# Patient Record
Sex: MALE | Race: WHITE | Employment: OTHER | URBAN - METROPOLITAN AREA
[De-identification: names, ages, dates, MRNs, and addresses within clinical notes are randomized per-mention and may not be internally consistent; named-entity substitution may affect disease eponyms.]

---

## 2017-05-19 PROBLEM — F03.90 SENILE DEMENTIA (HCC): Status: ACTIVE | Noted: 2017-05-19

## 2017-06-26 PROBLEM — M19.90 OA (OSTEOARTHRITIS): Status: ACTIVE | Noted: 2017-06-26

## 2017-06-26 PROBLEM — E78.2 MIXED HYPERLIPIDEMIA: Status: ACTIVE | Noted: 2017-06-26

## 2017-07-18 PROBLEM — E53.8 VITAMIN B12 DEFICIENCY: Status: ACTIVE | Noted: 2017-07-18
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
## 2017-07-18 PROBLEM — R89.9 ABNORMAL LABORATORY TEST: Status: ACTIVE | Noted: 2017-07-18

## 2017-07-31 PROBLEM — M25.572 BILATERAL ANKLE PAIN: Status: ACTIVE | Noted: 2017-07-31

## 2017-07-31 PROBLEM — M25.571 BILATERAL ANKLE PAIN: Status: ACTIVE | Noted: 2017-07-31

## 2017-07-31 PROBLEM — Z91.09 POLLEN ALLERGIES: Status: ACTIVE | Noted: 2017-07-31

## 2017-08-02 ENCOUNTER — APPOINTMENT (OUTPATIENT)
Dept: CT IMAGING | Age: 82
DRG: 841 | End: 2017-08-02
Attending: INTERNAL MEDICINE
Payer: MEDICARE

## 2017-08-02 ENCOUNTER — PATIENT OUTREACH (OUTPATIENT)
Dept: CASE MANAGEMENT | Age: 82
End: 2017-08-02

## 2017-08-02 ENCOUNTER — APPOINTMENT (OUTPATIENT)
Dept: GENERAL RADIOLOGY | Age: 82
DRG: 841 | End: 2017-08-02
Attending: NURSE PRACTITIONER
Payer: MEDICARE

## 2017-08-02 ENCOUNTER — HOSPITAL ENCOUNTER (INPATIENT)
Age: 82
LOS: 7 days | Discharge: NURSING, REHAB OR LONG-TERM CARE | DRG: 841 | End: 2017-08-10
Attending: INTERNAL MEDICINE | Admitting: INTERNAL MEDICINE
Payer: MEDICARE

## 2017-08-02 ENCOUNTER — HOSPITAL ENCOUNTER (OUTPATIENT)
Dept: LAB | Age: 82
Discharge: HOME | End: 2017-08-02
Payer: MEDICARE

## 2017-08-02 ENCOUNTER — DOCUMENTATION ONLY (OUTPATIENT)
Dept: ONCOLOGY | Age: 82
End: 2017-08-02

## 2017-08-02 DIAGNOSIS — C90.00 MULTIPLE MYELOMA NOT HAVING ACHIEVED REMISSION (HCC): ICD-10-CM

## 2017-08-02 DIAGNOSIS — F31.60 BIPOLAR AFFECTIVE DISORDER, CURRENT EPISODE MIXED, CURRENT EPISODE SEVERITY UNSPECIFIED (HCC): ICD-10-CM

## 2017-08-02 PROBLEM — K21.9 GASTROESOPHAGEAL REFLUX DISEASE: Status: ACTIVE | Noted: 2017-08-02

## 2017-08-02 PROBLEM — D72.819 LEUKOPENIA: Status: ACTIVE | Noted: 2017-08-02

## 2017-08-02 LAB
ALBUMIN SERPL BCP-MCNC: 3 G/DL (ref 3.2–4.6)
ALBUMIN SERPL BCP-MCNC: 3.1 G/DL (ref 3.2–4.6)
ALBUMIN/GLOB SERPL: 0.5 {RATIO} (ref 1.2–3.5)
ALBUMIN/GLOB SERPL: 0.5 {RATIO} (ref 1.2–3.5)
ALP SERPL-CCNC: 70 U/L (ref 50–136)
ALP SERPL-CCNC: 76 U/L (ref 50–136)
ALT SERPL-CCNC: 30 U/L (ref 12–65)
ALT SERPL-CCNC: 33 U/L (ref 12–65)
ANION GAP BLD CALC-SCNC: 3 MMOL/L (ref 7–16)
ANION GAP BLD CALC-SCNC: 7 MMOL/L (ref 7–16)
AST SERPL W P-5'-P-CCNC: 32 U/L (ref 15–37)
AST SERPL W P-5'-P-CCNC: 33 U/L (ref 15–37)
B2 MICROGLOB SERPL-MCNC: 4.3 MG/L (ref 0.8–2.34)
BASOPHILS # BLD AUTO: 0 K/UL (ref 0–0.2)
BASOPHILS # BLD AUTO: 0 K/UL (ref 0–0.2)
BASOPHILS # BLD: 0 % (ref 0–2)
BASOPHILS # BLD: 0 % (ref 0–2)
BILIRUB SERPL-MCNC: 0.4 MG/DL (ref 0.2–1.1)
BILIRUB SERPL-MCNC: 0.5 MG/DL (ref 0.2–1.1)
BONE MARROW PREP & W,BMA: NORMAL
BUN SERPL-MCNC: 21 MG/DL (ref 8–23)
BUN SERPL-MCNC: 23 MG/DL (ref 8–23)
CALCIUM SERPL-MCNC: 8 MG/DL (ref 8.3–10.4)
CALCIUM SERPL-MCNC: 8.4 MG/DL (ref 8.3–10.4)
CHLORIDE SERPL-SCNC: 109 MMOL/L (ref 98–107)
CHLORIDE SERPL-SCNC: 110 MMOL/L (ref 98–107)
CO2 SERPL-SCNC: 25 MMOL/L (ref 21–32)
CO2 SERPL-SCNC: 26 MMOL/L (ref 21–32)
CREAT SERPL-MCNC: 0.89 MG/DL (ref 0.8–1.5)
CREAT SERPL-MCNC: 0.98 MG/DL (ref 0.8–1.5)
DIFFERENTIAL METHOD BLD: ABNORMAL
DIFFERENTIAL METHOD BLD: ABNORMAL
EOSINOPHIL # BLD: 0.1 K/UL (ref 0–0.8)
EOSINOPHIL # BLD: 0.1 K/UL (ref 0–0.8)
EOSINOPHIL NFR BLD: 2 % (ref 0.5–7.8)
EOSINOPHIL NFR BLD: 2 % (ref 0.5–7.8)
ERYTHROCYTE [DISTWIDTH] IN BLOOD BY AUTOMATED COUNT: 14.6 % (ref 11.9–14.6)
ERYTHROCYTE [DISTWIDTH] IN BLOOD BY AUTOMATED COUNT: 14.9 % (ref 11.9–14.6)
GLOBULIN SER CALC-MCNC: 6.1 G/DL (ref 2.3–3.5)
GLOBULIN SER CALC-MCNC: 6.2 G/DL (ref 2.3–3.5)
GLUCOSE SERPL-MCNC: 146 MG/DL (ref 65–100)
GLUCOSE SERPL-MCNC: 72 MG/DL (ref 65–100)
HCT VFR BLD AUTO: 32.8 % (ref 41.1–50.3)
HCT VFR BLD AUTO: 33.8 % (ref 41.1–50.3)
HGB BLD-MCNC: 11.5 G/DL (ref 13.6–17.2)
HGB BLD-MCNC: 11.8 G/DL (ref 13.6–17.2)
IMM GRANULOCYTES # BLD: 0 K/UL (ref 0–0.5)
IMM GRANULOCYTES NFR BLD AUTO: 0.3 % (ref 0–5)
KAPPA LC FREE SER-MCNC: 57.16 MG/L (ref 3.3–19.4)
KAPPA LC FREE/LAMBDA FREE SER: 4.61 {RATIO} (ref 0.26–1.65)
LAMBDA LC FREE SERPL-MCNC: 12.39 MG/L (ref 5.71–26.3)
LYMPHOCYTES # BLD AUTO: 44 % (ref 13–44)
LYMPHOCYTES # BLD AUTO: 49 % (ref 13–44)
LYMPHOCYTES # BLD: 1.5 K/UL (ref 0.5–4.6)
LYMPHOCYTES # BLD: 1.7 K/UL (ref 0.5–4.6)
MAGNESIUM SERPL-MCNC: 2.1 MG/DL (ref 1.8–2.4)
MCH RBC QN AUTO: 34.3 PG (ref 26.1–32.9)
MCH RBC QN AUTO: 34.8 PG (ref 26.1–32.9)
MCHC RBC AUTO-ENTMCNC: 34.9 G/DL (ref 31.4–35)
MCHC RBC AUTO-ENTMCNC: 35.1 G/DL (ref 31.4–35)
MCV RBC AUTO: 98.3 FL (ref 79.6–97.8)
MCV RBC AUTO: 99.4 FL (ref 79.6–97.8)
MONOCYTES # BLD: 0.4 K/UL (ref 0.1–1.3)
MONOCYTES # BLD: 0.4 K/UL (ref 0.1–1.3)
MONOCYTES NFR BLD AUTO: 10 % (ref 4–12)
MONOCYTES NFR BLD AUTO: 12 % (ref 4–12)
NEUTS SEG # BLD: 1.3 K/UL (ref 1.7–8.2)
NEUTS SEG # BLD: 1.5 K/UL (ref 1.7–8.2)
NEUTS SEG NFR BLD AUTO: 37 % (ref 43–78)
NEUTS SEG NFR BLD AUTO: 44 % (ref 43–78)
NRBC # BLD: 0 K/UL (ref 0–0.2)
PLATELET # BLD AUTO: 112 K/UL (ref 150–450)
PLATELET # BLD AUTO: 120 K/UL (ref 150–450)
PMV BLD AUTO: 10.7 FL (ref 10.8–14.1)
PMV BLD AUTO: 11 FL (ref 10.8–14.1)
POTASSIUM SERPL-SCNC: 3.7 MMOL/L (ref 3.5–5.1)
POTASSIUM SERPL-SCNC: 3.9 MMOL/L (ref 3.5–5.1)
PROT SERPL-MCNC: 9.1 G/DL (ref 6.3–8.2)
PROT SERPL-MCNC: 9.3 G/DL (ref 6.3–8.2)
RBC # BLD AUTO: 3.3 M/UL (ref 4.23–5.67)
RBC # BLD AUTO: 3.44 M/UL (ref 4.23–5.67)
SODIUM SERPL-SCNC: 137 MMOL/L (ref 136–145)
SODIUM SERPL-SCNC: 143 MMOL/L (ref 136–145)
WBC # BLD AUTO: 3.4 K/UL (ref 4.3–11.1)
WBC # BLD AUTO: 3.4 K/UL (ref 4.3–11.1)

## 2017-08-02 PROCEDURE — 88305 TISSUE EXAM BY PATHOLOGIST: CPT | Performed by: INTERNAL MEDICINE

## 2017-08-02 PROCEDURE — 88280 CHROMOSOME KARYOTYPE STUDY: CPT

## 2017-08-02 PROCEDURE — 82232 ASSAY OF BETA-2 PROTEIN: CPT | Performed by: INTERNAL MEDICINE

## 2017-08-02 PROCEDURE — 88185 FLOWCYTOMETRY/TC ADD-ON: CPT

## 2017-08-02 PROCEDURE — 36415 COLL VENOUS BLD VENIPUNCTURE: CPT | Performed by: NURSE PRACTITIONER

## 2017-08-02 PROCEDURE — 88341 IMHCHEM/IMCYTCHM EA ADD ANTB: CPT | Performed by: INTERNAL MEDICINE

## 2017-08-02 PROCEDURE — 83735 ASSAY OF MAGNESIUM: CPT | Performed by: NURSE PRACTITIONER

## 2017-08-02 PROCEDURE — 88312 SPECIAL STAINS GROUP 1: CPT | Performed by: INTERNAL MEDICINE

## 2017-08-02 PROCEDURE — 88237 TISSUE CULTURE BONE MARROW: CPT

## 2017-08-02 PROCEDURE — 88184 FLOWCYTOMETRY/ TC 1 MARKER: CPT

## 2017-08-02 PROCEDURE — 88112 CYTOPATH CELL ENHANCE TECH: CPT

## 2017-08-02 PROCEDURE — 88374 M/PHMTRC ALYS ISHQUANT/SEMIQ: CPT

## 2017-08-02 PROCEDURE — 36415 COLL VENOUS BLD VENIPUNCTURE: CPT | Performed by: INTERNAL MEDICINE

## 2017-08-02 PROCEDURE — 88185 FLOWCYTOMETRY/TC ADD-ON: CPT | Performed by: INTERNAL MEDICINE

## 2017-08-02 PROCEDURE — 74011250636 HC RX REV CODE- 250/636: Performed by: NURSE PRACTITIONER

## 2017-08-02 PROCEDURE — 86334 IMMUNOFIX E-PHORESIS SERUM: CPT | Performed by: INTERNAL MEDICINE

## 2017-08-02 PROCEDURE — 88264 CHROMOSOME ANALYSIS 20-25: CPT

## 2017-08-02 PROCEDURE — 88342 IMHCHEM/IMCYTCHM 1ST ANTB: CPT | Performed by: INTERNAL MEDICINE

## 2017-08-02 PROCEDURE — 74011000250 HC RX REV CODE- 250: Performed by: PHYSICIAN ASSISTANT

## 2017-08-02 PROCEDURE — 77075 RADEX OSSEOUS SURVEY COMPL: CPT

## 2017-08-02 PROCEDURE — 85025 COMPLETE CBC W/AUTO DIFF WBC: CPT | Performed by: INTERNAL MEDICINE

## 2017-08-02 PROCEDURE — 82652 VIT D 1 25-DIHYDROXY: CPT | Performed by: INTERNAL MEDICINE

## 2017-08-02 PROCEDURE — 84165 PROTEIN E-PHORESIS SERUM: CPT | Performed by: INTERNAL MEDICINE

## 2017-08-02 PROCEDURE — 88364 INSITU HYBRIDIZATION (FISH): CPT

## 2017-08-02 PROCEDURE — 88367 INSITU HYBRIDIZATION AUTO: CPT

## 2017-08-02 PROCEDURE — 88313 SPECIAL STAINS GROUP 2: CPT | Performed by: INTERNAL MEDICINE

## 2017-08-02 PROCEDURE — 88311 DECALCIFY TISSUE: CPT | Performed by: INTERNAL MEDICINE

## 2017-08-02 PROCEDURE — 77012 CT SCAN FOR NEEDLE BIOPSY: CPT

## 2017-08-02 PROCEDURE — 80053 COMPREHEN METABOLIC PANEL: CPT | Performed by: INTERNAL MEDICINE

## 2017-08-02 PROCEDURE — 80053 COMPREHEN METABOLIC PANEL: CPT | Performed by: NURSE PRACTITIONER

## 2017-08-02 PROCEDURE — 88184 FLOWCYTOMETRY/ TC 1 MARKER: CPT | Performed by: INTERNAL MEDICINE

## 2017-08-02 PROCEDURE — 85025 COMPLETE CBC W/AUTO DIFF WBC: CPT | Performed by: NURSE PRACTITIONER

## 2017-08-02 PROCEDURE — 83883 ASSAY NEPHELOMETRY NOT SPEC: CPT | Performed by: INTERNAL MEDICINE

## 2017-08-02 PROCEDURE — 99218 HC RM OBSERVATION: CPT

## 2017-08-02 PROCEDURE — 88365 INSITU HYBRIDIZATION (FISH): CPT

## 2017-08-02 PROCEDURE — 74011250637 HC RX REV CODE- 250/637: Performed by: NURSE PRACTITIONER

## 2017-08-02 RX ORDER — NAPROXEN 250 MG/1
500 TABLET ORAL
Status: DISCONTINUED | OUTPATIENT
Start: 2017-08-02 | End: 2017-08-10 | Stop reason: HOSPADM

## 2017-08-02 RX ORDER — ONDANSETRON 2 MG/ML
4 INJECTION INTRAMUSCULAR; INTRAVENOUS
Status: DISCONTINUED | OUTPATIENT
Start: 2017-08-02 | End: 2017-08-10 | Stop reason: HOSPADM

## 2017-08-02 RX ORDER — DONEPEZIL HYDROCHLORIDE 5 MG/1
10 TABLET, FILM COATED ORAL DAILY
Status: DISCONTINUED | OUTPATIENT
Start: 2017-08-03 | End: 2017-08-10 | Stop reason: HOSPADM

## 2017-08-02 RX ORDER — BISMUTH SUBSALICYLATE 262 MG
1 TABLET,CHEWABLE ORAL DAILY
COMMUNITY
End: 2019-05-23

## 2017-08-02 RX ORDER — ASPIRIN 81 MG/1
81 TABLET ORAL DAILY
Status: DISCONTINUED | OUTPATIENT
Start: 2017-08-03 | End: 2017-08-10 | Stop reason: HOSPADM

## 2017-08-02 RX ORDER — SODIUM CHLORIDE 9 MG/ML
75 INJECTION, SOLUTION INTRAVENOUS CONTINUOUS
Status: DISCONTINUED | OUTPATIENT
Start: 2017-08-02 | End: 2017-08-10 | Stop reason: HOSPADM

## 2017-08-02 RX ORDER — LIDOCAINE HYDROCHLORIDE 20 MG/ML
2-20 INJECTION, SOLUTION INFILTRATION; PERINEURAL
Status: DISCONTINUED | OUTPATIENT
Start: 2017-08-02 | End: 2017-08-09 | Stop reason: HOSPADM

## 2017-08-02 RX ORDER — SIMVASTATIN 20 MG/1
20 TABLET, FILM COATED ORAL
Status: DISCONTINUED | OUTPATIENT
Start: 2017-08-02 | End: 2017-08-10 | Stop reason: HOSPADM

## 2017-08-02 RX ORDER — ENOXAPARIN SODIUM 100 MG/ML
40 INJECTION SUBCUTANEOUS EVERY 24 HOURS
Status: DISCONTINUED | OUTPATIENT
Start: 2017-08-02 | End: 2017-08-10 | Stop reason: HOSPADM

## 2017-08-02 RX ORDER — ALPRAZOLAM 0.5 MG/1
1 TABLET ORAL
Status: DISCONTINUED | OUTPATIENT
Start: 2017-08-02 | End: 2017-08-10 | Stop reason: HOSPADM

## 2017-08-02 RX ORDER — ESCITALOPRAM OXALATE 10 MG/1
20 TABLET ORAL DAILY
Status: DISCONTINUED | OUTPATIENT
Start: 2017-08-03 | End: 2017-08-03

## 2017-08-02 RX ORDER — MECLIZINE HYDROCHLORIDE 25 MG/1
25 TABLET ORAL
Status: DISCONTINUED | OUTPATIENT
Start: 2017-08-02 | End: 2017-08-10 | Stop reason: HOSPADM

## 2017-08-02 RX ORDER — HYDROCODONE BITARTRATE AND ACETAMINOPHEN 7.5; 325 MG/1; MG/1
1 TABLET ORAL
Status: DISCONTINUED | OUTPATIENT
Start: 2017-08-02 | End: 2017-08-10 | Stop reason: HOSPADM

## 2017-08-02 RX ADMIN — LIDOCAINE HYDROCHLORIDE 160 MG: 20 INJECTION, SOLUTION INFILTRATION; PERINEURAL at 14:48

## 2017-08-02 RX ADMIN — ALPRAZOLAM 1 MG: 0.5 TABLET ORAL at 22:15

## 2017-08-02 RX ADMIN — SODIUM CHLORIDE 75 ML/HR: 900 INJECTION, SOLUTION INTRAVENOUS at 22:40

## 2017-08-02 RX ADMIN — ENOXAPARIN SODIUM 40 MG: 40 INJECTION SUBCUTANEOUS at 22:12

## 2017-08-02 RX ADMIN — SODIUM BICARBONATE 2 ML: 0.2 INJECTION, SOLUTION INTRAVENOUS at 14:48

## 2017-08-02 RX ADMIN — NAPROXEN 250 MG: 250 TABLET ORAL at 23:26

## 2017-08-02 NOTE — PROGRESS NOTES
TRANSFER - IN REPORT:    Verbal report received from  [facility]  on Radha Ba  being received from   [facility]  for ordered procedure- work-up Bone Marrow  Biopsy and Bone Survery      Report consisted of patients Situation, Background, Assessment and   Recommendations(SBAR). Information from the following report(s) SBAR and Kardex was reviewed with the receiving nurse. Opportunity for questions and clarification was provided. Assessment completed upon patients arrival to unit and care assumed.

## 2017-08-02 NOTE — PROGRESS NOTES
8/2/17:  Patient in for new patient consult with Dr. Hernesto Asif. Patient referred by Dr. Jimi Mahmood to Dr. Kyle Clark for elevated protein in urine. Patient alert and oriented x 3 in the office today but admits to being forgetful at times. Patient is hard of hearing and speaks loudly to compensate. Dr. Kyle Clark reviewed labs and discussed plan. Patient lives alone and unable to travel back and forth to [facility].  Plan is to admit for further diagnostic work-up. During ov, patient mentioned to this navigator of \"highs and lows\" that he experiences with his bi-polar. He mentioned suicidal thoughts during \"lows\" but doesn't feel that way currently. , Trey Whatley, made aware of patient's comments. Patient reports he has seen Dr. Paul Bailey in the past for psychiatry. SW met with patient briefly. SW arranged for transportation to [facility] for admission to room 537. This navigator called patient's daughter, Claude Ammons, per his request to update family on his condition. Claude Ammons contact is [phone]. John Bland NP made aware of patient's admission. Report called to RN, Andrea Kinney on 5th Floor. Of note, patient's car is still at the cancer center.

## 2017-08-02 NOTE — PROGRESS NOTES
Dual head to toe skin assessment completed with this nurse and Guadalupe Kevin RN no breaks in skin good skin integrity .

## 2017-08-02 NOTE — PROGRESS NOTES
END OF SHIFT NOTE:    Intake/Output      Voiding: yes incont. In IR   Catheter: no   Drain:              Stool:  No  occurrences. Emesis:  No  occurrences. VITAL SIGNS  Patient Vitals for the past 12 hrs:   Temp Pulse Resp BP SpO2   08/02/17 1522 98.5 °F (36.9 °C) (!) 55 20 167/69 100 %   08/02/17 1340 97.8 °F (36.6 °C) (!) 54 18 131/64 100 %       Pain Assessment  Pain 1  Pain Scale 1: Numeric (0 - 10) (08/02/17 1637)  Pain Intensity 1: 0 (08/02/17 1637)  Patient Stated Pain Goal: 0 (08/02/17 1637)    Ambulating   bed     Additional Information: Patient is  very talkative. Call place to Pastoral care per patient request to have confession by . [facility] Psychiatry  consult  Perform by Dr. Eloy Lam report fax copy place om patient 's hard chart with his dx and recommendation. Patient is very hear of hearing. Close caption place on his television. Bone marrow biopsy done and Bone survey to be done this pm.     Shift report given to oncoming nurse Farshad Edmondson RN  at the bedside.     Rylan Latwon RN

## 2017-08-02 NOTE — PROGRESS NOTES
TRANSFER - OUT REPORT:    Verbal report given to Lam Pizarro RN(name) on [facility]  being transferred to 5th floor(unit) for routine progression of care       Report consisted of patients Situation, Background, Assessment and   Recommendations(SBAR). Information from the following report(s) Procedure Summary was reviewed with the receiving nurse. Lines:   Peripheral IV 03/23/15 Left Hand (Active)        Opportunity for questions and clarification was provided. Patient transported with:   Registered Nurse     Nurse encouraged to call IR for questions.

## 2017-08-02 NOTE — PROCEDURES
Department of Interventional Radiology  [phone]        Interventional Radiology Brief Procedure Note    Patient: Viki Wang MRN: [id number]  SSN: [id number]    YOB: 1935  Age: [de-identified] y.o.   Sex: male      Date of Procedure: 8/2/2017    Pre-Procedure Diagnosis: multiple myeloma    Post-Procedure Diagnosis: SAME    Procedure(s): Image Guided Biopsy    Brief Description of Procedure: CT guided right iliac bone marrow aspiration and core biopsy    Performed By: Paris Dias PA-C     Assistants: None    Anesthesia:Lidocaine    Estimated Blood Loss: Less than 10ml    Specimens: core and aspirate    Implants: None    Findings: no post biopsy bleeding    Complications: None    Recommendations: bedrest     Follow Up: referring MD    Signed By: Paris Dias PA-C     August 2, 2017

## 2017-08-02 NOTE — PROGRESS NOTES
SW received referral from RN Navigator Ness Powell to assist with pt needs. JUAN reviewed pt's chart, significant for dx of bipolar and Alzheimer's. MD to admit pt for testing overnight. JUAN met with pt to introduce self and services. Pt stated he was agreeable to admission but was concerned about transportation and alerting his family of his admission. RN Navigator called pt's daughter and made her aware of pt's admission. JUAN arranged for [place] transportation to [facility] and relayed this to pt. Pt verbalized understanding. JUAN updated inpt CMs of pt admission. JUAN provided pt with SW contact information and encouraged pt to call should any questions arise. Pt verbalized understanding. SW intends to follow up.

## 2017-08-02 NOTE — H&P
Inpatient Hematology / Oncology History and Physical    Reason for Asmission:  multimyeloma;Leukopenia    History of Present Illness:  Mr. Reny Wetzel is a [de-identified] y.o. male admitted on 8/2/2017 with a primary diagnosis of Multiple Myeloma, IgG Kappa, ISS Stage II, he expressed suicidal ideation after being told the diagnosis and treatment plan, he will be evaluated by Psychiatry during this admission. Review of Systems:  Constitutional c/o  appetite changes and fatigue. HEENT Denies trauma, blurry vision, hearing loss, ear pain, nosebleeds, sore throat, neck pain and ear discharge. Skin Denies lesions or rashes. Lungs Denies dyspnea, cough, sputum production or hemoptysis. Cardiovascular Denies chest pain, palpitations, or lower extremity edema. Gastrointestinal Denies nausea, vomiting, changes in bowel habits, bloody or black stools, abdominal pain.  Denies dysuria, frequency or hesitancy of urination. Neuro Denies headaches, visual changes or ataxia. Denies dizziness, tingling, tremors, sensory change, speech change, focal weakness or headaches. Hematology Denies easy bruising or bleeding, denies gingival bleeding or epistaxis. Endo Denies heat/cold intolerance, denies diabetes or thyroid abnormalities. MSK Denies back pain, arthralgias, myalgias or frequent falls. Psychiatric/Behavioral He expressed suicidal ideation.          No Known Allergies  Past Medical History:   Diagnosis Date    Acute encephalopathy 3/22/2015    Altered mental status 9/16/2014    Alzheimer disease     Anemia 9/16/2014    MILD      Anxiety     Asymmetrical sensorineural hearing loss     Bipolar disorder ([facility]     NOT TREATED, DIAGNOSED MANY YEARS AGO    Cataract 9/8/2016    Cortical hemorrhage (HCC) 9/17/2014    Elevated AST (SGOT) 9/16/2014    GERD (gastroesophageal reflux disease)     not Tx    H/O seasonal allergies     History of TIA (transient ischemic attack) 9/16/2014    Hypomagnesemia 9/16/2014    MILD      Panic attack     Senile dementia 5/19/2017    Stroke [facility])     ? TIA, PUT ON PLAVIX, TOOK SELF OFF    Syncope and collapse 3/22/2015    Tinea corporis 9/16/2014    Tinnitus      Past Surgical History:   Procedure Laterality Date    HX APPENDECTOMY      HX COLONOSCOPY  MULTIPLE OVER THE YEARS    NO CANCER    HX OTHER SURGICAL  AGE 26    COLONIC POLYP REMOVAL     Family History   Problem Relation Age of Onset    Diabetes Mother      COMPLICATIONS OF DM    Cancer Mother     Heart Disease Mother     Lung Disease Father      BLACK LUNG    Dementia Sister     Heart Disease Brother      \"OLD AGE\"    Other Son      ESTRANGED, IN [place]    Other Daughter      1 ESTRANGED, [place], 1 IN [place]     Social History     Social History    Marital status:      Spouse name: N/A    Number of children: N/A    Years of education: N/A     Occupational History    Not on file. Social History Main Topics    Smoking status: Former Smoker     Packs/day: 2.00     Years: 16.00     Types: Cigarettes    Smokeless tobacco: Never Used      Comment: QUIT AGE 37    Alcohol use No    Drug use: No    Sexual activity: Not Currently     Partners: Female     Birth control/ protection: None     Other Topics Concern    Not on file     Social History Narrative    9/16/14:  PATIENT IS , LIVES WITH CATARINA MAYA. LIVED MOST OF LIVE IN [place], OWNED A BUSINESS [facility]. HE MOVED TO [facility] FOR 5 YEARS, HAS BEEN HERE (?) FOR ABOUT 10 YEARS. HAS A DAUGHTER IN [place] THAT IS NOT SPEAKING TO HIM, A SON IN [place] WHO IS NOT SPEAKING TO HIM, AND A DAUGHTER IN [facility]. BROTHER AND SISTER ARE DEAD. ONLY FRIEND IS SERA TORRES, ([facility]: [phone]), A  WHO LOOKS AFTER THE PATIENT. CALL HIM ON DISCHARGE AS HE HAS PATIENT'S DOG AND HOUSE KEYS.        Current Facility-Administered Medications   Medication Dose Route Frequency Provider Last Rate Last Dose    ALPRAZolam (XANAX) tablet 1 mg  1 mg Oral TID PRN Linda Zepeda, NP        [START ON 8/3/2017] aspirin delayed-release tablet 81 mg  81 mg Oral DAILY Linda Zepeda, NP        [START ON 8/3/2017] donepezil (ARICEPT) tablet 10 mg  10 mg Oral DAILY Linda Zepeda, NP        [START ON 8/3/2017] escitalopram oxalate (LEXAPRO) tablet 20 mg  20 mg Oral DAILY Linda Zepeda, NP        simvastatin (ZOCOR) tablet 20 mg  20 mg Oral QHS Linda Zepeda, NP        meclizine (ANTIVERT) tablet 25 mg  25 mg Oral Q6H PRN Linda Zepeda, NP        ondansetron [facility]) injection 4 mg  4 mg IntraVENous Q4H PRN Linda Zepeda, NP        HYDROcodone-acetaminophen (NORCO) 7.5-325 mg per tablet 1 Tab  1 Tab Oral Q4H PRN Linda Zepeda, NP        0.9% sodium chloride infusion  75 mL/hr IntraVENous CONTINUOUS Linda Zepeda, NP        enoxaparin (LOVENOX) injection 40 mg  40 mg SubCUTAneous Q24H Linda Zepeda, NP        lidocaine (XYLOCAINE) 20 mg/mL (2 %) injection  mg  2-20 mL IntraDERMal Multiple Raull SOPHIA Altman   160 mg at 17 1448       OBJECTIVE:  Patient Vitals for the past 8 hrs:   BP Temp Pulse Resp SpO2 Weight   17 1522 167/69 98.5 °F (36.9 °C) (!) 55 20 100 % -   17 1420 - - - - - 261 lb (118.4 kg)   17 1340 131/64 97.8 °F (36.6 °C) (!) 54 18 100 % -     Temp (24hrs), Av.1 °F (36.7 °C), Min:97.8 °F (36.6 °C), Max:98.5 °F (36.9 °C)         Physical Exam:  Constitutional: Well developed, well nourished male in no acute distress, sitting comfortably in the hospital bed. HEENT: Normocephalic and atraumatic. Oropharynx is clear, mucous membranes are moist.  Pupils are equal, round, and reactive to light. Extraocular muscles are intact. Sclerae anicteric. Neck supple without JVD. No thyromegaly present. Lymph node   No palpable submandibular, cervical, supraclavicular, axillary or inguinal lymph nodes. Skin Warm and dry. No bruising and no rash noted. No erythema. No pallor.     Respiratory Lungs are clear to auscultation bilaterally without wheezes, rales or rhonchi, normal air exchange without accessory muscle use. CVS Normal rate, regular rhythm and normal S1 and S2 with a 2/6 systolic murmur. Abdomen Soft, nontender and nondistended, normoactive bowel sounds. No palpable mass. No hepatosplenomegaly. Neuro Grossly nonfocal with no obvious sensory or motor deficits. MSK Normal range of motion in general.  No edema and no tenderness. Psych Appears depressed. Labs:    Recent Results (from the past 24 hour(s))   CBC WITH AUTOMATED DIFF    Collection Time: 08/02/17 11:47 AM   Result Value Ref Range    WBC 3.4 (L) 4.3 - 11.1 K/uL    RBC 3.30 (L) 4.23 - 5.67 M/uL    HGB 11.5 (L) 13.6 - 17.2 g/dL    HCT 32.8 (L) 41.1 - 50.3 %    MCV 99.4 (H) 79.6 - 97.8 FL    MCH 34.8 (H) 26.1 - 32.9 PG    MCHC 35.1 (H) 31.4 - 35.0 g/dL    RDW 14.6 11.9 - 14.6 %    PLATELET 450 (L) 895 - 450 K/uL    MPV 10.7 (L) 10.8 - 14.1 FL    ABSOLUTE NRBC 0.00 0.0 - 0.2 K/uL    DF AUTOMATED      NEUTROPHILS 44 43 - 78 %    LYMPHOCYTES 44 13 - 44 %    MONOCYTES 10 4.0 - 12.0 %    EOSINOPHILS 2 0.5 - 7.8 %    BASOPHILS 0 0.0 - 2.0 %    ABS. NEUTROPHILS 1.5 (L) 1.7 - 8.2 K/UL    ABS. LYMPHOCYTES 1.5 0.5 - 4.6 K/UL    ABS. MONOCYTES 0.4 0.1 - 1.3 K/UL    ABS. EOSINOPHILS 0.1 0.0 - 0.8 K/UL    ABS. BASOPHILS 0.0 0.0 - 0.2 K/UL   METABOLIC PANEL, COMPREHENSIVE    Collection Time: 08/02/17 11:47 AM   Result Value Ref Range    Sodium 137 136 - 145 mmol/L    Potassium 3.9 3.5 - 5.1 mmol/L    Chloride 109 (H) 98 - 107 mmol/L    CO2 25 21 - 32 mmol/L    Anion gap 3 (L) 7 - 16 mmol/L    Glucose 146 (H) 65 - 100 mg/dL    BUN 23 8 - 23 MG/DL    Creatinine 0.98 0.8 - 1.5 MG/DL    GFR est AA >60 >60 ml/min/1.73m2    GFR est non-AA >60 >60 ml/min/1.73m2    Calcium 8.4 8.3 - 10.4 MG/DL    Bilirubin, total 0.5 0.2 - 1.1 MG/DL    ALT (SGPT) 33 12 - 65 U/L    AST (SGOT) 33 15 - 37 U/L    Alk.  phosphatase 76 50 - 136 U/L    Protein, total 9.3 (H) 6.3 - 8.2 g/dL    Albumin 3.1 (L) 3.2 - 4.6 g/dL Globulin 6.2 (H) 2.3 - 3.5 g/dL    A-G Ratio 0.5 (L) 1.2 - 3.5     PROTEIN ELEC WITH RENE, SERUM    Collection Time: 08/02/17 11:47 AM   Result Value Ref Range    Protein, total PENDING g/dL    A-G Ratio PENDING      ALBUMIN PENDING g/dL    ALPHA 1 PENDING g/dL    ALPHA 2 PENDING g/dL    BETA PENDING g/dL    GAMMA PENDING 10 - 12 g/dL    M-Perry PENDING 0 g/dL    Immunoglobulin G 3865 (H) 610 - 1616 mg/dL    Immunoglobulin A 32 (L) 85 - 499 mg/dL    Immunoglobulin M 29 (L) 35 - 242 mg/dL    PEP Interpretation PENDING     RENE Interpretation PENDING    BETA-2 MICROGLOBULIN    Collection Time: 08/02/17 11:47 AM   Result Value Ref Range    Beta-2 Microglobulin 4.3 (H) 0.80 - 2.34 mg/L   FREE LIGHT CHAINS, KAPPA/LAMBDA, QT    Collection Time: 08/02/17 11:47 AM   Result Value Ref Range    Kappa Free Light Chain 57.16 (H) 3.30 - 19.40 mg/L    Lambda Free Light Chain 12.39 5.71 - 26.30 mg/L    Kappa/Lambda Ratio 4.61 (H) 0.26 - 1.65     BONE MARROW PREP & PANCHAL STAIN    Collection Time: 08/02/17  2:50 PM   Result Value Ref Range    Bone Marrow Prep & Lonia Nailer Stain FOR HEMATOLOGY SERVICES RENDERED     METABOLIC PANEL, COMPREHENSIVE    Collection Time: 08/02/17  3:19 PM   Result Value Ref Range    Sodium 143 136 - 145 mmol/L    Potassium 3.7 3.5 - 5.1 mmol/L    Chloride 110 (H) 98 - 107 mmol/L    CO2 26 21 - 32 mmol/L    Anion gap 7 7 - 16 mmol/L    Glucose 72 65 - 100 mg/dL    BUN 21 8 - 23 MG/DL    Creatinine 0.89 0.8 - 1.5 MG/DL    GFR est AA >60 >60 ml/min/1.73m2    GFR est non-AA >60 >60 ml/min/1.73m2    Calcium 8.0 (L) 8.3 - 10.4 MG/DL    Bilirubin, total 0.4 0.2 - 1.1 MG/DL    ALT (SGPT) 30 12 - 65 U/L    AST (SGOT) 32 15 - 37 U/L    Alk.  phosphatase 70 50 - 136 U/L    Protein, total 9.1 (H) 6.3 - 8.2 g/dL    Albumin 3.0 (L) 3.2 - 4.6 g/dL    Globulin 6.1 (H) 2.3 - 3.5 g/dL    A-G Ratio 0.5 (L) 1.2 - 3.5     MAGNESIUM    Collection Time: 08/02/17  3:19 PM   Result Value Ref Range    Magnesium 2.1 1.8 - 2.4 mg/dL   CBC WITH AUTOMATED DIFF    Collection Time: 08/02/17  3:19 PM   Result Value Ref Range    WBC 3.4 (L) 4.3 - 11.1 K/uL    RBC 3.44 (L) 4.23 - 5.67 M/uL    HGB 11.8 (L) 13.6 - 17.2 g/dL    HCT 33.8 (L) 41.1 - 50.3 %    MCV 98.3 (H) 79.6 - 97.8 FL    MCH 34.3 (H) 26.1 - 32.9 PG    MCHC 34.9 31.4 - 35.0 g/dL    RDW 14.9 (H) 11.9 - 14.6 %    PLATELET 702 (L) 515 - 450 K/uL    MPV 11.0 10.8 - 14.1 FL    DF AUTOMATED      NEUTROPHILS 37 (L) 43 - 78 %    LYMPHOCYTES 49 (H) 13 - 44 %    MONOCYTES 12 4.0 - 12.0 %    EOSINOPHILS 2 0.5 - 7.8 %    BASOPHILS 0 0.0 - 2.0 %    IMMATURE GRANULOCYTES 0.3 0.0 - 5.0 %    ABS. NEUTROPHILS 1.3 (L) 1.7 - 8.2 K/UL    ABS. LYMPHOCYTES 1.7 0.5 - 4.6 K/UL    ABS. MONOCYTES 0.4 0.1 - 1.3 K/UL    ABS. EOSINOPHILS 0.1 0.0 - 0.8 K/UL    ABS. BASOPHILS 0.0 0.0 - 0.2 K/UL    ABS. IMM. GRANS. 0.0 0.0 - 0.5 K/UL       Imaging:  No images are attached to the encounter. ASSESSMENT:  Problem List  Date Reviewed: 7/31/2017          Codes Class Noted    Leukopenia ICD-10-CM: D72.819  ICD-9-CM: 288.50  8/2/2017        Multiple myeloma not having achieved remission ([facility] ICD-10-CM: C90.00  ICD-9-CM: 203.00  8/2/2017        Gastroesophageal reflux disease ICD-10-CM: K21.9  ICD-9-CM: 530.81  8/2/2017        Bilateral ankle pain ICD-10-CM: M25.571, M25.572  ICD-9-CM: 719.47  7/31/2017        Pollen allergies ICD-10-CM: J30.1  ICD-9-CM: 477.0  7/31/2017    Overview Signed 7/31/2017  2:27 PM by Satinder Cruz MD     Pt would like to consider allergy shots. Will refer to Allergy             Fall ICD-10-CM: [address]. Jean Siddiqui  ICD-9-CM: E888.9  7/18/2017    Overview Signed 7/18/2017  2:03 PM by Satinder Cruz MD     Pt had another fall, in his yard; he was chasing after his cat and lost his balance. No injuries sustained; fall witnessed by neighbor. No LOC.              Vitamin B12 deficiency ICD-10-CM: E53.8  ICD-9-CM: 266.2  7/18/2017    Overview Signed 7/18/2017  2:06 PM by Satinder Cruz MD     Pt just started taking Vitamin B 12 supplement. Abnormal laboratory test ICD-10-CM: R89.9  ICD-9-CM: 796.4  7/18/2017    Overview Addendum 7/31/2017  2:26 PM by Tamanna White MD     UPEP results reviewed with pt. Pt has referral to Oncology to further evaluate. Mixed hyperlipidemia ICD-10-CM: E78.2  ICD-9-CM: 272.2  6/26/2017        OA (osteoarthritis) ICD-10-CM: M19.90  ICD-9-CM: 715.90  6/26/2017        Senile dementia ICD-10-CM: F03.90  ICD-9-CM: 290.0  5/19/2017        Cataract ICD-10-CM: H26.9  ICD-9-CM: 366.9  9/8/2016        Asymmetrical sensorineural hearing loss ICD-10-CM: H90.5  ICD-9-CM: 389.16  Unknown        Bipolar disorder ([facility] ICD-10-CM: F31.9  ICD-9-CM: 296.80  3/22/2015    Overview Addendum 7/6/2017  5:01 PM by Tamanna White MD     Currently treated with Lexapro and Xanax. Given pt's age, I would recommend continuing current regimen; refill rx. Follow up 3 months or prn. Pt is working on weaning to a lower dose of Xanax. Syncope and collapse ICD-10-CM: R55  ICD-9-CM: 780.2  3/22/2015        Acute encephalopathy ICD-10-CM: G93.40  ICD-9-CM: 348.30  3/22/2015        Cortical hemorrhage ([facility] ICD-10-CM: I61.1  ICD-9-CM: 552  9/17/2014        Syncope ICD-10-CM: R55  ICD-9-CM: 780.2  9/16/2014    Overview Signed 7/6/2017  5:00 PM by Tamanna White MD     Refer to Cardiology to evaluated. Concern for symptomatic bradycardia. History of TIA (transient ischemic attack) ICD-10-CM: Z86.73  ICD-9-CM: V12.54  9/16/2014        Altered mental status ICD-10-CM: R41.82  ICD-9-CM: 780.97  9/16/2014        Anemia ICD-10-CM: D64.9  ICD-9-CM: 285.9  9/16/2014    Overview Addendum 7/6/2017  5:01 PM by Tamanna White MD     Recheck CBC.              Tinea corporis ICD-10-CM: B35.4  ICD-9-CM: 110.5  9/16/2014        Hypomagnesemia ICD-10-CM: E83.42  ICD-9-CM: 275.2  9/16/2014    Overview Signed 9/16/2014 10:14 PM by Julio Calzada NP     MILD               Elevated AST (SGOT) ICD-10-CM: R74.0  ICD-9-CM: 790.4  9/16/2014                PLAN:  · Psychiatry consult  · Bone marrow biopsy  · Skeletal survey  · Mediport  · Once he is stable and discharged, he will require Zometa and CyBorD as an outpatient. Lab studies and imaging studies were personally reviewed.               Jacinto Pérez MD  [address] [place] [address] Avenue  Office : [phone]  Fax : [phone]

## 2017-08-03 LAB
1,25(OH)2D3 SERPL-MCNC: 56 PG/ML (ref 19.9–79.3)
ALBUMIN SERPL BCP-MCNC: 2.7 G/DL (ref 3.2–4.6)
ALBUMIN/GLOB SERPL: 0.5 {RATIO} (ref 1.2–3.5)
ALP SERPL-CCNC: 63 U/L (ref 50–136)
ALT SERPL-CCNC: 27 U/L (ref 12–65)
ANION GAP BLD CALC-SCNC: 7 MMOL/L (ref 7–16)
AST SERPL W P-5'-P-CCNC: 27 U/L (ref 15–37)
BASOPHILS # BLD AUTO: 0 K/UL (ref 0–0.2)
BASOPHILS # BLD: 0 % (ref 0–2)
BILIRUB SERPL-MCNC: 0.4 MG/DL (ref 0.2–1.1)
BUN SERPL-MCNC: 17 MG/DL (ref 8–23)
CALCIUM SERPL-MCNC: 7.9 MG/DL (ref 8.3–10.4)
CHLORIDE SERPL-SCNC: 111 MMOL/L (ref 98–107)
CO2 SERPL-SCNC: 25 MMOL/L (ref 21–32)
CREAT SERPL-MCNC: 0.86 MG/DL (ref 0.8–1.5)
DIFFERENTIAL METHOD BLD: ABNORMAL
EOSINOPHIL # BLD: 0.1 K/UL (ref 0–0.8)
EOSINOPHIL NFR BLD: 3 % (ref 0.5–7.8)
ERYTHROCYTE [DISTWIDTH] IN BLOOD BY AUTOMATED COUNT: 14.8 % (ref 11.9–14.6)
GLOBULIN SER CALC-MCNC: 5.4 G/DL (ref 2.3–3.5)
GLUCOSE SERPL-MCNC: 94 MG/DL (ref 65–100)
HCT VFR BLD AUTO: 31.5 % (ref 41.1–50.3)
HGB BLD-MCNC: 11 G/DL (ref 13.6–17.2)
IMM GRANULOCYTES # BLD: 0 K/UL (ref 0–0.5)
IMM GRANULOCYTES NFR BLD AUTO: 0 % (ref 0–5)
LYMPHOCYTES # BLD AUTO: 50 % (ref 13–44)
LYMPHOCYTES # BLD: 1.4 K/UL (ref 0.5–4.6)
MCH RBC QN AUTO: 34.1 PG (ref 26.1–32.9)
MCHC RBC AUTO-ENTMCNC: 34.9 G/DL (ref 31.4–35)
MCV RBC AUTO: 97.5 FL (ref 79.6–97.8)
MONOCYTES # BLD: 0.3 K/UL (ref 0.1–1.3)
MONOCYTES NFR BLD AUTO: 10 % (ref 4–12)
NEUTS SEG # BLD: 1.1 K/UL (ref 1.7–8.2)
NEUTS SEG NFR BLD AUTO: 37 % (ref 43–78)
PLATELET # BLD AUTO: 117 K/UL (ref 150–450)
PMV BLD AUTO: 10.8 FL (ref 10.8–14.1)
POTASSIUM SERPL-SCNC: 3.6 MMOL/L (ref 3.5–5.1)
PROT SERPL-MCNC: 8.1 G/DL (ref 6.3–8.2)
RBC # BLD AUTO: 3.23 M/UL (ref 4.23–5.67)
SODIUM SERPL-SCNC: 143 MMOL/L (ref 136–145)
WBC # BLD AUTO: 2.9 K/UL (ref 4.3–11.1)

## 2017-08-03 PROCEDURE — 80053 COMPREHEN METABOLIC PANEL: CPT | Performed by: NURSE PRACTITIONER

## 2017-08-03 PROCEDURE — 85025 COMPLETE CBC W/AUTO DIFF WBC: CPT | Performed by: NURSE PRACTITIONER

## 2017-08-03 PROCEDURE — 07DR3ZX EXTRACTION OF ILIAC BONE MARROW, PERCUTANEOUS APPROACH, DIAGNOSTIC: ICD-10-PCS | Performed by: RADIOLOGY

## 2017-08-03 PROCEDURE — 99218 HC RM OBSERVATION: CPT

## 2017-08-03 PROCEDURE — 65270000029 HC RM PRIVATE

## 2017-08-03 PROCEDURE — 74011250636 HC RX REV CODE- 250/636: Performed by: NURSE PRACTITIONER

## 2017-08-03 PROCEDURE — 74011250637 HC RX REV CODE- 250/637: Performed by: NURSE PRACTITIONER

## 2017-08-03 PROCEDURE — 36415 COLL VENOUS BLD VENIPUNCTURE: CPT | Performed by: NURSE PRACTITIONER

## 2017-08-03 PROCEDURE — 99233 SBSQ HOSP IP/OBS HIGH 50: CPT | Performed by: INTERNAL MEDICINE

## 2017-08-03 RX ORDER — ESCITALOPRAM OXALATE 10 MG/1
10 TABLET ORAL DAILY
Status: COMPLETED | OUTPATIENT
Start: 2017-08-04 | End: 2017-08-06

## 2017-08-03 RX ORDER — QUETIAPINE FUMARATE 25 MG/1
25 TABLET, FILM COATED ORAL
Status: DISCONTINUED | OUTPATIENT
Start: 2017-08-03 | End: 2017-08-10 | Stop reason: HOSPADM

## 2017-08-03 RX ADMIN — DONEPEZIL HYDROCHLORIDE 10 MG: 5 TABLET, FILM COATED ORAL at 12:35

## 2017-08-03 RX ADMIN — ESCITALOPRAM OXALATE 20 MG: 10 TABLET ORAL at 09:50

## 2017-08-03 RX ADMIN — ALPRAZOLAM 1 MG: 0.5 TABLET ORAL at 16:17

## 2017-08-03 RX ADMIN — NAPROXEN 500 MG: 250 TABLET ORAL at 12:34

## 2017-08-03 RX ADMIN — ASPIRIN 81 MG: 81 TABLET, COATED ORAL at 09:50

## 2017-08-03 RX ADMIN — SODIUM CHLORIDE 75 ML/HR: 900 INJECTION, SOLUTION INTRAVENOUS at 09:53

## 2017-08-03 RX ADMIN — ENOXAPARIN SODIUM 40 MG: 40 INJECTION SUBCUTANEOUS at 16:17

## 2017-08-03 RX ADMIN — ALPRAZOLAM 1 MG: 0.5 TABLET ORAL at 10:00

## 2017-08-03 NOTE — PROGRESS NOTES
END OF SHIFT NOTE:    Intake/Output  08/02 1901 - 08/03 0700  In: -   Out: 400 [Urine:400]   Voiding: YES  Catheter: NO  Drain:              Stool:  0 occurrences. Emesis:  0 occurrences. VITAL SIGNS  Patient Vitals for the past 12 hrs:   Temp Pulse Resp BP SpO2   08/03/17 0330 97.7 °F (36.5 °C) (!) 54 18 129/63 96 %   08/02/17 2348 97.8 °F (36.6 °C) (!) 57 18 136/83 94 %       Pain Assessment  Pain 1  Pain Scale 1: Numeric (0 - 10) (08/03/17 0330)  Pain Intensity 1: 0 (08/03/17 0330)  Patient Stated Pain Goal: 0 (08/03/17 0330)  Pain Reassessment 1: Patient sleeping (08/03/17 0145)  Pain Location 1: Back (08/02/17 2327)  Pain Description 1: Aching;Constant (08/02/17 2327)  Pain Intervention(s) 1: Medication (see MAR) (08/02/17 2327)    Ambulating  Yes with assist.     Additional Information:   Pt is VERY [place]. Sitter at bedside. Pt inappropriate at times and did not sleep much through the night. Shift report given to oncoming nurse at the bedside.     Waymond Rinne, RN

## 2017-08-03 NOTE — PROGRESS NOTES
[facility]        Inpatient Hematology / Oncology Progress Note      Admission Date: 2017  1:13 PM  Reason for Admission/Hospital Course: multimyeloma  Leukopenia      24 Hour Events: BMBx performed , awaiting results  Skeletal survey neg  Psych consult - recommended adjustments in meds  Consult SW for psych placement - bipolar with suicidal ideations      ROS:  Constitutional: Negative for fever, chills, weakness, malaise, fatigue. CV: Negative for chest pain, palpitations, edema. Respiratory: Negative for dyspnea, cough, wheezing. GI: Negative for nausea, abdominal pain, diarrhea. Psych: +Bipolar with suicidal ideations    10 point review of systems is otherwise negative with the exception of the elements mentioned above in the HPI. No Known Allergies    OBJECTIVE:  Patient Vitals for the past 8 hrs:   BP Temp Pulse Resp SpO2   17 0807 134/71 97.8 °F (36.6 °C) (!) 53 18 98 %   17 0330 129/63 97.7 °F (36.5 °C) (!) 54 18 96 %     Temp (24hrs), Av.9 °F (36.6 °C), Min:97.7 °F (36.5 °C), Max:98.5 °F (36.9 °C)     0701 -  1900  In: -   Out: 200 [Urine:200]    Physical Exam:  Constitutional: Well developed, well nourished male in no acute distress, sitting comfortably in the hospital bed. HEENT: Normocephalic and atraumatic. Oropharynx is clear, mucous membranes are moist.Extraocular muscles are intact. Sclerae anicteric. Neck supple without JVD. No thyromegaly present. Lymph node   Deferred   Skin Warm and dry. No bruising and no rash noted. No erythema. No pallor. Respiratory Lungs are clear to auscultation bilaterally without wheezes, rales or rhonchi, normal air exchange without accessory muscle use. CVS Bradycardic rate, regular rhythm and normal S1 and S2. No murmurs, gallops, or rubs. Abdomen Soft, nontender and nondistended, normoactive bowel sounds. No palpable mass. No hepatosplenomegaly.    Neuro Grossly nonfocal with no obvious sensory or motor deficits. MSK Normal range of motion in general.  No edema and no tenderness. Psych Compulsive talking. Bipolar with suicidal ideations. Inappropriate comments at times to staff. Labs:    Recent Labs      08/03/17   0525  08/02/17   1519  08/02/17   1147   WBC  2.9*  3.4*  3.4*   RBC  3.23*  3.44*  3.30*   HGB  11.0*  11.8*  11.5*   HCT  31.5*  33.8*  32.8*   MCV  97.5  98.3*  99.4*   MCH  34.1*  34.3*  34.8*   MCHC  34.9  34.9  35.1*   RDW  14.8*  14.9*  14.6   PLT  117*  120*  112*   GRANS  37*  37*  44   LYMPH  50*  49*  44   MONOS  10  12  10   EOS  3  2  2   BASOS  0  0  0   IG  0.0  0.3   --    DF  AUTOMATED  AUTOMATED  AUTOMATED   ANEU  1.1*  1.3*  1.5*   ABL  1.4  1.7  1.5   ABM  0.3  0.4  0.4   ALINE  0.1  0.1  0.1   ABB  0.0  0.0  0.0   AIG  0.0  0.0   --       Recent Labs      08/03/17   0525  08/02/17   1519  08/02/17   1147   NA  143  143  137   K  3.6  3.7  3.9   CL  111*  110*  109*   CO2  25  26  25   AGAP  7  7  3*   GLU  94  72  146*   BUN  17  21  23   CREA  0.86  0.89  0.98   GFRAA  >60  >60  >60   GFRNA  >60  >60  >60   CA  7.9*  8.0*  8.4   SGOT  27  32  33   AP  63  70  76   TP  8.1  9.1*  9.2*  9.3*   ALB  2.7*  3.0*  3.1*   GLOB  5.4*  6.1*  6.2*   AGRAT  0.5*  0.5*  PENDING  0.5*   MG   --   2.1   --          Imaging:  XR BONE SURVEY COMP [[id number]] Collected: 08/02/17 2010      Order Status: Completed Updated: 08/02/17 2013     Narrative:       Exam:  Metabolic bone survey radiographs    History:  pain, multiple myeloma work-up, [de-identified] years Male    Comparison: None available    Findings:  No evidence of acute fracture or dislocation.  Normal alignment,  joint spaces preserved.  Normal mineralization.  No definite lytic lesions are  seen to suggest lytic osseous metastatic disease or osseous involvement by  multiple myeloma.  Visualized soft tissues otherwise unremarkable.     Impression:  No definite evidence of osseous involvement by multiple myeloma or  metastatic disease.         CT BX BONE MARROW NDL/TROCAR [[id number]] Collected: 08/02/17 1535     Order Status: Completed Updated: 08/02/17 1536     Narrative:       Title: CT guided right iliac bone marrow aspiration and core biopsy. History: [de-identified]year old male with multiple myeloma.       : La Nena Tran PA-C    Supervising Physician: Maddi Hayden M.D. Consent: Informed written and oral consent was obtained from the patient after  explanation of benefits and risks (including, but not limited to:  Infection,  Hemorrhage, Visceral Injury).  The patient's questions were answered to their  satisfaction.  The patient stated understanding and requested that we proceed.      Procedure: Maximal sterile barrier technique was used.  With the patient prone a  preliminary CT scan through the pelvis was performed with radio-opaque markers  on the skin. Following routine prep and drape of the right buttock, a local field block with  lidocaine was achieved. Using intermittent CT technique, a marrow aspirate followed by a core biopsy was  obtained with the 11-gauge On Control biopsy device. The needle was removed. Hemostasis was achieved with manual compression. A  bandage was applied. Complications: None. Medications: None. Findings: No post biopsy bleeding. Impression: Uncomplicated CT guided right iliac bone marrow aspiration and core  biopsy.      Plan:  Bedrest observation for one hour.           ASSESSMENT:    Problem List  Date Reviewed: 7/31/2017          Codes Class Noted    Leukopenia ICD-10-CM: D72.819  ICD-9-CM: 288.50  8/2/2017        Multiple myel[facility] not having achieved remission ([facility] ICD-10-CM: C90.00  ICD-9-CM: 203.00  8/2/2017        Gastroesophageal reflux disease ICD-10-CM: K21.9  ICD-9-CM: 530.81  8/2/2017        Bilateral ankle pain ICD-10-CM: M25.571, M25.572  ICD-9-CM: 719.47  7/31/2017        Pollen allergies ICD-10-CM: J30.1  ICD-9-CM: 477.0 7/31/2017    Overview Signed 7/31/2017  2:27 PM by Mary Rojo MD     Pt would like to consider allergy shots. Will refer to Allergy             Fall ICD-10-CM: [address]  ICD-9-CM: E888.9  7/18/2017    Overview Signed 7/18/2017  2:03 PM by Mary Rojo MD     Pt had another fall, in his yard; he was chasing after his cat and lost his balance. No injuries sustained; fall witnessed by neighbor. No LOC. Vitamin B12 deficiency ICD-10-CM: E53.8  ICD-9-CM: 266.2  7/18/2017    Overview Signed 7/18/2017  2:06 PM by Mary Rojo MD     Pt just started taking Vitamin B 12 supplement. Abnormal laboratory test ICD-10-CM: R89.9  ICD-9-CM: 796.4  7/18/2017    Overview Addendum 7/31/2017  2:26 PM by Mary Rojo MD     UPEP results reviewed with pt. Pt has referral to Oncology to further evaluate. Mixed hyperlipidemia ICD-10-CM: E78.2  ICD-9-CM: 272.2  6/26/2017        OA (osteoarthritis) ICD-10-CM: M19.90  ICD-9-CM: 715.90  6/26/2017        Senile dementia ICD-10-CM: F03.90  ICD-9-CM: 290.0  5/19/2017        Cataract ICD-10-CM: H26.9  ICD-9-CM: 366.9  9/8/2016        Asymmetrical sensorineural hearing loss ICD-10-CM: H90.5  ICD-9-CM: 389.16  Unknown        B[facility] ([facility] ICD-10-CM: F31.9  ICD-9-CM: 296.80  3/22/2015    Overview Addendum 7/6/2017  5:01 PM by Mary Rojo MD     Currently treated with Lexapro and Xanax. Given pt's age, I would recommend continuing current regimen; refill rx. Follow up 3 months or prn. Pt is working on weaning to a lower dose of Xanax. Syncope and collapse ICD-10-CM: R55  ICD-9-CM: 780.2  3/22/2015        Acute encephalopathy ICD-10-CM: G93.40  ICD-9-CM: 348.30  3/22/2015        Cortical hemorrhage ([facility] ICD-10-CM: I61.1  ICD-9-CM: 715  9/17/2014        Syncope ICD-10-CM: R55  ICD-9-CM: 780.2  9/16/2014    Overview Signed 7/6/2017  5:00 PM by Mary Rojo MD     Refer to Cardiology to evaluated.   Concern for symptomatic bradycardia. History of TIA (transient ischemic attack) ICD-10-CM: Z86.73  ICD-9-CM: V12.54  9/16/2014        Altered mental status ICD-10-CM: R41.82  ICD-9-CM: 780.97  9/16/2014        Anemia ICD-10-CM: D64.9  ICD-9-CM: 285.9  9/16/2014    Overview Addendum 7/6/2017  5:01 PM by Georgette Inman MD     Recheck CBC. Tinea corporis ICD-10-CM: B35.4  ICD-9-CM: 110.5  9/16/2014        Hypomagnesemia ICD-10-CM: E83.42  ICD-9-CM: 275.2  9/16/2014    Overview Signed 9/16/2014 10:14 PM by Chary Ferrell NP     MILD               Elevated AST (SGOT) ICD-10-CM: R74.0  ICD-9-CM: 790.4  9/16/2014            Mr. Gricel Braun is a [de-identified] y.o. male admitted on 8/2/2017 with a primary diagnosis of Multiple Myeloma, IgG Kappa, ISS Stage II, he expressed suicidal ideation after being told the diagnosis and treatment plan, he will be evaluated by Psychiatry during this admission. PLAN:  Multiple Myeloma, IgG Kappa, ISS Stage II  - Once stable and discharge, he will require Zometa and CyBorD as OP therapy  - Bone marrow biopsy  - Skeletal survey  - Mediport  8/3 BMbx performed yesterday. Awaiting results. Skeletal survey neg. Consult IR for port placement. Bipolar with suicidal ideations  - Psych consult  8/3 Pt with suicidal ideations. Also making inappropriate comments to staff at times. Psychiatrist recommended decreasing Lexapro to 10mg x 3 days, then DC. Start Seroquel 25mg at bedtime. Consult SW for psych placement.     Pancytopenia  - Transfuse as needed    Continue home meds  Lulú SOPs  DVT Prophylaxis: VINCE Rivera [facility]  [address] [address]  Office : [phone]  Fax : [phone]

## 2017-08-03 NOTE — PROGRESS NOTES
Psych placement on hold for now per Carolina Weldon NP (per MD). Will continue to follow for dispo planning.

## 2017-08-04 ENCOUNTER — APPOINTMENT (OUTPATIENT)
Dept: INTERVENTIONAL RADIOLOGY/VASCULAR | Age: 82
DRG: 841 | End: 2017-08-04
Attending: INTERNAL MEDICINE
Payer: MEDICARE

## 2017-08-04 LAB
ALBUMIN SERPL BCP-MCNC: 2.3 G/DL (ref 3.2–4.6)
ALBUMIN SERPL ELPH-MCNC: 3.88 G/DL (ref 3.2–5.6)
ALBUMIN/GLOB SERPL: 0.4 {RATIO} (ref 1.2–3.5)
ALBUMIN/GLOB SERPL: 0.7 {RATIO}
ALP SERPL-CCNC: 59 U/L (ref 50–136)
ALPHA1 GLOB SERPL ELPH-MCNC: 0.23 G/DL (ref 0.1–0.4)
ALPHA2 GLOB SERPL ELPH-MCNC: 0.51 G/DL (ref 0.4–1.2)
ALT SERPL-CCNC: 24 U/L (ref 12–65)
ANION GAP BLD CALC-SCNC: 4 MMOL/L (ref 7–16)
APPEARANCE UR: CLEAR
AST SERPL W P-5'-P-CCNC: 25 U/L (ref 15–37)
B-GLOBULIN SERPL QL ELPH: 1.02 G/DL (ref 0.6–1.3)
BASOPHILS # BLD AUTO: 0 K/UL (ref 0–0.2)
BASOPHILS # BLD: 1 % (ref 0–2)
BILIRUB SERPL-MCNC: 0.3 MG/DL (ref 0.2–1.1)
BILIRUB UR QL: NEGATIVE
BUN SERPL-MCNC: 14 MG/DL (ref 8–23)
CALCIUM SERPL-MCNC: 7.9 MG/DL (ref 8.3–10.4)
CHLORIDE SERPL-SCNC: 114 MMOL/L (ref 98–107)
CO2 SERPL-SCNC: 26 MMOL/L (ref 21–32)
COLOR UR: YELLOW
CREAT SERPL-MCNC: 0.94 MG/DL (ref 0.8–1.5)
DIFFERENTIAL METHOD BLD: ABNORMAL
EOSINOPHIL # BLD: 0.1 K/UL (ref 0–0.8)
EOSINOPHIL NFR BLD: 3 % (ref 0.5–7.8)
ERYTHROCYTE [DISTWIDTH] IN BLOOD BY AUTOMATED COUNT: 14.9 % (ref 11.9–14.6)
GAMMA GLOB MFR SERPL ELPH: 3.57 G/DL (ref 0.5–1.6)
GLOBULIN SER CALC-MCNC: 5.2 G/DL (ref 2.3–3.5)
GLUCOSE SERPL-MCNC: 116 MG/DL (ref 65–100)
GLUCOSE UR STRIP.AUTO-MCNC: NEGATIVE MG/DL
HCT VFR BLD AUTO: 32.3 % (ref 41.1–50.3)
HGB BLD-MCNC: 10.9 G/DL (ref 13.6–17.2)
HGB UR QL STRIP: NEGATIVE
IGA SERPL-MCNC: 32 MG/DL (ref 85–499)
IGG SERPL-MCNC: 3865 MG/DL (ref 610–1616)
IGM SERPL-MCNC: 29 MG/DL (ref 35–242)
IMM GRANULOCYTES # BLD: 0 K/UL (ref 0–0.5)
IMM GRANULOCYTES NFR BLD AUTO: 0.3 % (ref 0–5)
KETONES UR QL STRIP.AUTO: NEGATIVE MG/DL
LEUKOCYTE ESTERASE UR QL STRIP.AUTO: NEGATIVE
LYMPHOCYTES # BLD AUTO: 50 % (ref 13–44)
LYMPHOCYTES # BLD: 1.7 K/UL (ref 0.5–4.6)
M PROTEIN SERPL ELPH-MCNC: 3.26 G/DL
MCH RBC QN AUTO: 33.5 PG (ref 26.1–32.9)
MCHC RBC AUTO-ENTMCNC: 33.7 G/DL (ref 31.4–35)
MCV RBC AUTO: 99.4 FL (ref 79.6–97.8)
MONOCYTES # BLD: 0.3 K/UL (ref 0.1–1.3)
MONOCYTES NFR BLD AUTO: 8 % (ref 4–12)
NEUTS SEG # BLD: 1.3 K/UL (ref 1.7–8.2)
NEUTS SEG NFR BLD AUTO: 38 % (ref 43–78)
NITRITE UR QL STRIP.AUTO: NEGATIVE
PH UR STRIP: 6 [PH] (ref 5–9)
PLATELET # BLD AUTO: 111 K/UL (ref 150–450)
PMV BLD AUTO: 10.9 FL (ref 10.8–14.1)
POTASSIUM SERPL-SCNC: 3.4 MMOL/L (ref 3.5–5.1)
PROT PATTERN SERPL ELPH-IMP: ABNORMAL
PROT PATTERN SPEC IFE-IMP: ABNORMAL
PROT SERPL-MCNC: 7.5 G/DL (ref 6.3–8.2)
PROT SERPL-MCNC: 9.2 G/DL (ref 6.3–8.2)
PROT UR STRIP-MCNC: NEGATIVE MG/DL
RBC # BLD AUTO: 3.25 M/UL (ref 4.23–5.67)
SODIUM SERPL-SCNC: 144 MMOL/L (ref 136–145)
SP GR UR REFRACTOMETRY: 1.02 (ref 1–1.02)
UROBILINOGEN UR QL STRIP.AUTO: 2 EU/DL (ref 0.2–1)
WBC # BLD AUTO: 3.4 K/UL (ref 4.3–11.1)

## 2017-08-04 PROCEDURE — 85025 COMPLETE CBC W/AUTO DIFF WBC: CPT | Performed by: NURSE PRACTITIONER

## 2017-08-04 PROCEDURE — 65270000029 HC RM PRIVATE

## 2017-08-04 PROCEDURE — 81003 URINALYSIS AUTO W/O SCOPE: CPT | Performed by: NURSE PRACTITIONER

## 2017-08-04 PROCEDURE — 02H633Z INSERTION OF INFUSION DEVICE INTO RIGHT ATRIUM, PERCUTANEOUS APPROACH: ICD-10-PCS | Performed by: RADIOLOGY

## 2017-08-04 PROCEDURE — 87086 URINE CULTURE/COLONY COUNT: CPT | Performed by: NURSE PRACTITIONER

## 2017-08-04 PROCEDURE — 77030010507 HC ADH SKN DERMBND J&J -B

## 2017-08-04 PROCEDURE — 99152 MOD SED SAME PHYS/QHP 5/>YRS: CPT

## 2017-08-04 PROCEDURE — 74011250636 HC RX REV CODE- 250/636: Performed by: NURSE PRACTITIONER

## 2017-08-04 PROCEDURE — 74011250636 HC RX REV CODE- 250/636: Performed by: RADIOLOGY

## 2017-08-04 PROCEDURE — C1894 INTRO/SHEATH, NON-LASER: HCPCS

## 2017-08-04 PROCEDURE — 74011250637 HC RX REV CODE- 250/637: Performed by: INTERNAL MEDICINE

## 2017-08-04 PROCEDURE — 74011250637 HC RX REV CODE- 250/637: Performed by: NURSE PRACTITIONER

## 2017-08-04 PROCEDURE — 0JH63XZ INSERTION OF TUNNELED VASCULAR ACCESS DEVICE INTO CHEST SUBCUTANEOUS TISSUE AND FASCIA, PERCUTANEOUS APPROACH: ICD-10-PCS | Performed by: RADIOLOGY

## 2017-08-04 PROCEDURE — 80053 COMPREHEN METABOLIC PANEL: CPT | Performed by: NURSE PRACTITIONER

## 2017-08-04 PROCEDURE — 74011000250 HC RX REV CODE- 250: Performed by: RADIOLOGY

## 2017-08-04 PROCEDURE — C1769 GUIDE WIRE: HCPCS

## 2017-08-04 PROCEDURE — C1788 PORT, INDWELLING, IMP: HCPCS

## 2017-08-04 PROCEDURE — 99233 SBSQ HOSP IP/OBS HIGH 50: CPT | Performed by: INTERNAL MEDICINE

## 2017-08-04 PROCEDURE — 74011250636 HC RX REV CODE- 250/636

## 2017-08-04 PROCEDURE — 99153 MOD SED SAME PHYS/QHP EA: CPT

## 2017-08-04 PROCEDURE — 77001 FLUOROGUIDE FOR VEIN DEVICE: CPT

## 2017-08-04 PROCEDURE — 36415 COLL VENOUS BLD VENIPUNCTURE: CPT | Performed by: NURSE PRACTITIONER

## 2017-08-04 PROCEDURE — 77030031139 HC SUT VCRL2 J&J -A

## 2017-08-04 RX ORDER — DIPHENHYDRAMINE HYDROCHLORIDE 50 MG/ML
50 INJECTION, SOLUTION INTRAMUSCULAR; INTRAVENOUS ONCE
Status: ACTIVE | OUTPATIENT
Start: 2017-08-04 | End: 2017-08-05

## 2017-08-04 RX ORDER — FENTANYL CITRATE 50 UG/ML
12.5-5 INJECTION, SOLUTION INTRAMUSCULAR; INTRAVENOUS
Status: DISCONTINUED | OUTPATIENT
Start: 2017-08-04 | End: 2017-08-05 | Stop reason: ALTCHOICE

## 2017-08-04 RX ORDER — POTASSIUM CHLORIDE 20 MEQ/1
20 TABLET, EXTENDED RELEASE ORAL 2 TIMES DAILY
Status: DISCONTINUED | OUTPATIENT
Start: 2017-08-04 | End: 2017-08-10 | Stop reason: HOSPADM

## 2017-08-04 RX ORDER — HEPARIN SODIUM 200 [USP'U]/100ML
2000 INJECTION, SOLUTION INTRAVENOUS ONCE
Status: COMPLETED | OUTPATIENT
Start: 2017-08-04 | End: 2017-08-04

## 2017-08-04 RX ORDER — CEFAZOLIN SODIUM IN 0.9 % NACL 2 G/50 ML
2 INTRAVENOUS SOLUTION, PIGGYBACK (ML) INTRAVENOUS ONCE
Status: COMPLETED | OUTPATIENT
Start: 2017-08-04 | End: 2017-08-04

## 2017-08-04 RX ORDER — HALOPERIDOL 5 MG/ML
2-5 INJECTION INTRAMUSCULAR
Status: DISCONTINUED | OUTPATIENT
Start: 2017-08-04 | End: 2017-08-10 | Stop reason: HOSPADM

## 2017-08-04 RX ORDER — LIDOCAINE HYDROCHLORIDE 20 MG/ML
100-200 INJECTION, SOLUTION INFILTRATION; PERINEURAL ONCE
Status: COMPLETED | OUTPATIENT
Start: 2017-08-04 | End: 2017-08-04

## 2017-08-04 RX ORDER — LIDOCAINE HYDROCHLORIDE AND EPINEPHRINE 15; 5 MG/ML; UG/ML
1.5 INJECTION, SOLUTION EPIDURAL ONCE
Status: COMPLETED | OUTPATIENT
Start: 2017-08-04 | End: 2017-08-04

## 2017-08-04 RX ORDER — HEPARIN SODIUM (PORCINE) LOCK FLUSH IV SOLN 100 UNIT/ML 100 UNIT/ML
500 SOLUTION INTRAVENOUS ONCE
Status: COMPLETED | OUTPATIENT
Start: 2017-08-04 | End: 2017-08-04

## 2017-08-04 RX ORDER — MIDAZOLAM HYDROCHLORIDE 1 MG/ML
.5-2 INJECTION, SOLUTION INTRAMUSCULAR; INTRAVENOUS
Status: DISCONTINUED | OUTPATIENT
Start: 2017-08-04 | End: 2017-08-05 | Stop reason: ALTCHOICE

## 2017-08-04 RX ORDER — SODIUM CHLORIDE 9 MG/ML
25 INJECTION, SOLUTION INTRAVENOUS ONCE
Status: COMPLETED | OUTPATIENT
Start: 2017-08-04 | End: 2017-08-04

## 2017-08-04 RX ADMIN — DONEPEZIL HYDROCHLORIDE 10 MG: 5 TABLET, FILM COATED ORAL at 08:51

## 2017-08-04 RX ADMIN — MIDAZOLAM HYDROCHLORIDE 1 MG: 1 INJECTION, SOLUTION INTRAMUSCULAR; INTRAVENOUS at 15:13

## 2017-08-04 RX ADMIN — LIDOCAINE HYDROCHLORIDE 160 MG: 20 INJECTION, SOLUTION INFILTRATION; PERINEURAL at 15:07

## 2017-08-04 RX ADMIN — FENTANYL CITRATE 25 MCG: 50 INJECTION, SOLUTION INTRAMUSCULAR; INTRAVENOUS at 15:05

## 2017-08-04 RX ADMIN — QUETIAPINE FUMARATE 25 MG: 25 TABLET, FILM COATED ORAL at 21:28

## 2017-08-04 RX ADMIN — ESCITALOPRAM OXALATE 10 MG: 10 TABLET ORAL at 08:50

## 2017-08-04 RX ADMIN — LIDOCAINE HYDROCHLORIDE,EPINEPHRINE BITARTRATE 25 ML: 15; .005 INJECTION, SOLUTION EPIDURAL; INFILTRATION; INTRACAUDAL; PERINEURAL at 15:23

## 2017-08-04 RX ADMIN — SODIUM BICARBONATE 2 ML: 0.2 INJECTION, SOLUTION INTRAVENOUS at 15:07

## 2017-08-04 RX ADMIN — HEPARIN SODIUM 2000 UNITS: 200 INJECTION, SOLUTION INTRAVENOUS at 15:16

## 2017-08-04 RX ADMIN — SIMVASTATIN 20 MG: 20 TABLET, FILM COATED ORAL at 21:28

## 2017-08-04 RX ADMIN — HEPARIN SODIUM (PORCINE) LOCK FLUSH IV SOLN 100 UNIT/ML 500 UNITS: 100 SOLUTION at 15:18

## 2017-08-04 RX ADMIN — MIDAZOLAM HYDROCHLORIDE 1 MG: 1 INJECTION, SOLUTION INTRAMUSCULAR; INTRAVENOUS at 14:50

## 2017-08-04 RX ADMIN — MIDAZOLAM HYDROCHLORIDE 1 MG: 1 INJECTION, SOLUTION INTRAMUSCULAR; INTRAVENOUS at 15:05

## 2017-08-04 RX ADMIN — POTASSIUM CHLORIDE 20 MEQ: 20 TABLET, EXTENDED RELEASE ORAL at 17:48

## 2017-08-04 RX ADMIN — POTASSIUM CHLORIDE 20 MEQ: 20 TABLET, EXTENDED RELEASE ORAL at 08:50

## 2017-08-04 RX ADMIN — ALPRAZOLAM 1 MG: 0.5 TABLET ORAL at 21:28

## 2017-08-04 RX ADMIN — CEFAZOLIN 2 G: 1 INJECTION, POWDER, FOR SOLUTION INTRAMUSCULAR; INTRAVENOUS; PARENTERAL at 14:45

## 2017-08-04 RX ADMIN — SODIUM CHLORIDE 75 ML/HR: 900 INJECTION, SOLUTION INTRAVENOUS at 11:35

## 2017-08-04 RX ADMIN — FENTANYL CITRATE 50 MCG: 50 INJECTION, SOLUTION INTRAMUSCULAR; INTRAVENOUS at 14:50

## 2017-08-04 RX ADMIN — ASPIRIN 81 MG: 81 TABLET, COATED ORAL at 08:50

## 2017-08-04 RX ADMIN — FENTANYL CITRATE 25 MCG: 50 INJECTION, SOLUTION INTRAMUSCULAR; INTRAVENOUS at 15:13

## 2017-08-04 RX ADMIN — SODIUM CHLORIDE 25 ML/HR: 900 INJECTION, SOLUTION INTRAVENOUS at 14:45

## 2017-08-04 NOTE — PROGRESS NOTES
[facility]        Inpatient Hematology / Oncology Progress Note      Admission Date: 2017  1:13 PM  Reason for Admission/Hospital Course: multimyeloma  Leukopenia  Leukopenia      24 Hour Events: BMBx performed , awaiting results  Skeletal survey neg  Asking psych to re-evaluate due to patient becoming verbally aggressive toward staff, refusing medications and treatments      ROS:  Constitutional: Negative for fever, chills, weakness, malaise, fatigue. CV: Negative for chest pain, palpitations, edema. Respiratory: Negative for dyspnea, cough, wheezing. GI: Negative for nausea, abdominal pain, diarrhea. Psych: +Bipolar with suicidal ideations - states no suicidal ideations this AM    10 point review of systems is otherwise negative with the exception of the elements mentioned above in the HPI. No Known Allergies    OBJECTIVE:  Patient Vitals for the past 8 hrs:   BP Temp Pulse Resp SpO2 Weight   17 1141 142/77 98.3 °F (36.8 °C) 61 18 98 % -     Temp (24hrs), Av °F (36.7 °C), Min:97.3 °F (36.3 °C), Max:98.5 °F (36.9 °C)     0701 -  1900  In: 137 [P.O.:137]  Out: 500 [Urine:500]    Physical Exam:  Constitutional: Well developed, well nourished male in no acute distress, sitting comfortably in the hospital bed. HEENT: Normocephalic and atraumatic. Oropharynx is clear, mucous membranes are moist.Extraocular muscles are intact. Sclerae anicteric. Neck supple without JVD. No thyromegaly present. Lymph node   Deferred   Skin Warm and dry. No bruising and no rash noted. No erythema. No pallor. Respiratory Lungs are clear to auscultation bilaterally without wheezes, rales or rhonchi, normal air exchange without accessory muscle use. CVS Bradycardic rate, regular rhythm and normal S1 and S2. No murmurs, gallops, or rubs. Abdomen Soft, nontender and nondistended, normoactive bowel sounds. No palpable mass. No hepatosplenomegaly.    Neuro Grossly nonfocal with no obvious sensory or motor deficits. MSK Normal range of motion in general.  No edema and no tenderness. Psych Compulsive talking. Bipolar with suicidal ideations - states no suicidal ideations this AM.  Inappropriate comments at times to staff. More aggressive today. Labs:      Recent Labs      08/04/17   0520 08/03/17   0525  08/02/17   1519   WBC  3.4*  2.9*  3.4*   RBC  3.25*  3.23*  3.44*   HGB  10.9*  11.0*  11.8*   HCT  32.3*  31.5*  33.8*   MCV  99.4*  97.5  98.3*   MCH  33.5*  34.1*  34.3*   MCHC  33.7  34.9  34.9   RDW  14.9*  14.8*  14.9*   PLT  111*  117*  120*   GRANS  38*  37*  37*   LYMPH  50*  50*  49*   MONOS  8  10  12   EOS  3  3  2   BASOS  1  0  0   IG  0.3  0.0  0.3   DF  AUTOMATED  AUTOMATED  AUTOMATED   ANEU  1.3*  1.1*  1.3*   ABL  1.7  1.4  1.7   ABM  0.3  0.3  0.4   ALINE  0.1  0.1  0.1   ABB  0.0  0.0  0.0   AIG  0.0  0.0  0.0        Recent Labs      08/04/17   0520 08/03/17   0525  08/02/17   1519   NA  144  143  143   K  3.4*  3.6  3.7   CL  114*  111*  110*   CO2  26  25  26   AGAP  4*  7  7   GLU  116*  94  72   BUN  14  17  21   CREA  0.94  0.86  0.89   GFRAA  >60  >60  >60   GFRNA  >60  >60  >60   CA  7.9*  7.9*  8.0*   SGOT  25  27  32   AP  59  63  70   TP  7.5  8.1  9.1*   ALB  2.3*  2.7*  3.0*   GLOB  5.2*  5.4*  6.1*   AGRAT  0.4*  0.5*  0.5*   MG   --    --   2.1         Imaging:  XR BONE SURVEY COMP [[id number]] Collected: 08/02/17 2010      Order Status: Completed Updated: 08/02/17 2013     Narrative:       Exam:  Metabolic bone survey radiographs    History:  pain, multiple myeloma work-up, [de-identified] years Male    Comparison: None available    Findings:  No evidence of acute fracture or dislocation.  Normal alignment,  joint spaces preserved.  Normal mineralization.  No definite lytic lesions are  seen to suggest lytic osseous metastatic disease or osseous involvement by  multiple myeloma.  Visualized soft tissues otherwise unremarkable.     Impression:  No definite evidence of osseous involvement by multiple myeloma or  metastatic disease.         CT BX BONE MARROW NDL/TROCAR [[id number]] Collected: 08/02/17 1535     Order Status: Completed Updated: 08/02/17 1536     Narrative:       Title: CT guided right iliac bone marrow aspiration and core biopsy. History: [de-identified]year old male with multiple myeloma.       : La Nena Tran PA-C    Supervising Physician: Maddi Hayden M.D. Consent: Informed written and oral consent was obtained from the patient after  explanation of benefits and risks (including, but not limited to:  Infection,  Hemorrhage, Visceral Injury).  The patient's questions were answered to their  satisfaction.  The patient stated understanding and requested that we proceed.      Procedure: Maximal sterile barrier technique was used.  With the patient prone a  preliminary CT scan through the pelvis was performed with radio-opaque markers  on the skin. Following routine prep and drape of the right buttock, a local field block with  lidocaine was achieved. Using intermittent CT technique, a marrow aspirate followed by a core biopsy was  obtained with the 11-gauge On Control biopsy device. The needle was removed. Hemostasis was achieved with manual compression. A  bandage was applied. Complications: None. Medications: None. Findings: No post biopsy bleeding. Impression: Uncomplicated CT guided right iliac bone marrow aspiration and core  biopsy.      Plan:  Bedrest observation for one hour.           ASSESSMENT:    Problem List  Date Reviewed: 7/31/2017          Codes Class Noted    Leukopenia ICD-10-CM: D72.819  ICD-9-CM: 288.50  8/2/2017        Multiple myeloma not having achieved remission (HCC) ICD-10-CM: C90.00  ICD-9-CM: 203.00  8/2/2017        Gastroesophageal reflux disease ICD-10-CM: K21.9  ICD-9-CM: 530.81  8/2/2017        Bilateral ankle pain ICD-10-CM: M25.571, M25.572  ICD-9-CM: 719.47  7/31/2017 Pollen allergies ICD-10-CM: J30.1  ICD-9-CM: 477.0  7/31/2017    Overview Signed 7/31/2017  2:27 PM by Med Cheng MD     Pt would like to consider allergy shots. Will refer to Allergy             Fall ICD-10-CM: [address]  ICD-9-CM: E888.9  7/18/2017    Overview Signed 7/18/2017  2:03 PM by Med Cheng MD     Pt had another fall, in his yard; he was chasing after his cat and lost his balance. No injuries sustained; fall witnessed by neighbor. No LOC. Vitamin B12 deficiency ICD-10-CM: E53.8  ICD-9-CM: 266.2  7/18/2017    Overview Signed 7/18/2017  2:06 PM by Med Cheng MD     Pt just started taking Vitamin B 12 supplement. Abnormal laboratory test ICD-10-CM: R89.9  ICD-9-CM: 796.4  7/18/2017    Overview Addendum 7/31/2017  2:26 PM by Med Cheng MD     UPEP results reviewed with pt. Pt has referral to Oncology to further evaluate. Mixed hyperlipidemia ICD-10-CM: E78.2  ICD-9-CM: 272.2  6/26/2017        OA (osteoarthritis) ICD-10-CM: M19.90  ICD-9-CM: 715.90  6/26/2017        Senile dementia ICD-10-CM: F03.90  ICD-9-CM: 290.0  5/19/2017        Cataract ICD-10-CM: H26.9  ICD-9-CM: 366.9  9/8/2016        Asymmetrical sensorineural hearing loss ICD-10-CM: H90.5  ICD-9-CM: 389.16  Unknown        B[facility] disorder ([facility] ICD-10-CM: F31.9  ICD-9-CM: 296.80  3/22/2015    Overview Addendum 7/6/2017  5:01 PM by Med Cheng MD     Currently treated with Lexapro and Xanax. Given pt's age, I would recommend continuing current regimen; refill rx. Follow up 3 months or prn. Pt is working on weaning to a lower dose of Xanax.              Syncope and collapse ICD-10-CM: R55  ICD-9-CM: 780.2  3/22/2015        Acute encephalopathy ICD-10-CM: G93.40  ICD-9-CM: 348.30  3/22/2015        Cortical hemorrhage ([facility] ICD-10-CM: I61.1  ICD-9-CM: 031  9/17/2014        Syncope ICD-10-CM: R55  ICD-9-CM: 780.2  9/16/2014    Overview Signed 7/6/2017  5:00 PM by Med Cheng MD     Refer to Cardiology to evaluated. Concern for symptomatic bradycardia. History of TIA (transient ischemic attack) ICD-10-CM: Z86.73  ICD-9-CM: V12.54  9/16/2014        Altered mental status ICD-10-CM: R41.82  ICD-9-CM: 780.97  9/16/2014        Anemia ICD-10-CM: D64.9  ICD-9-CM: 285.9  9/16/2014    Overview Addendum 7/6/2017  5:01 PM by Grzegorz Wells MD     Recheck CBC. Tinea corporis ICD-10-CM: B35.4  ICD-9-CM: 110.5  9/16/2014        Hypomagnesemia ICD-10-CM: E83.42  ICD-9-CM: 275.2  9/16/2014    Overview Signed 9/16/2014 10:14 PM by Stanislaw Arzola NP     MILD               Elevated AST (SGOT) ICD-10-CM: R74.0  ICD-9-CM: 790.4  9/16/2014            Mr. Reny Wetzel is a [de-identified] y.o. male admitted on 8/2/2017 with a primary diagnosis of Multiple Myeloma, IgG Kappa, ISS Stage II, he expressed suicidal ideation after being told the diagnosis and treatment plan, he will be evaluated by Psychiatry during this admission. PLAN:  Multiple Myeloma, IgG Kappa, ISS Stage II  - Once stable and discharge, he will require Zometa and CyBorD as OP therapy  - Bone marrow biopsy  - Skeletal survey  - Mediport  8/3 BMbx performed yesterday. Awaiting results. Skeletal survey neg. Consult IR for port placement. 8/4 BMbx results pending. Bipolar with suicidal ideations  - Psych consult  8/3 Pt with suicidal ideations. Also making inappropriate comments to staff at times. Psychiatrist recommended decreasing Lexapro to 10mg x 3 days, then DC. Start Seroquel 25mg at bedtime. Consult SW for psych placement. 8/4  Per nurse, suicidal ideations worsened overnight. This morning, patient denies any suicidal ideation. However, he has become more verbally aggressive towards staff and refusing to take medications or treatments. Will ask psych to re-evaluate. Per Dr. Justus Tamez, hold off on psych placement for now.     Pancytopenia  - Transfuse as needed    Foul-smelling urine  8/4 Check UA/UCx    Electrolyte imbalance  - Replace prn per Lulú SOPs    Continue home meds  Lulú SOPs  [facility]  [address] [address]  Office : [phone]  Fax : [phone]

## 2017-08-04 NOTE — PROGRESS NOTES
TRANSFER - IN REPORT:    Verbal report received from Irma(name) on Robert Small  being received from IR(unit) for routine progression of care      Report consisted of patients Situation, Background, Assessment and   Recommendations(SBAR). Information from the following report(s) SBAR and Recent Results was reviewed with the receiving nurse. Opportunity for questions and clarification was provided. Assessment completed upon patients arrival to unit and care assumed.

## 2017-08-04 NOTE — PROGRESS NOTES
END OF SHIFT NOTE: Pt lying in bed with respirations even and unlabored. Alert with confusion. VSS, afebrile. Denies pain or discomfort. Medicated with Xanex for increase in anxiety and agitation with some relief noted. Sitter continues at bedside related to increase in suicidal ideations. Intake/Output  08/03 1901 - 08/04 0700  In: 3056 [I.V.:1144]  Out: 200 [Urine:200]   Voiding: YES  Catheter: NO  Drain:              Stool:  0 occurrences. Emesis:  0 occurrences. VITAL SIGNS  Patient Vitals for the past 12 hrs:   Temp Pulse Resp BP SpO2   08/03/17 1941 97.3 °F (36.3 °C) 60 20 150/70 97 %   08/03/17 1519 98.5 °F (36.9 °C) 60 18 150/55 94 %   08/03/17 1216 98.8 °F (37.1 °C) 63 18 142/72 96 %       Pain Assessment  Pain 1  Pain Scale 1: Numeric (0 - 10) (08/03/17 1945)  Pain Intensity 1: 0 (08/03/17 1945)  Patient Stated Pain Goal: 0 (08/03/17 1945)  Pain Reassessment 1: Patient sleeping (08/03/17 0145)  Pain Location 1: Back (08/02/17 2327)  Pain Description 1: Aching;Constant (08/02/17 2327)  Pain Intervention(s) 1: Medication (see MAR) (08/02/17 2327)    Ambulating  Yes    Additional Information:     Shift report given to oncoming nurse at the bedside.     Justus Angulo RN

## 2017-08-04 NOTE — PROGRESS NOTES
END OF SHIFT NOTE:    Intake/Output      Voiding: YES  Catheter: NO  Drain:              Stool:  0 occurrences. Emesis:  0 occurrences. VITAL SIGNS  Patient Vitals for the past 12 hrs:   Temp Pulse Resp BP SpO2   08/04/17 1649 98.5 °F (36.9 °C) 72 17 140/55 98 %   08/04/17 1607 - (!) 56 16 105/54 92 %   08/04/17 1550 - (!) 57 14 127/59 98 %   08/04/17 1535 - 60 15 136/63 97 %   08/04/17 1528 - (!) 54 14 127/69 98 %   08/04/17 1523 - 63 14 141/63 98 %   08/04/17 1518 - 62 15 131/67 98 %   08/04/17 1513 - (!) 53 14 147/71 98 %   08/04/17 1508 - (!) 56 12 149/76 99 %   08/04/17 1503 - (!) 56 14 155/79 99 %   08/04/17 1457 - (!) 51 16 155/88 100 %   08/04/17 1452 - 62 - 145/80 -   08/04/17 1403 - 61 18 149/67 98 %   08/04/17 1141 98.3 °F (36.8 °C) 61 18 142/77 98 %       Pain Assessment  Pain 1  Pain Scale 1: Numeric (0 - 10) (08/04/17 1607)  Pain Intensity 1: 0 (08/04/17 1607)  Patient Stated Pain Goal: 0 (08/04/17 0408)  Pain Reassessment 1: Patient sleeping (08/03/17 0145)  Pain Location 1: Back (08/02/17 2327)  Pain Description 1: Aching;Constant (08/02/17 2327)  Pain Intervention(s) 1: Medication (see MAR) (08/02/17 2327)    Ambulating  Yes    Additional Information:     Shift report given to oncoming nurse at the bedside.     Nate Liu RN

## 2017-08-04 NOTE — PROGRESS NOTES
END OF SHIFT NOTE:    Intake/Output      Voiding: YES  Catheter: NO  Drain:              Stool:  0 occurrences. Emesis:  0 occurrences. VITAL SIGNS  Patient Vitals for the past 12 hrs:   Temp Pulse Resp BP SpO2   08/03/17 1941 97.3 °F (36.3 °C) 60 20 150/70 97 %       Pain Assessment  Pain 1  Pain Scale 1: Numeric (0 - 10) (08/04/17 0408)  Pain Intensity 1: 0 (08/04/17 0408)  Patient Stated Pain Goal: 0 (08/04/17 0408)  Pain Reassessment 1: Patient sleeping (08/03/17 0145)  Pain Location 1: Back (08/02/17 2327)  Pain Description 1: Aching;Constant (08/02/17 2327)  Pain Intervention(s) 1: Medication (see MAR) (08/02/17 2327)    Ambulating  Yes    Additional Information: Pt was very rude and verbally aggressive last night. He refused all of his treatment and yelled out all night. Shift report given to Ronald Castillo RN oncoming nurse at the bedside.     Pino Rodriguez RN

## 2017-08-04 NOTE — PROCEDURES
Department of Interventional Radiology  [phone]        Interventional Radiology Brief Procedure Note    Patient: Kassi Wheat MRN: [id number]  SSN: [id number]    YOB: 1935  Age: [de-identified] y.o.   Sex: male      Date of Procedure: 8/4/2017    Pre-Procedure Diagnosis: MM    Post-Procedure Diagnosis: SAME    Procedure(s): Venous Chest Port Placement    Description of Procedure: As above    Performed By: Marleny Atkinson MD     Assistants: None    Anesthesia:Moderate Sedation    Estimated Blood Loss: None    Specimens: None    Implants: Subcutaneous Port    Findings: Tip in RA    Complications: None    Recommendations: Ok to use port    Follow Up: PRN    Signed By: Marleny Atkinson MD     August 4, 2017

## 2017-08-04 NOTE — PROGRESS NOTES
TRANSFER - OUT REPORT:    Verbal report given to oneil MCKEON(name) on Nan Mosley  being transferred to  520(unit) for routine progression of care       Report consisted of patients Situation, Background, Assessment and   Recommendations(SBAR). Information from the following report(s) Procedure Summary and MAR was reviewed with the receiving nurse. Lines:   Venous Access Device power port 08/04/17 Upper chest (subclavicular area, right (Active)       Peripheral IV 03/23/15 Left Hand (Active)       Peripheral IV 08/02/17 Left Hand (Active)   Site Assessment Clean, dry, & intact 8/4/2017  7:45 AM   Phlebitis Assessment 0 8/4/2017  7:45 AM   Infiltration Assessment 0 8/4/2017  7:45 AM   Dressing Status Clean, dry, & intact 8/4/2017  7:45 AM   Dressing Type Tape;Transparent 8/4/2017  7:45 AM   Hub Color/Line Status Infusing;Pink 8/4/2017  7:45 AM   Alcohol Cap Used No 8/4/2017  7:45 AM        Opportunity for questions and clarification was provided.       Patient transported with:   transport

## 2017-08-05 LAB
ALBUMIN SERPL BCP-MCNC: 2.4 G/DL (ref 3.2–4.6)
ALBUMIN/GLOB SERPL: 0.4 {RATIO} (ref 1.2–3.5)
ALP SERPL-CCNC: 60 U/L (ref 50–136)
ALT SERPL-CCNC: 25 U/L (ref 12–65)
ANION GAP BLD CALC-SCNC: 7 MMOL/L (ref 7–16)
AST SERPL W P-5'-P-CCNC: 27 U/L (ref 15–37)
BACTERIA SPEC CULT: NORMAL
BACTERIA SPEC CULT: NORMAL
BASOPHILS # BLD AUTO: 0 K/UL (ref 0–0.2)
BASOPHILS # BLD: 0 % (ref 0–2)
BILIRUB SERPL-MCNC: 0.5 MG/DL (ref 0.2–1.1)
BUN SERPL-MCNC: 15 MG/DL (ref 8–23)
CALCIUM SERPL-MCNC: 8.2 MG/DL (ref 8.3–10.4)
CHLORIDE SERPL-SCNC: 113 MMOL/L (ref 98–107)
CO2 SERPL-SCNC: 25 MMOL/L (ref 21–32)
CREAT SERPL-MCNC: 0.78 MG/DL (ref 0.8–1.5)
DIFFERENTIAL METHOD BLD: ABNORMAL
EOSINOPHIL # BLD: 0.1 K/UL (ref 0–0.8)
EOSINOPHIL NFR BLD: 3 % (ref 0.5–7.8)
ERYTHROCYTE [DISTWIDTH] IN BLOOD BY AUTOMATED COUNT: 15.1 % (ref 11.9–14.6)
GLOBULIN SER CALC-MCNC: 5.4 G/DL (ref 2.3–3.5)
GLUCOSE SERPL-MCNC: 87 MG/DL (ref 65–100)
HCT VFR BLD AUTO: 31.2 % (ref 41.1–50.3)
HGB BLD-MCNC: 10.7 G/DL (ref 13.6–17.2)
IMM GRANULOCYTES # BLD: 0 K/UL (ref 0–0.5)
IMM GRANULOCYTES NFR BLD AUTO: 0 % (ref 0–5)
LYMPHOCYTES # BLD AUTO: 46 % (ref 13–44)
LYMPHOCYTES # BLD: 1.5 K/UL (ref 0.5–4.6)
MCH RBC QN AUTO: 33.9 PG (ref 26.1–32.9)
MCHC RBC AUTO-ENTMCNC: 34.3 G/DL (ref 31.4–35)
MCV RBC AUTO: 98.7 FL (ref 79.6–97.8)
MONOCYTES # BLD: 0.3 K/UL (ref 0.1–1.3)
MONOCYTES NFR BLD AUTO: 8 % (ref 4–12)
NEUTS SEG # BLD: 1.4 K/UL (ref 1.7–8.2)
NEUTS SEG NFR BLD AUTO: 43 % (ref 43–78)
PLATELET # BLD AUTO: 113 K/UL (ref 150–450)
PMV BLD AUTO: 10.7 FL (ref 10.8–14.1)
POTASSIUM SERPL-SCNC: 3.6 MMOL/L (ref 3.5–5.1)
PROT SERPL-MCNC: 7.8 G/DL (ref 6.3–8.2)
RBC # BLD AUTO: 3.16 M/UL (ref 4.23–5.67)
SERVICE CMNT-IMP: NORMAL
SODIUM SERPL-SCNC: 145 MMOL/L (ref 136–145)
WBC # BLD AUTO: 3.3 K/UL (ref 4.3–11.1)

## 2017-08-05 PROCEDURE — 74011250637 HC RX REV CODE- 250/637: Performed by: INTERNAL MEDICINE

## 2017-08-05 PROCEDURE — 74011250637 HC RX REV CODE- 250/637: Performed by: NURSE PRACTITIONER

## 2017-08-05 PROCEDURE — 74011250636 HC RX REV CODE- 250/636: Performed by: NURSE PRACTITIONER

## 2017-08-05 PROCEDURE — 99232 SBSQ HOSP IP/OBS MODERATE 35: CPT | Performed by: INTERNAL MEDICINE

## 2017-08-05 PROCEDURE — 65270000029 HC RM PRIVATE

## 2017-08-05 PROCEDURE — 80053 COMPREHEN METABOLIC PANEL: CPT | Performed by: NURSE PRACTITIONER

## 2017-08-05 PROCEDURE — 36415 COLL VENOUS BLD VENIPUNCTURE: CPT | Performed by: NURSE PRACTITIONER

## 2017-08-05 PROCEDURE — 85025 COMPLETE CBC W/AUTO DIFF WBC: CPT | Performed by: NURSE PRACTITIONER

## 2017-08-05 RX ADMIN — POTASSIUM CHLORIDE 20 MEQ: 20 TABLET, EXTENDED RELEASE ORAL at 09:23

## 2017-08-05 RX ADMIN — QUETIAPINE FUMARATE 25 MG: 25 TABLET, FILM COATED ORAL at 22:07

## 2017-08-05 RX ADMIN — NAPROXEN 500 MG: 250 TABLET ORAL at 09:24

## 2017-08-05 RX ADMIN — POTASSIUM CHLORIDE 20 MEQ: 20 TABLET, EXTENDED RELEASE ORAL at 18:10

## 2017-08-05 RX ADMIN — ENOXAPARIN SODIUM 40 MG: 40 INJECTION SUBCUTANEOUS at 15:34

## 2017-08-05 RX ADMIN — ASPIRIN 81 MG: 81 TABLET, COATED ORAL at 09:23

## 2017-08-05 RX ADMIN — ESCITALOPRAM OXALATE 10 MG: 10 TABLET ORAL at 09:24

## 2017-08-05 RX ADMIN — NAPROXEN 500 MG: 250 TABLET ORAL at 22:52

## 2017-08-05 RX ADMIN — DONEPEZIL HYDROCHLORIDE 10 MG: 5 TABLET, FILM COATED ORAL at 12:07

## 2017-08-05 RX ADMIN — SIMVASTATIN 20 MG: 20 TABLET, FILM COATED ORAL at 22:07

## 2017-08-05 NOTE — PROGRESS NOTES
[facility]        Inpatient Hematology / Oncology Progress Note      Admission Date: 2017  1:13 PM  Reason for Admission/Hospital Course: multimyeloma  Leukopenia  Leukopenia      24 Hour Events: BMBx performed , awaiting results  Skeletal survey neg  Had another consult with psych yesterday; no new orders      ROS:  Constitutional: Negative for fever, chills, weakness, malaise, fatigue. CV: Negative for chest pain, palpitations, edema. Respiratory: Negative for dyspnea, cough, wheezing. GI: Negative for nausea, abdominal pain, diarrhea. Psych: +Bipolar with suicidal ideations - states no suicidal ideations this AM    10 point review of systems is otherwise negative with the exception of the elements mentioned above in the HPI. No Known Allergies    OBJECTIVE:  Patient Vitals for the past 8 hrs:   BP Temp Pulse Resp SpO2   17 0837 129/73 97.9 °F (36.6 °C) 61 18 98 %   17 0436 143/66 97.5 °F (36.4 °C) 61 18 98 %     Temp (24hrs), Av.1 °F (36.7 °C), Min:97.5 °F (36.4 °C), Max:98.5 °F (36.9 °C)         Physical Exam:  Constitutional: Well developed, well nourished male in no acute distress, sitting comfortably in the bedside chair. HEENT: Normocephalic and atraumatic. Oropharynx is clear, mucous membranes are moist.Extraocular muscles are intact. Sclerae anicteric. Neck supple without JVD. No thyromegaly present. Lymph node   Deferred   Skin Warm and dry. No bruising and no rash noted. No erythema. No pallor. Respiratory Lungs are clear to auscultation bilaterally without wheezes, rales or rhonchi, normal air exchange without accessory muscle use. CVS Bradycardic rate, regular rhythm and normal S1 and S2. No murmurs, gallops, or rubs. Abdomen Soft, nontender and nondistended, normoactive bowel sounds. No palpable mass. No hepatosplenomegaly. Neuro Grossly nonfocal with no obvious sensory or motor deficits.    MSK Normal range of motion in general.  No edema and no tenderness. Psych Compulsive talking. Bipolar with suicidal ideations - states no suicidal ideations this AM.  Inappropriate comments at times to staff. Polite today.        Labs:      Recent Labs      08/05/17   0550  08/04/17   0520  08/03/17   0525   WBC  3.3*  3.4*  2.9*   RBC  3.16*  3.25*  3.23*   HGB  10.7*  10.9*  11.0*   HCT  31.2*  32.3*  31.5*   MCV  98.7*  99.4*  97.5   MCH  33.9*  33.5*  34.1*   MCHC  34.3  33.7  34.9   RDW  15.1*  14.9*  14.8*   PLT  113*  111*  117*   GRANS  43  38*  37*   LYMPH  46*  50*  50*   MONOS  8  8  10   EOS  3  3  3   BASOS  0  1  0   IG  0.0  0.3  0.0   DF  AUTOMATED  AUTOMATED  AUTOMATED   ANEU  1.4*  1.3*  1.1*   ABL  1.5  1.7  1.4   ABM  0.3  0.3  0.3   ALINE  0.1  0.1  0.1   ABB  0.0  0.0  0.0   AIG  0.0  0.0  0.0        Recent Labs      08/05/17   0550  08/04/17   0520  08/03/17   0525  08/02/17   1519   NA  145  144  143  143   K  3.6  3.4*  3.6  3.7   CL  113*  114*  111*  110*   CO2  25  26  25  26   AGAP  7  4*  7  7   GLU  87  116*  94  72   BUN  15  14  17  21   CREA  0.78*  0.94  0.86  0.89   GFRAA  >60  >60  >60  >60   GFRNA  >60  >60  >60  >60   CA  8.2*  7.9*  7.9*  8.0*   SGOT  27  25  27  32   AP  60  59  63  70   TP  7.8  7.5  8.1  9.1*   ALB  2.4*  2.3*  2.7*  3.0*   GLOB  5.4*  5.2*  5.4*  6.1*   AGRAT  0.4*  0.4*  0.5*  0.5*   MG   --    --    --   2.1         Imaging:  XR BONE SURVEY COMP [[id number]] Collected: 08/02/17 2010      Order Status: Completed Updated: 08/02/17 2013     Narrative:       Exam:  Metabolic bone survey radiographs    History:  pain, multiple myeloma work-up, [de-identified] years Male    Comparison: None available    Findings:  No evidence of acute fracture or dislocation.  Normal alignment,  joint spaces preserved.  Normal mineralization.  No definite lytic lesions are  seen to suggest lytic osseous metastatic disease or osseous involvement by  multiple myeloma.  Visualized soft tissues otherwise unremarkable. Impression:  No definite evidence of osseous involvement by multiple myeloma or  metastatic disease.         CT BX BONE MARROW NDL/TROCAR [[id number]] Collected: 08/02/17 1535     Order Status: Completed Updated: 08/02/17 1536     Narrative:       Title: CT guided right iliac bone marrow aspiration and core biopsy. History: [de-identified]year old male with multiple myeloma.       : Nenita Hi PA-C    Supervising Physician: Leeroy Scanlon M.D. Consent: Informed written and oral consent was obtained from the patient after  explanation of benefits and risks (including, but not limited to:  Infection,  Hemorrhage, Visceral Injury).  The patient's questions were answered to their  satisfaction.  The patient stated understanding and requested that we proceed.      Procedure: Maximal sterile barrier technique was used.  With the patient prone a  preliminary CT scan through the pelvis was performed with radio-opaque markers  on the skin. Following routine prep and drape of the right buttock, a local field block with  lidocaine was achieved. Using intermittent CT technique, a marrow aspirate followed by a core biopsy was  obtained with the 11-gauge On Control biopsy device. The needle was removed. Hemostasis was achieved with manual compression. A  bandage was applied. Complications: None. Medications: None. Findings: No post biopsy bleeding. Impression: Uncomplicated CT guided right iliac bone marrow aspiration and core  biopsy.      Plan:  Bedrest observation for one hour.           ASSESSMENT:    Problem List  Date Reviewed: 7/31/2017          Codes Class Noted    Leukopenia ICD-10-CM: D72.819  ICD-9-CM: 288.50  8/2/2017        Multiple myeloma not having achieved remission (HCC) ICD-10-CM: C90.00  ICD-9-CM: 203.00  8/2/2017        Gastroesophageal reflux disease ICD-10-CM: K21.9  ICD-9-CM: 530.81  8/2/2017        Bilateral ankle pain ICD-10-CM: M25.571, F85.189  ICD-9-CM: 719.47  7/31/2017        Pollen allergies ICD-10-CM: J30.1  ICD-9-CM: 477.0  7/31/2017    Overview Signed 7/31/2017  2:27 PM by Georgette Inman MD     Pt would like to consider allergy shots. Will refer to Allergy             Fall ICD-10-CM: [address] Frieda Hinton  ICD-9-CM: E888.9  7/18/2017    Overview Signed 7/18/2017  2:03 PM by Georgette Inman MD     Pt had another fall, in his yard; he was chasing after his cat and lost his balance. No injuries sustained; fall witnessed by neighbor. No LOC. Vitamin B12 deficiency ICD-10-CM: E53.8  ICD-9-CM: 266.2  7/18/2017    Overview Signed 7/18/2017  2:06 PM by Georgette Inman MD     Pt just started taking Vitamin B 12 supplement. Abnormal laboratory test ICD-10-CM: R89.9  ICD-9-CM: 796.4  7/18/2017    Overview Addendum 7/31/2017  2:26 PM by Georgette Inman MD     UPEP results reviewed with pt. Pt has referral to Oncology to further evaluate. Mixed hyperlipidemia ICD-10-CM: E78.2  ICD-9-CM: 272.2  6/26/2017        OA (osteoarthritis) ICD-10-CM: M19.90  ICD-9-CM: 715.90  6/26/2017        Senile dementia ICD-10-CM: F03.90  ICD-9-CM: 290.0  5/19/2017        Cataract ICD-10-CM: H26.9  ICD-9-CM: 366.9  9/8/2016        Asymmetrical sensorineural hearing loss ICD-10-CM: H90.5  ICD-9-CM: 389.16  Unknown        Bipolar disorder ([facility] ICD-10-CM: F31.9  ICD-9-CM: 296.80  3/22/2015    Overview Addendum 7/6/2017  5:01 PM by Georgette Inman MD     Currently treated with Lexapro and Xanax. Given pt's age, I would recommend continuing current regimen; refill rx. Follow up 3 months or prn. Pt is working on weaning to a lower dose of Xanax.              Syncope and collapse ICD-10-CM: R55  ICD-9-CM: 780.2  3/22/2015        Acute encephalopathy ICD-10-CM: G93.40  ICD-9-CM: 348.30  3/22/2015        Cortical hemorrhage ([facility] ICD-10-CM: I61.1  ICD-9-CM: 642  9/17/2014        Syncope ICD-10-CM: R55  ICD-9-CM: 780.2  9/16/2014    Overview Signed 7/6/2017 5:00 PM by Shruthi Harrell MD     Refer to Cardiology to evaluated. Concern for symptomatic bradycardia. History of TIA (transient ischemic attack) ICD-10-CM: Z86.73  ICD-9-CM: V12.54  9/16/2014        Altered mental status ICD-10-CM: R41.82  ICD-9-CM: 780.97  9/16/2014        Anemia ICD-10-CM: D64.9  ICD-9-CM: 285.9  9/16/2014    Overview Addendum 7/6/2017  5:01 PM by Shruthi Harrell MD     Recheck CBC. Tinea corporis ICD-10-CM: B35.4  ICD-9-CM: 110.5  9/16/2014        Hypomagnesemia ICD-10-CM: E83.42  ICD-9-CM: 275.2  9/16/2014    Overview Signed 9/16/2014 10:14 PM by Jamil Villalta NP     MILD               Elevated AST (SGOT) ICD-10-CM: R74.0  ICD-9-CM: 790.4  9/16/2014            Mr. Christian Villalobos is a [de-identified] y.o. male admitted on 8/2/2017 with a primary diagnosis of Multiple Myeloma, IgG Kappa, ISS Stage II, he expressed suicidal ideation after being told the diagnosis and treatment plan, he will be evaluated by Psychiatry during this admission. PLAN:  Multiple Myeloma, IgG Kappa, ISS Stage II  - Once stable and discharge, he will require Zometa and CyBorD as OP therapy  - Bone marrow biopsy  - Skeletal survey  - Mediport  8/3 BMbx performed yesterday. Awaiting results. Skeletal survey neg. Consult IR for port placement. 8/5 BMbx results pending. Port placed yesterday    Bipolar with suicidal ideations  - Psych consult  8/3 Pt with suicidal ideations. Also making inappropriate comments to staff at times. Psychiatrist recommended decreasing Lexapro to 10mg x 3 days, then DC. Start Seroquel 25mg at bedtime. Consult SW for psych placement. 8/4  Per nurse, suicidal ideations worsened overnight. This morning, patient denies any suicidal ideation. However, he has become more verbally aggressive towards staff and refusing to take medications or treatments. Will ask psych to re-evaluate. Per Dr. Chris Oropeza, hold off on psych placement for now. 8/5 Pt spoke with psychiatrist yesterday. Still did not feel that he requires IP psych placement. No changes to plan, still recommends SNF placement after discharge. Pt denies any SI today. Pancytopenia  - Transfuse as needed    Foul-smelling urine  8/4 Check UA/UCx  8/5 UA neg for infx. UCx pending.     Electrolyte imbalance  - Replace prn per [facility] SOPs    Continue home meds  [facility] SOPs  DVT Prophylaxis: Lovenox              Tonya Davila NP   [facility]  [address] [address]  Office : [phone]  Fax : [phone]

## 2017-08-05 NOTE — PROGRESS NOTES
END OF SHIFT NOTE:    Intake/Output  08/04 1901 - 08/05 0700  In: 8299 [I.V.:1286]  Out: -    Voiding: YES  Catheter: NO  Drain:              Stool:  0 occurrences. Emesis:  0 occurrences. VITAL SIGNS  Patient Vitals for the past 12 hrs:   Temp Pulse Resp BP SpO2   08/05/17 0436 97.5 °F (36.4 °C) 61 18 143/66 98 %   08/05/17 0029 98.5 °F (36.9 °C) (!) 53 20 133/59 98 %   08/1935 98.1 °F (36.7 °C) 66 18 146/73 94 %       Pain Assessment  Pain 1  Pain Scale 1: Numeric (0 - 10) (08/05/17 0205)  Pain Intensity 1: 0 (08/05/17 0205)  Patient Stated Pain Goal: 0 (08/05/17 0205)  Pain Reassessment 1: Yes (08/05/17 0205)  Pain Location 1: Back (08/02/17 2327)  Pain Description 1: Aching;Constant (08/02/17 2327)  Pain Intervention(s) 1: Medication (see MAR) (08/02/17 2327)    Ambulating  Yes    Additional Information: Pt rested well overnight after being medicated. Over in the morning when he woke up he was back to normal being verbally aggressive and rude. Shift report given to Radha Pantoja RN oncoming nurse at the bedside.     Megan Clements RN

## 2017-08-06 LAB
ALBUMIN SERPL BCP-MCNC: 2.7 G/DL (ref 3.2–4.6)
ALBUMIN/GLOB SERPL: 0.5 {RATIO} (ref 1.2–3.5)
ALP SERPL-CCNC: 66 U/L (ref 50–136)
ALT SERPL-CCNC: 29 U/L (ref 12–65)
ANION GAP BLD CALC-SCNC: 7 MMOL/L (ref 7–16)
AST SERPL W P-5'-P-CCNC: 34 U/L (ref 15–37)
BASOPHILS # BLD AUTO: 0 K/UL (ref 0–0.2)
BASOPHILS # BLD: 0 % (ref 0–2)
BILIRUB SERPL-MCNC: 0.5 MG/DL (ref 0.2–1.1)
BUN SERPL-MCNC: 15 MG/DL (ref 8–23)
CALCIUM SERPL-MCNC: 8.2 MG/DL (ref 8.3–10.4)
CHLORIDE SERPL-SCNC: 111 MMOL/L (ref 98–107)
CO2 SERPL-SCNC: 26 MMOL/L (ref 21–32)
CREAT SERPL-MCNC: 0.86 MG/DL (ref 0.8–1.5)
DIFFERENTIAL METHOD BLD: ABNORMAL
EOSINOPHIL # BLD: 0.1 K/UL (ref 0–0.8)
EOSINOPHIL NFR BLD: 3 % (ref 0.5–7.8)
ERYTHROCYTE [DISTWIDTH] IN BLOOD BY AUTOMATED COUNT: 15 % (ref 11.9–14.6)
GLOBULIN SER CALC-MCNC: 5.6 G/DL (ref 2.3–3.5)
GLUCOSE SERPL-MCNC: 103 MG/DL (ref 65–100)
HCT VFR BLD AUTO: 32.6 % (ref 41.1–50.3)
HGB BLD-MCNC: 11.2 G/DL (ref 13.6–17.2)
IMM GRANULOCYTES # BLD: 0 K/UL (ref 0–0.5)
IMM GRANULOCYTES NFR BLD AUTO: 0 % (ref 0–5)
LYMPHOCYTES # BLD AUTO: 34 % (ref 13–44)
LYMPHOCYTES # BLD: 1.2 K/UL (ref 0.5–4.6)
MCH RBC QN AUTO: 33.7 PG (ref 26.1–32.9)
MCHC RBC AUTO-ENTMCNC: 34.4 G/DL (ref 31.4–35)
MCV RBC AUTO: 98.2 FL (ref 79.6–97.8)
MONOCYTES # BLD: 0.4 K/UL (ref 0.1–1.3)
MONOCYTES NFR BLD AUTO: 10 % (ref 4–12)
NEUTS SEG # BLD: 1.9 K/UL (ref 1.7–8.2)
NEUTS SEG NFR BLD AUTO: 53 % (ref 43–78)
PLATELET # BLD AUTO: 125 K/UL (ref 150–450)
PMV BLD AUTO: 10.5 FL (ref 10.8–14.1)
POTASSIUM SERPL-SCNC: 4 MMOL/L (ref 3.5–5.1)
PROT SERPL-MCNC: 8.3 G/DL (ref 6.3–8.2)
RBC # BLD AUTO: 3.32 M/UL (ref 4.23–5.67)
SODIUM SERPL-SCNC: 144 MMOL/L (ref 136–145)
WBC # BLD AUTO: 3.6 K/UL (ref 4.3–11.1)

## 2017-08-06 PROCEDURE — 74011250637 HC RX REV CODE- 250/637: Performed by: NURSE PRACTITIONER

## 2017-08-06 PROCEDURE — 65270000029 HC RM PRIVATE

## 2017-08-06 PROCEDURE — 85025 COMPLETE CBC W/AUTO DIFF WBC: CPT | Performed by: NURSE PRACTITIONER

## 2017-08-06 PROCEDURE — 74011250637 HC RX REV CODE- 250/637: Performed by: INTERNAL MEDICINE

## 2017-08-06 PROCEDURE — 74011250636 HC RX REV CODE- 250/636: Performed by: NURSE PRACTITIONER

## 2017-08-06 PROCEDURE — 36415 COLL VENOUS BLD VENIPUNCTURE: CPT | Performed by: NURSE PRACTITIONER

## 2017-08-06 PROCEDURE — 99232 SBSQ HOSP IP/OBS MODERATE 35: CPT | Performed by: INTERNAL MEDICINE

## 2017-08-06 PROCEDURE — 80053 COMPREHEN METABOLIC PANEL: CPT | Performed by: NURSE PRACTITIONER

## 2017-08-06 RX ADMIN — POTASSIUM CHLORIDE 20 MEQ: 20 TABLET, EXTENDED RELEASE ORAL at 18:03

## 2017-08-06 RX ADMIN — ASPIRIN 81 MG: 81 TABLET, COATED ORAL at 08:39

## 2017-08-06 RX ADMIN — SIMVASTATIN 20 MG: 20 TABLET, FILM COATED ORAL at 21:05

## 2017-08-06 RX ADMIN — QUETIAPINE FUMARATE 25 MG: 25 TABLET, FILM COATED ORAL at 21:05

## 2017-08-06 RX ADMIN — POTASSIUM CHLORIDE 20 MEQ: 20 TABLET, EXTENDED RELEASE ORAL at 08:39

## 2017-08-06 RX ADMIN — ESCITALOPRAM OXALATE 10 MG: 10 TABLET ORAL at 08:39

## 2017-08-06 RX ADMIN — ENOXAPARIN SODIUM 40 MG: 40 INJECTION SUBCUTANEOUS at 18:03

## 2017-08-06 RX ADMIN — DONEPEZIL HYDROCHLORIDE 10 MG: 5 TABLET, FILM COATED ORAL at 08:39

## 2017-08-06 NOTE — PROGRESS NOTES
END OF SHIFT NOTE:          Stool:  1 occurrences.          Emesis:  0 occurrences.           VITAL SIGNS  Patient Vitals for the past 12 hrs:   Temp Pulse Resp BP SpO2   08/06/17 0426 97.9 °F (36.6 °C) 68 20 160/65 96 %   08/05/17 2357 98 °F (36.7 °C) 72 20 147/69 94 %   08/05/17 2017 98.1 °F (36.7 °C) 67 20 124/44 96 %        Pain Assessment  Pain Assessment  Pain Intensity 1: 0 (08/06/17 0311)  Pain Location 1: Head  Pain Intervention(s) 1: Medication (see MAR)  Patient Stated Pain Goal: 0       Ambulating  Yes     Additional Information: Pt talked on phone until approx 2:30 am.  When he woke up he was being verbally aggressive and rude.     Shift report given to Carlo Ndiaye RN oncoming nurse at the bedside.     Tonya Prescott RN

## 2017-08-06 NOTE — PROGRESS NOTES
Bedside report given to Ruth Link, [address] [facility]. Time allotted for questions and concerns.

## 2017-08-06 NOTE — PROGRESS NOTES
[facility]        Inpatient Hematology / Oncology Progress Note      Admission Date: 2017  1:13 PM  Reason for Admission/Hospital Course: multimyeloma  Leukopenia  Leukopenia      24 Hour Events: BMBx performed , awaiting results  Skeletal survey neg  Seems more easily agitated today      ROS:  Constitutional: Negative for fever, chills, weakness, malaise, fatigue. CV: Negative for chest pain, palpitations, edema. Respiratory: Negative for dyspnea, cough, wheezing. GI: Negative for nausea, abdominal pain, diarrhea. Psych: +Bipolar with suicidal ideations - states no suicidal ideations this AM    10 point review of systems is otherwise negative with the exception of the elements mentioned above in the HPI. No Known Allergies    OBJECTIVE:  Patient Vitals for the past 8 hrs:   BP Temp Pulse Resp SpO2 Weight   17 0833 157/73 97.9 °F (36.6 °C) 67 19 93 % -   17 0426 160/65 97.9 °F (36.6 °C) 68 20 96 % -     Temp (24hrs), Av.9 °F (36.6 °C), Min:97.6 °F (36.4 °C), Max:98.1 °F (36.7 °C)     0701 -  1900  In: 240 [P.O.:240]  Out: -     Physical Exam:  Constitutional: Well developed, well nourished male in no acute distress, sitting comfortably in the bedside chair. HEENT: Normocephalic and atraumatic. Oropharynx is clear, mucous membranes are moist.Extraocular muscles are intact. Sclerae anicteric. Neck supple without JVD. No thyromegaly present. Lymph node   Deferred   Skin Warm and dry. No bruising and no rash noted. No erythema. No pallor. Respiratory Lungs are clear to auscultation bilaterally without wheezes, rales or rhonchi, normal air exchange without accessory muscle use. CVS Bradycardic rate, regular rhythm and normal S1 and S2. No murmurs, gallops, or rubs. Abdomen Soft, nontender and nondistended, normoactive bowel sounds. No palpable mass. No hepatosplenomegaly.    Neuro Grossly nonfocal with no obvious sensory or motor deficits. MSK Normal range of motion in general.  No edema and no tenderness. Psych Compulsive talking. Bipolar with suicidal ideations - states no suicidal ideations this AM.  Inappropriate comments at times to staff. More easily agitated today. Labs:      Recent Labs      08/05/17   0550  08/04/17   0520   WBC  3.3*  3.4*   RBC  3.16*  3.25*   HGB  10.7*  10.9*   HCT  31.2*  32.3*   MCV  98.7*  99.4*   MCH  33.9*  33.5*   MCHC  34.3  33.7   RDW  15.1*  14.9*   PLT  113*  111*   GRANS  43  38*   LYMPH  46*  50*   MONOS  8  8   EOS  3  3   BASOS  0  1   IG  0.0  0.3   DF  AUTOMATED  AUTOMATED   ANEU  1.4*  1.3*   ABL  1.5  1.7   ABM  0.3  0.3   ALINE  0.1  0.1   ABB  0.0  0.0   AIG  0.0  0.0        Recent Labs      08/05/17   0550  08/04/17   0520   NA  145  144   K  3.6  3.4*   CL  113*  114*   CO2  25  26   AGAP  7  4*   GLU  87  116*   BUN  15  14   CREA  0.78*  0.94   GFRAA  >60  >60   GFRNA  >60  >60   CA  8.2*  7.9*   SGOT  27  25   AP  60  59   TP  7.8  7.5   ALB  2.4*  2.3*   GLOB  5.4*  5.2*   AGRAT  0.4*  0.4*         Imaging:  XR BONE SURVEY COMP [[id number]] Collected: 08/02/17 2010      Order Status: Completed Updated: 08/02/17 2013     Narrative:       Exam:  Metabolic bone survey radiographs    History:  pain, multiple myeloma work-up, [de-identified] years Male    Comparison: None available    Findings:  No evidence of acute fracture or dislocation.  Normal alignment,  joint spaces preserved.  Normal mineralization.  No definite lytic lesions are  seen to suggest lytic osseous metastatic disease or osseous involvement by  multiple myeloma.  Visualized soft tissues otherwise unremarkable.     Impression:  No definite evidence of osseous involvement by multiple myeloma or  metastatic disease.         CT BX BONE MARROW NDL/TROCAR [[id number]] Collected: 08/02/17 1535     Order Status: Completed Updated: 08/02/17 1536     Narrative:       Title: CT guided right iliac bone marrow aspiration and core biopsy. History: [de-identified]year old male with multiple myeloma.       : Brandi Wong PA-C    Supervising Physician: Darby Paredes M.D. Consent: Informed written and oral consent was obtained from the patient after  explanation of benefits and risks (including, but not limited to:  Infection,  Hemorrhage, Visceral Injury).  The patient's questions were answered to their  satisfaction.  The patient stated understanding and requested that we proceed.      Procedure: Maximal sterile barrier technique was used.  With the patient prone a  preliminary CT scan through the pelvis was performed with radio-opaque markers  on the skin. Following routine prep and drape of the right buttock, a local field block with  lidocaine was achieved. Using intermittent CT technique, a marrow aspirate followed by a core biopsy was  obtained with the 11-gauge On Control biopsy device. The needle was removed. Hemostasis was achieved with manual compression. A  bandage was applied. Complications: None. Medications: None. Findings: No post biopsy bleeding. Impression: Uncomplicated CT guided right iliac bone marrow aspiration and core  biopsy. Plan:  Bedrest observation for one hour.           ASSESSMENT:    Problem List  Date Reviewed: 7/31/2017          Codes Class Noted    Leukopenia ICD-10-CM: D72.819  ICD-9-CM: 288.50  8/2/2017        Multiple myeloma not having achieved [facility] ([facility] ICD-10-CM: C90.00  ICD-9-CM: 203.00  8/2/2017        Gastroesophageal reflux disease ICD-10-CM: K21.9  ICD-9-CM: 530.81  8/2/2017        Bilateral ankle pain ICD-10-CM: M25.571, M25.572  ICD-9-CM: 719.47  7/31/2017        Pollen allergies ICD-10-CM: J30.1  ICD-9-CM: 477.0  7/31/2017    Overview Signed 7/31/2017  2:27 PM by Shruthi Harrell MD     Pt would like to consider allergy shots. Will refer to Allergy             Fall ICD-10-CM: Tessa Pickup. Dhara Krueger  ICD-9-CM: E888.9  7/18/2017    Overview Signed 7/18/2017  2:03 PM by Bryan Ashley MD     Pt had another fall, in his yard; he was chasing after his cat and lost his balance. No injuries sustained; fall witnessed by neighbor. No LOC. Vitamin B12 deficiency ICD-10-CM: E53.8  ICD-9-CM: 266.2  7/18/2017    Overview Signed 7/18/2017  2:06 PM by Bryan Ashley MD     Pt just started taking Vitamin B 12 supplement. Abnormal laboratory test ICD-10-CM: R89.9  ICD-9-CM: 796.4  7/18/2017    Overview Addendum 7/31/2017  2:26 PM by Bryan Ashley MD     UPEP results reviewed with pt. Pt has referral to Oncology to further evaluate. Mixed hyperlipidemia ICD-10-CM: E78.2  ICD-9-CM: 272.2  6/26/2017        OA (osteoarthritis) ICD-10-CM: M19.90  ICD-9-CM: 715.90  6/26/2017        Senile dementia ICD-10-CM: F03.90  ICD-9-CM: 290.0  5/19/2017        Cataract ICD-10-CM: H26.9  ICD-9-CM: 366.9  9/8/2016        Asymmetrical sensorineural hearing loss ICD-10-CM: H90.5  ICD-9-CM: 389.16  Unknown        Bipolar disorder ([facility] ICD-10-CM: F31.9  ICD-9-CM: 296.80  3/22/2015    Overview Addendum 7/6/2017  5:01 PM by Bryan Ashley MD     Currently treated with Lexapro and Xanax. Given pt's age, I would recommend continuing current regimen; refill rx. Follow up 3 months or prn. Pt is working on weaning to a lower dose of Xanax. Syncope and collapse ICD-10-CM: R55  ICD-9-CM: 780.2  3/22/2015        Acute encephalopathy ICD-10-CM: G93.40  ICD-9-CM: 348.30  3/22/2015        Cortical hemorrhage ([facility] ICD-10-CM: I61.1  ICD-9-CM: 574  9/17/2014        Syncope ICD-10-CM: R55  ICD-9-CM: 780.2  9/16/2014    Overview Signed 7/6/2017  5:00 PM by Bryan Ashley MD     Refer to Cardiology to evaluated. Concern for symptomatic bradycardia.              History of TIA (transient ischemic attack) ICD-10-CM: Z86.73  ICD-9-CM: V12.54  9/16/2014        Altered mental status ICD-10-CM: R41.82  ICD-9-CM: 780.97  9/16/2014        Anemia ICD-10-CM: D64.9  ICD-9-CM: 285.9 9/16/2014    Overview Addendum 7/6/2017  5:01 PM by Oc Aponte MD     Recheck CBC. Tinea corporis ICD-10-CM: B35.4  ICD-9-CM: 110.5  9/16/2014        Hypomagnesemia ICD-10-CM: E83.42  ICD-9-CM: 275.2  9/16/2014    Overview Signed 9/16/2014 10:14 PM by Kyle Dubon NP     MILD               Elevated AST (SGOT) ICD-10-CM: R74.0  ICD-9-CM: 790.4  9/16/2014            Mr. Arlette Galindo is a [de-identified] y.o. male admitted on 8/2/2017 with a primary diagnosis of Multiple Myeloma, IgG Kappa, ISS Stage II, he expressed suicidal ideation after being told the diagnosis and treatment plan, he will be evaluated by Psychiatry during this admission. PLAN:  Multiple Myeloma, IgG Kappa, ISS Stage II  - Once stable and discharge, he will require Zometa and CyBorD as OP therapy  - Bone marrow biopsy  - Skeletal survey  - Mediport  8/3 BMbx performed yesterday. Awaiting results. Skeletal survey neg. Consult IR for port placement. 8/6 BMbx results pending. Port placed 8/4. Bipolar with suicidal ideations  - Psych consult  8/3 Pt with suicidal ideations. Also making inappropriate comments to staff at times. Psychiatrist recommended decreasing Lexapro to 10mg x 3 days, then DC. Start Seroquel 25mg at bedtime. Consult SW for psych placement. 8/4  Per nurse, suicidal ideations worsened overnight. This morning, patient denies any suicidal ideation. However, he has become more verbally aggressive towards staff and refusing to take medications or treatments. Will ask psych to re-evaluate. Per Dr. Daniel Noel, hold off on psych placement for now. 8/5 Pt spoke with psychiatrist yesterday. Still did not feel that he requires IP psych placement. No changes to plan, still recommends SNF placement after discharge. Pt denies any SI today. Pancytopenia  - Transfuse as needed    Foul-smelling urine  8/4 Check UA/UCx  8/5 UA neg for infx. UCx pending. 8/6 UCx showed >100k mixed skin ondina. Recommended repeat UCx.   Results pending. Electrolyte imbalance  - Replace prn per [facility] SOPs    Continue home meds  [facility] SOPs  DVT Prophylaxis: Lovenox    Disposition: SW to work on placement for after discharge. SNF vs natalie-psych? Patient also currently lives alone and is worried about is cat who is home alone since 8/2.             Eugenie Rodriguez NP   [facility]  [address] [address]  Office : [phone]  Fax : [phone]

## 2017-08-06 NOTE — PROGRESS NOTES
END OF SHIFT NOTE:    Intake/Output      Voiding: YES  Catheter: NO  Drain:              Stool:  0 occurrences. Stool Assessment  Stool Color: Drucilla Solomon (pt stated \"it was really dark\") (08/05/17 5176)  Stool Appearance: Hard; Formed (08/05/17 4488)  Stool Amount: Small (08/05/17 6701)  Stool Source/Status: Rectum (08/05/17 0839)    Emesis:  0 occurrences. VITAL SIGNS  Patient Vitals for the past 12 hrs:   Temp Pulse Resp BP SpO2   08/06/17 1517 97.6 °F (36.4 °C) 76 19 148/77 98 %   08/06/17 1149 98.1 °F (36.7 °C) 82 18 171/77 99 %   08/06/17 0833 97.9 °F (36.6 °C) 67 19 157/73 93 %       Pain Assessment  Pain 1  Pain Scale 1: Visual (08/06/17 0311)  Pain Intensity 1: 0 (08/06/17 0311)  Patient Stated Pain Goal: 0 (08/06/17 0311)  Pain Reassessment 1: Yes (08/05/17 2358)  Pain Onset 1: now (08/05/17 2252)  Pain Location 1: Head (08/05/17 2252)  Pain Orientation 1: Anterior (08/05/17 2252)  Pain Description 1: Aching (08/05/17 2252)  Pain Intervention(s) 1: Medication (see MAR) (08/05/17 2252)    Ambulating  Yes    Additional Information:     Shift report given to oncoming nurse at the bedside.     Mary Carmen Aburto RN

## 2017-08-07 LAB
BASOPHILS # BLD AUTO: 0 K/UL (ref 0–0.2)
BASOPHILS # BLD: 0 % (ref 0–2)
DIFFERENTIAL METHOD BLD: ABNORMAL
EOSINOPHIL # BLD: 0.1 K/UL (ref 0–0.8)
EOSINOPHIL NFR BLD: 3 % (ref 0.5–7.8)
ERYTHROCYTE [DISTWIDTH] IN BLOOD BY AUTOMATED COUNT: 14.8 % (ref 11.9–14.6)
HCT VFR BLD AUTO: 31.5 % (ref 41.1–50.3)
HGB BLD-MCNC: 10.8 G/DL (ref 13.6–17.2)
IMM GRANULOCYTES # BLD: 0 K/UL (ref 0–0.5)
IMM GRANULOCYTES NFR BLD AUTO: 0.3 % (ref 0–5)
LYMPHOCYTES # BLD AUTO: 42 % (ref 13–44)
LYMPHOCYTES # BLD: 1.3 K/UL (ref 0.5–4.6)
MCH RBC QN AUTO: 33.6 PG (ref 26.1–32.9)
MCHC RBC AUTO-ENTMCNC: 34.3 G/DL (ref 31.4–35)
MCV RBC AUTO: 98.1 FL (ref 79.6–97.8)
MONOCYTES # BLD: 0.3 K/UL (ref 0.1–1.3)
MONOCYTES NFR BLD AUTO: 11 % (ref 4–12)
NEUTS SEG # BLD: 1.3 K/UL (ref 1.7–8.2)
NEUTS SEG NFR BLD AUTO: 44 % (ref 43–78)
PLATELET # BLD AUTO: 121 K/UL (ref 150–450)
PMV BLD AUTO: 10.6 FL (ref 10.8–14.1)
RBC # BLD AUTO: 3.21 M/UL (ref 4.23–5.67)
WBC # BLD AUTO: 3 K/UL (ref 4.3–11.1)

## 2017-08-07 PROCEDURE — 74011250637 HC RX REV CODE- 250/637: Performed by: INTERNAL MEDICINE

## 2017-08-07 PROCEDURE — 97161 PT EVAL LOW COMPLEX 20 MIN: CPT

## 2017-08-07 PROCEDURE — 85025 COMPLETE CBC W/AUTO DIFF WBC: CPT | Performed by: NURSE PRACTITIONER

## 2017-08-07 PROCEDURE — 87086 URINE CULTURE/COLONY COUNT: CPT | Performed by: NURSE PRACTITIONER

## 2017-08-07 PROCEDURE — 74011000302 HC RX REV CODE- 302: Performed by: INTERNAL MEDICINE

## 2017-08-07 PROCEDURE — 74011250636 HC RX REV CODE- 250/636: Performed by: NURSE PRACTITIONER

## 2017-08-07 PROCEDURE — 65270000029 HC RM PRIVATE

## 2017-08-07 PROCEDURE — 86580 TB INTRADERMAL TEST: CPT | Performed by: INTERNAL MEDICINE

## 2017-08-07 PROCEDURE — 74011250637 HC RX REV CODE- 250/637: Performed by: NURSE PRACTITIONER

## 2017-08-07 PROCEDURE — 36415 COLL VENOUS BLD VENIPUNCTURE: CPT | Performed by: NURSE PRACTITIONER

## 2017-08-07 PROCEDURE — 99233 SBSQ HOSP IP/OBS HIGH 50: CPT | Performed by: INTERNAL MEDICINE

## 2017-08-07 RX ORDER — DOCUSATE SODIUM 100 MG/1
100 CAPSULE, LIQUID FILLED ORAL 2 TIMES DAILY
Status: DISCONTINUED | OUTPATIENT
Start: 2017-08-07 | End: 2017-08-10 | Stop reason: HOSPADM

## 2017-08-07 RX ADMIN — SIMVASTATIN 20 MG: 20 TABLET, FILM COATED ORAL at 20:17

## 2017-08-07 RX ADMIN — ENOXAPARIN SODIUM 40 MG: 40 INJECTION SUBCUTANEOUS at 15:17

## 2017-08-07 RX ADMIN — ALPRAZOLAM 1 MG: 0.5 TABLET ORAL at 15:46

## 2017-08-07 RX ADMIN — DONEPEZIL HYDROCHLORIDE 10 MG: 5 TABLET, FILM COATED ORAL at 09:42

## 2017-08-07 RX ADMIN — DOCUSATE SODIUM 100 MG: 100 CAPSULE, LIQUID FILLED ORAL at 15:50

## 2017-08-07 RX ADMIN — NAPROXEN 500 MG: 250 TABLET ORAL at 09:54

## 2017-08-07 RX ADMIN — ALPRAZOLAM 1 MG: 0.5 TABLET ORAL at 09:42

## 2017-08-07 RX ADMIN — POTASSIUM CHLORIDE 20 MEQ: 20 TABLET, EXTENDED RELEASE ORAL at 09:42

## 2017-08-07 RX ADMIN — NAPROXEN 500 MG: 250 TABLET ORAL at 20:17

## 2017-08-07 RX ADMIN — TUBERCULIN PURIFIED PROTEIN DERIVATIVE 5 UNITS: 5 INJECTION, SOLUTION INTRADERMAL at 10:38

## 2017-08-07 RX ADMIN — SODIUM CHLORIDE 75 ML/HR: 900 INJECTION, SOLUTION INTRAVENOUS at 15:50

## 2017-08-07 RX ADMIN — POTASSIUM CHLORIDE 20 MEQ: 20 TABLET, EXTENDED RELEASE ORAL at 17:19

## 2017-08-07 RX ADMIN — QUETIAPINE FUMARATE 25 MG: 25 TABLET, FILM COATED ORAL at 21:48

## 2017-08-07 RX ADMIN — ALPRAZOLAM 1 MG: 0.5 TABLET ORAL at 00:10

## 2017-08-07 RX ADMIN — ASPIRIN 81 MG: 81 TABLET, COATED ORAL at 09:42

## 2017-08-07 RX ADMIN — ALPRAZOLAM 1 MG: 0.5 TABLET ORAL at 21:48

## 2017-08-07 NOTE — PROGRESS NOTES
Met with pt at bedside, pt is alert and oriented x3, found sitting up in bedside chair, agreeable to STR agreeable for referral to [facility], referral completed via CC link. PPD, PT/OT ordered today. Pt. States lives alone in own home all his family live out of state and is worried about his cat at home that he needs to get home to care for. CM will continue to follow for needs. Care Management Interventions  PCP Verified by CM: Yes  Mode of Transport at Discharge: BLS  Transition of Care Consult (CM Consult): Discharge Planning, SNF  Occupational Therapy Consult: Yes  Current Support Network: Lives Alone, Own Home  Confirm Follow Up Transport: Self  Plan discussed with Pt/Family/Caregiver: Yes  Freedom of Choice Offered:  Yes

## 2017-08-07 NOTE — PROGRESS NOTES
[facility]        Inpatient Hematology / Oncology Progress Note      Admission Date: 2017  1:13 PM  Reason for Admission/Hospital Course: multimyeloma  Leukopenia  Leukopenia      24 Hour Events: BMBx performed , awaiting results  Skeletal survey neg  Pt agreeable to go to SNF after discharge      ROS:  Constitutional: Negative for fever, chills, weakness, malaise, fatigue. CV: Negative for chest pain, palpitations, edema. Respiratory: Negative for dyspnea, cough, wheezing. GI: Negative for nausea, abdominal pain, diarrhea. Psych: +Bipolar with suicidal ideations - states no suicidal ideations this AM    10 point review of systems is otherwise negative with the exception of the elements mentioned above in the HPI. No Known Allergies    OBJECTIVE:  Patient Vitals for the past 8 hrs:   BP Temp Pulse Resp SpO2   17 0816 149/81 96.4 °F (35.8 °C) (!) 56 19 99 %     Temp (24hrs), Av.4 °F (36.3 °C), Min:96.4 °F (35.8 °C), Max:98.1 °F (36.7 °C)     07 -  1900  In: 120 [P.O.:120]  Out: -     Physical Exam:  Constitutional: Well developed, well nourished male in no acute distress, lying comfortably in the hospital bed. HEENT: Normocephalic and atraumatic. Oropharynx is clear, mucous membranes are moist.Extraocular muscles are intact. Sclerae anicteric. Neck supple without JVD. No thyromegaly present. Lymph node   Deferred   Skin Warm and dry. No bruising and no rash noted. No erythema. No pallor. Respiratory Lungs are clear to auscultation bilaterally without wheezes, rales or rhonchi, normal air exchange without accessory muscle use. CVS Bradycardic rate, regular rhythm and normal S1 and S2. No murmurs, gallops, or rubs. Abdomen Soft, nontender and nondistended, normoactive bowel sounds. No palpable mass. No hepatosplenomegaly. Neuro Grossly nonfocal with no obvious sensory or motor deficits.    MSK Normal range of motion in general.  No edema and no tenderness. Psych Compulsive talking. Bipolar with suicidal ideations - states no suicidal ideations this AM.  Inappropriate comments at times to staff. Pleasant today. Labs:      Recent Labs      08/07/17   0450  08/06/17   1050  08/05/17   0550   WBC  3.0*  3.6*  3.3*   RBC  3.21*  3.32*  3.16*   HGB  10.8*  11.2*  10.7*   HCT  31.5*  32.6*  31.2*   MCV  98.1*  98.2*  98.7*   MCH  33.6*  33.7*  33.9*   MCHC  34.3  34.4  34.3   RDW  14.8*  15.0*  15.1*   PLT  121*  125*  113*   GRANS  44  53  43   LYMPH  42  34  46*   MONOS  11  10  8   EOS  3  3  3   BASOS  0  0  0   IG  0.3  0.0  0.0   DF  AUTOMATED  AUTOMATED  AUTOMATED   ANEU  1.3*  1.9  1.4*   ABL  1.3  1.2  1.5   ABM  0.3  0.4  0.3   ALINE  0.1  0.1  0.1   ABB  0.0  0.0  0.0   AIG  0.0  0.0  0.0        Recent Labs      08/06/17   1050  08/05/17   0550   NA  144  145   K  4.0  3.6   CL  111*  113*   CO2  26  25   AGAP  7  7   GLU  103*  87   BUN  15  15   CREA  0.86  0.78*   GFRAA  >60  >60   GFRNA  >60  >60   CA  8.2*  8.2*   SGOT  34  27   AP  66  60   TP  8.3*  7.8   ALB  2.7*  2.4*   GLOB  5.6*  5.4*   AGRAT  0.5*  0.4*         Imaging:  XR BONE SURVEY COMP [[id number]] Collected: 08/02/17 2010      Order Status: Completed Updated: 08/02/17 2013     Narrative:       Exam:  Metabolic bone survey radiographs    History:  pain, multiple myeloma work-up, [de-identified] years Male    Comparison: None available    Findings:  No evidence of acute fracture or dislocation.  Normal alignment,  joint spaces preserved.  Normal mineralization.  No definite lytic lesions are  seen to suggest lytic osseous metastatic disease or osseous involvement by  multiple myeloma.  Visualized soft tissues otherwise unremarkable.     Impression:  No definite evidence of osseous involvement by multiple myeloma or  metastatic disease.         CT BX BONE MARROW NDL/TROCAR [[id number]] Collected: 08/02/17 1535     Order Status: Completed Updated: 08/02/17 1536     Narrative:       Title: CT guided right iliac bone marrow aspiration and core biopsy. History: [de-identified]year old male with multiple myeloma.       : Claudia Wilson PA-C    Supervising Physician: Bessie Joseph M.D. Consent: Informed written and oral consent was obtained from the patient after  explanation of benefits and risks (including, but not limited to:  Infection,  Hemorrhage, Visceral Injury).  The patient's questions were answered to their  satisfaction.  The patient stated understanding and requested that we proceed.      Procedure: Maximal sterile barrier technique was used.  With the patient prone a  preliminary CT scan through the pelvis was performed with radio-opaque markers  on the skin. Following routine prep and drape of the right buttock, a local field block with  lidocaine was achieved. Using intermittent CT technique, a marrow aspirate followed by a core biopsy was  obtained with the 11-gauge On Control biopsy device. The needle was removed. Hemostasis was achieved with manual compression. A  bandage was applied. Complications: None. Medications: None. Findings: No post biopsy bleeding. Impression: Uncomplicated CT guided right iliac bone marrow aspiration and core  biopsy. Plan:  Bedrest observation for one hour.           ASSESSMENT:    Problem List  Date Reviewed: 7/31/2017          Codes Class Noted    Leukopenia ICD-10-CM: D72.819  ICD-9-CM: 288.50  8/2/2017        Multiple myeloma not having achieved remission ([facility] ICD-10-CM: C90.00  ICD-9-CM: 203.00  8/2/2017        Gastroesophageal reflux disease ICD-10-CM: K21.9  ICD-9-CM: 530.81  8/2/2017        Bilateral ankle pain ICD-10-CM: M25.571, M25.572  ICD-9-CM: 719.47  7/31/2017        Pollen allergies ICD-10-CM: J30.1  ICD-9-CM: 477.0  7/31/2017    Overview Signed 7/31/2017  2:27 PM by Jazmyne Hernandez MD     Pt would like to consider allergy shots.   Will refer to Allergy             Fall ICD-10-CM: C23.QVSX  ICD-9-CM: E888.9  7/18/2017    Overview Signed 7/18/2017  2:03 PM by Valentina Gomez MD     Pt had another fall, in his yard; he was chasing after his cat and lost his balance. No injuries sustained; fall witnessed by neighbor. No LOC. Vitamin B12 deficiency ICD-10-CM: E53.8  ICD-9-CM: 266.2  7/18/2017    Overview Signed 7/18/2017  2:06 PM by Valentina Gomez MD     Pt just started taking Vitamin B 12 supplement. Abnormal laboratory test ICD-10-CM: R89.9  ICD-9-CM: 796.4  7/18/2017    Overview Addendum 7/31/2017  2:26 PM by Valentina Gomez MD     UPEP results reviewed with pt. Pt has referral to Oncology to further evaluate. Mixed hyperlipidemia ICD-10-CM: E78.2  ICD-9-CM: 272.2  6/26/2017        OA (osteoarthritis) ICD-10-CM: M19.90  ICD-9-CM: 715.90  6/26/2017        Senile dementia ICD-10-CM: F03.90  ICD-9-CM: 290.0  5/19/2017        Cataract ICD-10-CM: H26.9  ICD-9-CM: 366.9  9/8/2016        Asymmetrical sensorineural hearing loss ICD-10-CM: H90.5  ICD-9-CM: 389.16  Unknown        Bipolar disorder ([facility] ICD-10-CM: F31.9  ICD-9-CM: 296.80  3/22/2015    Overview Addendum 7/6/2017  5:01 PM by Valentina Gomez MD     Currently treated with Lexapro and Xanax. Given pt's age, I would recommend continuing current regimen; refill rx. Follow up 3 months or prn. Pt is working on weaning to a lower dose of Xanax. Syncope and collapse ICD-10-CM: R55  ICD-9-CM: 780.2  3/22/2015        Acute encephalopathy ICD-10-CM: G93.40  ICD-9-CM: 348.30  3/22/2015        Cortical hemorrhage ([facility] ICD-10-CM: I61.1  ICD-9-CM: 075  9/17/2014        Syncope ICD-10-CM: R55  ICD-9-CM: 780.2  9/16/2014    Overview Signed 7/6/2017  5:00 PM by Valentina Gomez MD     Refer to Cardiology to evaluated. Concern for symptomatic bradycardia.              History of TIA (transient ischemic attack) ICD-10-CM: Z86.73  ICD-9-CM: V12.54  9/16/2014        Altered mental status ICD-10-CM: R41.82  ICD-9-CM: 780.97  9/16/2014        Anemia ICD-10-CM: D64.9  ICD-9-CM: 285.9  9/16/2014    Overview Addendum 7/6/2017  5:01 PM by Med Cheng MD     Recheck CBC. Tinea corporis ICD-10-CM: B35.4  ICD-9-CM: 110.5  9/16/2014        Hypomagnesemia ICD-10-CM: E83.42  ICD-9-CM: 275.2  9/16/2014    Overview Signed 9/16/2014 10:14 PM by Christoph Rao NP     MILD               Elevated AST (SGOT) ICD-10-CM: R74.0  ICD-9-CM: 790.4  9/16/2014            Mr. Angelica Miller is a [de-identified] y.o. male admitted on 8/2/2017 with a primary diagnosis of Multiple Myeloma, IgG Kappa, ISS Stage II, he expressed suicidal ideation after being told the diagnosis and treatment plan, he will be evaluated by Psychiatry during this admission. PLAN:  Multiple Myeloma, IgG Kappa, ISS Stage II  - Once stable and discharge, he will require Zometa and CyBorD as OP therapy  - Bone marrow biopsy  - Skeletal survey  - Mediport  8/3 BMbx performed yesterday. Awaiting results. Skeletal survey neg. Consult IR for port placement. 8/7 BMbx results pending. Port placed 8/4. Bipolar with suicidal ideations  - Psych consult  8/3 Pt with suicidal ideations. Also making inappropriate comments to staff at times. Psychiatrist recommended decreasing Lexapro to 10mg x 3 days, then DC. Start Seroquel 25mg at bedtime. Consult SW for psych placement. 8/4  Per nurse, suicidal ideations worsened overnight. This morning, patient denies any suicidal ideation. However, he has become more verbally aggressive towards staff and refusing to take medications or treatments. Will ask psych to re-evaluate. Per Dr. Emily Jackson, hold off on psych placement for now. 8/5 Pt spoke with psychiatrist yesterday. Still did not feel that he requires IP psych placement. No changes to plan, still recommends SNF placement after discharge. Pt denies any SI today. 8/7 Discussed recommendation by psychiatrist for patient to go to SNF after discharge.   Pt agreeable with plan.    Pancytopenia  - Transfuse as needed    Foul-smelling urine  8/4 Check UA/UCx  8/5 UA neg for infx. UCx pending. 8/6 UCx showed >100k mixed skin ondina. Recommended repeat UCx. Results pending. 8/7 Repeat UCx results pending    Electrolyte imbalance  - Replace prn per [facility] SOPs    Continue home meds  [facility] SOPs  DVT Prophylaxis: Lovenox    Disposition: SW to work on placement for after discharge. SNF/natalie-psych? Pt is agreeable to go to facility. Patient also currently lives alone and is worried about is cat who is home alone since 8/2. Of note, patient's car and keys are still at [facility]. Pt is stable for discharge, just waiting on placement now. VINCE Lopez [facility]  [address] [address]  Office : [phone]  Fax : [phone]       Attending Addendum:  I personally evaluated the patient with Carloz Higginbotham NDONNA,  and agree with the assessment, findings and plan as documented. Appears stable, heart regular without murmur, lungs clear, abdomen benign. Awaiting BM biopsy results. Needs placement.                Aden Ferrell MD  [facility]  [address] [address]  Office : [phone]  Fax : [phone]

## 2017-08-07 NOTE — PROGRESS NOTES
0705-Bedside report received from Roberta Perry, [address] [place] [address]. Resting in bed. No needs voiced. No s/s of acute distress. 1800-  END OF SHIFT NOTE:    Intake/Output  08/07 0701 - 08/07 1900  In: 295 [P.O.:360; I.V.:334]  Out: -    Voiding: YES  Catheter: NO  Drain:              Stool:  1 occurrences. Stool Assessment  Stool Color: Little Servant (pt stated \"it was really dark\") (08/05/17 1654)  Stool Appearance: Hard; Formed (08/05/17 9789)  Stool Amount: Small (08/05/17 1662)  Stool Source/Status: Rectum (08/05/17 4262)    Emesis:  0 occurrences. VITAL SIGNS  Patient Vitals for the past 12 hrs:   Temp Pulse Resp BP SpO2   08/07/17 1545 98.3 °F (36.8 °C) 60 20 129/75 100 %   08/07/17 1116 96.5 °F (35.8 °C) 61 20 142/76 98 %   08/07/17 0816 96.4 °F (35.8 °C) (!) 56 19 149/81 99 %       Pain Assessment  Pain 1  Pain Scale 1: Numeric (0 - 10) (08/07/17 1500)  Pain Intensity 1: 0 (08/07/17 1500)  Patient Stated Pain Goal: 0 (08/06/17 0311)  Pain Reassessment 1: Yes (08/07/17 0940)  Pain Onset 1: now (08/05/17 2252)  Pain Location 1: Shoulder (08/07/17 0850)  Pain Orientation 1: Right (08/07/17 0850)  Pain Description 1: Aching (08/07/17 0850)  Pain Intervention(s) 1: Medication (see MAR) (08/07/17 0850)    Ambulating  Yes    Additional Information: Pts VSS and is in no acute distress. Pt had left hand IV replaced today due to infiltration. Pts L hand edematous. Pt has Right forearm IV with NS @ 75 mL/hr. Pt complains of xanax not being given at correct times. Pt states he should receive xanax in 0900 and 2100. Will pass onto night shift nurse. Pt worked with PT today and is agreable to go to SNF at discharge. Shift report given to oncoming nurse at the bedside.     Tao Dang

## 2017-08-07 NOTE — PROGRESS NOTES
1542-Spoke with niece who notifies that the patient is calling her at all hours of the night and is reporting that he is not getting his Xanax to her. Informed that he received Xanax at midnight last pm at approx 0930 today. Requests that he get Xanax dose at 2100. Spoke with Luther Villalta NP and declines to change Xanax from a prn to scheduled frequency so that psychology recommendations are followed.  Will notify RN of family request.

## 2017-08-07 NOTE — PROGRESS NOTES
Problem: Mobility Impaired (Adult and Pediatric)  Goal: *Acute Goals and Plan of Care (Insert Text)  Discharge Goals:  (1.)Mr. Lupe Wren will move from supine to sit and sit to supine , scoot up and down and roll side to side with INDEPENDENT within 7 day(s). (2.)Mr. Lupe Wren will transfer from bed to chair and chair to bed with INDEPENDENT using the least restrictive device within 7 day(s). (3.)Mr. Lupe Wren will ambulate with SUPERVISION for 250+ feet with the least restrictive device within 7 day(s). ________________________________________________________________________________________________      PHYSICAL THERAPY: INITIAL ASSESSMENT, TREATMENT DAY: DAY OF ASSESSMENT, PM 8/7/2017  INPATIENT: Hospital Day: 6  Payor: [address] [place] [address] / Plan: [address] / Product Type: [employer] /      NAME/AGE/GENDER: Radha Ba is a [de-identified] y.o. male      PRIMARY DIAGNOSIS: multimyeloma  Leukopenia  Leukopenia <principal problem not specified> <principal problem not specified>        ICD-10: Treatment Diagnosis:       · Generalized Muscle Weakness (M62.81)  · Difficulty in walking, Not elsewhere classified (R26.2)  · History of falling (Z91.81)   Precaution/Allergies:  Review of patient's allergies indicates no known allergies. ASSESSMENT:      Mr. Lupe Wren is sitting up in chair upon contact and requesting to use bathroom. Pt is CGA-SBA with transition sit to stand. Pt ambulated 10 ft into bathroom with CGA-SBA. Pt seated on commode, voided, and performed toilet hygiene independently. Pt stood from commode with SBA and ambulated another 10 ft with SBA to bedside chair. Pt reports 0/10 pain. Pt lives alone in 1 story home with 0 steps to enter and drives. He reports 8-9 falls in past 6 months and states \"Its due to my medicines, all medicines have a side-effect of dizziness, I'm trying to cut back on some of them\". Pt reports ambulating both household and community distances with SPC. Pt is unaware of need for assistance and no insight into deficits with decreased safety awareness. Pt perseverates throughout evaluation on if he really needs to bathe today, why he has to ask for xanax, why the socks changed from red to yellow for falls risks, etc. Pt has increased difficulty with focusing on task at hand. Pt left sitting up in chair with all needs within reach. Babatunde Evans will benefit from skilled PT (medically necessary) to address decreased strength, decreased balance, decreased functional tolerance, decreased cardiopulmonary endurance affecting participation in basic ADLs and functional tasks. This section established at most recent assessment   PROBLEM LIST (Impairments causing functional limitations):  1. Decreased Strength  2. Decreased Ambulation Ability/Technique  3. Decreased Balance  4. Decreased Activity Tolerance  5. Decreased Pacing Skills  6. Increased Fatigue  7. Decreased [place] with Home Exercise Program    INTERVENTIONS PLANNED: (Benefits and precautions of physical therapy have been discussed with the patient.)  1. Balance Exercise  2. Bed Mobility  3. Gait Training  4. Home Exercise Program (HEP)  5. Neuromuscular Re-education/Strengthening  6. Therapeutic Activites  7. Therapeutic Exercise/Strengthening  8. Group Therapy      TREATMENT PLAN: Frequency/Duration: 3 times a week for 12 weeks  Rehabilitation Potential For Stated Goals: GOOD      RECOMMENDED REHABILITATION/EQUIPMENT: (at time of discharge pending progress): Continue Skilled Therapy and rehab. HISTORY:   History of Present Injury/Illness (Reason for Referral):  See H&P below  Mr. Macky Najjar is a [de-identified] y.o. male admitted on 8/2/2017 with a primary diagnosis of Multiple Myeloma, IgG Kappa, ISS Stage II, he expressed suicidal ideation after being told the diagnosis and treatment plan, he will be evaluated by Psychiatry during this admission.     Past Medical History/Comorbidities:    Gerardo Gautam  has a past medical history of Acute encephalopathy (3/22/2015); Altered mental status (9/16/2014); Alzheimer disease; Anemia (9/16/2014); Anxiety; Asymmetrical sensorineural hearing loss; Bipolar disorder ([facility] Cataract (9/8/2016); Cortical hemorrhage ([facility] [address] [facility] (9/17/2014); Elevated AST (SGOT) (9/16/2014); GERD (gastroesophageal reflux disease); H/O seasonal allergies; History of TIA (transient ischemic attack) (9/16/2014); Hypomagnesemia (9/16/2014); Panic attack; Senile dementia (5/19/2017); Stroke [facility]); Syncope and collapse (3/22/2015); Tinea corporis (9/16/2014); and Tinnitus. Mr. Gerardo Gautam  has a past surgical history that includes appendectomy; other surgical (AGE 23); and colonoscopy (MULTIPLE OVER THE YEARS). Social History/Living Environment:   Home Environment: Private residence  # Steps to Enter: 0  One/Two Story Residence: One story  Living Alone: Yes  Support Systems: Friends \ neighbors  Patient Expects to be Discharged to[de-identified] Rehabilitation facility  Current DME Used/Available at Home: Regina Jointer, straight, Shower chair  Tub or Shower Type: Tub/Shower combination  Prior Level of Function/Work/Activity:  Lives alone, use of SPC for gait, indep with ADLs, 8-9 falls in past 6 months. Number of Personal Factors/Comorbidities that affect the Plan of Care: 3+: HIGH COMPLEXITY   EXAMINATION:   Most Recent Physical Functioning:   Gross Assessment:  AROM: Generally decreased, functional  Strength: Generally decreased, functional  Coordination: Within functional limits  Sensation: Intact               Posture:     Balance:  Sitting: Intact; Without support  Standing: Intact; Without support Bed Mobility:     Wheelchair Mobility:     Transfers:  Sit to Stand: Contact guard assistance;Stand-by asssistance  Stand to Sit: Contact guard assistance;Stand-by asssistance  Gait:     Base of Support: Narrowed  Speed/Connie: Slow  Step Length: Left shortened;Right shortened  Gait Abnormalities: Decreased step clearance;Trunk sway increased  Distance (ft): 10 Feet (ft) (X2)  Assistive Device:  (none)  Ambulation - Level of Assistance: Contact guard assistance;Stand-by asssistance  Interventions: Safety awareness training;Verbal cues       Body Structures Involved:  1. Heart  2. Lungs  3. Bones  4. Joints  5. Muscles Body Functions Affected:  1. Mental  2. Sensory/Pain  3. Cardio  4. Respiratory  5. Neuromusculoskeletal  6. Movement Related Activities and Participation Affected:  1. Learning and Applying Knowledge  2. General Tasks and Demands  3. Mobility  4. Self Care  5. Interpersonal Interactions and Relationships  6. Community, Social and [place]   Number of elements that affect the Plan of Care: 4+: HIGH COMPLEXITY   CLINICAL PRESENTATION:   Presentation: Evolving clinical presentation with changing clinical characteristics: MODERATE COMPLEXITY   CLINICAL DECISION MAKIN [facility] Inpatient Short Form  How much difficulty does the patient currently have. .. Unable A Lot A Little None   1. Turning over in bed (including adjusting bedclothes, sheets and blankets)? [ ] 1   [ ] 2   [X] 3   [ ] 4   2. Sitting down on and standing up from a chair with arms ( e.g., wheelchair, bedside commode, etc.)   [ ] 1   [ ] 2   [X] 3   [ ] 4   3. Moving from lying on back to sitting on the side of the bed? [ ] 1   [ ] 2   [X] 3   [ ] 4   How much help from another person does the patient currently need. .. Total A Lot A Little None   4. Moving to and from a bed to a chair (including a wheelchair)? [ ] 1   [ ] 2   [X] 3   [ ] 4   5. Need to walk in hospital room? [ ] 1   [ ] 2   [X] 3   [ ] 4   6. Climbing 3-5 steps with a railing? [ ] 1   [ ] 2   [X] 3   [ ] 4   © , Trustees of [address] [place] [address] [address] [address], under license to [employer].  All rights reserved    Score:  Initial: 18 Most Recent: X (Date: -- )     Interpretation of Tool:  Represents activities that are increasingly more difficult (i.e. Bed mobility, Transfers, Gait). Score 24 23 22-20 19-15 14-10 9-7 6       Modifier CH CI CJ CK CL CM CN         · Mobility - Walking and Moving Around:               - CURRENT STATUS:    CK - 40%-59% impaired, limited or restricted               - GOAL STATUS:           CJ - 20%-39% impaired, limited or restricted               - D/C STATUS:                       ---------------To be determined---------------  Payor: [employer] COMPLETE / Plan: [address] / Product Type: Managed Care Medicare /       Medical Necessity:     · Patient is expected to demonstrate progress in strength, balance, coordination and functional technique to decrease assistance required with gait and functional mobility. Reason for Services/Other Comments:  · Patient continues to require skilled intervention due to decreased strength, decreased balance, decreased functional tolerance, decreased cardiopulmonary endurance affecting participation in basic ADLs and functional tasks. Use of outcome tool(s) and clinical judgement create a POC that gives a: Clear prediction of patient's progress: LOW COMPLEXITY                 TREATMENT:   (In addition to Assessment/Re-Assessment sessions the following treatments were rendered)   Pre-treatment Symptoms/Complaints:  Perseverates on topics unrelated to PT  Pain: Initial:   Pain Intensity 1: 0  Post Session:  0/10, positioned comfortably in bedside chair. Assessment/Reassessment only, no treatment provided today     Braces/Orthotics/Lines/Etc:   · IV  · O2 Device: Room air  Treatment/Session Assessment:    · Response to Treatment:  Pt ambulated 10 ft X 2 with CGA-SBA. Pt has decreased insights into deficits and poor safety awareness.   · Interdisciplinary Collaboration:  · Physical Therapist  · Registered Nurse  · Certified Nursing Assistant/Patient Care Technician  · After treatment position/precautions:  · Up in chair  · Bed/Chair-wheels locked  · Bed in low position  · Call light within reach  · RN notified  · Compliance with Program/Exercises: Will assess as treatment progresses. · Recommendations/Intent for next treatment session: \"Next visit will focus on advancements to more challenging activities and reduction in assistance provided\".   Total Treatment Duration:  PT Patient Time In/Time Out  Time In: 1501  Time Out: [id number] Gavin Gautam

## 2017-08-08 ENCOUNTER — DOCUMENTATION ONLY (OUTPATIENT)
Dept: ONCOLOGY | Age: 82
End: 2017-08-08

## 2017-08-08 LAB
ANION GAP BLD CALC-SCNC: 7 MMOL/L (ref 7–16)
BASOPHILS # BLD AUTO: 0 K/UL (ref 0–0.2)
BASOPHILS # BLD: 0 % (ref 0–2)
BUN SERPL-MCNC: 13 MG/DL (ref 8–23)
CALCIUM SERPL-MCNC: 8.4 MG/DL (ref 8.3–10.4)
CHLORIDE SERPL-SCNC: 113 MMOL/L (ref 98–107)
CO2 SERPL-SCNC: 24 MMOL/L (ref 21–32)
CREAT SERPL-MCNC: 0.83 MG/DL (ref 0.8–1.5)
DIFFERENTIAL METHOD BLD: ABNORMAL
EOSINOPHIL # BLD: 0.1 K/UL (ref 0–0.8)
EOSINOPHIL NFR BLD: 3 % (ref 0.5–7.8)
ERYTHROCYTE [DISTWIDTH] IN BLOOD BY AUTOMATED COUNT: 15.1 % (ref 11.9–14.6)
GLUCOSE SERPL-MCNC: 85 MG/DL (ref 65–100)
HCT VFR BLD AUTO: 31.9 % (ref 41.1–50.3)
HGB BLD-MCNC: 10.9 G/DL (ref 13.6–17.2)
IMM GRANULOCYTES # BLD: 0 K/UL (ref 0–0.5)
IMM GRANULOCYTES NFR BLD AUTO: 0.4 % (ref 0–5)
LYMPHOCYTES # BLD AUTO: 50 % (ref 13–44)
LYMPHOCYTES # BLD: 1.4 K/UL (ref 0.5–4.6)
MCH RBC QN AUTO: 33.9 PG (ref 26.1–32.9)
MCHC RBC AUTO-ENTMCNC: 34.2 G/DL (ref 31.4–35)
MCV RBC AUTO: 99.1 FL (ref 79.6–97.8)
MM INDURATION POC: 0 MM (ref 0–5)
MONOCYTES # BLD: 0.3 K/UL (ref 0.1–1.3)
MONOCYTES NFR BLD AUTO: 9 % (ref 4–12)
NEUTS SEG # BLD: 1.1 K/UL (ref 1.7–8.2)
NEUTS SEG NFR BLD AUTO: 38 % (ref 43–78)
PLATELET # BLD AUTO: 118 K/UL (ref 150–450)
PMV BLD AUTO: 11.4 FL (ref 10.8–14.1)
POTASSIUM SERPL-SCNC: 3.8 MMOL/L (ref 3.5–5.1)
PPD POC: NEGATIVE NEGATIVE
RBC # BLD AUTO: 3.22 M/UL (ref 4.23–5.67)
SODIUM SERPL-SCNC: 144 MMOL/L (ref 136–145)
WBC # BLD AUTO: 2.8 K/UL (ref 4.3–11.1)

## 2017-08-08 PROCEDURE — 97535 SELF CARE MNGMENT TRAINING: CPT

## 2017-08-08 PROCEDURE — 65270000029 HC RM PRIVATE

## 2017-08-08 PROCEDURE — 80048 BASIC METABOLIC PNL TOTAL CA: CPT | Performed by: NURSE PRACTITIONER

## 2017-08-08 PROCEDURE — 36415 COLL VENOUS BLD VENIPUNCTURE: CPT | Performed by: NURSE PRACTITIONER

## 2017-08-08 PROCEDURE — 99233 SBSQ HOSP IP/OBS HIGH 50: CPT | Performed by: INTERNAL MEDICINE

## 2017-08-08 PROCEDURE — 85025 COMPLETE CBC W/AUTO DIFF WBC: CPT | Performed by: NURSE PRACTITIONER

## 2017-08-08 PROCEDURE — 97165 OT EVAL LOW COMPLEX 30 MIN: CPT

## 2017-08-08 PROCEDURE — 74011250636 HC RX REV CODE- 250/636: Performed by: NURSE PRACTITIONER

## 2017-08-08 PROCEDURE — 74011250637 HC RX REV CODE- 250/637: Performed by: NURSE PRACTITIONER

## 2017-08-08 PROCEDURE — 74011250637 HC RX REV CODE- 250/637: Performed by: INTERNAL MEDICINE

## 2017-08-08 RX ADMIN — QUETIAPINE FUMARATE 25 MG: 25 TABLET, FILM COATED ORAL at 21:30

## 2017-08-08 RX ADMIN — NAPROXEN 500 MG: 250 TABLET ORAL at 08:10

## 2017-08-08 RX ADMIN — ENOXAPARIN SODIUM 40 MG: 40 INJECTION SUBCUTANEOUS at 15:48

## 2017-08-08 RX ADMIN — ASPIRIN 81 MG: 81 TABLET, COATED ORAL at 08:10

## 2017-08-08 RX ADMIN — POTASSIUM CHLORIDE 20 MEQ: 20 TABLET, EXTENDED RELEASE ORAL at 17:51

## 2017-08-08 RX ADMIN — DONEPEZIL HYDROCHLORIDE 10 MG: 5 TABLET, FILM COATED ORAL at 08:10

## 2017-08-08 RX ADMIN — ALPRAZOLAM 1 MG: 0.5 TABLET ORAL at 21:30

## 2017-08-08 RX ADMIN — SIMVASTATIN 20 MG: 20 TABLET, FILM COATED ORAL at 21:30

## 2017-08-08 RX ADMIN — NAPROXEN 500 MG: 250 TABLET ORAL at 21:30

## 2017-08-08 RX ADMIN — DOCUSATE SODIUM 100 MG: 100 CAPSULE, LIQUID FILLED ORAL at 08:10

## 2017-08-08 RX ADMIN — DOCUSATE SODIUM 100 MG: 100 CAPSULE, LIQUID FILLED ORAL at 17:51

## 2017-08-08 RX ADMIN — ALPRAZOLAM 1 MG: 0.5 TABLET ORAL at 08:10

## 2017-08-08 RX ADMIN — POTASSIUM CHLORIDE 20 MEQ: 20 TABLET, EXTENDED RELEASE ORAL at 08:10

## 2017-08-08 RX ADMIN — SODIUM CHLORIDE 75 ML/HR: 900 INJECTION, SOLUTION INTRAVENOUS at 08:14

## 2017-08-08 NOTE — PROGRESS NOTES
0720-Bedside report received from [address]. Resting in bed. No needs voiced. No s/s of acute distress. 1804-  END OF SHIFT NOTE:    Intake/Output  08/08 0701 - 08/08 1900  In: 9727 [P.O.:50; I.V.:1544]  Out: -    Voiding: YES  Catheter: NO  Drain:              Stool:  2 occurrences. Stool Assessment  Stool Color: Phylicia Shack (pt stated \"it was really dark\") (08/05/17 3426)  Stool Appearance: Hard; Formed (08/05/17 1875)  Stool Amount: Small (08/05/17 5362)  Stool Source/Status: Rectum (08/05/17 4944)    Emesis:  0 occurrences. VITAL SIGNS  Patient Vitals for the past 12 hrs:   Temp Pulse Resp BP SpO2   08/08/17 1521 97.6 °F (36.4 °C) 65 18 139/68 99 %   08/08/17 1104 97.9 °F (36.6 °C) 66 18 158/74 98 %   08/08/17 0716 97.9 °F (36.6 °C) 65 18 160/64 99 %       Pain Assessment  Pain 1  Pain Scale 1: Numeric (0 - 10) (08/08/17 1412)  Pain Intensity 1: 0 (08/08/17 1412)  Patient Stated Pain Goal: 0 (08/08/17 0120)  Pain Reassessment 1: Patient sleeping (08/08/17 0849)  Pain Onset 1: now (08/05/17 2252)  Pain Location 1: Shoulder (08/08/17 0810)  Pain Orientation 1: Right (08/08/17 0810)  Pain Description 1: Aching (08/08/17 0810)  Pain Intervention(s) 1: Medication (see MAR) (08/08/17 0810)    Ambulating  Yes    Additional Information: Pts VSS and is in no acute distress. Pt still confused, possibly going to snf rehab tomorrow or Thursday. Shift report given to oncoming nurse at the bedside.     Jovanna Dang

## 2017-08-08 NOTE — PROGRESS NOTES
Problem: Self Care Deficits Care Plan (Adult)  Goal: *Acute Goals and Plan of Care (Insert Text)  1. Patient will perform bathing with supervision within 7 days with equipment as needed. 2. Patient will perform toileting with modified independence within 7 days with equipment as needed. 3. Patient will perform toilet transfer with modified independence within 7 days with equipment as needed. 4. Patient will perform upper body dressing and lower body dressing with modified independence within 7 days with equipment as needed. 5. Pt will participate in B UE therapeutic exercises for 8 minutes with 3 rest breaks within 7 days. OCCUPATIONAL THERAPY: Initial Assessment, Treatment Day: 1st and AM 8/8/2017  INPATIENT: Hospital Day: 7  Payor: [address] [place] [address] / Plan: [address] / Product Type: [employer] Medicare /      NAME/AGE/GENDER: Yuliana Giles is a [de-identified] y.o. male      PRIMARY DIAGNOSIS:  multimyeloma  Leukopenia  Leukopenia <principal problem not specified> <principal problem not specified>        ICD-10: Treatment Diagnosis:        · Generalized Muscle Weakness (M62.81)   Precautions/Allergies:         Review of patient's allergies indicates no known allergies. ASSESSMENT:      Mr. Domingo Fernandez admitted with above diagnosis. Patient lives alone and he was driving PTA. Patient able to take care of himself and patient ambulated with SC PTA. Patient perseverated continuously throughout evaluation. Initially, he declined to go to bathroom, though he agreed to do so later in session. Patient stood with SBA with RW. Pt ambulated to bathroom using RW with SBA. Pt performed toilet transfer and toileting with SBA. Note:  When pt pull down his Depend diaper, it was soaked with urine. Pt only agreeable to putting on a new pair, not washing area. Patient stood with SBA using RW and ambulated to chair using RW with SBA. Pt sat with SBA. Pt given call bell and tray.   Patient seems to functioning just below baseline. Pt to benefit from Occupational Therapy to maximize ADL performance. Recommend post acute rehab services. Cont OT per tx plan. This section established at most recent assessment   PROBLEM LIST (Impairments causing functional limitations):  1. Decreased Strength  2. Decreased ADL/Functional Activities  3. Decreased Transfer Abilities  4. Decreased Ambulation Ability/Technique  5. Decreased Balance  6. Decreased Activity Tolerance  7. Edema/Girth  8. Decreased Cognition    INTERVENTIONS PLANNED: (Benefits and precautions of occupational therapy have been discussed with the patient.)  1. Activities of daily living training  2. Balance training  3. Clothing management  4. Cognitive training  5. Donning&doffing training  6. Group therapy  7. Hygiene training  8. Neuromuscular re-eduation  9. Re-evaluation  10. Therapeutic activity  11. Therapeutic exercise      TREATMENT PLAN: Frequency/Duration: Follow patient 3x's/weeek to address above goals. Rehabilitation Potential For Stated Goals: GOOD      RECOMMENDED REHABILITATION/EQUIPMENT: (at time of discharge pending progress): Continue Skilled Therapy. OCCUPATIONAL PROFILE AND HISTORY:   History of Present Injury/Illness (Reason for Referral):  Mr. Susan Morejon is a [de-identified] y.o. male admitted on 8/2/2017 with a primary diagnosis of Multiple Myeloma, IgG Kappa, ISS Stage II, he expressed suicidal ideation after being told the diagnosis and treatment plan, he will be evaluated by Psychiatry during this admission. Past Medical History/Comorbidities:   Mr. Susan Morejon  has a past medical history of Acute encephalopathy (3/22/2015); Altered mental status (9/16/2014); Alzheimer disease; Anemia (9/16/2014); Anxiety; Asymmetrical sensorineural hearing loss; Bipolar disorder ([address] Cataract (9/8/2016); Cortical hemorrhage ([address] (9/17/2014);  Elevated AST (SGOT) (9/16/2014); GERD (gastroesophageal reflux disease); H/O seasonal allergies; History of TIA (transient ischemic attack) (9/16/2014); Hypomagnesemia (9/16/2014); Panic attack; Senile dementia (5/19/2017); Stroke [facility]); Syncope and collapse (3/22/2015); Tinea corporis (9/16/2014); and Tinnitus. Mr. Danika Mark  has a past surgical history that includes appendectomy; other surgical (AGE 23); and colonoscopy (MULTIPLE OVER THE YEARS). Social History/Living Environment:   Home Environment: Private residence  # Steps to Enter: 0  One/Two Story Residence: One story  Living Alone: Yes  Support Systems: Friends \ neighbors  Patient Expects to be Discharged to[de-identified] Rehabilitation facility  Current DME Used/Available at Home: Aundra Pines, straight, Shower chair  Tub or Shower Type: Tub/Shower combination  Prior Level of Function/Work/Activity:  Modified Independent/Independent with ADLs and ambulation using SC. Pt was driving PTA.       Number of Personal Factors/Comorbidities that affect the Plan of Care: Brief history (0):  LOW COMPLEXITY   ASSESSMENT OF OCCUPATIONAL PERFORMANCE[de-identified]   Activities of Daily Living:           Basic ADLs (From Assessment) Complex ADLs (From Assessment)   Basic ADL  Feeding: Modified independent  Oral Facial Hygiene/Grooming: Stand-by assistance  Bathing: Minimum assistance  Upper Body Dressing: Setup  Lower Body Dressing: Minimum assistance  Toileting: Stand by assistance Instrumental ADL  Meal Preparation: Contact guard assistance  Homemaking: Maximum assistance  Medication Management: Minimum assistance   Grooming/Bathing/Dressing Activities of Daily Living     Cognitive Retraining  Safety/Judgement: Lack of insight into deficits                 Functional Transfers  Toilet Transfer : Stand-by asssistance     Bed/Mat Mobility  Sit to Stand: Stand-by asssistance          Most Recent Physical Functioning:   Gross Assessment:                  Posture:     Balance:    Bed Mobility:     Wheelchair Mobility:     Transfers:  Sit to Stand: Stand-by asssistance  Stand to Sit: Stand-by asssistance                    Patient Vitals for the past 6 hrs:       BP BP Patient Position SpO2 Pulse   17 0716 160/64 Sitting 99 % 65   17 1104 158/74 Sitting 98 % 66        Mental Status  Neurologic State: Alert  Orientation Level: Oriented to person, Oriented to place, Disoriented to situation, Disoriented to time  Cognition: Decreased attention/concentration  Perception: Appears intact  Perseveration: Perseverates during conversation, Perseverates during ADLS  Safety/Judgement: Lack of insight into deficits                               Physical Skills Involved:  1. Balance  2. Strength  3. Activity Tolerance Cognitive Skills Affected (resulting in the inability to perform in a timely and safe manner):  1. Perception  2. Immediate Memory  3. Short Term Recall  4. Sustained Attention  5. Divided Attention  6. Comprehension Psychosocial Skills Affected:  1. Habits/Routines  2. Environmental Adaptation  3. Social Interaction   Number of elements that affect the Plan of Care: 5+:  HIGH COMPLEXITY   CLINICAL DECISION MAKIN[address] [place] [id number] AM-PAC 6 Clicks   Daily Activity Inpatient Short Form  How much help from another person does the patient currently need. .. Total A Lot A Little None   1. Putting on and taking off regular lower body clothing?   [ ] 1   [ ] 2   [X] 3   [ ] 4   2. Bathing (including washing, rinsing, drying)? [ ] 1   [ ] 2   [X] 3   [ ] 4   3. Toileting, which includes using toilet, bedpan or urinal?   [ ] 1   [ ] 2   [X] 3   [ ] 4   4. Putting on and taking off regular upper body clothing?   [ ] 1   [ ] 2   [X] 3   [ ] 4   5. Taking care of personal grooming such as brushing teeth? [ ] 1   [ ] 2   [X] 3   [ ] 4   6. Eating meals? [ ] 1   [ ] 2   [X] 3   [ ] 4   © , Trustees of [address] [place] [address], under license to [employer].  All rights reserved    Score:  Initial: CK Most Recent: X (Date: -- )     Interpretation of Tool:  Represents activities that are increasingly more difficult (i.e. Bed mobility, Transfers, Gait). Score 24 23 22-20 19-15 14-10 9-7 6       Modifier CH CI CJ CK CL CM CN         · Self Care:               - CURRENT STATUS:    CK - 40%-59% impaired, limited or restricted               - GOAL STATUS:           CJ - 20%-39% impaired, limited or restricted               - D/C STATUS:                       ---------------To be determined---------------  Payor: [employer] COMPLETE / Plan: [address] / Product Type: Managed Care Medicare /       Medical Necessity:     · Patient demonstrates good rehab potential due to higher previous functional level. Reason for Services/Other Comments:  · Patient continues to require skilled intervention due to pt's inability to take of self. Use of outcome tool(s) and clinical judgement create a POC that gives a: LOW COMPLEXITY             TREATMENT:   (In addition to Assessment/Re-Assessment sessions the following treatments were rendered)      Pre-treatment Symptoms/Complaints:    Pain: Initial:   Pain Intensity 1: 0  Post Session:  0      Self Care: (9 mnutes): Procedure(s) (per grid) utilized to improve and/or restore self-care/home management as related to toileting. Required minimal verbal cueing to facilitate activities of daily living skills. Braces/Orthotics/Lines/Etc:   · IV  · O2 Device: Room air  Treatment/Session Assessment:    · Response to Treatment:  Pt tolerated tx fair. · Interdisciplinary Collaboration:  · Physical Therapist  · Registered Nurse  · Physician  · After treatment position/precautions:  · Up in chair  · Bed/Chair-wheels locked  · Bed in low position  · Call light within reach  · RN notified  · Compliance with Program/Exercises: Will assess as treatment progresses. · Recommendations/Intent for next treatment session: \"Next visit will focus on advancements to more challenging activities and reduction in assistance provided\".   Total Treatment Duration:  OT Patient Time In/Time Out  Time In: 1007  Time Out: [address], OT

## 2017-08-08 NOTE — PROGRESS NOTES
SW received referral from inpt SW to facilitate referral for pt to outpatient psychiatry. SW received faxed hard copy of pt's inpt telepsych consultation and completed and faxed urgent referral to Dr. Eleni Ball, psychiatrist, at [facility] at [phone]. JUAN updated inpt SW and PsyD. No other needs at this time. JUAN intends to follow up with Dr. Suzan Chang office to confirm referral and schedule pt appointment.

## 2017-08-08 NOTE — PROGRESS NOTES
END OF SHIFT NOTE:    Intake/Output      Voiding: YES  Catheter: NO  Drain:              Stool:  0 occurrences. Stool Assessment  Stool Color: Nekrystal Nissen (pt stated \"it was really dark\") (08/05/17 5962)  Stool Appearance: Hard; Formed (08/05/17 3161)  Stool Amount: Small (08/05/17 2219)  Stool Source/Status: Rectum (08/05/17 3003)    Emesis:  0 occurrences. VITAL SIGNS  Patient Vitals for the past 12 hrs:   Temp Pulse Resp BP SpO2   08/08/17 0355 97.7 °F (36.5 °C) (!) 56 18 165/78 99 %   08/07/17 2015 97.6 °F (36.4 °C) (!) 51 18 143/76 96 %       Pain Assessment  Pain 1  Pain Scale 1: Visual (08/08/17 0120)  Pain Intensity 1: 0 (08/08/17 0120)  Patient Stated Pain Goal: 0 (08/08/17 0120)  Pain Reassessment 1: Patient sleeping (08/08/17 0120)  Pain Onset 1: now (08/05/17 2252)  Pain Location 1: Shoulder (08/07/17 1949)  Pain Orientation 1: Right (08/07/17 1949)  Pain Description 1: Aching (08/07/17 1949)  Pain Intervention(s) 1: Medication (see MAR) (08/07/17 1949)    Ambulating  Yes    Additional Information:   Pt rested in bed, denies any needs. Shift report given to oncoming nurse at the bedside.     Latonya Parham RN

## 2017-08-08 NOTE — PROGRESS NOTES
[facility] Hematology & Oncology        Inpatient Hematology / Oncology Progress Note      Admission Date: 2017  1:13 PM  Reason for Admission/Hospital Course: multimyeloma  Leukopenia  Leukopenia      24 Hour Events: BMBx performed , awaiting results  Skeletal survey neg  Pt agreeable to go to SNF after discharge  Will need psych follow up plan prior to discharge CM assisting      ROS:  Constitutional: Negative for fever, chills, weakness, malaise, fatigue. CV: Negative for chest pain, palpitations, edema. Respiratory: Negative for dyspnea, cough, wheezing. GI: Negative for nausea, abdominal pain, diarrhea. Psych: +Bipolar. Anxious. 10 point review of systems is otherwise negative with the exception of the elements mentioned above in the HPI. No Known Allergies    OBJECTIVE:  Patient Vitals for the past 8 hrs:   BP Temp Pulse Resp SpO2 Weight   17 1104 158/74 97.9 °F (36.6 °C) 66 18 98 % -   17 0716 160/64 97.9 °F (36.6 °C) 65 18 99 % -   17 0658 - - - - - 263 lb 9.6 oz (119.6 kg)     Temp (24hrs), Av.9 °F (36.6 °C), Min:97.6 °F (36.4 °C), Max:98.3 °F (36.8 °C)     0701 -  1900  In: 25 [P.O.:25]  Out: -     Physical Exam:  Constitutional: Well developed, well nourished male in no acute distress, lying comfortably in the hospital bed. HEENT: Normocephalic and atraumatic. Oropharynx is clear, mucous membranes are moist.Extraocular muscles are intact. Sclerae anicteric. Lymph node   Deferred   Skin Warm and dry. No bruising and no rash noted. No erythema. No pallor. Respiratory Lungs are clear to auscultation bilaterally without wheezes, rales or rhonchi, normal air exchange without accessory muscle use. CVS Bradycardic rate, regular rhythm and normal S1 and S2. No murmurs, gallops, or rubs. Abdomen Soft, nontender and nondistended, normoactive bowel sounds. Neuro Grossly nonfocal with no obvious sensory or motor deficits.    MSK Normal range of motion in general.  No edema and no tenderness. Psych Compulsive talking. Bipolar with suicidal ideations - states no suicidal ideations this AM.  Inappropriate comments at times to staff. Reasonably pleasant today. Labs:      Recent Labs      08/08/17 0412 08/07/17   0450  08/06/17   1050   WBC  2.8*  3.0*  3.6*   RBC  3.22*  3.21*  3.32*   HGB  10.9*  10.8*  11.2*   HCT  31.9*  31.5*  32.6*   MCV  99.1*  98.1*  98.2*   MCH  33.9*  33.6*  33.7*   MCHC  34.2  34.3  34.4   RDW  15.1*  14.8*  15.0*   PLT  118*  121*  125*   GRANS  38*  44  53   LYMPH  50*  42  34   MONOS  9  11  10   EOS  3  3  3   BASOS  0  0  0   IG  0.4  0.3  0.0   DF  AUTOMATED  AUTOMATED  AUTOMATED   ANEU  1.1*  1.3*  1.9   ABL  1.4  1.3  1.2   ABM  0.3  0.3  0.4   ALINE  0.1  0.1  0.1   ABB  0.0  0.0  0.0   AIG  0.0  0.0  0.0        Recent Labs      08/08/17   0412  08/06/17   1050   NA  144  144   K  3.8  4.0   CL  113*  111*   CO2  24  26   AGAP  7  7   GLU  85  103*   BUN  13  15   CREA  0.83  0.86   GFRAA  >60  >60   GFRNA  >60  >60   CA  8.4  8.2*   SGOT   --   34   AP   --   66   TP   --   8.3*   ALB   --   2.7*   GLOB   --   5.6*   AGRAT   --   0.5*         Imaging:  XR BONE SURVEY COMP [[id number]] Collected: 08/02/17 2010      Order Status: Completed Updated: 08/02/17 2013     Narrative:       Exam:  Metabolic bone survey radiographs    History:  pain, multiple myeloma work-up, [de-identified] years Male    Comparison: None available    Findings:  No evidence of acute fracture or dislocation.  Normal alignment,  joint spaces preserved.  Normal mineralization.  No definite lytic lesions are  seen to suggest lytic osseous metastatic disease or osseous involvement by  multiple myeloma.  Visualized soft tissues otherwise unremarkable.     Impression:  No definite evidence of osseous involvement by multiple myeloma or  metastatic disease.         CT BX BONE MARROW NDL/ABEL [[id number]] Collected: 08/02/17 0317     Order Status: Completed Updated: 08/02/17 1536     Narrative:       Title: CT guided right iliac bone marrow aspiration and core biopsy. History: [de-identified]year old male with multiple myeloma.       : Marcia Fan PA-C    Supervising Physician: Deborah Alatorre M.D. Consent: Informed written and oral consent was obtained from the patient after  explanation of benefits and risks (including, but not limited to:  Infection,  Hemorrhage, Visceral Injury).  The patient's questions were answered to their  satisfaction.  The patient stated understanding and requested that we proceed.      Procedure: Maximal sterile barrier technique was used.  With the patient prone a  preliminary CT scan through the pelvis was performed with radio-opaque markers  on the skin. Following routine prep and drape of the right buttock, a local field block with  lidocaine was achieved. Using intermittent CT technique, a marrow aspirate followed by a core biopsy was  obtained with the 11-gauge On Control biopsy device. The needle was removed. Hemostasis was achieved with manual compression. A  bandage was applied. Complications: None. Medications: None. Findings: No post biopsy bleeding. Impression: Uncomplicated CT guided right iliac bone marrow aspiration and core  biopsy. Plan:  Bedrest observation for one hour.           ASSESSMENT:    Problem List  Date Reviewed: 7/31/2017          Codes Class Noted    Leukopenia ICD-10-CM: D72.819  ICD-9-CM: 288.50  8/2/2017        Multiple myel[facility] not having achieved remission ([facility] ICD-10-CM: C90.00  ICD-9-CM: 203.00  8/2/2017        Gastroesophageal reflux disease ICD-10-CM: K21.9  ICD-9-CM: 530.81  8/2/2017        Bilateral ankle pain ICD-10-CM: M25.571, M25.572  ICD-9-CM: 719.47  7/31/2017        Pollen allergies ICD-10-CM: J30.1  ICD-9-CM: 477.0  7/31/2017    Overview Signed 7/31/2017  2:27 PM by Bryan Ashley MD     Pt would like to consider allergy shots.   Will refer to Allergy             Fall ICD-10-CM: W19. Jackson Maria  ICD-9-CM: E888.9  7/18/2017    Overview Signed 7/18/2017  2:03 PM by Ines Gardner MD     Pt had another fall, in his yard; he was chasing after his cat and lost his balance. No injuries sustained; fall witnessed by neighbor. No LOC. Vitamin B12 deficiency ICD-10-CM: E53.8  ICD-9-CM: 266.2  7/18/2017    Overview Signed 7/18/2017  2:06 PM by Ines Gardner MD     Pt just started taking Vitamin B 12 supplement. Abnormal laboratory test ICD-10-CM: R89.9  ICD-9-CM: 796.4  7/18/2017    Overview Addendum 7/31/2017  2:26 PM by Ines Gardner MD     UPEP results reviewed with pt. Pt has referral to Oncology to further evaluate. Mixed hyperlipidemia ICD-10-CM: E78.2  ICD-9-CM: 272.2  6/26/2017        OA (osteoarthritis) ICD-10-CM: M19.90  ICD-9-CM: 715.90  6/26/2017        Senile dementia ICD-10-CM: F03.90  ICD-9-CM: 290.0  5/19/2017        Cataract ICD-10-CM: H26.9  ICD-9-CM: 366.9  9/8/2016        Asymmetrical sensorineural hearing loss ICD-10-CM: H90.5  ICD-9-CM: 389.16  Unknown        Bipolar disorder ([facility] ICD-10-CM: F31.9  ICD-9-CM: 296.80  3/22/2015    Overview Addendum 7/6/2017  5:01 PM by Ines Gardner MD     Currently treated with Lexapro and Xanax. Given pt's age, I would recommend continuing current regimen; refill rx. Follow up 3 months or prn. Pt is working on weaning to a lower dose of Xanax. Syncope and collapse ICD-10-CM: R55  ICD-9-CM: 780.2  3/22/2015        Acute encephalopathy ICD-10-CM: G93.40  ICD-9-CM: 348.30  3/22/2015        Cortical hemorrhage ([facility] ICD-10-CM: I61.1  ICD-9-CM: 463  9/17/2014        Syncope ICD-10-CM: R55  ICD-9-CM: 780.2  9/16/2014    Overview Signed 7/6/2017  5:00 PM by Ines Gardner MD     Refer to Cardiology to evaluated. Concern for symptomatic bradycardia.              History of TIA (transient ischemic attack) ICD-10-CM: Z86.73  ICD-9-CM: V12.54  9/16/2014        Altered mental status ICD-10-CM: R41.82  ICD-9-CM: 780.97  9/16/2014        Anemia ICD-10-CM: D64.9  ICD-9-CM: 285.9  9/16/2014    Overview Addendum 7/6/2017  5:01 PM by Wendy Becerra MD     Recheck CBC. Tinea corporis ICD-10-CM: B35.4  ICD-9-CM: 110.5  9/16/2014        Hypomagnesemia ICD-10-CM: E83.42  ICD-9-CM: 275.2  9/16/2014    Overview Signed 9/16/2014 10:14 PM by Hina Miner NP     MILD               Elevated AST (SGOT) ICD-10-CM: R74.0  ICD-9-CM: 790.4  9/16/2014            Mr. Jason Nieto is a [de-identified] y.o. male admitted on 8/2/2017 with a primary diagnosis of Multiple Myeloma, IgG Kappa, ISS Stage II, he expressed suicidal ideation after being told the diagnosis and treatment plan, he will be evaluated by Psychiatry during this admission. PLAN:  Multiple Myeloma, IgG Kappa, ISS Stage II  - Once stable and discharge, he will require Zometa and CyBorD as OP therapy  - Bone marrow biopsy  - Skeletal survey  - Mediport  8/3 BMbx performed yesterday. Awaiting results. Skeletal survey neg. Consult IR for port placement. 8/7 BMbx results pending. Port placed 8/4. Bipolar with suicidal ideations  - Psych consult  8/3 Pt with suicidal ideations. Also making inappropriate comments to staff at times. Psychiatrist recommended decreasing Lexapro to 10mg x 3 days, then DC. Start Seroquel 25mg at bedtime. Consult SW for psych placement. 8/4  Per nurse, suicidal ideations worsened overnight. This morning, patient denies any suicidal ideation. However, he has become more verbally aggressive towards staff and refusing to take medications or treatments. Will ask psych to re-evaluate. Per Dr. Gali Amador, hold off on psych placement for now. 8/5 Pt spoke with psychiatrist yesterday. Still did not feel that he requires IP psych placement. No changes to plan, still recommends SNF placement after discharge. Pt denies any SI today.   8/7 Discussed recommendation by psychiatrist for patient to go to SNF after discharge. Pt agreeable with plan. 8/8  assisting with referring patient to psychiatrist prior to discharging him    Pancytopenia  - Transfuse as needed    Foul-smelling urine  8/4 Check UA/UCx  8/5 UA neg for infx. UCx pending. 8/6 UCx showed >100k mixed skin ondina. Recommended repeat UCx. Results pending. 8/7 Repeat UCx results pending    Electrolyte imbalance  - Replace prn per [facility] SOPs    Continue home meds  [facility] SOPs  DVT Prophylaxis: Lovenox    Disposition: SW to work on placement for after discharge. SNF/natalie-psych? Pt is agreeable to go to facility. Patient also currently lives alone and is worried about is cat who is home alone since 8/2. Of note, patient's car and keys are still at [facility]. Pt is stable for discharge, just waiting on placement now. 8/8  has arranged appointment with Dr. Jossie Santillan and has asked that Dr. Jossie Santillan make recommendations for psych consult. Teresa Partida NP   [facility]ematology [facility] On[facility]  [id number] [place] [address]  Office : [phone]  Fax : [phone]               Attending Addendum:  I personally evaluated the patient with Teresa Partida N.EWELINA,  and agree with the assessment, findings and plan as documented. Appears stable, heart regular without murmur, lungs clear, abdomen benign. Will need continued psychiatry f/u. Placement to rehab once available. Plasma cell dyscrasia treatment as outpatient, BM biopsy results pending.                Jaden Perez MD  [facility]  [address] [address]  Office : [phone]  Fax : [phone]

## 2017-08-08 NOTE — CONSULTS
Unable to meet with patient due to on-going provider family emergency in real time. Defer recommendations to telepsych. Electronically Signed By:  Layla Bragg Psy.D.   [address] [place] [address]

## 2017-08-08 NOTE — PROGRESS NOTES
JUAN sent Virginia Leyva, , the pt's latest telepsych. She'll forward the report to Dr. Leeann Gilford for an appt. JUAN spoke with Tej Lainez nurse liaison. [facility] doesn't have a bed available at this time but may have one tomorrow or Thursday. SW following.

## 2017-08-09 PROCEDURE — 65270000029 HC RM PRIVATE

## 2017-08-09 PROCEDURE — 97530 THERAPEUTIC ACTIVITIES: CPT

## 2017-08-09 PROCEDURE — 74011250637 HC RX REV CODE- 250/637: Performed by: INTERNAL MEDICINE

## 2017-08-09 PROCEDURE — 74011250636 HC RX REV CODE- 250/636: Performed by: NURSE PRACTITIONER

## 2017-08-09 PROCEDURE — 74011250637 HC RX REV CODE- 250/637: Performed by: NURSE PRACTITIONER

## 2017-08-09 PROCEDURE — 99233 SBSQ HOSP IP/OBS HIGH 50: CPT | Performed by: INTERNAL MEDICINE

## 2017-08-09 RX ADMIN — POTASSIUM CHLORIDE 20 MEQ: 20 TABLET, EXTENDED RELEASE ORAL at 18:33

## 2017-08-09 RX ADMIN — ASPIRIN 81 MG: 81 TABLET, COATED ORAL at 10:49

## 2017-08-09 RX ADMIN — DONEPEZIL HYDROCHLORIDE 10 MG: 5 TABLET, FILM COATED ORAL at 10:48

## 2017-08-09 RX ADMIN — ALPRAZOLAM 1 MG: 0.5 TABLET ORAL at 21:08

## 2017-08-09 RX ADMIN — ENOXAPARIN SODIUM 40 MG: 40 INJECTION SUBCUTANEOUS at 18:34

## 2017-08-09 RX ADMIN — ALPRAZOLAM 1 MG: 0.5 TABLET ORAL at 10:53

## 2017-08-09 RX ADMIN — POTASSIUM CHLORIDE 20 MEQ: 20 TABLET, EXTENDED RELEASE ORAL at 10:48

## 2017-08-09 RX ADMIN — SIMVASTATIN 20 MG: 20 TABLET, FILM COATED ORAL at 21:08

## 2017-08-09 RX ADMIN — NAPROXEN 500 MG: 250 TABLET ORAL at 21:09

## 2017-08-09 RX ADMIN — QUETIAPINE FUMARATE 25 MG: 25 TABLET, FILM COATED ORAL at 21:09

## 2017-08-09 NOTE — PROGRESS NOTES
END OF SHIFT NOTE:    Intake/Output      Voiding: YES  Catheter: NO  Drain:              Stool:  0 occurrences. Stool Assessment  Stool Color: Victorine Serve (pt stated \"it was really dark\") (08/05/17 8660)  Stool Appearance: Hard; Formed (08/05/17 3512)  Stool Amount: Small (08/05/17 4483)  Stool Source/Status: Rectum (08/05/17 4200)    Emesis:  0 occurrences. VITAL SIGNS  Patient Vitals for the past 12 hrs:   Temp Pulse Resp BP SpO2   08/09/17 0330 98 °F (36.7 °C) 66 18 133/66 98 %   08/08/17 2330 98 °F (36.7 °C) 60 18 143/64 98 %   08/08/17 2000 98 °F (36.7 °C) 62 18 124/55 99 %       Pain Assessment  Pain 1  Pain Scale 1: Visual (08/09/17 0156)  Pain Intensity 1: 0 (08/09/17 0156)  Patient Stated Pain Goal: 0 (08/09/17 0156)  Pain Reassessment 1: Patient sleeping (08/09/17 0156)  Pain Onset 1: now (08/05/17 2252)  Pain Location 1: Shoulder (08/08/17 0810)  Pain Orientation 1: Right (08/08/17 0810)  Pain Description 1: Aching (08/08/17 0810)  Pain Intervention(s) 1: Medication (see MAR) (08/08/17 0810)    Ambulating  Yes    Additional Information:   Pt pulled out IV last night, refused to have another one. Pt received night time medications and slept through the night, see MAR. Shift report given to oncoming nurse at the bedside.     Marko Horton, RN

## 2017-08-09 NOTE — PROGRESS NOTES
[facility]        Inpatient Hematology / Oncology Progress Note      Admission Date: 2017  1:13 PM  Reason for Admission/Hospital Course: multimyeloma  Leukopenia  Leukopenia      24 Hour Events: BMBx performed , awaiting results  Re-consult tele psych  Records sent to Dr. Bowen Everett for outpatient appointment prior to discharge    ROS:  Constitutional: Negative for fever, chills, weakness, malaise, fatigue. CV: Negative for chest pain, palpitations, edema. Respiratory: Negative for dyspnea, cough, wheezing. GI: Negative for nausea, abdominal pain, diarrhea. Psych: +Bipolar. Anxious. 10 point review of systems is otherwise negative with the exception of the elements mentioned above in the HPI. No Known Allergies    OBJECTIVE:  Patient Vitals for the past 8 hrs:   BP Temp Pulse Resp SpO2 Height Weight   17 0734 164/71 97.5 °F (36.4 °C) (!) 54 18 99 % - -   17 0406 - - - - - 5' 10\" (1.778 m) 265 lb 3.2 oz (120.3 kg)   17 0330 133/66 98 °F (36.7 °C) 66 18 98 % - -     Temp (24hrs), Av.8 °F (36.6 °C), Min:97.5 °F (36.4 °C), Max:98 °F (36.7 °C)     0701 -  1900  In: 240 [P.O.:240]  Out: -     Physical Exam:  Constitutional: Well developed, well nourished male in no acute distress, lying comfortably in the hospital bed. HEENT: Normocephalic and atraumatic. Oropharynx is clear, mucous membranes are moist.Extraocular muscles are intact. Sclerae anicteric. Lymph node   Deferred   Skin Warm and dry. No bruising and no rash noted. No erythema. No pallor. Respiratory Lungs are clear to auscultation bilaterally without wheezes, rales or rhonchi, normal air exchange without accessory muscle use. CVS Bradycardic rate, regular rhythm and normal S1 and S2. No murmurs, gallops, or rubs. Abdomen Soft, nontender and nondistended, normoactive bowel sounds. Neuro Grossly nonfocal with no obvious sensory or motor deficits.    MSK Normal range of motion in general.  No edema and no tenderness. Psych Compulsive talking. Bipolar with suicidal ideations - states no suicidal ideations this AM.  Inappropriate comments at times to staff. Reasonably pleasant today. Labs:      Recent Labs      08/08/17   0412  08/07/17   0450   WBC  2.8*  3.0*   RBC  3.22*  3.21*   HGB  10.9*  10.8*   HCT  31.9*  31.5*   MCV  99.1*  98.1*   MCH  33.9*  33.6*   MCHC  34.2  34.3   RDW  15.1*  14.8*   PLT  118*  121*   GRANS  38*  44   LYMPH  50*  42   MONOS  9  11   EOS  3  3   BASOS  0  0   IG  0.4  0.3   DF  AUTOMATED  AUTOMATED   ANEU  1.1*  1.3*   ABL  1.4  1.3   ABM  0.3  0.3   ALINE  0.1  0.1   ABB  0.0  0.0   AIG  0.0  0.0        Recent Labs      08/08/17   0412   NA  144   K  3.8   CL  113*   CO2  24   AGAP  7   GLU  85   BUN  13   CREA  0.83   GFRAA  >60   GFRNA  >60   CA  8.4         Imaging:  XR BONE SURVEY COMP [[id number]] Collected: 08/02/17 2010      Order Status: Completed Updated: 08/02/17 2013     Narrative:       Exam:  Metabolic bone survey radiographs    History:  pain, multiple myeloma work-up, [de-identified] years Male    Comparison: None available    Findings:  No evidence of acute fracture or dislocation.  Normal alignment,  joint spaces preserved.  Normal mineralization.  No definite lytic lesions are  seen to suggest lytic osseous metastatic disease or osseous involvement by  multiple myeloma.  Visualized soft tissues otherwise unremarkable. Impression:  No definite evidence of osseous involvement by multiple myeloma or  metastatic disease.         CT BX BONE MARROW NDL/TROCAR [[id number]] Collected: 08/02/17 1535     Order Status: Completed Updated: 08/02/17 1536     Narrative:       Title: CT guided right iliac bone marrow aspiration and core biopsy. History: [de-identified]year old male with multiple myeloma.       : Giovana Gomez PA-C    Supervising Physician: Christine Escalera M.D.     Consent: Informed written and oral consent was obtained from the patient after  explanation of benefits and risks (including, but not limited to:  Infection,  Hemorrhage, Visceral Injury).  The patient's questions were answered to their  satisfaction.  The patient stated understanding and requested that we proceed.      Procedure: Maximal sterile barrier technique was used.  With the patient prone a  preliminary CT scan through the pelvis was performed with radio-opaque markers  on the skin. Following routine prep and drape of the right buttock, a local field block with  lidocaine was achieved. Using intermittent CT technique, a marrow aspirate followed by a core biopsy was  obtained with the 11-gauge On Control biopsy device. The needle was removed. Hemostasis was achieved with manual compression. A  bandage was applied. Complications: None. Medications: None. Findings: No post biopsy bleeding. Impression: Uncomplicated CT guided right iliac bone marrow aspiration and core  biopsy. Plan:  Bedrest observation for one hour.           ASSESSMENT:    Problem List  Date Reviewed: 7/31/2017          Codes Class Noted    Leukopenia ICD-10-CM: D72.819  ICD-9-CM: 288.50  8/2/2017        Multiple myeloma not having achieved remission ([facility] ICD-10-CM: C90.00  ICD-9-CM: 203.00  8/2/2017        Gastroesophageal reflux disease ICD-10-CM: K21.9  ICD-9-CM: 530.81  8/2/2017        Bilateral ankle pain ICD-10-CM: M25.571, M25.572  ICD-9-CM: 719.47  7/31/2017        Pollen allergies ICD-10-CM: J30.1  ICD-9-CM: 477.0  7/31/2017    Overview Signed 7/31/2017  2:27 PM by Mary Rojo MD     Pt would like to consider allergy shots. Will refer to Allergy             Fall ICD-10-CM: [address]  ICD-9-CM: E888.9  7/18/2017    Overview Signed 7/18/2017  2:03 PM by Mary Rojo MD     Pt had another fall, in his yard; he was chasing after his cat and lost his balance. No injuries sustained; fall witnessed by neighbor. No LOC.              Vitamin B12 deficiency ICD-10-CM: E53.8  ICD-9-CM: 266.2  7/18/2017    Overview Signed 7/18/2017  2:06 PM by Luda Galicia MD     Pt just started taking Vitamin B 12 supplement. Abnormal laboratory test ICD-10-CM: R89.9  ICD-9-CM: 796.4  7/18/2017    Overview Addendum 7/31/2017  2:26 PM by Luda Galicia MD     UPEP results reviewed with pt. Pt has referral to Oncology to further evaluate. Mixed hyperlipidemia ICD-10-CM: E78.2  ICD-9-CM: 272.2  6/26/2017        OA (osteoarthritis) ICD-10-CM: M19.90  ICD-9-CM: 715.90  6/26/2017        Senile dementia ICD-10-CM: F03.90  ICD-9-CM: 290.0  5/19/2017        Cataract ICD-10-CM: H26.9  ICD-9-CM: 366.9  9/8/2016        Asymmetrical sensorineural hearing loss ICD-10-CM: H90.5  ICD-9-CM: 389.16  Unknown        Bipolar disorder ([facility] ICD-10-CM: F31.9  ICD-9-CM: 296.80  3/22/2015    Overview Addendum 7/6/2017  5:01 PM by Luda Galicia MD     Currently treated with Lexapro and Xanax. Given pt's age, I would recommend continuing current regimen; refill rx. Follow up 3 months or prn. Pt is working on weaning to a lower dose of Xanax. Syncope and collapse ICD-10-CM: R55  ICD-9-CM: 780.2  3/22/2015        Acute encephalopathy ICD-10-CM: G93.40  ICD-9-CM: 348.30  3/22/2015        Cortical hemorrhage ([facility] ICD-10-CM: I61.1  ICD-9-CM: 567  9/17/2014        Syncope ICD-10-CM: R55  ICD-9-CM: 780.2  9/16/2014    Overview Signed 7/6/2017  5:00 PM by Luda Galicia MD     Refer to Cardiology to evaluated. Concern for symptomatic bradycardia. History of TIA (transient ischemic attack) ICD-10-CM: Z86.73  ICD-9-CM: V12.54  9/16/2014        Altered mental status ICD-10-CM: R41.82  ICD-9-CM: 780.97  9/16/2014        Anemia ICD-10-CM: D64.9  ICD-9-CM: 285.9  9/16/2014    Overview Addendum 7/6/2017  5:01 PM by Luda Galicia MD     Recheck CBC.              Tinea corporis ICD-10-CM: B35.4  ICD-9-CM: 110.5  9/16/2014        Hypomagnesemia ICD-10-CM: V92.25  ICD-9-CM: 275.2  9/16/2014    Overview Signed 9/16/2014 10:14 PM by Jamil Villalta NP     MILD               Elevated AST (SGOT) ICD-10-CM: R74.0  ICD-9-CM: 790.4  9/16/2014            Mr. Christian Villalobos is a [de-identified] y.o. male admitted on 8/2/2017 with a primary diagnosis of Multiple Myeloma, IgG Kappa, ISS Stage II, he expressed suicidal ideation after being told the diagnosis and treatment plan, he will be evaluated by Psychiatry during this admission. PLAN:  Multiple Myeloma, IgG Kappa, ISS Stage II  - Once stable and discharge, he will require Zometa and CyBorD as OP therapy  - Bone marrow biopsy  - Skeletal survey  - Mediport  8/3 BMbx performed yesterday. Awaiting results. Skeletal survey neg. Consult IR for port placement. 8/7 BMbx results pending. Port placed 8/4. Bipolar with suicidal ideations  - Psych consult  8/3 Pt with suicidal ideations. Also making inappropriate comments to staff at times. Psychiatrist recommended decreasing Lexapro to 10mg x 3 days, then DC. Start Seroquel 25mg at bedtime. Consult SW for psych placement. 8/4  Per nurse, suicidal ideations worsened overnight. This morning, patient denies any suicidal ideation. However, he has become more verbally aggressive towards staff and refusing to take medications or treatments. Will ask psych to re-evaluate. Per Dr. Chris Oropeza, hold off on psych placement for now. 8/5 Pt spoke with psychiatrist yesterday. Still did not feel that he requires IP psych placement. No changes to plan, still recommends SNF placement after discharge. Pt denies any SI today. 8/7 Discussed recommendation by psychiatrist for patient to go to SNF after discharge. Pt agreeable with plan. 8/8 CM assisting with referring patient to psychiatrist prior to discharging him  8/9 Patient denies ever having suicidal ideations. Re-consult to psych. Still waiting on placement. Pancytopenia  - Transfuse as needed    Foul-smelling urine  8/4 Check UA/UCx  8/5 UA neg for infx. UCx pending. 8/6 UCx showed >100k mixed skin ondina. Recommended repeat UCx. Results pending. 8/7 Repeat UCx results pending  8/9 UC negative    Electrolyte imbalance  - Replace prn per [facility] SOPs    Continue home meds  [facility] SOPs  DVT Prophylaxis: Lovenox    Disposition: SW to work on placement for after discharge. SNF/natalie-psych? Pt is agreeable to go to facility. Patient also currently lives alone and is worried about is cat who is home alone since 8/2. Of note, patient's car and keys are [facility] at [facility]. Pt is stable for discharge, just waiting on placement now. 8/8  has arranged appointment with Dr. Jossie Santillan and has asked that Dr. Jossie Santillan make recommendations for psych consult. 8/9 Reviewed patient's paper chart with documentation from Dr. Naida Rodrigez (tele-pschy). It was noted that he has questionable history of bipolar disorder. Per Dr. Allen Green note Israel Espinosa currently does not appear to have the ability to take care of himself due to his mood disorder and feeling overwhelmed\". \"Does not meet criteria for involuntary care. SNF placement post discharge\". I have requested re-exam as recommended 1-2 days prior to discharge. Current meds include Aricept, Seroquel, and prn Xanax. Teresa Partida NP   [facility]  [address] [place] [address]  Office : [phone]  Fax : [phone]               Attending Addendum:  I personally evaluated the patient with Teresa Partida N.P.,  and agree with the assessment, findings and plan as documented. Appears stable, heart regular without murmur, lungs clear, abdomen benign. Continue following w psychiatry. Placement pending.             Jadne Perez MD  [address]  Office : [phone]  Fax : [phone]

## 2017-08-09 NOTE — PROGRESS NOTES
Problem: Mobility Impaired (Adult and Pediatric)  Goal: *Acute Goals and Plan of Care (Insert Text)  Discharge Goals:  (1.)Mr. Kev Singletary will move from supine to sit and sit to supine , scoot up and down and roll side to side with INDEPENDENT within 7 day(s). (2.)Mr. Kev Singletary will transfer from bed to chair and chair to bed with INDEPENDENT using the least restrictive device within 7 day(s). (3.)Mr. Kev Singletary will ambulate with SUPERVISION for 250+ feet with the least restrictive device within 7 day(s). ________________________________________________________________________________________________      PHYSICAL THERAPY: Daily Note, Treatment Day: 1st and PM 8/9/2017  INPATIENT: Hospital Day: 8  Payor: [facility] / Plan: [address] / Product Type: Managed Care Medicare /      NAME/AGE/GENDER: Taran Ibarra is a [de-identified] y.o. male      PRIMARY DIAGNOSIS: multimyeloma  Leukopenia  Leukopenia <principal problem not specified> <principal problem not specified>        ICD-10: Treatment Diagnosis:       · Generalized Muscle Weakness (M62.81)  · Difficulty in walking, Not elsewhere classified (R26.2)  · History of falling (Z91.81)   Precaution/Allergies:  Review of patient's allergies indicates no known allergies. ASSESSMENT:      Mr. Kev Singletary is supine in bed upon contact and requesting to use bathroom. He is on his cell phone leaving a message for someone. States that \"they\" will not let him go to the bathroom alone. Bed mobility is modified independent. Patient requires no assistance from this writer in the bathroom in face he tells me he can do this himself. Once out of the bathroom the patient agrees to gait train with the use of the walker in the hallway x 200 feet. Patient is talking the entire time and states that he uses a cane and has 2 in his car right now and 4 more at home.   Once back in the room the patient returns to supine in bed with supervision and request some gurwinder Sanchez Pt perseverates throughout the treatment session and his voice is loud and carries. Good session and patient is progressing. Pt has increased difficulty with focusing on task at hand. All needs within reach. Tico Bhatti will benefit from skilled PT (medically necessary) to address decreased strength, decreased balance, decreased functional tolerance, decreased cardiopulmonary endurance affecting participation in basic ADLs and functional tasks. Will continue PT efforts. This section established at most recent assessment   PROBLEM LIST (Impairments causing functional limitations):  1. Decreased Strength  2. Decreased Ambulation Ability/Technique  3. Decreased Balance  4. Decreased Activity Tolerance  5. Decreased Pacing Skills  6. Increased Fatigue  7. Decreased [place] with Home Exercise Program    INTERVENTIONS PLANNED: (Benefits and precautions of physical therapy have been discussed with the patient.)  1. Balance Exercise  2. Bed Mobility  3. Gait Training  4. Home Exercise Program (HEP)  5. Neuromuscular Re-education/Strengthening  6. Therapeutic Activites  7. Therapeutic Exercise/Strengthening  8. Group Therapy      TREATMENT PLAN: Frequency/Duration: 3 times a week for 12 weeks  Rehabilitation Potential For Stated Goals: GOOD      RECOMMENDED REHABILITATION/EQUIPMENT: (at time of discharge pending progress): Continue Skilled Therapy and rehab. HISTORY:   History of Present Injury/Illness (Reason for Referral):  See H&P below  Mr. Eveline Harp is a [de-identified] y.o. male admitted on 8/2/2017 with a primary diagnosis of Multiple Myeloma, IgG Kappa, ISS Stage II, he expressed suicidal ideation after being told the diagnosis and treatment plan, he will be evaluated by Psychiatry during this admission. Past Medical History/Comorbidities:   Mr. Eveline Harp  has a past medical history of Acute encephalopathy (3/22/2015); Altered mental status (9/16/2014); Alzheimer disease;  Anemia (9/16/2014); Anxiety; Asymmetrical sensorineural hearing loss; Bipolar disorder ([facility] Cataract (9/8/2016); Cortical hemorrhage ([facility] (9/17/2014); Elevated AST (SGOT) (9/16/2014); GERD (gastroesophageal reflux disease); H/O seasonal allergies; History of TIA (transient ischemic attack) (9/16/2014); Hypomagnesemia (9/16/2014); Panic attack; Senile dementia (5/19/2017); Stroke [facility]); Syncope and collapse (3/22/2015); Tinea corporis (9/16/2014); and Tinnitus. Mr. Rob Wood  has a past surgical history that includes appendectomy; other surgical (AGE 23); and colonoscopy (MULTIPLE OVER THE YEARS). Social History/Living Environment:   Home Environment: Private residence  # Steps to Enter: 0  One/Two Story Residence: One story  Living Alone: Yes  Support Systems: Friends \ neighbors  Patient Expects to be Discharged to[de-identified] Rehabilitation facility  Current DME Used/Available at Home: Jacqulyne Skeeters, straight, Shower chair  Tub or Shower Type: Tub/Shower combination  Prior Level of Function/Work/Activity:  Lives alone, use of SPC for gait, indep with ADLs, 8-9 falls in past 6 months. Number of Personal Factors/Comorbidities that affect the Plan of Care: 3+: HIGH COMPLEXITY   EXAMINATION:   Most Recent Physical Functioning:   Gross Assessment:                  Posture:     Balance:  Sitting: Intact  Standing: Intact Bed Mobility:  Rolling: Modified independent  Supine to Sit: Modified independent  Sit to Supine: Modified independent  Scooting: Modified independent  Wheelchair Mobility:     Transfers:  Sit to Stand: Supervision  Stand to Sit: Supervision  Gait:     Distance (ft): 200 Feet (ft)  Assistive Device: Walker, rolling       Body Structures Involved:  1. Heart  2. Lungs  3. Bones  4. Joints  5. Muscles Body Functions Affected:  1. Mental  2. Sensory/Pain  3. Cardio  4. Respiratory  5. Neuromusculoskeletal  6. Movement Related Activities and Participation Affected:  1. Learning and Applying Knowledge  2.  General Tasks and Demands  3. Mobility  4. Self Care  5. Interpersonal Interactions and Relationships  6. Community, Social and [place]   Number of elements that affect the Plan of Care: 4+: HIGH COMPLEXITY   CLINICAL PRESENTATION:   Presentation: Evolving clinical presentation with changing clinical characteristics: MODERATE COMPLEXITY   CLINICAL DECISION MAKIN [facility] Mobility Inpatient Short Form  How much difficulty does the patient currently have. .. Unable A Lot A Little None   1. Turning over in bed (including adjusting bedclothes, sheets and blankets)? [ ] 1   [ ] 2   [X] 3   [ ] 4   2. Sitting down on and standing up from a chair with arms ( e.g., wheelchair, bedside commode, etc.)   [ ] 1   [ ] 2   [X] 3   [ ] 4   3. Moving from lying on back to sitting on the side of the bed? [ ] 1   [ ] 2   [X] 3   [ ] 4   How much help from another person does the patient currently need. .. Total A Lot A Little None   4. Moving to and from a bed to a chair (including a wheelchair)? [ ] 1   [ ] 2   [X] 3   [ ] 4   5. Need to walk in hospital room? [ ] 1   [ ] 2   [X] 3   [ ] 4   6. Climbing 3-5 steps with a railing? [ ] 1   [ ] 2   [X] 3   [ ] 4   © , Trustees of [address] [place] [address] Box [facility], under license to [employer]. All rights reserved    Score:  Initial: 18 Most Recent: X (Date: -- )     Interpretation of Tool:  Represents activities that are increasingly more difficult (i.e. Bed mobility, Transfers, Gait).        Score 24 23 22-20 19-15 14-10 9-7 6       Modifier CH CI CJ CK CL CM CN         · Mobility - Walking and Moving Around:               - CURRENT STATUS:    CK - 40%-59% impaired, limited or restricted               - GOAL STATUS:           CJ - 20%-39% impaired, limited or restricted               - D/C STATUS:                       ---------------To be determined---------------  Payor: [facility] MEDICARE COMPLETE / Plan: [address] / Product Type: Managed Care Medicare /       Medical Necessity:     · Patient is expected to demonstrate progress in strength, balance, coordination and functional technique to decrease assistance required with gait and functional mobility. Reason for Services/Other Comments:  · Patient continues to require skilled intervention due to decreased strength, decreased balance, decreased functional tolerance, decreased cardiopulmonary endurance affecting participation in basic ADLs and functional tasks. Use of outcome tool(s) and clinical judgement create a POC that gives a: Clear prediction of patient's progress: LOW COMPLEXITY                 TREATMENT:   (In addition to Assessment/Re-Assessment sessions the following treatments were rendered)   Pre-treatment Symptoms/Complaints:  Perseverates on topics unrelated to PT  Pain: Initial:   Pain Intensity 1: 0  Post Session:  0/10      Assessment/Reassessment only, no treatment provided today     Braces/Orthotics/Lines/Etc:   ·    Treatment/Session Assessment:    · Response to Treatment:  Tolerated well however patient wants to walk with a cane  · Interdisciplinary Collaboration:  · Physical Therapy Assistant and Registered Nurse  · After treatment position/precautions:  · Supine in bed, Bed/Chair-wheels locked, Bed in low position, Call light within reach and RN notified  · Compliance with Program/Exercises: compliant  · Recommendations/Intent for next treatment session: \"Next visit will focus on advancements to more challenging activities and reduction in assistance provided\".   Total Treatment Duration:  PT Patient Time In/Time Out  Time In: 1600  Time Out: 1629     Ligia Woodard PTA

## 2017-08-09 NOTE — PROGRESS NOTES
END OF SHIFT NOTE:    Intake/Output  08/09 0701 - 08/09 1900  In: 240 [P.O.:240]  Out: -    Voiding: YES  Catheter: NO  Drain:              Stool:  0 occurrences. Stool Assessment  Stool Color: Marleny Fuentes (pt stated \"it was really dark\") (08/05/17 4553)  Stool Appearance: Hard; Formed (08/05/17 5535)  Stool Amount: Small (08/05/17 1917)  Stool Source/Status: Rectum (08/05/17 9762)    Emesis:  0 occurrences. VITAL SIGNS  Patient Vitals for the past 12 hrs:   Temp Pulse Resp BP SpO2   08/09/17 1544 98.2 °F (36.8 °C) 75 18 158/80 97 %   08/09/17 1119 98 °F (36.7 °C) 63 18 145/66 95 %   08/09/17 0734 97.5 °F (36.4 °C) (!) 54 18 164/71 99 %       Pain Assessment  Pain 1  Pain Scale 1: Numeric (0 - 10) (08/09/17 1700)  Pain Intensity 1: 0 (08/09/17 1700)  Patient Stated Pain Goal: 0 (08/09/17 0745)  Pain Reassessment 1: Patient sleeping (08/09/17 0156)  Pain Onset 1: now (08/05/17 2252)  Pain Location 1: Shoulder (08/08/17 0810)  Pain Orientation 1: Right (08/08/17 0810)  Pain Description 1: Aching (08/08/17 0810)  Pain Intervention(s) 1: Medication (see MAR) (08/08/17 0810)    Ambulating  Yes    Additional Information:     Shift report given to oncoming nurse at the bedside.     Rosette Ureña

## 2017-08-09 NOTE — PROGRESS NOTES
Dispo update:  Updated [address] (Ms. Bijal Elias, RN liaison) about status of Mr. Darrin Peres in room 520.

## 2017-08-10 VITALS
BODY MASS INDEX: 37.94 KG/M2 | SYSTOLIC BLOOD PRESSURE: 146 MMHG | TEMPERATURE: 97.9 F | RESPIRATION RATE: 17 BRPM | HEART RATE: 83 BPM | DIASTOLIC BLOOD PRESSURE: 72 MMHG | HEIGHT: 70 IN | WEIGHT: 265 LBS | OXYGEN SATURATION: 97 %

## 2017-08-10 LAB
ANION GAP BLD CALC-SCNC: 6 MMOL/L (ref 7–16)
BACTERIA SPEC CULT: NORMAL
BASOPHILS # BLD AUTO: 0 K/UL (ref 0–0.2)
BASOPHILS # BLD: 0 % (ref 0–2)
BUN SERPL-MCNC: 15 MG/DL (ref 8–23)
CALCIUM SERPL-MCNC: 8.4 MG/DL (ref 8.3–10.4)
CHLORIDE SERPL-SCNC: 113 MMOL/L (ref 98–107)
CO2 SERPL-SCNC: 26 MMOL/L (ref 21–32)
CREAT SERPL-MCNC: 0.92 MG/DL (ref 0.8–1.5)
DIFFERENTIAL METHOD BLD: ABNORMAL
EOSINOPHIL # BLD: 0.1 K/UL (ref 0–0.8)
EOSINOPHIL NFR BLD: 3 % (ref 0.5–7.8)
ERYTHROCYTE [DISTWIDTH] IN BLOOD BY AUTOMATED COUNT: 15 % (ref 11.9–14.6)
GLUCOSE SERPL-MCNC: 81 MG/DL (ref 65–100)
HCT VFR BLD AUTO: 34.2 % (ref 41.1–50.3)
HGB BLD-MCNC: 11.5 G/DL (ref 13.6–17.2)
IMM GRANULOCYTES # BLD: 0 K/UL (ref 0–0.5)
IMM GRANULOCYTES NFR BLD AUTO: 0 % (ref 0–5)
LYMPHOCYTES # BLD AUTO: 55 % (ref 13–44)
LYMPHOCYTES # BLD: 1.9 K/UL (ref 0.5–4.6)
MCH RBC QN AUTO: 33.2 PG (ref 26.1–32.9)
MCHC RBC AUTO-ENTMCNC: 33.6 G/DL (ref 31.4–35)
MCV RBC AUTO: 98.8 FL (ref 79.6–97.8)
MM INDURATION POC: NORMAL NEGATIVE (ref 0–5)
MONOCYTES # BLD: 0.3 K/UL (ref 0.1–1.3)
MONOCYTES NFR BLD AUTO: 10 % (ref 4–12)
NEUTS SEG # BLD: 1.1 K/UL (ref 1.7–8.2)
NEUTS SEG NFR BLD AUTO: 32 % (ref 43–78)
PLATELET # BLD AUTO: 127 K/UL (ref 150–450)
PMV BLD AUTO: 9.9 FL (ref 10.8–14.1)
POTASSIUM SERPL-SCNC: 3.8 MMOL/L (ref 3.5–5.1)
PPD POC: NORMAL NEGATIVE
RBC # BLD AUTO: 3.46 M/UL (ref 4.23–5.67)
SERVICE CMNT-IMP: NORMAL
SODIUM SERPL-SCNC: 145 MMOL/L (ref 136–145)
WBC # BLD AUTO: 3.4 K/UL (ref 4.3–11.1)

## 2017-08-10 PROCEDURE — 99239 HOSP IP/OBS DSCHRG MGMT >30: CPT | Performed by: INTERNAL MEDICINE

## 2017-08-10 PROCEDURE — 74011250637 HC RX REV CODE- 250/637: Performed by: NURSE PRACTITIONER

## 2017-08-10 PROCEDURE — 80048 BASIC METABOLIC PNL TOTAL CA: CPT | Performed by: NURSE PRACTITIONER

## 2017-08-10 PROCEDURE — 85025 COMPLETE CBC W/AUTO DIFF WBC: CPT | Performed by: NURSE PRACTITIONER

## 2017-08-10 PROCEDURE — 36415 COLL VENOUS BLD VENIPUNCTURE: CPT | Performed by: NURSE PRACTITIONER

## 2017-08-10 RX ORDER — POTASSIUM CHLORIDE 20 MEQ/1
20 TABLET, EXTENDED RELEASE ORAL 2 TIMES DAILY
Qty: 60 TAB | Refills: 0 | Status: SHIPPED | OUTPATIENT
Start: 2017-08-10 | End: 2019-05-23

## 2017-08-10 RX ORDER — QUETIAPINE FUMARATE 25 MG/1
25 TABLET, FILM COATED ORAL
Qty: 30 TAB | Refills: 0 | Status: SHIPPED | OUTPATIENT
Start: 2017-08-10 | End: 2017-08-18

## 2017-08-10 RX ORDER — ALPRAZOLAM 1 MG/1
1 TABLET ORAL
Qty: 90 TAB | Refills: 0 | Status: SHIPPED | OUTPATIENT
Start: 2017-08-10 | End: 2018-08-08

## 2017-08-10 RX ORDER — NAPROXEN 500 MG/1
500 TABLET ORAL
Qty: 60 TAB | Refills: 0 | Status: SHIPPED | OUTPATIENT
Start: 2017-08-10 | End: 2019-05-23 | Stop reason: SDUPTHER

## 2017-08-10 RX ADMIN — DONEPEZIL HYDROCHLORIDE 10 MG: 5 TABLET, FILM COATED ORAL at 09:28

## 2017-08-10 RX ADMIN — ASPIRIN 81 MG: 81 TABLET, COATED ORAL at 09:27

## 2017-08-10 NOTE — PROGRESS NOTES
[address] ambulance transport arranged for 1:30 pm transport to [facility] (approved by Ms. Emma Juarez RN clinical liaison), room 13W, report [phone]. This plan is ok with Mr. Audrey Contreras in room room 520, Ms. Vianey Dunlap NP (she will call patient's daughter), and MIKE Robins.

## 2017-08-10 NOTE — PROGRESS NOTES
Pt to be discharged to [facility]. TRANSFER - OUT REPORT:    Verbal report given Ligia Cadena on Babatunde Evans  being transferred to [facility] for routine progression of care       Report consisted of patients Situation, Background, Assessment and   Recommendations(SBAR). Information from the following report(s) SBAR was reviewed with the receiving nurse. Lines: None. Opportunity for questions and clarification was provided. Patient transported with:  [facility]          .

## 2017-08-10 NOTE — PROGRESS NOTES
END OF SHIFT NOTE:    Intake/Output      Voiding: YES  Catheter: NO  Drain:              Stool:  0 occurrences. Stool Assessment  Stool Color: Corena Harris (pt stated \"it was really dark\") (08/05/17 3936)  Stool Appearance: Hard; Formed (08/05/17 6198)  Stool Amount: Small (08/05/17 1954)  Stool Source/Status: Rectum (08/05/17 4057)    Emesis:  0 occurrences. VITAL SIGNS  Patient Vitals for the past 12 hrs:   Temp Pulse Resp BP SpO2   08/10/17 0405 97.7 °F (36.5 °C) (!) 46 16 108/41 100 %   08/10/17 0023 97.5 °F (36.4 °C) (!) 56 18 142/75 97 %   08/09/17 2057 98 °F (36.7 °C) 69 19 144/69 98 %       Pain Assessment  Pain 1  Pain Scale 1: Visual (patient sleeping ) (08/10/17 0554)  Pain Intensity 1: 0 (08/10/17 0554)  Patient Stated Pain Goal: 0 (08/10/17 0554)  Pain Reassessment 1: Yes (08/09/17 2130)  Pain Onset 1: now (08/05/17 2252)  Pain Location 1: Shoulder (08/08/17 0810)  Pain Orientation 1: Right (08/08/17 0810)  Pain Description 1: Aching (08/08/17 0810)  Pain Intervention(s) 1: Medication (see MAR) (08/09/17 2100)    Ambulating  Yes with assistance to bathroom    Additional Information: uneventful night. Slept through the night. Possibly being discharged to rehab today. Awaiting placement. Shift report given to oncoming nurse at the bedside.     Devika Hardy

## 2017-08-11 ENCOUNTER — PATIENT OUTREACH (OUTPATIENT)
Dept: CASE MANAGEMENT | Age: 82
End: 2017-08-11

## 2017-08-24 ENCOUNTER — HOSPITAL ENCOUNTER (OUTPATIENT)
Dept: LAB | Age: 82
Discharge: HOME | End: 2017-08-24
Payer: MEDICARE

## 2017-08-24 ENCOUNTER — DOCUMENTATION ONLY (OUTPATIENT)
Dept: ONCOLOGY | Age: 82
End: 2017-08-24

## 2017-08-24 ENCOUNTER — PATIENT OUTREACH (OUTPATIENT)
Dept: CASE MANAGEMENT | Age: 82
End: 2017-08-24

## 2017-08-24 DIAGNOSIS — C90.00 MULTIPLE MYELOMA NOT HAVING ACHIEVED REMISSION (HCC): ICD-10-CM

## 2017-08-24 PROBLEM — D72.819 LEUKOPENIA: Status: RESOLVED | Noted: 2017-08-02 | Resolved: 2017-08-24

## 2017-08-24 LAB
ALBUMIN SERPL-MCNC: 3.1 G/DL (ref 3.2–4.6)
ALBUMIN/GLOB SERPL: 0.5 {RATIO} (ref 1.2–3.5)
ALP SERPL-CCNC: 74 U/L (ref 50–136)
ALT SERPL-CCNC: 28 U/L (ref 12–65)
ANION GAP SERPL CALC-SCNC: 4 MMOL/L (ref 7–16)
AST SERPL-CCNC: 29 U/L (ref 15–37)
BASOPHILS # BLD: 0 K/UL (ref 0–0.2)
BASOPHILS NFR BLD: 0 % (ref 0–2)
BILIRUB SERPL-MCNC: 0.3 MG/DL (ref 0.2–1.1)
BUN SERPL-MCNC: 15 MG/DL (ref 8–23)
CALCIUM SERPL-MCNC: 8.5 MG/DL (ref 8.3–10.4)
CHLORIDE SERPL-SCNC: 108 MMOL/L (ref 98–107)
CO2 SERPL-SCNC: 28 MMOL/L (ref 21–32)
CREAT SERPL-MCNC: 0.94 MG/DL (ref 0.8–1.5)
DIFFERENTIAL METHOD BLD: ABNORMAL
EOSINOPHIL # BLD: 0.1 K/UL (ref 0–0.8)
EOSINOPHIL NFR BLD: 3 % (ref 0.5–7.8)
ERYTHROCYTE [DISTWIDTH] IN BLOOD BY AUTOMATED COUNT: 14.2 % (ref 11.9–14.6)
GLOBULIN SER CALC-MCNC: 6.2 G/DL (ref 2.3–3.5)
GLUCOSE SERPL-MCNC: 94 MG/DL (ref 65–100)
HCT VFR BLD AUTO: 32.6 % (ref 41.1–50.3)
HGB BLD-MCNC: 11.2 G/DL (ref 13.6–17.2)
LYMPHOCYTES # BLD: 1.4 K/UL (ref 0.5–4.6)
LYMPHOCYTES NFR BLD: 38 % (ref 13–44)
MAGNESIUM SERPL-MCNC: 2.1 MG/DL (ref 1.8–2.4)
MCH RBC QN AUTO: 34.4 PG (ref 26.1–32.9)
MCHC RBC AUTO-ENTMCNC: 34.4 G/DL (ref 31.4–35)
MCV RBC AUTO: 100 FL (ref 79.6–97.8)
MONOCYTES # BLD: 0.4 K/UL (ref 0.1–1.3)
MONOCYTES NFR BLD: 10 % (ref 4–12)
NEUTS SEG # BLD: 1.8 K/UL (ref 1.7–8.2)
NEUTS SEG NFR BLD: 49 % (ref 43–78)
NRBC # BLD: 0 K/UL (ref 0–0.2)
PLATELET # BLD AUTO: 102 K/UL (ref 150–450)
PMV BLD AUTO: 10.8 FL (ref 10.8–14.1)
POTASSIUM SERPL-SCNC: 3.7 MMOL/L (ref 3.5–5.1)
PROT SERPL-MCNC: 9.3 G/DL (ref 6.3–8.2)
RBC # BLD AUTO: 3.26 M/UL (ref 4.23–5.67)
SODIUM SERPL-SCNC: 140 MMOL/L (ref 136–145)
WBC # BLD AUTO: 3.7 K/UL (ref 4.3–11.1)

## 2017-08-24 PROCEDURE — 83735 ASSAY OF MAGNESIUM: CPT | Performed by: INTERNAL MEDICINE

## 2017-08-24 PROCEDURE — 80053 COMPREHEN METABOLIC PANEL: CPT | Performed by: INTERNAL MEDICINE

## 2017-08-24 PROCEDURE — 85025 COMPLETE CBC W/AUTO DIFF WBC: CPT | Performed by: INTERNAL MEDICINE

## 2017-08-24 NOTE — PROGRESS NOTES
8/24/17 Dr Gautam Avelar reviewed staging results with patient. 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 Recommendation is for weekly treatment of Cytoxan, Velcade, Decadron for 3 weeks on 1 week off. Plan for at least 6 cycles. Patient states understanding but questionable ability to consent for treatment per documentation from hospital admission. VM left for Krishna De Anda, daughter however patient states son Ana Gaines is flying into [facility] today from [place]. Jacquelyn Sosa is trying to get in touch with son or daughter to due chemo education and consent of treatment. Patient aware of appointments.

## 2017-08-24 NOTE — PROGRESS NOTES
SW completed visit with pt, MD and RN Navigator. SW offered ongoing assistance in facilitating communication between pt's children who are pt's decision-makers as pt was deemed incapable of making healthcare decisions. Pt stated his son is flying into town today and intends to stay for 2-4 days. RN Navigators coordinating with pt's family and [facility]. No immediate needs. SW intends to remain available to assist PRN.

## 2017-08-24 NOTE — PROGRESS NOTES
8/24/17:  [facility] to [facility] that [facility] will be arranging transportation for chemotherapy education and financial counseling appt on 8/25/17 @ 10am.  Also spoke with patient's son, Jimmy Calle [phone], to confirm that he is in town. Son confirmed that he is at [facility] currently awaiting on dad's return from our office. Son in town until Sunday and plans to discuss long term care of his father. He will be in attendance during chemo ed and financial counseling.

## 2017-08-25 ENCOUNTER — DOCUMENTATION ONLY (OUTPATIENT)
Dept: HEMATOLOGY | Age: 82
End: 2017-08-25

## 2017-08-25 RX ORDER — PALONOSETRON 0.05 MG/ML
0.25 INJECTION, SOLUTION INTRAVENOUS ONCE
Status: CANCELLED
Start: 2017-09-08 | End: 2017-09-08

## 2017-08-25 NOTE — PROGRESS NOTES
I spoke with Mr. Haseeb French regarding his insurance coverage, potential oral medication authorizations, enrollment in the [address] [place] [address] ([facility]) and the [address] [place] [address] ([address] [facility] [address]), and assistance organization resource sheet. Mr. Haseeb French has [facility] Medicare Complete insurance. His insurance will pay approved chemotherapy claims at 80%. Of his $5,900 max out of pocket, he has $4,581.70 remaining to meet before [employer] will pay claims at 100%. Since Mr. Haseeb French will have a patient responsibility for his chemotherapy, I spoke with him about a co-pay assistance maricel with [employer] ([facility]). I went over what the maricel covers and does not cover and the enrollment process. Mr. Haseeb French gave me permission to apply for the maricel on his behalf. Next, I spoke with Mr. Haseeb French regarding potential oral medication authorizations. I told him that if he ever had any problems getting his oral medications filled to give the dedicated [facility] , Kimberly Tipton, a call. Most of the time, it is simply an authorization that needs to be done with the insurance company. Next, I spoke with Mr. Haseeb French regarding enrolling with [facility] and [facility]. I went over some of the services that [facility] and [facility] offers and the enrollment process. Lastly, I gave Mr. Haseeb French a form with various resource organizations that could assist with specific needs (example:  transportation, lodging, preparing meals, home cleaning)     Faxed Patient Referral form to the [address] [place] [address] at [phone]. Phone [phone]. Form scanned into chart. Faxed Physician's Statement to the [address] [place] [address] at [phone]. Phone [phone]. Form scanned into chart.

## 2017-08-25 NOTE — PROGRESS NOTES
Mr. Gali Perry has been approved for a $10,000 chemotherapy co-pay assistance maricel with [employer] [facility] Steph Boateng pays the co-pays and co-insurance for chemotherapy medications and supportive medications. Approval information will be scanned into chart.

## 2017-09-08 ENCOUNTER — HOSPITAL ENCOUNTER (OUTPATIENT)
Dept: INFUSION THERAPY | Age: 82
Discharge: HOME | End: 2017-09-08
Payer: MEDICARE

## 2017-09-08 ENCOUNTER — DOCUMENTATION ONLY (OUTPATIENT)
Dept: ONCOLOGY | Age: 82
End: 2017-09-08

## 2017-09-08 ENCOUNTER — PATIENT OUTREACH (OUTPATIENT)
Dept: CASE MANAGEMENT | Age: 82
End: 2017-09-08

## 2017-09-08 ENCOUNTER — HOSPITAL ENCOUNTER (OUTPATIENT)
Dept: LAB | Age: 82
Discharge: HOME | End: 2017-09-08
Payer: MEDICARE

## 2017-09-08 DIAGNOSIS — C90.00 MULTIPLE MYELOMA NOT HAVING ACHIEVED REMISSION (HCC): ICD-10-CM

## 2017-09-08 LAB
ALBUMIN SERPL-MCNC: 3.1 G/DL (ref 3.2–4.6)
ALBUMIN/GLOB SERPL: 0.5 {RATIO} (ref 1.2–3.5)
ALP SERPL-CCNC: 75 U/L (ref 50–136)
ALT SERPL-CCNC: 28 U/L (ref 12–65)
ANION GAP SERPL CALC-SCNC: 4 MMOL/L (ref 7–16)
AST SERPL-CCNC: 28 U/L (ref 15–37)
BASOPHILS # BLD: 0 K/UL (ref 0–0.2)
BASOPHILS NFR BLD: 0 % (ref 0–2)
BILIRUB SERPL-MCNC: 0.3 MG/DL (ref 0.2–1.1)
BUN SERPL-MCNC: 21 MG/DL (ref 8–23)
CALCIUM SERPL-MCNC: 8.9 MG/DL (ref 8.3–10.4)
CHLORIDE SERPL-SCNC: 107 MMOL/L (ref 98–107)
CO2 SERPL-SCNC: 29 MMOL/L (ref 21–32)
CREAT SERPL-MCNC: 0.91 MG/DL (ref 0.8–1.5)
DIFFERENTIAL METHOD BLD: ABNORMAL
EOSINOPHIL # BLD: 0.1 K/UL (ref 0–0.8)
EOSINOPHIL NFR BLD: 2 % (ref 0.5–7.8)
ERYTHROCYTE [DISTWIDTH] IN BLOOD BY AUTOMATED COUNT: 14.4 % (ref 11.9–14.6)
GLOBULIN SER CALC-MCNC: 6.7 G/DL (ref 2.3–3.5)
GLUCOSE SERPL-MCNC: 109 MG/DL (ref 65–100)
HCT VFR BLD AUTO: 31.9 % (ref 41.1–50.3)
HGB BLD-MCNC: 11.1 G/DL (ref 13.6–17.2)
LYMPHOCYTES # BLD: 1.5 K/UL (ref 0.5–4.6)
LYMPHOCYTES NFR BLD: 26 % (ref 13–44)
MCH RBC QN AUTO: 34.7 PG (ref 26.1–32.9)
MCHC RBC AUTO-ENTMCNC: 34.8 G/DL (ref 31.4–35)
MCV RBC AUTO: 99.7 FL (ref 79.6–97.8)
MONOCYTES # BLD: 0.5 K/UL (ref 0.1–1.3)
MONOCYTES NFR BLD: 8 % (ref 4–12)
NEUTS SEG # BLD: 3.7 K/UL (ref 1.7–8.2)
NEUTS SEG NFR BLD: 64 % (ref 43–78)
NRBC # BLD: 0 K/UL (ref 0–0.2)
PLATELET # BLD AUTO: 109 K/UL (ref 150–450)
PMV BLD AUTO: 9.4 FL (ref 10.8–14.1)
POTASSIUM SERPL-SCNC: 4 MMOL/L (ref 3.5–5.1)
PROT SERPL-MCNC: 9.8 G/DL (ref 6.3–8.2)
RBC # BLD AUTO: 3.2 M/UL (ref 4.23–5.67)
SODIUM SERPL-SCNC: 140 MMOL/L (ref 136–145)
WBC # BLD AUTO: 5.7 K/UL (ref 4.3–11.1)

## 2017-09-08 PROCEDURE — 74011000258 HC RX REV CODE- 258: Performed by: INTERNAL MEDICINE

## 2017-09-08 PROCEDURE — 96367 TX/PROPH/DG ADDL SEQ IV INF: CPT

## 2017-09-08 PROCEDURE — 74011000250 HC RX REV CODE- 250: Performed by: INTERNAL MEDICINE

## 2017-09-08 PROCEDURE — 74011250636 HC RX REV CODE- 250/636: Performed by: INTERNAL MEDICINE

## 2017-09-08 PROCEDURE — 85025 COMPLETE CBC W/AUTO DIFF WBC: CPT | Performed by: INTERNAL MEDICINE

## 2017-09-08 PROCEDURE — 96401 CHEMO ANTI-NEOPL SQ/IM: CPT

## 2017-09-08 PROCEDURE — 74011250636 HC RX REV CODE- 250/636

## 2017-09-08 PROCEDURE — 96413 CHEMO IV INFUSION 1 HR: CPT

## 2017-09-08 PROCEDURE — 80053 COMPREHEN METABOLIC PANEL: CPT | Performed by: INTERNAL MEDICINE

## 2017-09-08 PROCEDURE — 96375 TX/PRO/DX INJ NEW DRUG ADDON: CPT

## 2017-09-08 RX ORDER — SODIUM CHLORIDE 9 MG/ML
250 INJECTION, SOLUTION INTRAVENOUS CONTINUOUS
Status: DISCONTINUED | OUTPATIENT
Start: 2017-09-08 | End: 2017-09-12 | Stop reason: HOSPADM

## 2017-09-08 RX ORDER — PALONOSETRON 0.05 MG/ML
0.25 INJECTION, SOLUTION INTRAVENOUS ONCE
Status: COMPLETED | OUTPATIENT
Start: 2017-09-08 | End: 2017-09-08

## 2017-09-08 RX ORDER — HEPARIN 100 UNIT/ML
300-500 SYRINGE INTRAVENOUS AS NEEDED
Status: DISPENSED | OUTPATIENT
Start: 2017-09-08 | End: 2017-09-09

## 2017-09-08 RX ORDER — SODIUM CHLORIDE 0.9 % (FLUSH) 0.9 %
10 SYRINGE (ML) INJECTION AS NEEDED
Status: ACTIVE | OUTPATIENT
Start: 2017-09-08 | End: 2017-09-09

## 2017-09-08 RX ADMIN — PALONOSETRON HYDROCHLORIDE 0.25 MG: 0.25 INJECTION INTRAVENOUS at 13:17

## 2017-09-08 RX ADMIN — CYCLOPHOSPHAMIDE 714 MG: 1 INJECTION, POWDER, FOR SOLUTION INTRAVENOUS; ORAL at 13:36

## 2017-09-08 RX ADMIN — DEXAMETHASONE SODIUM PHOSPHATE 20 MG: 10 INJECTION INTRAMUSCULAR; INTRAVENOUS at 12:55

## 2017-09-08 RX ADMIN — SODIUM CHLORIDE 3.1 MG: 9 INJECTION INTRAMUSCULAR; INTRAVENOUS; SUBCUTANEOUS at 13:36

## 2017-09-08 RX ADMIN — SODIUM CHLORIDE, PRESERVATIVE FREE 500 UNITS: 5 INJECTION INTRAVENOUS at 14:44

## 2017-09-08 RX ADMIN — SODIUM CHLORIDE 250 ML: 900 INJECTION, SOLUTION INTRAVENOUS at 12:50

## 2017-09-08 NOTE — PROGRESS NOTES
Arrived to the [facility] via [facility]. Chemo completed. Patient tolerated well. Any issues or concerns during appointment: no.  Patient aware of next infusion appointment on  9/15 at 1400. Discharged via [facility] to assisted living.

## 2017-09-08 NOTE — PROGRESS NOTES
9/8/17- Pt seen in the office today with Dr Joce Gay. Labs reviewed. Pt states he wants to proceed with treatment. Son aware of this via phone. Treatment schedule reviewed with pt. Pt to return in 1 week to see Dr Joce Gay and infusion. Pt to receive treatment on Day 1,8, and 15. Myeloma labs to be done on day 1 of each cycle.

## 2017-09-14 ENCOUNTER — PATIENT OUTREACH (OUTPATIENT)
Dept: CASE MANAGEMENT | Age: 82
End: 2017-09-14

## 2017-09-14 NOTE — PROGRESS NOTES
Follow Up Call- Care Coordinator called mobile # [phone], recording states that number has been disconnected or no longer in service. Care Coordinator will close encounter due to unable to reach patient for follow up call. This note will not be viewable in [address]

## 2017-09-14 NOTE — PROGRESS NOTES
9/14/17:  REceived call from Josue Ingram NP affilliated with patient's care at [facility]. NP reports patient fell on 9/12/17 and has bruising around eye. Labs reviewed by NP WBC 3.5, Hgb 10.8 and platelets 531 per NP. Patient still with limited short term memory and has been in bed for the majority of the week. NP voiced concerns about his ability to manage medications appropriately. Concerns reported to Dr. Jong Oliver. Patient to be seen on 9/15.

## 2017-09-15 ENCOUNTER — HOSPITAL ENCOUNTER (OUTPATIENT)
Dept: INFUSION THERAPY | Age: 82
Discharge: HOME | End: 2017-09-15
Payer: MEDICARE

## 2017-09-15 ENCOUNTER — PATIENT OUTREACH (OUTPATIENT)
Dept: CASE MANAGEMENT | Age: 82
End: 2017-09-15

## 2017-09-15 ENCOUNTER — HOSPITAL ENCOUNTER (OUTPATIENT)
Dept: LAB | Age: 82
Discharge: HOME | End: 2017-09-15
Payer: MEDICARE

## 2017-09-15 DIAGNOSIS — C90.00 MULTIPLE MYELOMA NOT HAVING ACHIEVED REMISSION (HCC): ICD-10-CM

## 2017-09-15 LAB
ALBUMIN SERPL-MCNC: 3.2 G/DL (ref 3.2–4.6)
ALBUMIN/GLOB SERPL: 0.5 {RATIO} (ref 1.2–3.5)
ALP SERPL-CCNC: 82 U/L (ref 50–136)
ALT SERPL-CCNC: 29 U/L (ref 12–65)
ANION GAP SERPL CALC-SCNC: 4 MMOL/L (ref 7–16)
AST SERPL-CCNC: 26 U/L (ref 15–37)
BASOPHILS # BLD: 0 K/UL (ref 0–0.2)
BASOPHILS NFR BLD: 0 % (ref 0–2)
BILIRUB SERPL-MCNC: 0.4 MG/DL (ref 0.2–1.1)
BUN SERPL-MCNC: 21 MG/DL (ref 8–23)
CALCIUM SERPL-MCNC: 8.7 MG/DL (ref 8.3–10.4)
CHLORIDE SERPL-SCNC: 107 MMOL/L (ref 98–107)
CO2 SERPL-SCNC: 28 MMOL/L (ref 21–32)
CREAT SERPL-MCNC: 1.02 MG/DL (ref 0.8–1.5)
DIFFERENTIAL METHOD BLD: ABNORMAL
EOSINOPHIL # BLD: 0.1 K/UL (ref 0–0.8)
EOSINOPHIL NFR BLD: 2 % (ref 0.5–7.8)
ERYTHROCYTE [DISTWIDTH] IN BLOOD BY AUTOMATED COUNT: 14.3 % (ref 11.9–14.6)
GLOBULIN SER CALC-MCNC: 6.2 G/DL (ref 2.3–3.5)
GLUCOSE SERPL-MCNC: 112 MG/DL (ref 65–100)
HCT VFR BLD AUTO: 30.6 % (ref 41.1–50.3)
HGB BLD-MCNC: 10.6 G/DL (ref 13.6–17.2)
LYMPHOCYTES # BLD: 1.3 K/UL (ref 0.5–4.6)
LYMPHOCYTES NFR BLD: 38 % (ref 13–44)
MAGNESIUM SERPL-MCNC: 2.2 MG/DL (ref 1.8–2.4)
MCH RBC QN AUTO: 34.5 PG (ref 26.1–32.9)
MCHC RBC AUTO-ENTMCNC: 34.6 G/DL (ref 31.4–35)
MCV RBC AUTO: 99.7 FL (ref 79.6–97.8)
MONOCYTES # BLD: 0.3 K/UL (ref 0.1–1.3)
MONOCYTES NFR BLD: 9 % (ref 4–12)
NEUTS SEG # BLD: 1.7 K/UL (ref 1.7–8.2)
NEUTS SEG NFR BLD: 51 % (ref 43–78)
NRBC # BLD: 0 K/UL (ref 0–0.2)
PLATELET # BLD AUTO: 102 K/UL (ref 150–450)
PMV BLD AUTO: 10.7 FL (ref 10.8–14.1)
POTASSIUM SERPL-SCNC: 3.9 MMOL/L (ref 3.5–5.1)
PROT SERPL-MCNC: 9.4 G/DL (ref 6.3–8.2)
RBC # BLD AUTO: 3.07 M/UL (ref 4.23–5.67)
SODIUM SERPL-SCNC: 139 MMOL/L (ref 136–145)
WBC # BLD AUTO: 3.3 K/UL (ref 4.3–11.1)

## 2017-09-15 PROCEDURE — 85025 COMPLETE CBC W/AUTO DIFF WBC: CPT | Performed by: INTERNAL MEDICINE

## 2017-09-15 PROCEDURE — 74011250636 HC RX REV CODE- 250/636: Performed by: INTERNAL MEDICINE

## 2017-09-15 PROCEDURE — 96401 CHEMO ANTI-NEOPL SQ/IM: CPT

## 2017-09-15 PROCEDURE — 83735 ASSAY OF MAGNESIUM: CPT | Performed by: INTERNAL MEDICINE

## 2017-09-15 PROCEDURE — 80053 COMPREHEN METABOLIC PANEL: CPT | Performed by: INTERNAL MEDICINE

## 2017-09-15 PROCEDURE — 74011250636 HC RX REV CODE- 250/636

## 2017-09-15 PROCEDURE — 74011000258 HC RX REV CODE- 258: Performed by: INTERNAL MEDICINE

## 2017-09-15 PROCEDURE — 96413 CHEMO IV INFUSION 1 HR: CPT

## 2017-09-15 PROCEDURE — 96375 TX/PRO/DX INJ NEW DRUG ADDON: CPT

## 2017-09-15 PROCEDURE — 74011000250 HC RX REV CODE- 250: Performed by: INTERNAL MEDICINE

## 2017-09-15 RX ORDER — HEPARIN 100 UNIT/ML
300-500 SYRINGE INTRAVENOUS AS NEEDED
Status: DISPENSED | OUTPATIENT
Start: 2017-09-15 | End: 2017-09-16

## 2017-09-15 RX ORDER — DEXAMETHASONE SODIUM PHOSPHATE 4 MG/ML
20 INJECTION, SOLUTION INTRA-ARTICULAR; INTRALESIONAL; INTRAMUSCULAR; INTRAVENOUS; SOFT TISSUE ONCE
Status: DISCONTINUED | OUTPATIENT
Start: 2017-09-15 | End: 2017-09-15 | Stop reason: SDUPTHER

## 2017-09-15 RX ORDER — SODIUM CHLORIDE 0.9 % (FLUSH) 0.9 %
10 SYRINGE (ML) INJECTION AS NEEDED
Status: ACTIVE | OUTPATIENT
Start: 2017-09-15 | End: 2017-09-16

## 2017-09-15 RX ORDER — PALONOSETRON 0.05 MG/ML
0.25 INJECTION, SOLUTION INTRAVENOUS ONCE
Status: COMPLETED | OUTPATIENT
Start: 2017-09-15 | End: 2017-09-15

## 2017-09-15 RX ADMIN — DEXAMETHASONE SODIUM PHOSPHATE 20 MG: 10 INJECTION INTRAMUSCULAR; INTRAVENOUS at 16:03

## 2017-09-15 RX ADMIN — Medication 10 ML: at 15:53

## 2017-09-15 RX ADMIN — CYCLOPHOSPHAMIDE 714 MG: 1 INJECTION, POWDER, FOR SOLUTION INTRAVENOUS; ORAL at 16:25

## 2017-09-15 RX ADMIN — Medication 10 ML: at 17:36

## 2017-09-15 RX ADMIN — PALONOSETRON HYDROCHLORIDE 0.25 MG: 0.25 INJECTION INTRAVENOUS at 16:01

## 2017-09-15 RX ADMIN — SODIUM CHLORIDE 500 ML: 900 INJECTION, SOLUTION INTRAVENOUS at 15:53

## 2017-09-15 RX ADMIN — SODIUM CHLORIDE 3.1 MG: 9 INJECTION INTRAMUSCULAR; INTRAVENOUS; SUBCUTANEOUS at 16:24

## 2017-09-15 RX ADMIN — SODIUM CHLORIDE, PRESERVATIVE FREE 500 UNITS: 5 INJECTION INTRAVENOUS at 17:36

## 2017-09-15 NOTE — PROGRESS NOTES
Arrived to the [facility]. Chemotherapy completed. Patient tolerated well. Any issues or concerns during appointment: Transport from assisted living  left before patient chemotherapy complete. States that supervisor will be coming to transport patient. Transportation did not return until 1850 to pick the patient up  Patient aware of next infusion appointment on 09/22  at 1215 . Discharged ambulatory. Transport # [phone]

## 2017-09-15 NOTE — PROGRESS NOTES
Problem: Chemotherapy Treatment  Goal: *Chemotherapy regimen followed  Outcome: Progressing Towards Goal  Review plan of care.  Monitor for reaction

## 2017-09-22 ENCOUNTER — HOSPITAL ENCOUNTER (OUTPATIENT)
Dept: LAB | Age: 82
Discharge: HOME | End: 2017-09-22
Payer: MEDICARE

## 2017-09-22 ENCOUNTER — HOSPITAL ENCOUNTER (OUTPATIENT)
Dept: INFUSION THERAPY | Age: 82
Discharge: HOME | End: 2017-09-22
Payer: MEDICARE

## 2017-09-22 ENCOUNTER — PATIENT OUTREACH (OUTPATIENT)
Dept: CASE MANAGEMENT | Age: 82
End: 2017-09-22

## 2017-09-22 VITALS — WEIGHT: 254 LBS | BODY MASS INDEX: 36.45 KG/M2

## 2017-09-22 DIAGNOSIS — C90.00 MULTIPLE MYELOMA NOT HAVING ACHIEVED REMISSION (HCC): ICD-10-CM

## 2017-09-22 LAB
ALBUMIN SERPL-MCNC: 3 G/DL (ref 3.2–4.6)
ALBUMIN/GLOB SERPL: 0.6 {RATIO} (ref 1.2–3.5)
ALP SERPL-CCNC: 85 U/L (ref 50–136)
ALT SERPL-CCNC: 25 U/L (ref 12–65)
ANION GAP SERPL CALC-SCNC: 4 MMOL/L (ref 7–16)
AST SERPL-CCNC: 23 U/L (ref 15–37)
BASOPHILS # BLD: 0 K/UL (ref 0–0.2)
BASOPHILS NFR BLD: 0 % (ref 0–2)
BILIRUB SERPL-MCNC: 0.6 MG/DL (ref 0.2–1.1)
BUN SERPL-MCNC: 16 MG/DL (ref 8–23)
CALCIUM SERPL-MCNC: 8.5 MG/DL (ref 8.3–10.4)
CHLORIDE SERPL-SCNC: 109 MMOL/L (ref 98–107)
CO2 SERPL-SCNC: 28 MMOL/L (ref 21–32)
CREAT SERPL-MCNC: 0.82 MG/DL (ref 0.8–1.5)
DIFFERENTIAL METHOD BLD: ABNORMAL
EOSINOPHIL # BLD: 0 K/UL (ref 0–0.8)
EOSINOPHIL NFR BLD: 2 % (ref 0.5–7.8)
ERYTHROCYTE [DISTWIDTH] IN BLOOD BY AUTOMATED COUNT: 14.6 % (ref 11.9–14.6)
GLOBULIN SER CALC-MCNC: 5.3 G/DL (ref 2.3–3.5)
GLUCOSE SERPL-MCNC: 119 MG/DL (ref 65–100)
HCT VFR BLD AUTO: 29.7 % (ref 41.1–50.3)
HGB BLD-MCNC: 10.3 G/DL (ref 13.6–17.2)
LYMPHOCYTES # BLD: 0.7 K/UL (ref 0.5–4.6)
LYMPHOCYTES NFR BLD: 26 % (ref 13–44)
MAGNESIUM SERPL-MCNC: 2.2 MG/DL (ref 1.8–2.4)
MCH RBC QN AUTO: 34.6 PG (ref 26.1–32.9)
MCHC RBC AUTO-ENTMCNC: 34.7 G/DL (ref 31.4–35)
MCV RBC AUTO: 99.7 FL (ref 79.6–97.8)
MONOCYTES # BLD: 0.2 K/UL (ref 0.1–1.3)
MONOCYTES NFR BLD: 8 % (ref 4–12)
NEUTS SEG # BLD: 1.7 K/UL (ref 1.7–8.2)
NEUTS SEG NFR BLD: 65 % (ref 43–78)
NRBC # BLD: 0.01 K/UL (ref 0–0.2)
PLATELET # BLD AUTO: 61 K/UL (ref 150–450)
PMV BLD AUTO: 10.7 FL (ref 10.8–14.1)
POTASSIUM SERPL-SCNC: 3.9 MMOL/L (ref 3.5–5.1)
PROT SERPL-MCNC: 8.3 G/DL (ref 6.3–8.2)
RBC # BLD AUTO: 2.98 M/UL (ref 4.23–5.67)
SODIUM SERPL-SCNC: 141 MMOL/L (ref 136–145)
WBC # BLD AUTO: 2.6 K/UL (ref 4.3–11.1)

## 2017-09-22 PROCEDURE — 85025 COMPLETE CBC W/AUTO DIFF WBC: CPT | Performed by: INTERNAL MEDICINE

## 2017-09-22 PROCEDURE — 96367 TX/PROPH/DG ADDL SEQ IV INF: CPT

## 2017-09-22 PROCEDURE — 74011000250 HC RX REV CODE- 250: Performed by: INTERNAL MEDICINE

## 2017-09-22 PROCEDURE — 80053 COMPREHEN METABOLIC PANEL: CPT | Performed by: INTERNAL MEDICINE

## 2017-09-22 PROCEDURE — 96375 TX/PRO/DX INJ NEW DRUG ADDON: CPT

## 2017-09-22 PROCEDURE — 96413 CHEMO IV INFUSION 1 HR: CPT

## 2017-09-22 PROCEDURE — 74011000258 HC RX REV CODE- 258: Performed by: INTERNAL MEDICINE

## 2017-09-22 PROCEDURE — 74011250636 HC RX REV CODE- 250/636

## 2017-09-22 PROCEDURE — 74011250636 HC RX REV CODE- 250/636: Performed by: INTERNAL MEDICINE

## 2017-09-22 PROCEDURE — 96361 HYDRATE IV INFUSION ADD-ON: CPT

## 2017-09-22 PROCEDURE — 96401 CHEMO ANTI-NEOPL SQ/IM: CPT

## 2017-09-22 PROCEDURE — 83735 ASSAY OF MAGNESIUM: CPT | Performed by: INTERNAL MEDICINE

## 2017-09-22 RX ORDER — HEPARIN 100 UNIT/ML
300-500 SYRINGE INTRAVENOUS AS NEEDED
Status: DISPENSED | OUTPATIENT
Start: 2017-09-22 | End: 2017-09-23

## 2017-09-22 RX ORDER — PALONOSETRON 0.05 MG/ML
0.25 INJECTION, SOLUTION INTRAVENOUS ONCE
Status: COMPLETED | OUTPATIENT
Start: 2017-09-22 | End: 2017-09-22

## 2017-09-22 RX ORDER — SODIUM CHLORIDE 0.9 % (FLUSH) 0.9 %
10 SYRINGE (ML) INJECTION AS NEEDED
Status: ACTIVE | OUTPATIENT
Start: 2017-09-22 | End: 2017-09-23

## 2017-09-22 RX ORDER — DEXAMETHASONE SODIUM PHOSPHATE 4 MG/ML
20 INJECTION, SOLUTION INTRA-ARTICULAR; INTRALESIONAL; INTRAMUSCULAR; INTRAVENOUS; SOFT TISSUE ONCE
Status: DISCONTINUED | OUTPATIENT
Start: 2017-09-22 | End: 2017-09-22 | Stop reason: SDUPTHER

## 2017-09-22 RX ADMIN — CYCLOPHOSPHAMIDE 714 MG: 1 INJECTION, POWDER, FOR SOLUTION INTRAVENOUS; ORAL at 14:40

## 2017-09-22 RX ADMIN — PALONOSETRON HYDROCHLORIDE 0.25 MG: 0.25 INJECTION INTRAVENOUS at 14:16

## 2017-09-22 RX ADMIN — Medication 10 ML: at 15:50

## 2017-09-22 RX ADMIN — SODIUM CHLORIDE 500 ML: 900 INJECTION, SOLUTION INTRAVENOUS at 13:45

## 2017-09-22 RX ADMIN — SODIUM CHLORIDE 3.1 MG: 9 INJECTION INTRAMUSCULAR; INTRAVENOUS; SUBCUTANEOUS at 14:42

## 2017-09-22 RX ADMIN — SODIUM CHLORIDE, PRESERVATIVE FREE 500 UNITS: 5 INJECTION INTRAVENOUS at 15:50

## 2017-09-22 RX ADMIN — Medication 10 ML: at 14:37

## 2017-09-22 RX ADMIN — Medication 10 ML: at 13:45

## 2017-09-22 RX ADMIN — DEXAMETHASONE SODIUM PHOSPHATE 20 MG: 10 INJECTION INTRAMUSCULAR; INTRAVENOUS at 14:19

## 2017-09-22 NOTE — PROGRESS NOTES
Pt arrived via wheelchair today at 1340, to receive IV chemotherapy. Pt tolerated without difficulty. Patient discharged via wheelchair accompanied by [facility] personnel. Instructed to notify physician of any problems, questions or concerns. Allowed opportunity for patient/family to ask questions. Verbalized understanding. Next appointment is Oct 12 at [phone] with Bessy Alvarado.

## 2017-09-22 NOTE — PROGRESS NOTES
Pt was seen and labs reviewed by Dr. Rodríguez Redmond. Will proceed with chemo today despite platelets 61. Pt states he had more nausea after 2nd dose of chemo, so prescription for Zofran given to patient to take back to nursing facility. Daughter and son in law present for today's appt, and verbalize understanding of plan. Pt to return on October 12th to start cycle 2.

## 2017-09-22 NOTE — PROGRESS NOTES
Problem: Knowledge Deficit  Goal: *Participate in the learning process  Outcome: Progressing Towards Goal  Verbalizes/demonstrates understanding of purpose/procedure/potential side effects of velcade/cytoxan.

## 2017-10-12 ENCOUNTER — HOSPITAL ENCOUNTER (OUTPATIENT)
Dept: INFUSION THERAPY | Age: 82
Discharge: HOME | End: 2017-10-12
Payer: MEDICARE

## 2017-10-12 ENCOUNTER — HOSPITAL ENCOUNTER (OUTPATIENT)
Dept: LAB | Age: 82
Discharge: HOME | End: 2017-10-12
Payer: MEDICARE

## 2017-10-12 ENCOUNTER — PATIENT OUTREACH (OUTPATIENT)
Dept: CASE MANAGEMENT | Age: 82
End: 2017-10-12

## 2017-10-12 DIAGNOSIS — C90.00 MULTIPLE MYELOMA NOT HAVING ACHIEVED REMISSION (HCC): ICD-10-CM

## 2017-10-12 LAB
ALBUMIN SERPL-MCNC: 3.3 G/DL (ref 3.2–4.6)
ALBUMIN/GLOB SERPL: 0.8 {RATIO} (ref 1.2–3.5)
ALP SERPL-CCNC: 97 U/L (ref 50–136)
ALT SERPL-CCNC: 22 U/L (ref 12–65)
ANION GAP SERPL CALC-SCNC: 4 MMOL/L (ref 7–16)
AST SERPL-CCNC: 20 U/L (ref 15–37)
BASOPHILS # BLD: 0 K/UL (ref 0–0.2)
BASOPHILS NFR BLD: 1 % (ref 0–2)
BILIRUB SERPL-MCNC: 0.4 MG/DL (ref 0.2–1.1)
BUN SERPL-MCNC: 17 MG/DL (ref 8–23)
CALCIUM SERPL-MCNC: 8.8 MG/DL (ref 8.3–10.4)
CHLORIDE SERPL-SCNC: 109 MMOL/L (ref 98–107)
CO2 SERPL-SCNC: 30 MMOL/L (ref 21–32)
CREAT SERPL-MCNC: 0.85 MG/DL (ref 0.8–1.5)
DIFFERENTIAL METHOD BLD: ABNORMAL
EOSINOPHIL # BLD: 0.1 K/UL (ref 0–0.8)
EOSINOPHIL NFR BLD: 2 % (ref 0.5–7.8)
ERYTHROCYTE [DISTWIDTH] IN BLOOD BY AUTOMATED COUNT: 14.3 % (ref 11.9–14.6)
GLOBULIN SER CALC-MCNC: 4.4 G/DL (ref 2.3–3.5)
GLUCOSE SERPL-MCNC: 112 MG/DL (ref 65–100)
HCT VFR BLD AUTO: 33.3 % (ref 41.1–50.3)
HGB BLD-MCNC: 11.4 G/DL (ref 13.6–17.2)
LYMPHOCYTES # BLD: 1.2 K/UL (ref 0.5–4.6)
LYMPHOCYTES NFR BLD: 42 % (ref 13–44)
MCH RBC QN AUTO: 34.4 PG (ref 26.1–32.9)
MCHC RBC AUTO-ENTMCNC: 34.2 G/DL (ref 31.4–35)
MCV RBC AUTO: 100.6 FL (ref 79.6–97.8)
MONOCYTES # BLD: 0.6 K/UL (ref 0.1–1.3)
MONOCYTES NFR BLD: 22 % (ref 4–12)
NEUTS SEG # BLD: 0.9 K/UL (ref 1.7–8.2)
NEUTS SEG NFR BLD: 34 % (ref 43–78)
NRBC # BLD: 0 K/UL (ref 0–0.2)
PLATELET # BLD AUTO: 126 K/UL (ref 150–450)
PMV BLD AUTO: 9.8 FL (ref 10.8–14.1)
POTASSIUM SERPL-SCNC: 4.2 MMOL/L (ref 3.5–5.1)
PROT SERPL-MCNC: 7.7 G/DL (ref 6.3–8.2)
RBC # BLD AUTO: 3.31 M/UL (ref 4.23–5.67)
SODIUM SERPL-SCNC: 143 MMOL/L (ref 136–145)
WBC # BLD AUTO: 2.8 K/UL (ref 4.3–11.1)

## 2017-10-12 PROCEDURE — 96375 TX/PRO/DX INJ NEW DRUG ADDON: CPT

## 2017-10-12 PROCEDURE — 74011000250 HC RX REV CODE- 250: Performed by: INTERNAL MEDICINE

## 2017-10-12 PROCEDURE — 85025 COMPLETE CBC W/AUTO DIFF WBC: CPT | Performed by: INTERNAL MEDICINE

## 2017-10-12 PROCEDURE — 74011250636 HC RX REV CODE- 250/636: Performed by: INTERNAL MEDICINE

## 2017-10-12 PROCEDURE — 80053 COMPREHEN METABOLIC PANEL: CPT | Performed by: INTERNAL MEDICINE

## 2017-10-12 PROCEDURE — 74011000258 HC RX REV CODE- 258: Performed by: INTERNAL MEDICINE

## 2017-10-12 PROCEDURE — 96401 CHEMO ANTI-NEOPL SQ/IM: CPT

## 2017-10-12 PROCEDURE — 84165 PROTEIN E-PHORESIS SERUM: CPT | Performed by: INTERNAL MEDICINE

## 2017-10-12 PROCEDURE — 86334 IMMUNOFIX E-PHORESIS SERUM: CPT | Performed by: INTERNAL MEDICINE

## 2017-10-12 PROCEDURE — 96413 CHEMO IV INFUSION 1 HR: CPT

## 2017-10-12 PROCEDURE — 74011250636 HC RX REV CODE- 250/636

## 2017-10-12 RX ORDER — HEPARIN 100 UNIT/ML
300-500 SYRINGE INTRAVENOUS AS NEEDED
Status: DISPENSED | OUTPATIENT
Start: 2017-10-12 | End: 2017-10-13

## 2017-10-12 RX ORDER — PALONOSETRON 0.05 MG/ML
0.25 INJECTION, SOLUTION INTRAVENOUS ONCE
Status: COMPLETED | OUTPATIENT
Start: 2017-10-12 | End: 2017-10-12

## 2017-10-12 RX ORDER — DEXAMETHASONE SODIUM PHOSPHATE 4 MG/ML
20 INJECTION, SOLUTION INTRA-ARTICULAR; INTRALESIONAL; INTRAMUSCULAR; INTRAVENOUS; SOFT TISSUE ONCE
Status: DISCONTINUED | OUTPATIENT
Start: 2017-10-12 | End: 2017-10-12 | Stop reason: SDUPTHER

## 2017-10-12 RX ORDER — SODIUM CHLORIDE 0.9 % (FLUSH) 0.9 %
10 SYRINGE (ML) INJECTION AS NEEDED
Status: ACTIVE | OUTPATIENT
Start: 2017-10-12 | End: 2017-10-13

## 2017-10-12 RX ADMIN — PALONOSETRON HYDROCHLORIDE 0.25 MG: 0.25 INJECTION INTRAVENOUS at 12:31

## 2017-10-12 RX ADMIN — Medication 10 ML: at 13:57

## 2017-10-12 RX ADMIN — DEXAMETHASONE SODIUM PHOSPHATE 20 MG: 10 INJECTION INTRAMUSCULAR; INTRAVENOUS at 12:34

## 2017-10-12 RX ADMIN — SODIUM CHLORIDE 3.1 MG: 9 INJECTION INTRAMUSCULAR; INTRAVENOUS; SUBCUTANEOUS at 13:01

## 2017-10-12 RX ADMIN — CYCLOPHOSPHAMIDE 714 MG: 1 INJECTION, POWDER, FOR SOLUTION INTRAVENOUS; ORAL at 12:57

## 2017-10-12 RX ADMIN — SODIUM CHLORIDE, PRESERVATIVE FREE 500 UNITS: 5 INJECTION INTRAVENOUS at 13:57

## 2017-10-12 NOTE — PROGRESS NOTES
Pt was seen and labs reviewed by Dr. Oswaldo Pereira. Will proceed with chemo today despite . Will switch therapy to treat every 2 weeks. Pt to return in 2 weeks.

## 2017-10-12 NOTE — PROGRESS NOTES
Arrived to the [facility]. chemo completed. Patient tolerated well. Any issues or concerns during appointment: no.  Patient aware of next infusion appointment on 10/26 (date) at 80 (time). Discharged assisted living.

## 2017-10-13 LAB
ALBUMIN SERPL ELPH-MCNC: 3.58 G/DL (ref 3.2–5.6)
ALBUMIN/GLOB SERPL: 1 {RATIO}
ALPHA1 GLOB SERPL ELPH-MCNC: 0.3 G/DL (ref 0.1–0.4)
ALPHA2 GLOB SERPL ELPH-MCNC: 0.55 G/DL (ref 0.4–1.2)
B-GLOBULIN SERPL QL ELPH: 0.99 G/DL (ref 0.6–1.3)
GAMMA GLOB MFR SERPL ELPH: 1.87 G/DL (ref 0.5–1.6)
IGA SERPL-MCNC: 26 MG/DL (ref 85–499)
IGG SERPL-MCNC: 2053 MG/DL (ref 610–1616)
IGM SERPL-MCNC: 16 MG/DL (ref 35–242)
M PROTEIN SERPL ELPH-MCNC: 1.04 G/DL
PROT PATTERN SERPL ELPH-IMP: ABNORMAL
PROT PATTERN SPEC IFE-IMP: ABNORMAL
PROT SERPL-MCNC: 7.3 G/DL (ref 6.3–8.2)

## 2017-10-19 ENCOUNTER — APPOINTMENT (OUTPATIENT)
Dept: INFUSION THERAPY | Age: 82
End: 2017-10-19
Payer: MEDICARE

## 2017-10-26 ENCOUNTER — PATIENT OUTREACH (OUTPATIENT)
Dept: CASE MANAGEMENT | Age: 82
End: 2017-10-26

## 2017-10-26 ENCOUNTER — HOSPITAL ENCOUNTER (OUTPATIENT)
Dept: INFUSION THERAPY | Age: 82
Discharge: HOME | End: 2017-10-26
Payer: MEDICARE

## 2017-10-26 ENCOUNTER — HOSPITAL ENCOUNTER (OUTPATIENT)
Dept: LAB | Age: 82
Discharge: HOME | End: 2017-10-26
Payer: MEDICARE

## 2017-10-26 DIAGNOSIS — C90.00 MULTIPLE MYELOMA NOT HAVING ACHIEVED REMISSION (HCC): ICD-10-CM

## 2017-10-26 DIAGNOSIS — E78.2 MIXED HYPERLIPIDEMIA: ICD-10-CM

## 2017-10-26 DIAGNOSIS — F31.60 BIPOLAR AFFECTIVE DISORDER, CURRENT EPISODE MIXED, CURRENT EPISODE SEVERITY UNSPECIFIED (HCC): ICD-10-CM

## 2017-10-26 LAB
ALBUMIN SERPL-MCNC: 3.3 G/DL (ref 3.2–4.6)
ALBUMIN/GLOB SERPL: 0.8 {RATIO} (ref 1.2–3.5)
ALP SERPL-CCNC: 89 U/L (ref 50–136)
ALT SERPL-CCNC: 23 U/L (ref 12–65)
ANION GAP SERPL CALC-SCNC: 5 MMOL/L (ref 7–16)
AST SERPL-CCNC: 23 U/L (ref 15–37)
BASOPHILS # BLD: 0 K/UL (ref 0–0.2)
BASOPHILS NFR BLD: 1 % (ref 0–2)
BILIRUB SERPL-MCNC: 0.5 MG/DL (ref 0.2–1.1)
BUN SERPL-MCNC: 18 MG/DL (ref 8–23)
CALCIUM SERPL-MCNC: 8.7 MG/DL (ref 8.3–10.4)
CHLORIDE SERPL-SCNC: 108 MMOL/L (ref 98–107)
CO2 SERPL-SCNC: 28 MMOL/L (ref 21–32)
CREAT SERPL-MCNC: 1.06 MG/DL (ref 0.8–1.5)
DIFFERENTIAL METHOD BLD: ABNORMAL
EOSINOPHIL # BLD: 0.1 K/UL (ref 0–0.8)
EOSINOPHIL NFR BLD: 2 % (ref 0.5–7.8)
ERYTHROCYTE [DISTWIDTH] IN BLOOD BY AUTOMATED COUNT: 14 % (ref 11.9–14.6)
GLOBULIN SER CALC-MCNC: 4.2 G/DL (ref 2.3–3.5)
GLUCOSE SERPL-MCNC: 116 MG/DL (ref 65–100)
HCT VFR BLD AUTO: 34.8 % (ref 41.1–50.3)
HGB BLD-MCNC: 11.9 G/DL (ref 13.6–17.2)
LYMPHOCYTES # BLD: 1.4 K/UL (ref 0.5–4.6)
LYMPHOCYTES NFR BLD: 34 % (ref 13–44)
MCH RBC QN AUTO: 34.1 PG (ref 26.1–32.9)
MCHC RBC AUTO-ENTMCNC: 34.2 G/DL (ref 31.4–35)
MCV RBC AUTO: 99.7 FL (ref 79.6–97.8)
MONOCYTES # BLD: 0.4 K/UL (ref 0.1–1.3)
MONOCYTES NFR BLD: 11 % (ref 4–12)
NEUTS SEG # BLD: 2 K/UL (ref 1.7–8.2)
NEUTS SEG NFR BLD: 52 % (ref 43–78)
NRBC # BLD: 0 K/UL (ref 0–0.2)
PLATELET # BLD AUTO: 130 K/UL (ref 150–450)
PMV BLD AUTO: 10.3 FL (ref 10.8–14.1)
POTASSIUM SERPL-SCNC: 4 MMOL/L (ref 3.5–5.1)
PROT SERPL-MCNC: 7.5 G/DL (ref 6.3–8.2)
RBC # BLD AUTO: 3.49 M/UL (ref 4.23–5.67)
SODIUM SERPL-SCNC: 141 MMOL/L (ref 136–145)
WBC # BLD AUTO: 3.9 K/UL (ref 4.3–11.1)

## 2017-10-26 PROCEDURE — 74011000250 HC RX REV CODE- 250: Performed by: INTERNAL MEDICINE

## 2017-10-26 PROCEDURE — 80053 COMPREHEN METABOLIC PANEL: CPT | Performed by: INTERNAL MEDICINE

## 2017-10-26 PROCEDURE — 74011250636 HC RX REV CODE- 250/636

## 2017-10-26 PROCEDURE — 85025 COMPLETE CBC W/AUTO DIFF WBC: CPT | Performed by: INTERNAL MEDICINE

## 2017-10-26 PROCEDURE — 96413 CHEMO IV INFUSION 1 HR: CPT

## 2017-10-26 PROCEDURE — 96375 TX/PRO/DX INJ NEW DRUG ADDON: CPT

## 2017-10-26 PROCEDURE — 96401 CHEMO ANTI-NEOPL SQ/IM: CPT

## 2017-10-26 PROCEDURE — 74011250636 HC RX REV CODE- 250/636: Performed by: INTERNAL MEDICINE

## 2017-10-26 RX ORDER — SODIUM CHLORIDE 9 MG/ML
250 INJECTION, SOLUTION INTRAVENOUS CONTINUOUS
Status: DISCONTINUED | OUTPATIENT
Start: 2017-10-26 | End: 2017-10-30 | Stop reason: HOSPADM

## 2017-10-26 RX ORDER — PALONOSETRON 0.05 MG/ML
0.25 INJECTION, SOLUTION INTRAVENOUS ONCE
Status: COMPLETED | OUTPATIENT
Start: 2017-10-26 | End: 2017-10-26

## 2017-10-26 RX ORDER — HEPARIN 100 UNIT/ML
300-500 SYRINGE INTRAVENOUS AS NEEDED
Status: DISPENSED | OUTPATIENT
Start: 2017-10-26 | End: 2017-10-27

## 2017-10-26 RX ORDER — DEXAMETHASONE SODIUM PHOSPHATE 100 MG/10ML
20 INJECTION INTRAMUSCULAR; INTRAVENOUS ONCE
Status: COMPLETED | OUTPATIENT
Start: 2017-10-26 | End: 2017-10-26

## 2017-10-26 RX ORDER — SODIUM CHLORIDE 0.9 % (FLUSH) 0.9 %
10 SYRINGE (ML) INJECTION AS NEEDED
Status: ACTIVE | OUTPATIENT
Start: 2017-10-26 | End: 2017-10-27

## 2017-10-26 RX ADMIN — CYCLOPHOSPHAMIDE 714 MG: 1 INJECTION, POWDER, FOR SOLUTION INTRAVENOUS; ORAL at 15:25

## 2017-10-26 RX ADMIN — SODIUM CHLORIDE 250 ML: 900 INJECTION, SOLUTION INTRAVENOUS at 14:45

## 2017-10-26 RX ADMIN — DEXAMETHASONE SODIUM PHOSPHATE 20 MG: 10 INJECTION INTRAMUSCULAR; INTRAVENOUS at 14:53

## 2017-10-26 RX ADMIN — PALONOSETRON HYDROCHLORIDE 0.25 MG: 0.25 INJECTION INTRAVENOUS at 14:50

## 2017-10-26 RX ADMIN — SODIUM CHLORIDE, PRESERVATIVE FREE 500 UNITS: 5 INJECTION INTRAVENOUS at 16:31

## 2017-10-26 RX ADMIN — SODIUM CHLORIDE 3.1 MG: 9 INJECTION INTRAMUSCULAR; INTRAVENOUS; SUBCUTANEOUS at 15:30

## 2017-10-26 NOTE — PROGRESS NOTES
10/26/17:  Patient in for pre-chemo for CyBorD with Dr. Janelle Elias. Patient doing well and reports he is feeling way better than before when he started treatment. Patient here from assisted living facility. Dr. Janelle Elias reviewed labs and assessed the patient. Plan is to continue treatment on 11/9 and then again on 12/7. Myeloma labs to be drawn day 1 of the next cycle (12/7).

## 2017-11-09 ENCOUNTER — HOSPITAL ENCOUNTER (OUTPATIENT)
Dept: INFUSION THERAPY | Age: 82
Discharge: HOME | End: 2017-11-09
Payer: MEDICARE

## 2017-11-09 ENCOUNTER — HOSPITAL ENCOUNTER (OUTPATIENT)
Dept: LAB | Age: 82
Discharge: HOME | End: 2017-11-09
Payer: MEDICARE

## 2017-11-09 ENCOUNTER — PATIENT OUTREACH (OUTPATIENT)
Dept: CASE MANAGEMENT | Age: 82
End: 2017-11-09

## 2017-11-09 DIAGNOSIS — C90.00 MULTIPLE MYELOMA NOT HAVING ACHIEVED REMISSION (HCC): ICD-10-CM

## 2017-11-09 DIAGNOSIS — F31.60 BIPOLAR AFFECTIVE DISORDER, CURRENT EPISODE MIXED, CURRENT EPISODE SEVERITY UNSPECIFIED (HCC): ICD-10-CM

## 2017-11-09 DIAGNOSIS — E78.2 MIXED HYPERLIPIDEMIA: ICD-10-CM

## 2017-11-09 LAB
ALBUMIN SERPL-MCNC: 3.5 G/DL (ref 3.2–4.6)
ALBUMIN/GLOB SERPL: 0.9 {RATIO}
ALP SERPL-CCNC: 82 U/L (ref 50–136)
ALT SERPL-CCNC: 19 U/L (ref 12–65)
ANION GAP SERPL CALC-SCNC: 7 MMOL/L
AST SERPL-CCNC: 18 U/L (ref 15–37)
BASOPHILS # BLD: 0 K/UL (ref 0–0.2)
BASOPHILS NFR BLD: 1 % (ref 0–2)
BILIRUB SERPL-MCNC: 0.4 MG/DL (ref 0.2–1.1)
BUN SERPL-MCNC: 16 MG/DL (ref 8–23)
CALCIUM SERPL-MCNC: 8.6 MG/DL (ref 8.3–10.4)
CHLORIDE SERPL-SCNC: 109 MMOL/L (ref 98–107)
CO2 SERPL-SCNC: 28 MMOL/L (ref 21–32)
CREAT SERPL-MCNC: 1.2 MG/DL (ref 0.8–1.5)
DIFFERENTIAL METHOD BLD: ABNORMAL
EOSINOPHIL # BLD: 0.1 K/UL (ref 0–0.8)
EOSINOPHIL NFR BLD: 3 % (ref 0.5–7.8)
ERYTHROCYTE [DISTWIDTH] IN BLOOD BY AUTOMATED COUNT: 13.6 % (ref 11.9–14.6)
GLOBULIN SER CALC-MCNC: 4.1 G/DL
GLUCOSE SERPL-MCNC: 100 MG/DL (ref 65–100)
HCT VFR BLD AUTO: 34.8 % (ref 41.1–50.3)
HGB BLD-MCNC: 11.9 G/DL (ref 13.6–17.2)
LYMPHOCYTES # BLD: 1.1 K/UL (ref 0.5–4.6)
LYMPHOCYTES NFR BLD: 28 % (ref 13–44)
MAGNESIUM SERPL-MCNC: 2.3 MG/DL (ref 1.8–2.4)
MCH RBC QN AUTO: 33.7 PG (ref 26.1–32.9)
MCHC RBC AUTO-ENTMCNC: 34.2 G/DL (ref 31.4–35)
MCV RBC AUTO: 98.6 FL (ref 79.6–97.8)
MONOCYTES # BLD: 0.7 K/UL (ref 0.1–1.3)
MONOCYTES NFR BLD: 17 % (ref 4–12)
NEUTS SEG # BLD: 1.9 K/UL (ref 1.7–8.2)
NEUTS SEG NFR BLD: 51 % (ref 43–78)
NRBC # BLD: 0 K/UL (ref 0–0.2)
PLATELET # BLD AUTO: 136 K/UL (ref 150–450)
PMV BLD AUTO: 10.3 FL (ref 10.8–14.1)
POTASSIUM SERPL-SCNC: 4.1 MMOL/L (ref 3.5–5.1)
PROT SERPL-MCNC: 7.6 G/DL (ref 6.3–8.2)
RBC # BLD AUTO: 3.53 M/UL (ref 4.23–5.67)
SODIUM SERPL-SCNC: 144 MMOL/L (ref 136–145)
WBC # BLD AUTO: 3.8 K/UL (ref 4.3–11.1)

## 2017-11-09 PROCEDURE — 74011250636 HC RX REV CODE- 250/636

## 2017-11-09 PROCEDURE — 96361 HYDRATE IV INFUSION ADD-ON: CPT

## 2017-11-09 PROCEDURE — 80053 COMPREHEN METABOLIC PANEL: CPT | Performed by: INTERNAL MEDICINE

## 2017-11-09 PROCEDURE — 74011000250 HC RX REV CODE- 250: Performed by: INTERNAL MEDICINE

## 2017-11-09 PROCEDURE — 96413 CHEMO IV INFUSION 1 HR: CPT

## 2017-11-09 PROCEDURE — 74011250636 HC RX REV CODE- 250/636: Performed by: INTERNAL MEDICINE

## 2017-11-09 PROCEDURE — 85025 COMPLETE CBC W/AUTO DIFF WBC: CPT | Performed by: INTERNAL MEDICINE

## 2017-11-09 PROCEDURE — 74011000258 HC RX REV CODE- 258: Performed by: INTERNAL MEDICINE

## 2017-11-09 PROCEDURE — 96375 TX/PRO/DX INJ NEW DRUG ADDON: CPT

## 2017-11-09 PROCEDURE — 96401 CHEMO ANTI-NEOPL SQ/IM: CPT

## 2017-11-09 PROCEDURE — 83735 ASSAY OF MAGNESIUM: CPT | Performed by: INTERNAL MEDICINE

## 2017-11-09 RX ORDER — SODIUM CHLORIDE 0.9 % (FLUSH) 0.9 %
10 SYRINGE (ML) INJECTION AS NEEDED
Status: ACTIVE | OUTPATIENT
Start: 2017-11-09 | End: 2017-11-09

## 2017-11-09 RX ORDER — PALONOSETRON 0.05 MG/ML
0.25 INJECTION, SOLUTION INTRAVENOUS ONCE
Status: COMPLETED | OUTPATIENT
Start: 2017-11-09 | End: 2017-11-09

## 2017-11-09 RX ORDER — HEPARIN 100 UNIT/ML
300-500 SYRINGE INTRAVENOUS AS NEEDED
Status: ACTIVE | OUTPATIENT
Start: 2017-11-09 | End: 2017-11-09

## 2017-11-09 RX ADMIN — CYCLOPHOSPHAMIDE 714 MG: 1 INJECTION, POWDER, FOR SOLUTION INTRAVENOUS; ORAL at 14:10

## 2017-11-09 RX ADMIN — SODIUM CHLORIDE 1000 ML: 900 INJECTION, SOLUTION INTRAVENOUS at 12:45

## 2017-11-09 RX ADMIN — Medication 10 ML: at 15:25

## 2017-11-09 RX ADMIN — SODIUM CHLORIDE 3.1 MG: 9 INJECTION INTRAMUSCULAR; INTRAVENOUS; SUBCUTANEOUS at 14:10

## 2017-11-09 RX ADMIN — SODIUM CHLORIDE, PRESERVATIVE FREE 500 UNITS: 5 INJECTION INTRAVENOUS at 15:25

## 2017-11-09 RX ADMIN — DEXAMETHASONE SODIUM PHOSPHATE 20 MG: 10 INJECTION INTRAMUSCULAR; INTRAVENOUS at 13:28

## 2017-11-09 RX ADMIN — PALONOSETRON HYDROCHLORIDE 0.25 MG: 0.25 INJECTION INTRAVENOUS at 13:27

## 2017-11-09 NOTE — PROGRESS NOTES
19/17- Pt seen in the office today with Dr Janelle Elias. Pt to be given extra fluids today in infusion due to hypotension. Pt to return to see Dr Janelle Elias in 2 weeks.

## 2017-11-09 NOTE — PROGRESS NOTES
Arrived to the [facility]. Chemo and hydration completed. Patient tolerated well. Any issues or concerns during appointment: no.  Patient aware of next infusion appointment on 12/7 (date) at [phone] (time). Discharged ambulating with walker.

## 2017-12-07 ENCOUNTER — PATIENT OUTREACH (OUTPATIENT)
Dept: CASE MANAGEMENT | Age: 82
End: 2017-12-07

## 2017-12-07 ENCOUNTER — HOSPITAL ENCOUNTER (OUTPATIENT)
Dept: LAB | Age: 82
Discharge: HOME | End: 2017-12-07
Payer: MEDICARE

## 2017-12-07 ENCOUNTER — HOSPITAL ENCOUNTER (OUTPATIENT)
Dept: INFUSION THERAPY | Age: 82
Discharge: HOME | End: 2017-12-07
Payer: MEDICARE

## 2017-12-07 VITALS — WEIGHT: 253 LBS | BODY MASS INDEX: 36.3 KG/M2

## 2017-12-07 DIAGNOSIS — E78.2 MIXED HYPERLIPIDEMIA: ICD-10-CM

## 2017-12-07 DIAGNOSIS — F31.60 BIPOLAR AFFECTIVE DISORDER, CURRENT EPISODE MIXED, CURRENT EPISODE SEVERITY UNSPECIFIED (HCC): ICD-10-CM

## 2017-12-07 DIAGNOSIS — C90.00 MULTIPLE MYELOMA NOT HAVING ACHIEVED REMISSION (HCC): ICD-10-CM

## 2017-12-07 LAB
ALBUMIN SERPL-MCNC: 3.4 G/DL (ref 3.2–4.6)
ALBUMIN/GLOB SERPL: 1 {RATIO} (ref 1.2–3.5)
ALP SERPL-CCNC: 67 U/L (ref 50–136)
ALT SERPL-CCNC: 17 U/L (ref 12–65)
ANION GAP SERPL CALC-SCNC: 5 MMOL/L (ref 7–16)
AST SERPL-CCNC: 16 U/L (ref 15–37)
BASOPHILS # BLD: 0 K/UL (ref 0–0.2)
BASOPHILS NFR BLD: 1 % (ref 0–2)
BILIRUB SERPL-MCNC: 0.3 MG/DL (ref 0.2–1.1)
BUN SERPL-MCNC: 17 MG/DL (ref 8–23)
CALCIUM SERPL-MCNC: 8.7 MG/DL (ref 8.3–10.4)
CHLORIDE SERPL-SCNC: 109 MMOL/L (ref 98–107)
CO2 SERPL-SCNC: 30 MMOL/L (ref 21–32)
CREAT SERPL-MCNC: 0.88 MG/DL (ref 0.8–1.5)
DIFFERENTIAL METHOD BLD: ABNORMAL
EOSINOPHIL # BLD: 0.2 K/UL (ref 0–0.8)
EOSINOPHIL NFR BLD: 4 % (ref 0.5–7.8)
ERYTHROCYTE [DISTWIDTH] IN BLOOD BY AUTOMATED COUNT: 13.3 % (ref 11.9–14.6)
GLOBULIN SER CALC-MCNC: 3.5 G/DL (ref 2.3–3.5)
GLUCOSE SERPL-MCNC: 97 MG/DL (ref 65–100)
HCT VFR BLD AUTO: 35.7 % (ref 41.1–50.3)
HGB BLD-MCNC: 11.9 G/DL (ref 13.6–17.2)
LYMPHOCYTES # BLD: 1 K/UL (ref 0.5–4.6)
LYMPHOCYTES NFR BLD: 20 % (ref 13–44)
MAGNESIUM SERPL-MCNC: 2.1 MG/DL (ref 1.8–2.4)
MCH RBC QN AUTO: 33.1 PG (ref 26.1–32.9)
MCHC RBC AUTO-ENTMCNC: 33.3 G/DL (ref 31.4–35)
MCV RBC AUTO: 99.2 FL (ref 79.6–97.8)
MONOCYTES # BLD: 0.6 K/UL (ref 0.1–1.3)
MONOCYTES NFR BLD: 12 % (ref 4–12)
NEUTS SEG # BLD: 3.3 K/UL (ref 1.7–8.2)
NEUTS SEG NFR BLD: 64 % (ref 43–78)
NRBC # BLD: 0 K/UL (ref 0–0.2)
PLATELET # BLD AUTO: 125 K/UL (ref 150–450)
PMV BLD AUTO: 10.9 FL (ref 10.8–14.1)
POTASSIUM SERPL-SCNC: 4.1 MMOL/L (ref 3.5–5.1)
PROT SERPL-MCNC: 6.9 G/DL (ref 6.3–8.2)
RBC # BLD AUTO: 3.6 M/UL (ref 4.23–5.67)
SODIUM SERPL-SCNC: 144 MMOL/L (ref 136–145)
WBC # BLD AUTO: 5.2 K/UL (ref 4.3–11.1)

## 2017-12-07 PROCEDURE — 96401 CHEMO ANTI-NEOPL SQ/IM: CPT

## 2017-12-07 PROCEDURE — 74011000250 HC RX REV CODE- 250: Performed by: INTERNAL MEDICINE

## 2017-12-07 PROCEDURE — 96375 TX/PRO/DX INJ NEW DRUG ADDON: CPT

## 2017-12-07 PROCEDURE — 85025 COMPLETE CBC W/AUTO DIFF WBC: CPT | Performed by: INTERNAL MEDICINE

## 2017-12-07 PROCEDURE — 84165 PROTEIN E-PHORESIS SERUM: CPT | Performed by: INTERNAL MEDICINE

## 2017-12-07 PROCEDURE — 96361 HYDRATE IV INFUSION ADD-ON: CPT

## 2017-12-07 PROCEDURE — 74011000258 HC RX REV CODE- 258: Performed by: INTERNAL MEDICINE

## 2017-12-07 PROCEDURE — 83735 ASSAY OF MAGNESIUM: CPT | Performed by: INTERNAL MEDICINE

## 2017-12-07 PROCEDURE — 80053 COMPREHEN METABOLIC PANEL: CPT | Performed by: INTERNAL MEDICINE

## 2017-12-07 PROCEDURE — 96367 TX/PROPH/DG ADDL SEQ IV INF: CPT

## 2017-12-07 PROCEDURE — 96413 CHEMO IV INFUSION 1 HR: CPT

## 2017-12-07 PROCEDURE — 74011250636 HC RX REV CODE- 250/636: Performed by: INTERNAL MEDICINE

## 2017-12-07 PROCEDURE — 74011250636 HC RX REV CODE- 250/636

## 2017-12-07 PROCEDURE — 86334 IMMUNOFIX E-PHORESIS SERUM: CPT | Performed by: INTERNAL MEDICINE

## 2017-12-07 RX ORDER — PALONOSETRON 0.05 MG/ML
0.25 INJECTION, SOLUTION INTRAVENOUS ONCE
Status: COMPLETED | OUTPATIENT
Start: 2017-12-07 | End: 2017-12-07

## 2017-12-07 RX ORDER — SODIUM CHLORIDE 0.9 % (FLUSH) 0.9 %
10 SYRINGE (ML) INJECTION AS NEEDED
Status: ACTIVE | OUTPATIENT
Start: 2017-12-07 | End: 2017-12-08

## 2017-12-07 RX ORDER — HEPARIN 100 UNIT/ML
300-500 SYRINGE INTRAVENOUS AS NEEDED
Status: DISPENSED | OUTPATIENT
Start: 2017-12-07 | End: 2017-12-08

## 2017-12-07 RX ADMIN — SODIUM CHLORIDE 3.1 MG: 9 INJECTION INTRAMUSCULAR; INTRAVENOUS; SUBCUTANEOUS at 13:15

## 2017-12-07 RX ADMIN — PALONOSETRON HYDROCHLORIDE 0.25 MG: 0.25 INJECTION INTRAVENOUS at 12:22

## 2017-12-07 RX ADMIN — CYCLOPHOSPHAMIDE 714 MG: 1 INJECTION, POWDER, FOR SOLUTION INTRAVENOUS; ORAL at 13:10

## 2017-12-07 RX ADMIN — Medication 10 ML: at 11:50

## 2017-12-07 RX ADMIN — SODIUM CHLORIDE, PRESERVATIVE FREE 500 UNITS: 5 INJECTION INTRAVENOUS at 14:25

## 2017-12-07 RX ADMIN — DEXAMETHASONE SODIUM PHOSPHATE 20 MG: 10 INJECTION INTRAMUSCULAR; INTRAVENOUS at 12:25

## 2017-12-07 RX ADMIN — SODIUM CHLORIDE 500 ML: 900 INJECTION, SOLUTION INTRAVENOUS at 11:50

## 2017-12-07 RX ADMIN — Medication 10 ML: at 14:25

## 2017-12-07 RX ADMIN — Medication 10 ML: at 12:24

## 2017-12-07 NOTE — PROGRESS NOTES
Pt was seen and labs reviewed by Dr. Mynor Dietrich. Pt is responding to treatment well, and has no complaints. Pt to return in 2 weeks. Will check to see if pt can be on oral maintenance therapy while at nursing facility.

## 2017-12-07 NOTE — PROGRESS NOTES
Problem: Chemotherapy Treatment  Goal: *Chemotherapy regimen followed  Outcome: Progressing Towards Goal  Verbalizes/demonstrates understanding of purpose/procedure/potential side effects of decadron/velcade/cytoxan.

## 2017-12-07 NOTE — PROGRESS NOTES
Pt arrived via wheelchair today at [phone], to receive IV chemotherapy. Pt tolerated without difficulty. Patient discharged via wheelchair accompanied by [facility] personnel. Instructed to notify physician of any problems, questions or concerns. Allowed opportunity for patient/family to ask questions. Verbalized understanding. Next appointment is Dec 21 at [phone] with [facility]

## 2017-12-08 LAB
ALBUMIN SERPL ELPH-MCNC: 3.67 G/DL (ref 3.2–5.6)
ALBUMIN/GLOB SERPL: 1.3 {RATIO}
ALPHA1 GLOB SERPL ELPH-MCNC: 0.23 G/DL (ref 0.1–0.4)
ALPHA2 GLOB SERPL ELPH-MCNC: 0.54 G/DL (ref 0.4–1.2)
B-GLOBULIN SERPL QL ELPH: 0.87 G/DL (ref 0.6–1.3)
GAMMA GLOB MFR SERPL ELPH: 1.29 G/DL (ref 0.5–1.6)
IGA SERPL-MCNC: 48 MG/DL (ref 85–499)
IGG SERPL-MCNC: 1517 MG/DL (ref 610–1616)
IGM SERPL-MCNC: 12 MG/DL (ref 35–242)
M PROTEIN SERPL ELPH-MCNC: 0.64 G/DL
PROT PATTERN SERPL ELPH-IMP: ABNORMAL
PROT PATTERN SPEC IFE-IMP: ABNORMAL
PROT SERPL-MCNC: 6.6 G/DL (ref 6.3–8.2)

## 2017-12-21 ENCOUNTER — HOSPITAL ENCOUNTER (OUTPATIENT)
Dept: LAB | Age: 82
Discharge: HOME | End: 2017-12-21
Payer: MEDICARE

## 2017-12-21 ENCOUNTER — HOSPITAL ENCOUNTER (OUTPATIENT)
Dept: INFUSION THERAPY | Age: 82
Discharge: HOME | End: 2017-12-21
Payer: MEDICARE

## 2017-12-21 ENCOUNTER — PATIENT OUTREACH (OUTPATIENT)
Dept: CASE MANAGEMENT | Age: 82
End: 2017-12-21

## 2017-12-21 DIAGNOSIS — C90.00 MULTIPLE MYELOMA NOT HAVING ACHIEVED REMISSION (HCC): ICD-10-CM

## 2017-12-21 DIAGNOSIS — F31.60 BIPOLAR AFFECTIVE DISORDER, CURRENT EPISODE MIXED, CURRENT EPISODE SEVERITY UNSPECIFIED (HCC): ICD-10-CM

## 2017-12-21 DIAGNOSIS — E78.2 MIXED HYPERLIPIDEMIA: ICD-10-CM

## 2017-12-21 LAB
ALBUMIN SERPL-MCNC: 3.3 G/DL (ref 3.2–4.6)
ALBUMIN/GLOB SERPL: 0.9 {RATIO} (ref 1.2–3.5)
ALP SERPL-CCNC: 72 U/L (ref 50–136)
ALT SERPL-CCNC: 16 U/L (ref 12–65)
ANION GAP SERPL CALC-SCNC: 7 MMOL/L (ref 7–16)
AST SERPL-CCNC: 14 U/L (ref 15–37)
BASOPHILS # BLD: 0 K/UL (ref 0–0.2)
BASOPHILS NFR BLD: 0 % (ref 0–2)
BILIRUB SERPL-MCNC: 0.3 MG/DL (ref 0.2–1.1)
BUN SERPL-MCNC: 17 MG/DL (ref 8–23)
CALCIUM SERPL-MCNC: 8.5 MG/DL (ref 8.3–10.4)
CHLORIDE SERPL-SCNC: 111 MMOL/L (ref 98–107)
CO2 SERPL-SCNC: 27 MMOL/L (ref 21–32)
CREAT SERPL-MCNC: 0.85 MG/DL (ref 0.8–1.5)
DIFFERENTIAL METHOD BLD: ABNORMAL
EOSINOPHIL # BLD: 0.2 K/UL (ref 0–0.8)
EOSINOPHIL NFR BLD: 4 % (ref 0.5–7.8)
ERYTHROCYTE [DISTWIDTH] IN BLOOD BY AUTOMATED COUNT: 13.8 % (ref 11.9–14.6)
GLOBULIN SER CALC-MCNC: 3.6 G/DL (ref 2.3–3.5)
GLUCOSE SERPL-MCNC: 108 MG/DL (ref 65–100)
HCT VFR BLD AUTO: 34.3 % (ref 41.1–50.3)
HGB BLD-MCNC: 11.5 G/DL (ref 13.6–17.2)
LYMPHOCYTES # BLD: 0.9 K/UL (ref 0.5–4.6)
LYMPHOCYTES NFR BLD: 19 % (ref 13–44)
MAGNESIUM SERPL-MCNC: 1.9 MG/DL (ref 1.8–2.4)
MCH RBC QN AUTO: 32.9 PG (ref 26.1–32.9)
MCHC RBC AUTO-ENTMCNC: 33.5 G/DL (ref 31.4–35)
MCV RBC AUTO: 98 FL (ref 79.6–97.8)
MONOCYTES # BLD: 0.5 K/UL (ref 0.1–1.3)
MONOCYTES NFR BLD: 10 % (ref 4–12)
NEUTS SEG # BLD: 3.1 K/UL (ref 1.7–8.2)
NEUTS SEG NFR BLD: 67 % (ref 43–78)
NRBC # BLD: 0 K/UL (ref 0–0.2)
PLATELET # BLD AUTO: 147 K/UL (ref 150–450)
PMV BLD AUTO: 10.3 FL (ref 10.8–14.1)
POTASSIUM SERPL-SCNC: 4.1 MMOL/L (ref 3.5–5.1)
PROT SERPL-MCNC: 6.9 G/DL (ref 6.3–8.2)
RBC # BLD AUTO: 3.5 M/UL (ref 4.23–5.67)
SODIUM SERPL-SCNC: 145 MMOL/L (ref 136–145)
WBC # BLD AUTO: 4.6 K/UL (ref 4.3–11.1)

## 2017-12-21 PROCEDURE — 96401 CHEMO ANTI-NEOPL SQ/IM: CPT

## 2017-12-21 PROCEDURE — 85025 COMPLETE CBC W/AUTO DIFF WBC: CPT | Performed by: INTERNAL MEDICINE

## 2017-12-21 PROCEDURE — 80053 COMPREHEN METABOLIC PANEL: CPT | Performed by: INTERNAL MEDICINE

## 2017-12-21 PROCEDURE — 83735 ASSAY OF MAGNESIUM: CPT | Performed by: INTERNAL MEDICINE

## 2017-12-21 PROCEDURE — 74011250636 HC RX REV CODE- 250/636

## 2017-12-21 PROCEDURE — 96375 TX/PRO/DX INJ NEW DRUG ADDON: CPT

## 2017-12-21 PROCEDURE — 74011000250 HC RX REV CODE- 250: Performed by: INTERNAL MEDICINE

## 2017-12-21 PROCEDURE — 74011000258 HC RX REV CODE- 258: Performed by: INTERNAL MEDICINE

## 2017-12-21 PROCEDURE — 96413 CHEMO IV INFUSION 1 HR: CPT

## 2017-12-21 PROCEDURE — 74011250636 HC RX REV CODE- 250/636: Performed by: INTERNAL MEDICINE

## 2017-12-21 RX ORDER — SODIUM CHLORIDE 0.9 % (FLUSH) 0.9 %
10 SYRINGE (ML) INJECTION AS NEEDED
Status: ACTIVE | OUTPATIENT
Start: 2017-12-21 | End: 2017-12-22

## 2017-12-21 RX ORDER — HEPARIN 100 UNIT/ML
300-500 SYRINGE INTRAVENOUS AS NEEDED
Status: DISPENSED | OUTPATIENT
Start: 2017-12-21 | End: 2017-12-22

## 2017-12-21 RX ORDER — PALONOSETRON 0.05 MG/ML
0.25 INJECTION, SOLUTION INTRAVENOUS ONCE
Status: COMPLETED | OUTPATIENT
Start: 2017-12-21 | End: 2017-12-21

## 2017-12-21 RX ADMIN — CYCLOPHOSPHAMIDE 714 MG: 1 INJECTION, POWDER, FOR SOLUTION INTRAVENOUS; ORAL at 13:45

## 2017-12-21 RX ADMIN — Medication 10 ML: at 12:48

## 2017-12-21 RX ADMIN — PALONOSETRON HYDROCHLORIDE 0.25 MG: 0.25 INJECTION INTRAVENOUS at 13:05

## 2017-12-21 RX ADMIN — Medication 500 UNITS: at 14:52

## 2017-12-21 RX ADMIN — DEXAMETHASONE SODIUM PHOSPHATE 20 MG: 10 INJECTION INTRAMUSCULAR; INTRAVENOUS at 13:08

## 2017-12-21 RX ADMIN — SODIUM CHLORIDE 3.1 MG: 9 INJECTION INTRAMUSCULAR; INTRAVENOUS; SUBCUTANEOUS at 13:30

## 2017-12-21 RX ADMIN — Medication 10 ML: at 14:52

## 2017-12-21 RX ADMIN — SODIUM CHLORIDE 500 ML: 900 INJECTION, SOLUTION INTRAVENOUS at 12:50

## 2017-12-21 NOTE — PROGRESS NOTES
Arrived to the [facility]. Velcade and Cytoxan completed. Patient tolerated well. Any issues or concerns during appointment:Left voice mail for Mike at [facility] at 1400 that patient would be done with treatment at [phone]. Called again at end of treatment and spoke with Mike who verbalized she had been in a meeting, did not get the message, and would see that patient was picked up. Patient aware of next infusion appointment on 1/4/17 at 1315.   Discharged ambulatory with walker assisted by volunteer down to wait in lobby at his request.

## 2017-12-21 NOTE — PROGRESS NOTES
Pt was seen and labs reviewed by Dr. Harry Hager. Will proceed with treatment today. Pt to return to clinic on 1/4.

## 2018-01-04 ENCOUNTER — PATIENT OUTREACH (OUTPATIENT)
Dept: CASE MANAGEMENT | Age: 83
End: 2018-01-04

## 2018-01-04 ENCOUNTER — HOSPITAL ENCOUNTER (OUTPATIENT)
Dept: INFUSION THERAPY | Age: 83
Discharge: HOME | End: 2018-01-04
Payer: MEDICARE

## 2018-01-04 ENCOUNTER — HOSPITAL ENCOUNTER (OUTPATIENT)
Dept: LAB | Age: 83
Discharge: HOME | End: 2018-01-04
Payer: MEDICARE

## 2018-01-04 DIAGNOSIS — C90.00 MULTIPLE MYELOMA NOT HAVING ACHIEVED REMISSION (HCC): ICD-10-CM

## 2018-01-04 LAB
ALBUMIN SERPL-MCNC: 3.4 G/DL (ref 3.2–4.6)
ALBUMIN/GLOB SERPL: 0.9 {RATIO}
ALP SERPL-CCNC: 83 U/L (ref 50–136)
ALT SERPL-CCNC: 15 U/L (ref 12–65)
ANION GAP SERPL CALC-SCNC: 5 MMOL/L
AST SERPL-CCNC: 17 U/L (ref 15–37)
BASOPHILS # BLD: 0 K/UL (ref 0–0.2)
BASOPHILS NFR BLD: 1 % (ref 0–2)
BILIRUB SERPL-MCNC: 0.3 MG/DL (ref 0.2–1.1)
BUN SERPL-MCNC: 19 MG/DL (ref 8–23)
CALCIUM SERPL-MCNC: 8.6 MG/DL (ref 8.3–10.4)
CHLORIDE SERPL-SCNC: 109 MMOL/L (ref 98–107)
CO2 SERPL-SCNC: 29 MMOL/L (ref 21–32)
CREAT SERPL-MCNC: 0.9 MG/DL (ref 0.8–1.5)
DIFFERENTIAL METHOD BLD: ABNORMAL
EOSINOPHIL # BLD: 0.2 K/UL (ref 0–0.8)
EOSINOPHIL NFR BLD: 3 % (ref 0.5–7.8)
ERYTHROCYTE [DISTWIDTH] IN BLOOD BY AUTOMATED COUNT: 13.9 % (ref 11.9–14.6)
GLOBULIN SER CALC-MCNC: 3.9 G/DL
GLUCOSE SERPL-MCNC: 83 MG/DL (ref 65–100)
HCT VFR BLD AUTO: 36.8 % (ref 41.1–50.3)
HGB BLD-MCNC: 12.2 G/DL (ref 13.6–17.2)
LYMPHOCYTES # BLD: 1 K/UL (ref 0.5–4.6)
LYMPHOCYTES NFR BLD: 21 % (ref 13–44)
MCH RBC QN AUTO: 32.3 PG (ref 26.1–32.9)
MCHC RBC AUTO-ENTMCNC: 33.2 G/DL (ref 31.4–35)
MCV RBC AUTO: 97.4 FL (ref 79.6–97.8)
MONOCYTES # BLD: 0.6 K/UL (ref 0.1–1.3)
MONOCYTES NFR BLD: 13 % (ref 4–12)
NEUTS SEG # BLD: 3 K/UL (ref 1.7–8.2)
NEUTS SEG NFR BLD: 63 % (ref 43–78)
NRBC # BLD: 0.01 K/UL (ref 0–0.2)
PLATELET # BLD AUTO: 165 K/UL (ref 150–450)
PMV BLD AUTO: 10 FL (ref 10.8–14.1)
POTASSIUM SERPL-SCNC: 4.2 MMOL/L (ref 3.5–5.1)
PROT SERPL-MCNC: 7.3 G/DL (ref 6.3–8.2)
RBC # BLD AUTO: 3.78 M/UL (ref 4.23–5.67)
SODIUM SERPL-SCNC: 143 MMOL/L (ref 136–145)
WBC # BLD AUTO: 4.8 K/UL (ref 4.3–11.1)

## 2018-01-04 PROCEDURE — 84165 PROTEIN E-PHORESIS SERUM: CPT | Performed by: INTERNAL MEDICINE

## 2018-01-04 PROCEDURE — 74011000258 HC RX REV CODE- 258: Performed by: INTERNAL MEDICINE

## 2018-01-04 PROCEDURE — 86334 IMMUNOFIX E-PHORESIS SERUM: CPT | Performed by: INTERNAL MEDICINE

## 2018-01-04 PROCEDURE — 96413 CHEMO IV INFUSION 1 HR: CPT

## 2018-01-04 PROCEDURE — 96375 TX/PRO/DX INJ NEW DRUG ADDON: CPT

## 2018-01-04 PROCEDURE — 74011250636 HC RX REV CODE- 250/636: Performed by: INTERNAL MEDICINE

## 2018-01-04 PROCEDURE — 96401 CHEMO ANTI-NEOPL SQ/IM: CPT

## 2018-01-04 PROCEDURE — 74011000250 HC RX REV CODE- 250: Performed by: INTERNAL MEDICINE

## 2018-01-04 PROCEDURE — 74011250636 HC RX REV CODE- 250/636

## 2018-01-04 PROCEDURE — 80053 COMPREHEN METABOLIC PANEL: CPT | Performed by: INTERNAL MEDICINE

## 2018-01-04 PROCEDURE — 85025 COMPLETE CBC W/AUTO DIFF WBC: CPT | Performed by: INTERNAL MEDICINE

## 2018-01-04 RX ORDER — SODIUM CHLORIDE 0.9 % (FLUSH) 0.9 %
10 SYRINGE (ML) INJECTION AS NEEDED
Status: ACTIVE | OUTPATIENT
Start: 2018-01-04 | End: 2018-01-05

## 2018-01-04 RX ORDER — HEPARIN 100 UNIT/ML
300-500 SYRINGE INTRAVENOUS AS NEEDED
Status: DISPENSED | OUTPATIENT
Start: 2018-01-04 | End: 2018-01-05

## 2018-01-04 RX ORDER — PALONOSETRON 0.05 MG/ML
0.25 INJECTION, SOLUTION INTRAVENOUS ONCE
Status: COMPLETED | OUTPATIENT
Start: 2018-01-04 | End: 2018-01-04

## 2018-01-04 RX ORDER — DEXAMETHASONE SODIUM PHOSPHATE 4 MG/ML
20 INJECTION, SOLUTION INTRA-ARTICULAR; INTRALESIONAL; INTRAMUSCULAR; INTRAVENOUS; SOFT TISSUE ONCE
Status: DISCONTINUED | OUTPATIENT
Start: 2018-01-04 | End: 2018-01-04 | Stop reason: SDUPTHER

## 2018-01-04 RX ADMIN — SODIUM CHLORIDE 500 ML: 900 INJECTION, SOLUTION INTRAVENOUS at 13:10

## 2018-01-04 RX ADMIN — DEXAMETHASONE SODIUM PHOSPHATE 20 MG: 10 INJECTION INTRAMUSCULAR; INTRAVENOUS at 13:33

## 2018-01-04 RX ADMIN — Medication 10 ML: at 13:07

## 2018-01-04 RX ADMIN — Medication 500 UNITS: at 15:02

## 2018-01-04 RX ADMIN — SODIUM CHLORIDE 3.1 MG: 9 INJECTION INTRAMUSCULAR; INTRAVENOUS; SUBCUTANEOUS at 14:55

## 2018-01-04 RX ADMIN — CYCLOPHOSPHAMIDE 714 MG: 1 INJECTION, POWDER, FOR SOLUTION INTRAVENOUS; ORAL at 13:55

## 2018-01-04 RX ADMIN — Medication 10 ML: at 15:02

## 2018-01-04 RX ADMIN — PALONOSETRON HYDROCHLORIDE 0.25 MG: 0.25 INJECTION INTRAVENOUS at 13:15

## 2018-01-04 NOTE — PROGRESS NOTES
Pt was seen and labs reviewed by Dr. Re Pearce. Pt continues to do well and feel well on current therapy. Will plan on completing one more cycle and possibly millan regimen to some oral therapy with immunotherapy. Pt to return in 2 weeks.

## 2018-01-04 NOTE — PROGRESS NOTES
Arrived to the [facility]. Chemo completed. Patient tolerated well. Any issues or concerns during appointment: none. Patient aware of next infusion appointment on 1/18/18 at 1600. Discharged in [facility] with him when he left.

## 2018-01-05 LAB
ALBUMIN SERPL ELPH-MCNC: 3.8 G/DL (ref 3.2–5.6)
ALBUMIN/GLOB SERPL: 1.2 {RATIO}
ALPHA1 GLOB SERPL ELPH-MCNC: 0.27 G/DL (ref 0.1–0.4)
ALPHA2 GLOB SERPL ELPH-MCNC: 0.62 G/DL (ref 0.4–1.2)
B-GLOBULIN SERPL QL ELPH: 0.95 G/DL (ref 0.6–1.3)
GAMMA GLOB MFR SERPL ELPH: 1.25 G/DL (ref 0.5–1.6)
IGA SERPL-MCNC: 59 MG/DL (ref 85–499)
IGG SERPL-MCNC: 1595 MG/DL (ref 610–1616)
IGM SERPL-MCNC: 12 MG/DL (ref 35–242)
M PROTEIN SERPL ELPH-MCNC: 0.63 G/DL
PROT PATTERN SERPL ELPH-IMP: ABNORMAL
PROT PATTERN SPEC IFE-IMP: ABNORMAL
PROT SERPL-MCNC: 6.9 G/DL (ref 6.3–8.2)

## 2018-01-11 ENCOUNTER — PATIENT OUTREACH (OUTPATIENT)
Dept: CASE MANAGEMENT | Age: 83
End: 2018-01-11

## 2018-01-18 ENCOUNTER — APPOINTMENT (OUTPATIENT)
Dept: INFUSION THERAPY | Age: 83
End: 2018-01-18
Payer: MEDICARE

## 2018-01-19 ENCOUNTER — HOSPITAL ENCOUNTER (OUTPATIENT)
Dept: LAB | Age: 83
Discharge: HOME | End: 2018-01-19
Payer: MEDICARE

## 2018-01-19 ENCOUNTER — HOSPITAL ENCOUNTER (OUTPATIENT)
Dept: INFUSION THERAPY | Age: 83
Discharge: HOME | End: 2018-01-19
Payer: MEDICARE

## 2018-01-19 VITALS
DIASTOLIC BLOOD PRESSURE: 67 MMHG | RESPIRATION RATE: 18 BRPM | HEART RATE: 68 BPM | WEIGHT: 254.8 LBS | OXYGEN SATURATION: 97 % | BODY MASS INDEX: 36.56 KG/M2 | SYSTOLIC BLOOD PRESSURE: 122 MMHG | TEMPERATURE: 97.6 F

## 2018-01-19 DIAGNOSIS — C90.00 MULTIPLE MYELOMA NOT HAVING ACHIEVED REMISSION (HCC): ICD-10-CM

## 2018-01-19 LAB
ALBUMIN SERPL-MCNC: 3.5 G/DL (ref 3.2–4.6)
ALBUMIN/GLOB SERPL: 0.9 {RATIO}
ALP SERPL-CCNC: 74 U/L (ref 50–136)
ALT SERPL-CCNC: 16 U/L (ref 12–65)
ANION GAP SERPL CALC-SCNC: 4 MMOL/L
AST SERPL-CCNC: 19 U/L (ref 15–37)
BASOPHILS # BLD: 0 K/UL (ref 0–0.2)
BASOPHILS NFR BLD: 1 % (ref 0–2)
BILIRUB SERPL-MCNC: 0.5 MG/DL (ref 0.2–1.1)
BUN SERPL-MCNC: 18 MG/DL (ref 8–23)
CALCIUM SERPL-MCNC: 8.6 MG/DL (ref 8.3–10.4)
CHLORIDE SERPL-SCNC: 108 MMOL/L (ref 98–107)
CO2 SERPL-SCNC: 30 MMOL/L (ref 21–32)
CREAT SERPL-MCNC: 0.9 MG/DL (ref 0.8–1.5)
DIFFERENTIAL METHOD BLD: ABNORMAL
EOSINOPHIL # BLD: 0.1 K/UL (ref 0–0.8)
EOSINOPHIL NFR BLD: 3 % (ref 0.5–7.8)
ERYTHROCYTE [DISTWIDTH] IN BLOOD BY AUTOMATED COUNT: 13.9 % (ref 11.9–14.6)
GLOBULIN SER CALC-MCNC: 3.7 G/DL
GLUCOSE SERPL-MCNC: 104 MG/DL (ref 65–100)
HCT VFR BLD AUTO: 37 % (ref 41.1–50.3)
HGB BLD-MCNC: 12.6 G/DL (ref 13.6–17.2)
LYMPHOCYTES # BLD: 0.9 K/UL (ref 0.5–4.6)
LYMPHOCYTES NFR BLD: 21 % (ref 13–44)
MCH RBC QN AUTO: 32.3 PG (ref 26.1–32.9)
MCHC RBC AUTO-ENTMCNC: 34.1 G/DL (ref 31.4–35)
MCV RBC AUTO: 94.9 FL (ref 79.6–97.8)
MONOCYTES # BLD: 0.6 K/UL (ref 0.1–1.3)
MONOCYTES NFR BLD: 13 % (ref 4–12)
NEUTS SEG # BLD: 2.7 K/UL (ref 1.7–8.2)
NEUTS SEG NFR BLD: 62 % (ref 43–78)
NRBC # BLD: 0 K/UL (ref 0–0.2)
PLATELET # BLD AUTO: 154 K/UL (ref 150–450)
PMV BLD AUTO: 9.8 FL (ref 10.8–14.1)
POTASSIUM SERPL-SCNC: 3.9 MMOL/L (ref 3.5–5.1)
PROT SERPL-MCNC: 7.2 G/DL (ref 6.3–8.2)
RBC # BLD AUTO: 3.9 M/UL (ref 4.23–5.67)
SODIUM SERPL-SCNC: 142 MMOL/L (ref 136–145)
WBC # BLD AUTO: 4.4 K/UL (ref 4.3–11.1)

## 2018-01-19 PROCEDURE — 96401 CHEMO ANTI-NEOPL SQ/IM: CPT

## 2018-01-19 PROCEDURE — 96375 TX/PRO/DX INJ NEW DRUG ADDON: CPT

## 2018-01-19 PROCEDURE — 74011250636 HC RX REV CODE- 250/636: Performed by: NURSE PRACTITIONER

## 2018-01-19 PROCEDURE — 96413 CHEMO IV INFUSION 1 HR: CPT

## 2018-01-19 PROCEDURE — 74011000250 HC RX REV CODE- 250: Performed by: NURSE PRACTITIONER

## 2018-01-19 PROCEDURE — 80053 COMPREHEN METABOLIC PANEL: CPT | Performed by: INTERNAL MEDICINE

## 2018-01-19 PROCEDURE — 74011000258 HC RX REV CODE- 258: Performed by: NURSE PRACTITIONER

## 2018-01-19 PROCEDURE — 85025 COMPLETE CBC W/AUTO DIFF WBC: CPT | Performed by: INTERNAL MEDICINE

## 2018-01-19 PROCEDURE — 74011250636 HC RX REV CODE- 250/636: Performed by: INTERNAL MEDICINE

## 2018-01-19 PROCEDURE — 74011250636 HC RX REV CODE- 250/636

## 2018-01-19 RX ORDER — HEPARIN 100 UNIT/ML
300-500 SYRINGE INTRAVENOUS AS NEEDED
Status: DISPENSED | OUTPATIENT
Start: 2018-01-19 | End: 2018-01-19

## 2018-01-19 RX ORDER — PALONOSETRON 0.05 MG/ML
0.25 INJECTION, SOLUTION INTRAVENOUS ONCE
Status: COMPLETED | OUTPATIENT
Start: 2018-01-19 | End: 2018-01-19

## 2018-01-19 RX ORDER — SODIUM CHLORIDE 0.9 % (FLUSH) 0.9 %
10 SYRINGE (ML) INJECTION AS NEEDED
Status: ACTIVE | OUTPATIENT
Start: 2018-01-19 | End: 2018-01-19

## 2018-01-19 RX ORDER — DEXAMETHASONE SODIUM PHOSPHATE 4 MG/ML
20 INJECTION, SOLUTION INTRA-ARTICULAR; INTRALESIONAL; INTRAMUSCULAR; INTRAVENOUS; SOFT TISSUE ONCE
Status: DISCONTINUED | OUTPATIENT
Start: 2018-01-19 | End: 2018-01-19 | Stop reason: SDUPTHER

## 2018-01-19 RX ORDER — SODIUM CHLORIDE 9 MG/ML
50 INJECTION, SOLUTION INTRAVENOUS ONCE
Status: COMPLETED | OUTPATIENT
Start: 2018-01-19 | End: 2018-01-19

## 2018-01-19 RX ADMIN — Medication 10 ML: at 12:06

## 2018-01-19 RX ADMIN — CYCLOPHOSPHAMIDE 714 MG: 1 INJECTION, POWDER, FOR SOLUTION INTRAVENOUS; ORAL at 11:01

## 2018-01-19 RX ADMIN — SODIUM CHLORIDE 3.1 MG: 9 INJECTION INTRAMUSCULAR; INTRAVENOUS; SUBCUTANEOUS at 11:01

## 2018-01-19 RX ADMIN — DEXAMETHASONE SODIUM PHOSPHATE 20 MG: 10 INJECTION INTRAMUSCULAR; INTRAVENOUS at 10:22

## 2018-01-19 RX ADMIN — PALONOSETRON HYDROCHLORIDE 0.25 MG: 0.25 INJECTION INTRAVENOUS at 10:06

## 2018-01-19 RX ADMIN — SODIUM CHLORIDE 50 ML/HR: 900 INJECTION, SOLUTION INTRAVENOUS at 10:32

## 2018-01-19 RX ADMIN — Medication 500 UNITS: at 12:06

## 2018-01-19 NOTE — PROGRESS NOTES
Arrived to the [facility]. Cytoxan and Velcade completed.  Patient tolerated without problems  Any issues or concerns during appointment: Copy of appointment calendar faxed to Sherman Singletary,  for patient's Assisted Living facility  Patient aware of next infusion appointment on 2/1/18 at 200  Discharged assisted to the lobby by Carrol Fuentes, volunteer to await his transportation

## 2018-02-01 ENCOUNTER — HOSPITAL ENCOUNTER (OUTPATIENT)
Dept: LAB | Age: 83
Discharge: HOME | End: 2018-02-01
Payer: MEDICARE

## 2018-02-01 ENCOUNTER — PATIENT OUTREACH (OUTPATIENT)
Dept: CASE MANAGEMENT | Age: 83
End: 2018-02-01

## 2018-02-01 ENCOUNTER — HOSPITAL ENCOUNTER (OUTPATIENT)
Dept: INFUSION THERAPY | Age: 83
Discharge: HOME | End: 2018-02-01
Payer: MEDICARE

## 2018-02-01 DIAGNOSIS — C90.00 MULTIPLE MYELOMA NOT HAVING ACHIEVED REMISSION (HCC): ICD-10-CM

## 2018-02-01 LAB
ALBUMIN SERPL-MCNC: 3.4 G/DL (ref 3.2–4.6)
ALBUMIN/GLOB SERPL: 1 {RATIO} (ref 1.2–3.5)
ALP SERPL-CCNC: 78 U/L (ref 50–136)
ALT SERPL-CCNC: 17 U/L (ref 12–65)
ANION GAP SERPL CALC-SCNC: 3 MMOL/L (ref 7–16)
AST SERPL-CCNC: 14 U/L (ref 15–37)
BASOPHILS # BLD: 0 K/UL (ref 0–0.2)
BASOPHILS NFR BLD: 1 % (ref 0–2)
BILIRUB SERPL-MCNC: 0.5 MG/DL (ref 0.2–1.1)
BUN SERPL-MCNC: 18 MG/DL (ref 8–23)
CALCIUM SERPL-MCNC: 8.5 MG/DL (ref 8.3–10.4)
CHLORIDE SERPL-SCNC: 113 MMOL/L (ref 98–107)
CO2 SERPL-SCNC: 29 MMOL/L (ref 21–32)
CREAT SERPL-MCNC: 0.91 MG/DL (ref 0.8–1.5)
DIFFERENTIAL METHOD BLD: ABNORMAL
EOSINOPHIL # BLD: 0.1 K/UL (ref 0–0.8)
EOSINOPHIL NFR BLD: 3 % (ref 0.5–7.8)
ERYTHROCYTE [DISTWIDTH] IN BLOOD BY AUTOMATED COUNT: 14.5 % (ref 11.9–14.6)
GLOBULIN SER CALC-MCNC: 3.4 G/DL (ref 2.3–3.5)
GLUCOSE SERPL-MCNC: 95 MG/DL (ref 65–100)
HCT VFR BLD AUTO: 36.7 % (ref 41.1–50.3)
HGB BLD-MCNC: 12.3 G/DL (ref 13.6–17.2)
LYMPHOCYTES # BLD: 0.8 K/UL (ref 0.5–4.6)
LYMPHOCYTES NFR BLD: 17 % (ref 13–44)
MCH RBC QN AUTO: 32.5 PG (ref 26.1–32.9)
MCHC RBC AUTO-ENTMCNC: 33.5 G/DL (ref 31.4–35)
MCV RBC AUTO: 96.8 FL (ref 79.6–97.8)
MONOCYTES # BLD: 0.5 K/UL (ref 0.1–1.3)
MONOCYTES NFR BLD: 12 % (ref 4–12)
NEUTS SEG # BLD: 3.1 K/UL (ref 1.7–8.2)
NEUTS SEG NFR BLD: 68 % (ref 43–78)
NRBC # BLD: 0 K/UL (ref 0–0.2)
PLATELET # BLD AUTO: 161 K/UL (ref 150–450)
PMV BLD AUTO: 10.1 FL (ref 10.8–14.1)
POTASSIUM SERPL-SCNC: 4.2 MMOL/L (ref 3.5–5.1)
PROT SERPL-MCNC: 6.8 G/DL (ref 6.3–8.2)
RBC # BLD AUTO: 3.79 M/UL (ref 4.23–5.67)
SODIUM SERPL-SCNC: 145 MMOL/L (ref 136–145)
WBC # BLD AUTO: 4.5 K/UL (ref 4.3–11.1)

## 2018-02-01 PROCEDURE — 74011250636 HC RX REV CODE- 250/636: Performed by: NURSE PRACTITIONER

## 2018-02-01 PROCEDURE — 96401 CHEMO ANTI-NEOPL SQ/IM: CPT

## 2018-02-01 PROCEDURE — 74011000258 HC RX REV CODE- 258: Performed by: NURSE PRACTITIONER

## 2018-02-01 PROCEDURE — 96375 TX/PRO/DX INJ NEW DRUG ADDON: CPT

## 2018-02-01 PROCEDURE — 84165 PROTEIN E-PHORESIS SERUM: CPT | Performed by: INTERNAL MEDICINE

## 2018-02-01 PROCEDURE — 85025 COMPLETE CBC W/AUTO DIFF WBC: CPT | Performed by: INTERNAL MEDICINE

## 2018-02-01 PROCEDURE — 74011250636 HC RX REV CODE- 250/636

## 2018-02-01 PROCEDURE — 96413 CHEMO IV INFUSION 1 HR: CPT

## 2018-02-01 PROCEDURE — 86334 IMMUNOFIX E-PHORESIS SERUM: CPT | Performed by: INTERNAL MEDICINE

## 2018-02-01 PROCEDURE — 74011000250 HC RX REV CODE- 250: Performed by: NURSE PRACTITIONER

## 2018-02-01 PROCEDURE — 80053 COMPREHEN METABOLIC PANEL: CPT | Performed by: INTERNAL MEDICINE

## 2018-02-01 RX ORDER — PALONOSETRON 0.05 MG/ML
0.25 INJECTION, SOLUTION INTRAVENOUS ONCE
Status: COMPLETED | OUTPATIENT
Start: 2018-02-01 | End: 2018-02-01

## 2018-02-01 RX ORDER — HEPARIN 100 UNIT/ML
300-500 SYRINGE INTRAVENOUS AS NEEDED
Status: DISPENSED | OUTPATIENT
Start: 2018-02-01 | End: 2018-02-01

## 2018-02-01 RX ORDER — SODIUM CHLORIDE 0.9 % (FLUSH) 0.9 %
10 SYRINGE (ML) INJECTION AS NEEDED
Status: ACTIVE | OUTPATIENT
Start: 2018-02-01 | End: 2018-02-01

## 2018-02-01 RX ORDER — DEXAMETHASONE SODIUM PHOSPHATE 4 MG/ML
20 INJECTION, SOLUTION INTRA-ARTICULAR; INTRALESIONAL; INTRAMUSCULAR; INTRAVENOUS; SOFT TISSUE ONCE
Status: DISCONTINUED | OUTPATIENT
Start: 2018-02-01 | End: 2018-02-01 | Stop reason: SDUPTHER

## 2018-02-01 RX ADMIN — PALONOSETRON HYDROCHLORIDE 0.25 MG: 0.25 INJECTION INTRAVENOUS at 11:34

## 2018-02-01 RX ADMIN — SODIUM CHLORIDE 3.1 MG: 9 INJECTION INTRAMUSCULAR; INTRAVENOUS; SUBCUTANEOUS at 11:49

## 2018-02-01 RX ADMIN — DEXAMETHASONE SODIUM PHOSPHATE 20 MG: 10 INJECTION INTRAMUSCULAR; INTRAVENOUS at 11:35

## 2018-02-01 RX ADMIN — Medication 10 ML: at 12:59

## 2018-02-01 RX ADMIN — SODIUM CHLORIDE, PRESERVATIVE FREE 300 UNITS: 5 INJECTION INTRAVENOUS at 12:59

## 2018-02-01 RX ADMIN — CYCLOPHOSPHAMIDE 714 MG: 1 INJECTION, POWDER, FOR SOLUTION INTRAVENOUS; ORAL at 11:49

## 2018-02-01 NOTE — PROGRESS NOTES
Arrived to the [facility]. Chemo completed. Patient tolerated well. Any issues or concerns during appointment: no.  Patient aware of next infusion appointment on 2/15 (date) at 2 (time). Called Galen Allen from assisted living and left message. [phone]  Discharged to nursing home.

## 2018-02-01 NOTE — PROGRESS NOTES
Pt was seen and labs reviewed by Dr. Jayant Mayorga. Pt is feeling well physically, but reports feeling depressed due to son's recent cancer dx. Will proceed with treatment today and in 2 weeks. Will start pt on Revlimid once this cycle is complete. Pt aggreable with plan.

## 2018-02-02 LAB
ALBUMIN SERPL ELPH-MCNC: 3.52 G/DL (ref 3.2–5.6)
ALBUMIN/GLOB SERPL: 1.2 {RATIO}
ALPHA1 GLOB SERPL ELPH-MCNC: 0.27 G/DL (ref 0.1–0.4)
ALPHA2 GLOB SERPL ELPH-MCNC: 0.6 G/DL (ref 0.4–1.2)
B-GLOBULIN SERPL QL ELPH: 0.94 G/DL (ref 0.6–1.3)
GAMMA GLOB MFR SERPL ELPH: 1.18 G/DL (ref 0.5–1.6)
IGA SERPL-MCNC: 50 MG/DL (ref 85–499)
IGG SERPL-MCNC: 1282 MG/DL (ref 610–1616)
IGM SERPL-MCNC: 15 MG/DL (ref 35–242)
M PROTEIN SERPL ELPH-MCNC: 0.49 G/DL
PROT PATTERN SERPL ELPH-IMP: ABNORMAL
PROT PATTERN SPEC IFE-IMP: ABNORMAL
PROT SERPL-MCNC: 6.5 G/DL (ref 6.3–8.2)

## 2018-02-15 ENCOUNTER — HOSPITAL ENCOUNTER (OUTPATIENT)
Dept: LAB | Age: 83
Discharge: HOME | End: 2018-02-15
Payer: MEDICARE

## 2018-02-15 ENCOUNTER — HOSPITAL ENCOUNTER (OUTPATIENT)
Dept: INFUSION THERAPY | Age: 83
Discharge: HOME | End: 2018-02-15
Payer: MEDICARE

## 2018-02-15 ENCOUNTER — PATIENT OUTREACH (OUTPATIENT)
Dept: CASE MANAGEMENT | Age: 83
End: 2018-02-15

## 2018-02-15 DIAGNOSIS — C90.00 MULTIPLE MYELOMA NOT HAVING ACHIEVED REMISSION (HCC): ICD-10-CM

## 2018-02-15 LAB
ALBUMIN SERPL-MCNC: 3.5 G/DL (ref 3.2–4.6)
ALBUMIN/GLOB SERPL: 1.1 {RATIO} (ref 1.2–3.5)
ALP SERPL-CCNC: 78 U/L (ref 50–136)
ALT SERPL-CCNC: 20 U/L (ref 12–65)
ANION GAP SERPL CALC-SCNC: 5 MMOL/L (ref 7–16)
AST SERPL-CCNC: 16 U/L (ref 15–37)
BASOPHILS # BLD: 0 K/UL (ref 0–0.2)
BASOPHILS NFR BLD: 1 % (ref 0–2)
BILIRUB SERPL-MCNC: 0.3 MG/DL (ref 0.2–1.1)
BUN SERPL-MCNC: 23 MG/DL (ref 8–23)
CALCIUM SERPL-MCNC: 8.5 MG/DL (ref 8.3–10.4)
CHLORIDE SERPL-SCNC: 112 MMOL/L (ref 98–107)
CO2 SERPL-SCNC: 26 MMOL/L (ref 21–32)
CREAT SERPL-MCNC: 1 MG/DL (ref 0.8–1.5)
DIFFERENTIAL METHOD BLD: ABNORMAL
EOSINOPHIL # BLD: 0.1 K/UL (ref 0–0.8)
EOSINOPHIL NFR BLD: 2 % (ref 0.5–7.8)
ERYTHROCYTE [DISTWIDTH] IN BLOOD BY AUTOMATED COUNT: 14.7 % (ref 11.9–14.6)
GLOBULIN SER CALC-MCNC: 3.2 G/DL (ref 2.3–3.5)
GLUCOSE SERPL-MCNC: 113 MG/DL (ref 65–100)
HCT VFR BLD AUTO: 35.9 % (ref 41.1–50.3)
HGB BLD-MCNC: 12.2 G/DL (ref 13.6–17.2)
LYMPHOCYTES # BLD: 0.8 K/UL (ref 0.5–4.6)
LYMPHOCYTES NFR BLD: 19 % (ref 13–44)
MAGNESIUM SERPL-MCNC: 2.1 MG/DL (ref 1.8–2.4)
MCH RBC QN AUTO: 33.1 PG (ref 26.1–32.9)
MCHC RBC AUTO-ENTMCNC: 34 G/DL (ref 31.4–35)
MCV RBC AUTO: 97.3 FL (ref 79.6–97.8)
MONOCYTES # BLD: 0.6 K/UL (ref 0.1–1.3)
MONOCYTES NFR BLD: 14 % (ref 4–12)
NEUTS SEG # BLD: 2.8 K/UL (ref 1.7–8.2)
NEUTS SEG NFR BLD: 65 % (ref 43–78)
NRBC # BLD: 0 K/UL (ref 0–0.2)
PLATELET # BLD AUTO: 161 K/UL (ref 150–450)
PMV BLD AUTO: 10.1 FL (ref 10.8–14.1)
POTASSIUM SERPL-SCNC: 3.9 MMOL/L (ref 3.5–5.1)
PROT SERPL-MCNC: 6.7 G/DL (ref 6.3–8.2)
RBC # BLD AUTO: 3.69 M/UL (ref 4.23–5.67)
SODIUM SERPL-SCNC: 143 MMOL/L (ref 136–145)
WBC # BLD AUTO: 4.4 K/UL (ref 4.3–11.1)

## 2018-02-15 PROCEDURE — 74011250636 HC RX REV CODE- 250/636: Performed by: NURSE PRACTITIONER

## 2018-02-15 PROCEDURE — 96401 CHEMO ANTI-NEOPL SQ/IM: CPT

## 2018-02-15 PROCEDURE — 96375 TX/PRO/DX INJ NEW DRUG ADDON: CPT

## 2018-02-15 PROCEDURE — 74011000250 HC RX REV CODE- 250: Performed by: NURSE PRACTITIONER

## 2018-02-15 PROCEDURE — 74011000258 HC RX REV CODE- 258: Performed by: NURSE PRACTITIONER

## 2018-02-15 PROCEDURE — 74011250636 HC RX REV CODE- 250/636

## 2018-02-15 PROCEDURE — 85025 COMPLETE CBC W/AUTO DIFF WBC: CPT | Performed by: INTERNAL MEDICINE

## 2018-02-15 PROCEDURE — 83735 ASSAY OF MAGNESIUM: CPT | Performed by: INTERNAL MEDICINE

## 2018-02-15 PROCEDURE — 80053 COMPREHEN METABOLIC PANEL: CPT | Performed by: INTERNAL MEDICINE

## 2018-02-15 PROCEDURE — 96413 CHEMO IV INFUSION 1 HR: CPT

## 2018-02-15 RX ORDER — HEPARIN 100 UNIT/ML
300-500 SYRINGE INTRAVENOUS AS NEEDED
Status: DISPENSED | OUTPATIENT
Start: 2018-02-15 | End: 2018-02-16

## 2018-02-15 RX ORDER — SODIUM CHLORIDE 0.9 % (FLUSH) 0.9 %
10 SYRINGE (ML) INJECTION AS NEEDED
Status: ACTIVE | OUTPATIENT
Start: 2018-02-15 | End: 2018-02-16

## 2018-02-15 RX ORDER — PALONOSETRON 0.05 MG/ML
0.25 INJECTION, SOLUTION INTRAVENOUS ONCE
Status: COMPLETED | OUTPATIENT
Start: 2018-02-15 | End: 2018-02-15

## 2018-02-15 RX ADMIN — CYCLOPHOSPHAMIDE 714 MG: 1 INJECTION, POWDER, FOR SOLUTION INTRAVENOUS; ORAL at 16:27

## 2018-02-15 RX ADMIN — Medication 10 ML: at 17:32

## 2018-02-15 RX ADMIN — PALONOSETRON HYDROCHLORIDE 0.25 MG: 0.25 INJECTION INTRAVENOUS at 15:46

## 2018-02-15 RX ADMIN — SODIUM CHLORIDE, PRESERVATIVE FREE 500 UNITS: 5 INJECTION INTRAVENOUS at 17:32

## 2018-02-15 RX ADMIN — SODIUM CHLORIDE 3.1 MG: 9 INJECTION INTRAMUSCULAR; INTRAVENOUS; SUBCUTANEOUS at 16:27

## 2018-02-15 RX ADMIN — DEXAMETHASONE SODIUM PHOSPHATE: 10 INJECTION INTRAMUSCULAR; INTRAVENOUS at 15:50

## 2018-02-15 NOTE — PROGRESS NOTES
Pt was seen and labs reviewed by Dr. Niharika Langley. Pt to proceed with day 15, cycle 4 treatment today. Plan will be to start Revlimid 10 mg daily with no breaks. Pt will return to clinic in 2 weeks for chemo ed and will start Revlimid after seeing Dr. Niharika Langley. Pt has transportation issues so will need chemo ed and OV in the same day.

## 2018-02-15 NOTE — PROGRESS NOTES
[facility] Patient tolerated well. Any issues or concerns during appointment: no.  Patient aware of next Md appointment on 3/1 (date) at 80 (time). Discharged ambulatory.

## 2018-03-05 ENCOUNTER — PATIENT OUTREACH (OUTPATIENT)
Dept: CASE MANAGEMENT | Age: 83
End: 2018-03-05

## 2018-03-05 ENCOUNTER — HOSPITAL ENCOUNTER (OUTPATIENT)
Dept: LAB | Age: 83
Discharge: HOME | End: 2018-03-05
Payer: MEDICARE

## 2018-03-05 DIAGNOSIS — C90.00 MULTIPLE MYELOMA NOT HAVING ACHIEVED REMISSION (HCC): ICD-10-CM

## 2018-03-05 LAB
ALBUMIN SERPL-MCNC: 3.5 G/DL (ref 3.2–4.6)
ALBUMIN/GLOB SERPL: 1 {RATIO} (ref 1.2–3.5)
ALP SERPL-CCNC: 80 U/L (ref 50–136)
ALT SERPL-CCNC: 13 U/L (ref 12–65)
ANION GAP SERPL CALC-SCNC: 6 MMOL/L (ref 7–16)
AST SERPL-CCNC: 15 U/L (ref 15–37)
BASOPHILS # BLD: 0 K/UL (ref 0–0.2)
BASOPHILS NFR BLD: 1 % (ref 0–2)
BILIRUB SERPL-MCNC: 0.4 MG/DL (ref 0.2–1.1)
BUN SERPL-MCNC: 20 MG/DL (ref 8–23)
CALCIUM SERPL-MCNC: 8.9 MG/DL (ref 8.3–10.4)
CHLORIDE SERPL-SCNC: 110 MMOL/L (ref 98–107)
CO2 SERPL-SCNC: 29 MMOL/L (ref 21–32)
CREAT SERPL-MCNC: 0.93 MG/DL (ref 0.8–1.5)
DIFFERENTIAL METHOD BLD: ABNORMAL
EOSINOPHIL # BLD: 0.1 K/UL (ref 0–0.8)
EOSINOPHIL NFR BLD: 3 % (ref 0.5–7.8)
ERYTHROCYTE [DISTWIDTH] IN BLOOD BY AUTOMATED COUNT: 14.2 % (ref 11.9–14.6)
GLOBULIN SER CALC-MCNC: 3.6 G/DL (ref 2.3–3.5)
GLUCOSE SERPL-MCNC: 91 MG/DL (ref 65–100)
HCT VFR BLD AUTO: 36.8 % (ref 41.1–50.3)
HGB BLD-MCNC: 12.4 G/DL (ref 13.6–17.2)
LYMPHOCYTES # BLD: 0.8 K/UL (ref 0.5–4.6)
LYMPHOCYTES NFR BLD: 17 % (ref 13–44)
MCH RBC QN AUTO: 32.9 PG (ref 26.1–32.9)
MCHC RBC AUTO-ENTMCNC: 33.7 G/DL (ref 31.4–35)
MCV RBC AUTO: 97.6 FL (ref 79.6–97.8)
MONOCYTES # BLD: 0.6 K/UL (ref 0.1–1.3)
MONOCYTES NFR BLD: 12 % (ref 4–12)
NEUTS SEG # BLD: 3.3 K/UL (ref 1.7–8.2)
NEUTS SEG NFR BLD: 68 % (ref 43–78)
NRBC # BLD: 0 K/UL (ref 0–0.2)
PLATELET # BLD AUTO: 143 K/UL (ref 150–450)
PMV BLD AUTO: 10.1 FL (ref 10.8–14.1)
POTASSIUM SERPL-SCNC: 4.2 MMOL/L (ref 3.5–5.1)
PROT SERPL-MCNC: 7.1 G/DL (ref 6.3–8.2)
RBC # BLD AUTO: 3.77 M/UL (ref 4.23–5.67)
SODIUM SERPL-SCNC: 145 MMOL/L (ref 136–145)
WBC # BLD AUTO: 4.9 K/UL (ref 4.3–11.1)

## 2018-03-05 PROCEDURE — 80053 COMPREHEN METABOLIC PANEL: CPT | Performed by: INTERNAL MEDICINE

## 2018-03-05 PROCEDURE — 84165 PROTEIN E-PHORESIS SERUM: CPT | Performed by: INTERNAL MEDICINE

## 2018-03-05 PROCEDURE — 86334 IMMUNOFIX E-PHORESIS SERUM: CPT | Performed by: INTERNAL MEDICINE

## 2018-03-05 PROCEDURE — 85025 COMPLETE CBC W/AUTO DIFF WBC: CPT | Performed by: INTERNAL MEDICINE

## 2018-03-05 NOTE — PROGRESS NOTES
Pt was seen and labs reviewed by Dr. Costa Jorge. Pt is to start Revlimid 10 mg daily as soon as it is delivered to his assisted living facility. Pt and his daughter, Lucille Chandler (via telephone), were given chemo education on Revlimid. Daughter is healthcare POA and gave verbal consent via telephone as witnessed by myself and Augustine Joseph. Written instructions sent home with patient and Kimani Sherwood RN at [facility] was given instructions on Revlimid side effects and when to call provider. Pt to return to clinic on 4/12.

## 2018-03-06 LAB
ALBUMIN SERPL ELPH-MCNC: 3.88 G/DL (ref 3.2–5.6)
ALBUMIN/GLOB SERPL: 1.4 {RATIO}
ALPHA1 GLOB SERPL ELPH-MCNC: 0.23 G/DL (ref 0.1–0.4)
ALPHA2 GLOB SERPL ELPH-MCNC: 0.57 G/DL (ref 0.4–1.2)
B-GLOBULIN SERPL QL ELPH: 0.87 G/DL (ref 0.6–1.3)
GAMMA GLOB MFR SERPL ELPH: 1.16 G/DL (ref 0.5–1.6)
IGA SERPL-MCNC: 68 MG/DL (ref 85–499)
IGG SERPL-MCNC: 1338 MG/DL (ref 610–1616)
IGM SERPL-MCNC: 15 MG/DL (ref 35–242)
M PROTEIN SERPL ELPH-MCNC: 0.46 G/DL
PROT PATTERN SERPL ELPH-IMP: ABNORMAL
PROT PATTERN SPEC IFE-IMP: ABNORMAL
PROT SERPL-MCNC: 6.7 G/DL (ref 6.3–8.2)

## 2018-04-12 ENCOUNTER — PATIENT OUTREACH (OUTPATIENT)
Dept: CASE MANAGEMENT | Age: 83
End: 2018-04-12

## 2018-04-12 ENCOUNTER — HOSPITAL ENCOUNTER (OUTPATIENT)
Dept: LAB | Age: 83
Discharge: HOME | End: 2018-04-12
Payer: MEDICARE

## 2018-04-12 DIAGNOSIS — C90.00 MULTIPLE MYELOMA NOT HAVING ACHIEVED REMISSION (HCC): ICD-10-CM

## 2018-04-12 PROBLEM — E66.01 SEVERE OBESITY (BMI 35.0-39.9) WITH COMORBIDITY (HCC): Status: ACTIVE | Noted: 2018-04-12

## 2018-04-12 LAB
ALBUMIN SERPL-MCNC: 3.6 G/DL (ref 3.2–4.6)
ALBUMIN/GLOB SERPL: 1 {RATIO} (ref 1.2–3.5)
ALP SERPL-CCNC: 83 U/L (ref 50–136)
ALT SERPL-CCNC: 20 U/L (ref 12–65)
ANION GAP SERPL CALC-SCNC: 5 MMOL/L (ref 7–16)
AST SERPL-CCNC: 18 U/L (ref 15–37)
BASOPHILS # BLD: 0.1 K/UL (ref 0–0.2)
BASOPHILS NFR BLD: 2 % (ref 0–2)
BILIRUB SERPL-MCNC: 0.4 MG/DL (ref 0.2–1.1)
BUN SERPL-MCNC: 14 MG/DL (ref 8–23)
CALCIUM SERPL-MCNC: 8.6 MG/DL (ref 8.3–10.4)
CHLORIDE SERPL-SCNC: 108 MMOL/L (ref 98–107)
CO2 SERPL-SCNC: 27 MMOL/L (ref 21–32)
CREAT SERPL-MCNC: 0.92 MG/DL (ref 0.8–1.5)
DIFFERENTIAL METHOD BLD: ABNORMAL
EOSINOPHIL # BLD: 0.3 K/UL (ref 0–0.8)
EOSINOPHIL NFR BLD: 8 % (ref 0.5–7.8)
ERYTHROCYTE [DISTWIDTH] IN BLOOD BY AUTOMATED COUNT: 13.2 % (ref 11.9–14.6)
GLOBULIN SER CALC-MCNC: 3.5 G/DL (ref 2.3–3.5)
GLUCOSE SERPL-MCNC: 88 MG/DL (ref 65–100)
HCT VFR BLD AUTO: 37.6 % (ref 41.1–50.3)
HGB BLD-MCNC: 12.7 G/DL (ref 13.6–17.2)
LYMPHOCYTES # BLD: 0.9 K/UL (ref 0.5–4.6)
LYMPHOCYTES NFR BLD: 21 % (ref 13–44)
MCH RBC QN AUTO: 32.5 PG (ref 26.1–32.9)
MCHC RBC AUTO-ENTMCNC: 33.8 G/DL (ref 31.4–35)
MCV RBC AUTO: 96.2 FL (ref 79.6–97.8)
MONOCYTES # BLD: 0.7 K/UL (ref 0.1–1.3)
MONOCYTES NFR BLD: 17 % (ref 4–12)
NEUTS SEG # BLD: 2.2 K/UL (ref 1.7–8.2)
NEUTS SEG NFR BLD: 51 % (ref 43–78)
NRBC # BLD: 0 K/UL (ref 0–0.2)
PLATELET # BLD AUTO: 149 K/UL (ref 150–450)
PMV BLD AUTO: 10.2 FL (ref 10.8–14.1)
POTASSIUM SERPL-SCNC: 4.6 MMOL/L (ref 3.5–5.1)
PROT SERPL-MCNC: 7.1 G/DL (ref 6.3–8.2)
RBC # BLD AUTO: 3.91 M/UL (ref 4.23–5.67)
SODIUM SERPL-SCNC: 140 MMOL/L (ref 136–145)
WBC # BLD AUTO: 4.2 K/UL (ref 4.3–11.1)

## 2018-04-12 PROCEDURE — 86334 IMMUNOFIX E-PHORESIS SERUM: CPT | Performed by: INTERNAL MEDICINE

## 2018-04-12 PROCEDURE — 85025 COMPLETE CBC W/AUTO DIFF WBC: CPT | Performed by: INTERNAL MEDICINE

## 2018-04-12 PROCEDURE — 84165 PROTEIN E-PHORESIS SERUM: CPT | Performed by: INTERNAL MEDICINE

## 2018-04-12 PROCEDURE — 80053 COMPREHEN METABOLIC PANEL: CPT | Performed by: INTERNAL MEDICINE

## 2018-04-12 NOTE — PROGRESS NOTES
Pt was seen and labs reviewed by Dr. Arielle Guerrero. Pt doing well on Revlimid so far, will continue current treatment plan. Pt to return to clinic on 5/10 with CBC, CMP.

## 2018-04-13 LAB
ALBUMIN SERPL ELPH-MCNC: 3.76 G/DL (ref 3.2–5.6)
ALBUMIN/GLOB SERPL: 1.3 {RATIO}
ALPHA1 GLOB SERPL ELPH-MCNC: 0.24 G/DL (ref 0.1–0.4)
ALPHA2 GLOB SERPL ELPH-MCNC: 0.6 G/DL (ref 0.4–1.2)
B-GLOBULIN SERPL QL ELPH: 0.89 G/DL (ref 0.6–1.3)
GAMMA GLOB MFR SERPL ELPH: 1.21 G/DL (ref 0.5–1.6)
IGA SERPL-MCNC: 119 MG/DL (ref 85–499)
IGG SERPL-MCNC: 1291 MG/DL (ref 610–1616)
IGM SERPL-MCNC: 11 MG/DL (ref 35–242)
M PROTEIN SERPL ELPH-MCNC: 0.51 G/DL
PROT PATTERN SERPL ELPH-IMP: ABNORMAL
PROT PATTERN SPEC IFE-IMP: ABNORMAL
PROT SERPL-MCNC: 6.7 G/DL (ref 6.3–8.2)

## 2018-05-17 ENCOUNTER — PATIENT OUTREACH (OUTPATIENT)
Dept: CASE MANAGEMENT | Age: 83
End: 2018-05-17

## 2018-05-17 ENCOUNTER — HOSPITAL ENCOUNTER (OUTPATIENT)
Dept: LAB | Age: 83
Discharge: HOME | End: 2018-05-17
Payer: MEDICARE

## 2018-05-17 DIAGNOSIS — C90.00 MULTIPLE MYELOMA NOT HAVING ACHIEVED REMISSION (HCC): ICD-10-CM

## 2018-05-17 LAB
ALBUMIN SERPL-MCNC: 3.5 G/DL (ref 3.2–4.6)
ALBUMIN/GLOB SERPL: 1 {RATIO} (ref 1.2–3.5)
ALP SERPL-CCNC: 94 U/L (ref 50–136)
ALT SERPL-CCNC: 21 U/L (ref 12–65)
ANION GAP SERPL CALC-SCNC: 5 MMOL/L (ref 7–16)
AST SERPL-CCNC: 14 U/L (ref 15–37)
BASOPHILS # BLD: 0.1 K/UL (ref 0–0.2)
BASOPHILS NFR BLD: 2 % (ref 0–2)
BILIRUB SERPL-MCNC: 0.4 MG/DL (ref 0.2–1.1)
BUN SERPL-MCNC: 13 MG/DL (ref 8–23)
CALCIUM SERPL-MCNC: 8.7 MG/DL (ref 8.3–10.4)
CHLORIDE SERPL-SCNC: 109 MMOL/L (ref 98–107)
CO2 SERPL-SCNC: 29 MMOL/L (ref 21–32)
CREAT SERPL-MCNC: 0.93 MG/DL (ref 0.8–1.5)
DIFFERENTIAL METHOD BLD: ABNORMAL
EOSINOPHIL # BLD: 0.4 K/UL (ref 0–0.8)
EOSINOPHIL NFR BLD: 10 % (ref 0.5–7.8)
ERYTHROCYTE [DISTWIDTH] IN BLOOD BY AUTOMATED COUNT: 13.4 % (ref 11.9–14.6)
GLOBULIN SER CALC-MCNC: 3.6 G/DL (ref 2.3–3.5)
GLUCOSE SERPL-MCNC: 87 MG/DL (ref 65–100)
HCT VFR BLD AUTO: 40 % (ref 41.1–50.3)
HGB BLD-MCNC: 13.3 G/DL (ref 13.6–17.2)
LYMPHOCYTES # BLD: 1 K/UL (ref 0.5–4.6)
LYMPHOCYTES NFR BLD: 27 % (ref 13–44)
MCH RBC QN AUTO: 31.7 PG (ref 26.1–32.9)
MCHC RBC AUTO-ENTMCNC: 33.3 G/DL (ref 31.4–35)
MCV RBC AUTO: 95.2 FL (ref 79.6–97.8)
MONOCYTES # BLD: 0.5 K/UL (ref 0.1–1.3)
MONOCYTES NFR BLD: 13 % (ref 4–12)
NEUTS SEG # BLD: 1.9 K/UL (ref 1.7–8.2)
NEUTS SEG NFR BLD: 48 % (ref 43–78)
NRBC # BLD: 0 K/UL (ref 0–0.2)
PLATELET # BLD AUTO: 128 K/UL (ref 150–450)
PMV BLD AUTO: 10.4 FL (ref 10.8–14.1)
POTASSIUM SERPL-SCNC: 4.1 MMOL/L (ref 3.5–5.1)
PROT SERPL-MCNC: 7.1 G/DL (ref 6.3–8.2)
RBC # BLD AUTO: 4.2 M/UL (ref 4.23–5.67)
SODIUM SERPL-SCNC: 143 MMOL/L (ref 136–145)
WBC # BLD AUTO: 3.9 K/UL (ref 4.3–11.1)

## 2018-05-17 PROCEDURE — 85025 COMPLETE CBC W/AUTO DIFF WBC: CPT | Performed by: INTERNAL MEDICINE

## 2018-05-17 PROCEDURE — 80053 COMPREHEN METABOLIC PANEL: CPT | Performed by: INTERNAL MEDICINE

## 2018-05-17 NOTE — PROGRESS NOTES
Pt was seen and labs reviewed by Dr. Yuni Addison. Labs remaining stable. Pt reports tolerating Revlimid well. Pt to return in 4 weeks with labs, including SPEP.

## 2018-06-28 ENCOUNTER — PATIENT OUTREACH (OUTPATIENT)
Dept: CASE MANAGEMENT | Age: 83
End: 2018-06-28

## 2018-06-28 ENCOUNTER — HOSPITAL ENCOUNTER (OUTPATIENT)
Dept: LAB | Age: 83
Discharge: HOME | End: 2018-06-28
Payer: MEDICARE

## 2018-06-28 DIAGNOSIS — C90.00 MULTIPLE MYELOMA NOT HAVING ACHIEVED REMISSION (HCC): ICD-10-CM

## 2018-06-28 LAB
ALBUMIN SERPL-MCNC: 3.5 G/DL (ref 3.2–4.6)
ALBUMIN/GLOB SERPL: 1 {RATIO} (ref 1.2–3.5)
ALP SERPL-CCNC: 96 U/L (ref 50–136)
ALT SERPL-CCNC: 25 U/L (ref 12–65)
ANION GAP SERPL CALC-SCNC: 8 MMOL/L (ref 7–16)
AST SERPL-CCNC: 25 U/L (ref 15–37)
BASOPHILS # BLD: 0.1 K/UL (ref 0–0.2)
BASOPHILS NFR BLD: 2 % (ref 0–2)
BILIRUB SERPL-MCNC: 0.6 MG/DL (ref 0.2–1.1)
BUN SERPL-MCNC: 16 MG/DL (ref 8–23)
CALCIUM SERPL-MCNC: 8.5 MG/DL (ref 8.3–10.4)
CHLORIDE SERPL-SCNC: 108 MMOL/L (ref 98–107)
CO2 SERPL-SCNC: 26 MMOL/L (ref 21–32)
CREAT SERPL-MCNC: 0.84 MG/DL (ref 0.8–1.5)
DIFFERENTIAL METHOD BLD: ABNORMAL
EOSINOPHIL # BLD: 0.4 K/UL (ref 0–0.8)
EOSINOPHIL NFR BLD: 12 % (ref 0.5–7.8)
ERYTHROCYTE [DISTWIDTH] IN BLOOD BY AUTOMATED COUNT: 14.8 % (ref 11.9–14.6)
GLOBULIN SER CALC-MCNC: 3.5 G/DL (ref 2.3–3.5)
GLUCOSE SERPL-MCNC: 96 MG/DL (ref 65–100)
HCT VFR BLD AUTO: 36.8 % (ref 41.1–50.3)
HGB BLD-MCNC: 12.2 G/DL (ref 13.6–17.2)
LYMPHOCYTES # BLD: 1 K/UL (ref 0.5–4.6)
LYMPHOCYTES NFR BLD: 29 % (ref 13–44)
MCH RBC QN AUTO: 30.7 PG (ref 26.1–32.9)
MCHC RBC AUTO-ENTMCNC: 33.2 G/DL (ref 31.4–35)
MCV RBC AUTO: 92.7 FL (ref 79.6–97.8)
MONOCYTES # BLD: 0.4 K/UL (ref 0.1–1.3)
MONOCYTES NFR BLD: 13 % (ref 4–12)
NEUTS SEG # BLD: 1.5 K/UL (ref 1.7–8.2)
NEUTS SEG NFR BLD: 45 % (ref 43–78)
NRBC # BLD: 0 K/UL (ref 0–0.2)
PLATELET # BLD AUTO: 104 K/UL (ref 150–450)
PMV BLD AUTO: 10.3 FL (ref 10.8–14.1)
POTASSIUM SERPL-SCNC: 3.7 MMOL/L (ref 3.5–5.1)
PROT SERPL-MCNC: 7 G/DL (ref 6.3–8.2)
RBC # BLD AUTO: 3.97 M/UL (ref 4.23–5.67)
SODIUM SERPL-SCNC: 142 MMOL/L (ref 136–145)
WBC # BLD AUTO: 3.4 K/UL (ref 4.3–11.1)

## 2018-06-28 PROCEDURE — 85025 COMPLETE CBC W/AUTO DIFF WBC: CPT | Performed by: INTERNAL MEDICINE

## 2018-06-28 PROCEDURE — 86334 IMMUNOFIX E-PHORESIS SERUM: CPT | Performed by: INTERNAL MEDICINE

## 2018-06-28 PROCEDURE — 80053 COMPREHEN METABOLIC PANEL: CPT | Performed by: INTERNAL MEDICINE

## 2018-06-28 PROCEDURE — 84165 PROTEIN E-PHORESIS SERUM: CPT | Performed by: INTERNAL MEDICINE

## 2018-06-28 NOTE — PROGRESS NOTES
Pt was seen and labs reviewed by Dr. Laverne Shen (MM labs pending). Pt seems to be tolerating treatment well, so will continue current dose of Revlimid. Pt to return in 5 weeks.

## 2018-06-29 LAB
ALBUMIN SERPL ELPH-MCNC: 3.6 G/DL (ref 3.2–5.6)
ALBUMIN/GLOB SERPL: 1.2 {RATIO}
ALPHA1 GLOB SERPL ELPH-MCNC: 0.28 G/DL (ref 0.1–0.4)
ALPHA2 GLOB SERPL ELPH-MCNC: 0.6 G/DL (ref 0.4–1.2)
B-GLOBULIN SERPL QL ELPH: 0.93 G/DL (ref 0.6–1.3)
GAMMA GLOB MFR SERPL ELPH: 1.29 G/DL (ref 0.5–1.6)
IGA SERPL-MCNC: 186 MG/DL (ref 85–499)
IGG SERPL-MCNC: 1297 MG/DL (ref 610–1616)
IGM SERPL-MCNC: 24 MG/DL (ref 35–242)
M PROTEIN SERPL ELPH-MCNC: 0.28 G/DL
PROT PATTERN SERPL ELPH-IMP: ABNORMAL
PROT PATTERN SPEC IFE-IMP: ABNORMAL
PROT SERPL-MCNC: 6.7 G/DL (ref 6.3–8.2)

## 2018-08-02 ENCOUNTER — HOSPITAL ENCOUNTER (OUTPATIENT)
Dept: LAB | Age: 83
Discharge: HOME | End: 2018-08-02
Payer: MEDICARE

## 2018-08-02 ENCOUNTER — PATIENT OUTREACH (OUTPATIENT)
Dept: CASE MANAGEMENT | Age: 83
End: 2018-08-02

## 2018-08-02 DIAGNOSIS — C90.00 MULTIPLE MYELOMA NOT HAVING ACHIEVED REMISSION (HCC): ICD-10-CM

## 2018-08-02 PROBLEM — Z95.0 S/P PLACEMENT OF CARDIAC PACEMAKER: Status: ACTIVE | Noted: 2018-08-02

## 2018-08-02 LAB
ALBUMIN SERPL-MCNC: 3.1 G/DL (ref 3.2–4.6)
ALBUMIN/GLOB SERPL: 0.9 {RATIO} (ref 1.2–3.5)
ALP SERPL-CCNC: 110 U/L (ref 50–136)
ALT SERPL-CCNC: 23 U/L (ref 12–65)
ANION GAP SERPL CALC-SCNC: 8 MMOL/L (ref 7–16)
AST SERPL-CCNC: 21 U/L (ref 15–37)
BASOPHILS # BLD: 0 K/UL (ref 0–0.2)
BASOPHILS NFR BLD: 1 % (ref 0–2)
BILIRUB SERPL-MCNC: 0.5 MG/DL (ref 0.2–1.1)
BUN SERPL-MCNC: 16 MG/DL (ref 8–23)
CALCIUM SERPL-MCNC: 8.4 MG/DL (ref 8.3–10.4)
CHLORIDE SERPL-SCNC: 107 MMOL/L (ref 98–107)
CO2 SERPL-SCNC: 27 MMOL/L (ref 21–32)
CREAT SERPL-MCNC: 1.05 MG/DL (ref 0.8–1.5)
DIFFERENTIAL METHOD BLD: ABNORMAL
EOSINOPHIL # BLD: 0.3 K/UL (ref 0–0.8)
EOSINOPHIL NFR BLD: 9 % (ref 0.5–7.8)
ERYTHROCYTE [DISTWIDTH] IN BLOOD BY AUTOMATED COUNT: 15.9 % (ref 11.9–14.6)
GLOBULIN SER CALC-MCNC: 3.5 G/DL (ref 2.3–3.5)
GLUCOSE SERPL-MCNC: 109 MG/DL (ref 65–100)
HCT VFR BLD AUTO: 36.4 % (ref 41.1–50.3)
HGB BLD-MCNC: 11.7 G/DL (ref 13.6–17.2)
IGA SERPL-MCNC: 202 MG/DL (ref 70–400)
IGG SERPL-MCNC: 1280 MG/DL (ref 700–1600)
IGM SERPL-MCNC: 16 MG/DL (ref 40–230)
LYMPHOCYTES # BLD: 1.1 K/UL (ref 0.5–4.6)
LYMPHOCYTES NFR BLD: 34 % (ref 13–44)
MCH RBC QN AUTO: 30.1 PG (ref 26.1–32.9)
MCHC RBC AUTO-ENTMCNC: 32.1 G/DL (ref 31.4–35)
MCV RBC AUTO: 93.6 FL (ref 79.6–97.8)
MONOCYTES # BLD: 0.5 K/UL (ref 0.1–1.3)
MONOCYTES NFR BLD: 14 % (ref 4–12)
NEUTS SEG # BLD: 1.3 K/UL (ref 1.7–8.2)
NEUTS SEG NFR BLD: 42 % (ref 43–78)
NRBC # BLD: 0 K/UL (ref 0–0.2)
PLATELET # BLD AUTO: 122 K/UL (ref 150–450)
PMV BLD AUTO: 9.9 FL (ref 10.8–14.1)
POTASSIUM SERPL-SCNC: 3.3 MMOL/L (ref 3.5–5.1)
PROT SERPL-MCNC: 6.6 G/DL (ref 6.3–8.2)
RBC # BLD AUTO: 3.89 M/UL (ref 4.23–5.67)
SODIUM SERPL-SCNC: 142 MMOL/L (ref 136–145)
WBC # BLD AUTO: 3.2 K/UL (ref 4.3–11.1)

## 2018-08-02 PROCEDURE — 80053 COMPREHEN METABOLIC PANEL: CPT

## 2018-08-02 PROCEDURE — 86334 IMMUNOFIX E-PHORESIS SERUM: CPT

## 2018-08-02 PROCEDURE — 85025 COMPLETE CBC W/AUTO DIFF WBC: CPT

## 2018-08-02 PROCEDURE — 82784 ASSAY IGA/IGD/IGG/IGM EACH: CPT

## 2018-08-02 NOTE — PROGRESS NOTES
Pt was seen and labs reviewed by Dr. Eva Santana. Pt has achieved a VGPR on treatment. Will continue current dose of Revlimid. Potassium 3.3,  Pt instructed to eat foods high in potassium and continue KDUR. Lab results sent with patient to give to staff at [facility]. Pt to return to clinic on 9/6.

## 2018-08-03 LAB
ALBUMIN SERPL ELPH-MCNC: 3.38 G/DL (ref 3.2–5.6)
ALBUMIN/GLOB SERPL: 1.2 {RATIO}
ALPHA1 GLOB SERPL ELPH-MCNC: 0.24 G/DL (ref 0.1–0.4)
ALPHA2 GLOB SERPL ELPH-MCNC: 0.56 G/DL (ref 0.4–1.2)
B-GLOBULIN SERPL QL ELPH: 0.88 G/DL (ref 0.6–1.3)
GAMMA GLOB MFR SERPL ELPH: 1.24 G/DL (ref 0.5–1.6)
IGA SERPL-MCNC: ABNORMAL MG/DL (ref 85–499)
IGG SERPL-MCNC: ABNORMAL MG/DL (ref 610–1616)
IGM SERPL-MCNC: ABNORMAL MG/DL (ref 35–242)
M PROTEIN SERPL ELPH-MCNC: 0.37 G/DL
PROT PATTERN SERPL ELPH-IMP: ABNORMAL
PROT PATTERN SPEC IFE-IMP: ABNORMAL
PROT SERPL-MCNC: 6.3 G/DL (ref 6.3–8.2)

## 2018-08-08 PROBLEM — R35.0 URINARY FREQUENCY: Status: ACTIVE | Noted: 2018-08-08

## 2018-09-06 ENCOUNTER — HOSPITAL ENCOUNTER (OUTPATIENT)
Dept: LAB | Age: 83
Discharge: HOME | End: 2018-09-06
Payer: MEDICARE

## 2018-09-06 ENCOUNTER — PATIENT OUTREACH (OUTPATIENT)
Dept: CASE MANAGEMENT | Age: 83
End: 2018-09-06

## 2018-09-06 DIAGNOSIS — C90.00 MULTIPLE MYELOMA NOT HAVING ACHIEVED REMISSION (HCC): ICD-10-CM

## 2018-09-06 LAB
ALBUMIN SERPL-MCNC: 3 G/DL (ref 3.2–4.6)
ALBUMIN/GLOB SERPL: 0.8 {RATIO} (ref 1.2–3.5)
ALP SERPL-CCNC: 100 U/L (ref 50–136)
ALT SERPL-CCNC: 22 U/L (ref 12–65)
ANION GAP SERPL CALC-SCNC: 6 MMOL/L (ref 7–16)
AST SERPL-CCNC: 19 U/L (ref 15–37)
BASOPHILS # BLD: 0.1 K/UL (ref 0–0.2)
BASOPHILS NFR BLD: 2 % (ref 0–2)
BILIRUB SERPL-MCNC: 0.4 MG/DL (ref 0.2–1.1)
BUN SERPL-MCNC: 18 MG/DL (ref 8–23)
CALCIUM SERPL-MCNC: 8.2 MG/DL (ref 8.3–10.4)
CHLORIDE SERPL-SCNC: 111 MMOL/L (ref 98–107)
CO2 SERPL-SCNC: 27 MMOL/L (ref 21–32)
CREAT SERPL-MCNC: 1.07 MG/DL (ref 0.8–1.5)
DIFFERENTIAL METHOD BLD: ABNORMAL
EOSINOPHIL # BLD: 0.2 K/UL (ref 0–0.8)
EOSINOPHIL NFR BLD: 7 % (ref 0.5–7.8)
ERYTHROCYTE [DISTWIDTH] IN BLOOD BY AUTOMATED COUNT: 16 % (ref 11.9–14.6)
GLOBULIN SER CALC-MCNC: 3.8 G/DL (ref 2.3–3.5)
GLUCOSE SERPL-MCNC: 96 MG/DL (ref 65–100)
HCT VFR BLD AUTO: 37.1 % (ref 41.1–50.3)
HGB BLD-MCNC: 11.9 G/DL (ref 13.6–17.2)
IGA SERPL-MCNC: 202 MG/DL (ref 70–400)
IGG SERPL-MCNC: 1190 MG/DL (ref 700–1600)
IGM SERPL-MCNC: 14 MG/DL (ref 40–230)
IMM GRANULOCYTES # BLD: 0 K/UL (ref 0–0.5)
IMM GRANULOCYTES NFR BLD AUTO: 0 % (ref 0–5)
LYMPHOCYTES # BLD: 1.2 K/UL (ref 0.5–4.6)
LYMPHOCYTES NFR BLD: 37 % (ref 13–44)
MAGNESIUM SERPL-MCNC: 2.2 MG/DL (ref 1.8–2.4)
MCH RBC QN AUTO: 30.3 PG (ref 26.1–32.9)
MCHC RBC AUTO-ENTMCNC: 32.1 G/DL (ref 31.4–35)
MCV RBC AUTO: 94.4 FL (ref 79.6–97.8)
MONOCYTES # BLD: 0.6 K/UL (ref 0.1–1.3)
MONOCYTES NFR BLD: 17 % (ref 4–12)
NEUTS SEG # BLD: 1.3 K/UL (ref 1.7–8.2)
NEUTS SEG NFR BLD: 38 % (ref 43–78)
NRBC # BLD: 0 K/UL (ref 0–0.2)
PLATELET # BLD AUTO: 118 K/UL (ref 150–450)
PMV BLD AUTO: 9.6 FL (ref 9.4–12.3)
POTASSIUM SERPL-SCNC: 3.9 MMOL/L (ref 3.5–5.1)
PROT SERPL-MCNC: 6.8 G/DL (ref 6.3–8.2)
RBC # BLD AUTO: 3.93 M/UL (ref 4.23–5.67)
SODIUM SERPL-SCNC: 144 MMOL/L (ref 136–145)
WBC # BLD AUTO: 3.4 K/UL (ref 4.3–11.1)

## 2018-09-06 PROCEDURE — 80053 COMPREHEN METABOLIC PANEL: CPT

## 2018-09-06 PROCEDURE — 85025 COMPLETE CBC W/AUTO DIFF WBC: CPT

## 2018-09-06 PROCEDURE — 82784 ASSAY IGA/IGD/IGG/IGM EACH: CPT

## 2018-09-06 PROCEDURE — 86334 IMMUNOFIX E-PHORESIS SERUM: CPT

## 2018-09-06 PROCEDURE — 83735 ASSAY OF MAGNESIUM: CPT

## 2018-09-06 NOTE — PROGRESS NOTES
Pt was seen and labs reviewed by Dr. Blair Her. MM labs remaining stable, so will current dose of Revlimid. New script for Acyclovir given for pt to take back to [facility]. Pt to return to clinic in 5 weeks.

## 2018-09-07 LAB
ALBUMIN SERPL ELPH-MCNC: 3.18 G/DL (ref 3.2–5.6)
ALBUMIN/GLOB SERPL: 1 {RATIO}
ALPHA1 GLOB SERPL ELPH-MCNC: 0.29 G/DL (ref 0.1–0.4)
ALPHA2 GLOB SERPL ELPH-MCNC: 0.69 G/DL (ref 0.4–1.2)
B-GLOBULIN SERPL QL ELPH: 1.02 G/DL (ref 0.6–1.3)
GAMMA GLOB MFR SERPL ELPH: 1.11 G/DL (ref 0.5–1.6)
IGA SERPL-MCNC: ABNORMAL MG/DL (ref 85–499)
IGG SERPL-MCNC: ABNORMAL MG/DL (ref 610–1616)
IGM SERPL-MCNC: ABNORMAL MG/DL (ref 35–242)
M PROTEIN SERPL ELPH-MCNC: 0.17 G/DL
PROT PATTERN SERPL ELPH-IMP: ABNORMAL
PROT PATTERN SPEC IFE-IMP: ABNORMAL
PROT SERPL-MCNC: 6.3 G/DL (ref 6.3–8.2)

## 2018-10-11 ENCOUNTER — HOSPITAL ENCOUNTER (OUTPATIENT)
Dept: LAB | Age: 83
Discharge: HOME | End: 2018-10-11
Payer: MEDICARE

## 2018-10-11 ENCOUNTER — PATIENT OUTREACH (OUTPATIENT)
Dept: CASE MANAGEMENT | Age: 83
End: 2018-10-11

## 2018-10-11 DIAGNOSIS — C90.00 MULTIPLE MYELOMA NOT HAVING ACHIEVED REMISSION (HCC): ICD-10-CM

## 2018-10-11 LAB
ALBUMIN SERPL-MCNC: 3.4 G/DL (ref 3.2–4.6)
ALBUMIN/GLOB SERPL: 0.9 {RATIO} (ref 1.2–3.5)
ALP SERPL-CCNC: 98 U/L (ref 50–136)
ALT SERPL-CCNC: 27 U/L (ref 12–65)
ANION GAP SERPL CALC-SCNC: 7 MMOL/L (ref 7–16)
AST SERPL-CCNC: 22 U/L (ref 15–37)
BASOPHILS # BLD: 0.1 K/UL (ref 0–0.2)
BASOPHILS NFR BLD: 2 % (ref 0–2)
BILIRUB SERPL-MCNC: 0.5 MG/DL (ref 0.2–1.1)
BUN SERPL-MCNC: 16 MG/DL (ref 8–23)
CALCIUM SERPL-MCNC: 8.7 MG/DL (ref 8.3–10.4)
CHLORIDE SERPL-SCNC: 105 MMOL/L (ref 98–107)
CO2 SERPL-SCNC: 29 MMOL/L (ref 21–32)
CREAT SERPL-MCNC: 1.06 MG/DL (ref 0.8–1.5)
DIFFERENTIAL METHOD BLD: ABNORMAL
EOSINOPHIL # BLD: 0.3 K/UL (ref 0–0.8)
EOSINOPHIL NFR BLD: 8 % (ref 0.5–7.8)
ERYTHROCYTE [DISTWIDTH] IN BLOOD BY AUTOMATED COUNT: 16.6 % (ref 11.9–14.6)
GLOBULIN SER CALC-MCNC: 3.6 G/DL (ref 2.3–3.5)
GLUCOSE SERPL-MCNC: 112 MG/DL (ref 65–100)
HCT VFR BLD AUTO: 37.5 % (ref 41.1–50.3)
HGB BLD-MCNC: 12.1 G/DL (ref 13.6–17.2)
IMM GRANULOCYTES # BLD: 0 K/UL (ref 0–0.5)
IMM GRANULOCYTES NFR BLD AUTO: 0 % (ref 0–5)
LYMPHOCYTES # BLD: 1 K/UL (ref 0.5–4.6)
LYMPHOCYTES NFR BLD: 32 % (ref 13–44)
MAGNESIUM SERPL-MCNC: 2.3 MG/DL (ref 1.8–2.4)
MCH RBC QN AUTO: 30.7 PG (ref 26.1–32.9)
MCHC RBC AUTO-ENTMCNC: 32.3 G/DL (ref 31.4–35)
MCV RBC AUTO: 95.2 FL (ref 79.6–97.8)
MONOCYTES # BLD: 0.5 K/UL (ref 0.1–1.3)
MONOCYTES NFR BLD: 15 % (ref 4–12)
NEUTS SEG # BLD: 1.4 K/UL (ref 1.7–8.2)
NEUTS SEG NFR BLD: 43 % (ref 43–78)
NRBC # BLD: 0 K/UL (ref 0–0.2)
PLATELET # BLD AUTO: 110 K/UL (ref 150–450)
PMV BLD AUTO: 9.9 FL (ref 9.4–12.3)
POTASSIUM SERPL-SCNC: 3.8 MMOL/L (ref 3.5–5.1)
PROT SERPL-MCNC: 7 G/DL (ref 6.3–8.2)
RBC # BLD AUTO: 3.94 M/UL (ref 4.23–5.67)
SODIUM SERPL-SCNC: 141 MMOL/L (ref 136–145)
WBC # BLD AUTO: 3.2 K/UL (ref 4.3–11.1)

## 2018-10-11 PROCEDURE — 86334 IMMUNOFIX E-PHORESIS SERUM: CPT

## 2018-10-11 PROCEDURE — 80053 COMPREHEN METABOLIC PANEL: CPT

## 2018-10-11 PROCEDURE — 85025 COMPLETE CBC W/AUTO DIFF WBC: CPT

## 2018-10-11 PROCEDURE — 82784 ASSAY IGA/IGD/IGG/IGM EACH: CPT

## 2018-10-11 PROCEDURE — 83735 ASSAY OF MAGNESIUM: CPT

## 2018-10-11 NOTE — PROGRESS NOTES
Pt was seen and labs reviewed by Dr. Brenenn Moss. Pt continues to do well on current regimen, so will continue. Pt stated his NP at his facility is trying to reduce the number of medications he is taking. Dr. Brennen Moss says that patient may have the shingles vaccine and afterwards he may discontinue the Acyclovir. Pt to return to clinic in 4 weeks with labs.

## 2018-10-12 LAB
ALBUMIN SERPL ELPH-MCNC: 3.58 G/DL (ref 3.2–5.6)
ALBUMIN/GLOB SERPL: 1.2 {RATIO}
ALPHA1 GLOB SERPL ELPH-MCNC: 0.27 G/DL (ref 0.1–0.4)
ALPHA2 GLOB SERPL ELPH-MCNC: 0.61 G/DL (ref 0.4–1.2)
B-GLOBULIN SERPL QL ELPH: 0.96 G/DL (ref 0.6–1.3)
GAMMA GLOB MFR SERPL ELPH: 1.28 G/DL (ref 0.5–1.6)
IGA SERPL-MCNC: 207 MG/DL (ref 85–499)
IGG SERPL-MCNC: 1270 MG/DL (ref 610–1616)
IGM SERPL-MCNC: 18 MG/DL (ref 35–242)
M PROTEIN SERPL ELPH-MCNC: 0.41 G/DL
PROT PATTERN SERPL ELPH-IMP: ABNORMAL
PROT PATTERN SPEC IFE-IMP: ABNORMAL
PROT SERPL-MCNC: 6.7 G/DL (ref 6.3–8.2)

## 2018-11-08 ENCOUNTER — HOSPITAL ENCOUNTER (OUTPATIENT)
Dept: LAB | Age: 83
Discharge: HOME | End: 2018-11-08
Payer: MEDICARE

## 2018-11-08 ENCOUNTER — PATIENT OUTREACH (OUTPATIENT)
Dept: CASE MANAGEMENT | Age: 83
End: 2018-11-08

## 2018-11-08 DIAGNOSIS — C90.00 MULTIPLE MYELOMA NOT HAVING ACHIEVED REMISSION (HCC): ICD-10-CM

## 2018-11-08 LAB
ALBUMIN SERPL-MCNC: 3.1 G/DL (ref 3.2–4.6)
ALBUMIN/GLOB SERPL: 0.9 {RATIO} (ref 1.2–3.5)
ALP SERPL-CCNC: 93 U/L (ref 50–136)
ALT SERPL-CCNC: 23 U/L (ref 12–65)
ANION GAP SERPL CALC-SCNC: 5 MMOL/L (ref 7–16)
AST SERPL-CCNC: 15 U/L (ref 15–37)
BASOPHILS # BLD: 0.1 K/UL (ref 0–0.2)
BASOPHILS NFR BLD: 2 % (ref 0–2)
BILIRUB SERPL-MCNC: 0.3 MG/DL (ref 0.2–1.1)
BUN SERPL-MCNC: 19 MG/DL (ref 8–23)
CALCIUM SERPL-MCNC: 8.4 MG/DL (ref 8.3–10.4)
CHLORIDE SERPL-SCNC: 109 MMOL/L (ref 98–107)
CO2 SERPL-SCNC: 28 MMOL/L (ref 21–32)
CREAT SERPL-MCNC: 1.03 MG/DL (ref 0.8–1.5)
DIFFERENTIAL METHOD BLD: ABNORMAL
EOSINOPHIL # BLD: 0.2 K/UL (ref 0–0.8)
EOSINOPHIL NFR BLD: 7 % (ref 0.5–7.8)
ERYTHROCYTE [DISTWIDTH] IN BLOOD BY AUTOMATED COUNT: 17 % (ref 11.9–14.6)
GLOBULIN SER CALC-MCNC: 3.4 G/DL (ref 2.3–3.5)
GLUCOSE SERPL-MCNC: 96 MG/DL (ref 65–100)
HCT VFR BLD AUTO: 35.9 % (ref 41.1–50.3)
HGB BLD-MCNC: 11.6 G/DL (ref 13.6–17.2)
IMM GRANULOCYTES # BLD: 0 K/UL (ref 0–0.5)
IMM GRANULOCYTES NFR BLD AUTO: 1 % (ref 0–5)
LYMPHOCYTES # BLD: 1 K/UL (ref 0.5–4.6)
LYMPHOCYTES NFR BLD: 33 % (ref 13–44)
MCH RBC QN AUTO: 30.9 PG (ref 26.1–32.9)
MCHC RBC AUTO-ENTMCNC: 32.3 G/DL (ref 31.4–35)
MCV RBC AUTO: 95.7 FL (ref 79.6–97.8)
MONOCYTES # BLD: 0.5 K/UL (ref 0.1–1.3)
MONOCYTES NFR BLD: 18 % (ref 4–12)
NEUTS SEG # BLD: 1.2 K/UL (ref 1.7–8.2)
NEUTS SEG NFR BLD: 40 % (ref 43–78)
NRBC # BLD: 0.01 K/UL (ref 0–0.2)
PLATELET # BLD AUTO: 115 K/UL (ref 150–450)
PMV BLD AUTO: 9.7 FL (ref 9.4–12.3)
POTASSIUM SERPL-SCNC: 4.2 MMOL/L (ref 3.5–5.1)
PROT SERPL-MCNC: 6.5 G/DL (ref 6.3–8.2)
RBC # BLD AUTO: 3.75 M/UL (ref 4.23–5.67)
SODIUM SERPL-SCNC: 142 MMOL/L (ref 136–145)
WBC # BLD AUTO: 3.1 K/UL (ref 4.3–11.1)

## 2018-11-08 PROCEDURE — 80053 COMPREHEN METABOLIC PANEL: CPT

## 2018-11-08 PROCEDURE — 86334 IMMUNOFIX E-PHORESIS SERUM: CPT

## 2018-11-08 PROCEDURE — 82784 ASSAY IGA/IGD/IGG/IGM EACH: CPT

## 2018-11-08 PROCEDURE — 85025 COMPLETE CBC W/AUTO DIFF WBC: CPT

## 2018-11-09 LAB
ALBUMIN SERPL ELPH-MCNC: 3.29 G/DL (ref 3.2–5.6)
ALBUMIN/GLOB SERPL: 1.1 {RATIO}
ALPHA1 GLOB SERPL ELPH-MCNC: 0.22 G/DL (ref 0.1–0.4)
ALPHA2 GLOB SERPL ELPH-MCNC: 0.5 G/DL (ref 0.4–1.2)
B-GLOBULIN SERPL QL ELPH: 1.05 G/DL (ref 0.6–1.3)
GAMMA GLOB MFR SERPL ELPH: 1.23 G/DL (ref 0.5–1.6)
IGA SERPL-MCNC: 213 MG/DL (ref 85–499)
IGG SERPL-MCNC: 1167 MG/DL (ref 610–1616)
IGM SERPL-MCNC: 11 MG/DL (ref 35–242)
M PROTEIN SERPL ELPH-MCNC: 0.13 G/DL
PROT PATTERN SERPL ELPH-IMP: ABNORMAL
PROT PATTERN SPEC IFE-IMP: ABNORMAL
PROT SERPL-MCNC: 6.3 G/DL (ref 6.3–8.2)

## 2018-12-13 ENCOUNTER — HOSPITAL ENCOUNTER (OUTPATIENT)
Dept: LAB | Age: 83
Discharge: HOME | End: 2018-12-13
Payer: MEDICARE

## 2018-12-13 ENCOUNTER — PATIENT OUTREACH (OUTPATIENT)
Dept: CASE MANAGEMENT | Age: 83
End: 2018-12-13

## 2018-12-13 DIAGNOSIS — C90.00 MULTIPLE MYELOMA NOT HAVING ACHIEVED REMISSION (HCC): ICD-10-CM

## 2018-12-13 LAB
ALBUMIN SERPL-MCNC: 3 G/DL (ref 3.2–4.6)
ALBUMIN/GLOB SERPL: 0.8 {RATIO} (ref 1.2–3.5)
ALP SERPL-CCNC: 92 U/L (ref 50–136)
ALT SERPL-CCNC: 15 U/L (ref 12–65)
ANION GAP SERPL CALC-SCNC: 6 MMOL/L (ref 7–16)
AST SERPL-CCNC: 10 U/L (ref 15–37)
BASOPHILS # BLD: 0.1 K/UL (ref 0–0.2)
BASOPHILS NFR BLD: 1 % (ref 0–2)
BILIRUB SERPL-MCNC: 0.4 MG/DL (ref 0.2–1.1)
BUN SERPL-MCNC: 18 MG/DL (ref 8–23)
CALCIUM SERPL-MCNC: 8.6 MG/DL (ref 8.3–10.4)
CHLORIDE SERPL-SCNC: 110 MMOL/L (ref 98–107)
CO2 SERPL-SCNC: 27 MMOL/L (ref 21–32)
CREAT SERPL-MCNC: 1.12 MG/DL (ref 0.8–1.5)
DIFFERENTIAL METHOD BLD: ABNORMAL
EOSINOPHIL # BLD: 0.4 K/UL (ref 0–0.8)
EOSINOPHIL NFR BLD: 8 % (ref 0.5–7.8)
ERYTHROCYTE [DISTWIDTH] IN BLOOD BY AUTOMATED COUNT: 16 % (ref 11.9–14.6)
GLOBULIN SER CALC-MCNC: 3.8 G/DL (ref 2.3–3.5)
GLUCOSE SERPL-MCNC: 112 MG/DL (ref 65–100)
HCT VFR BLD AUTO: 36.4 % (ref 41.1–50.3)
HGB BLD-MCNC: 11.7 G/DL (ref 13.6–17.2)
IMM GRANULOCYTES # BLD: 0 K/UL (ref 0–0.5)
IMM GRANULOCYTES NFR BLD AUTO: 0 % (ref 0–5)
LYMPHOCYTES # BLD: 1.1 K/UL (ref 0.5–4.6)
LYMPHOCYTES NFR BLD: 24 % (ref 13–44)
MAGNESIUM SERPL-MCNC: 2.4 MG/DL (ref 1.8–2.4)
MCH RBC QN AUTO: 31.1 PG (ref 26.1–32.9)
MCHC RBC AUTO-ENTMCNC: 32.1 G/DL (ref 31.4–35)
MCV RBC AUTO: 96.8 FL (ref 79.6–97.8)
MONOCYTES # BLD: 0.4 K/UL (ref 0.1–1.3)
MONOCYTES NFR BLD: 10 % (ref 4–12)
NEUTS SEG # BLD: 2.6 K/UL (ref 1.7–8.2)
NEUTS SEG NFR BLD: 56 % (ref 43–78)
NRBC # BLD: 0 K/UL (ref 0–0.2)
PLATELET # BLD AUTO: 146 K/UL (ref 150–450)
PMV BLD AUTO: 9.9 FL (ref 9.4–12.3)
POTASSIUM SERPL-SCNC: 3.9 MMOL/L (ref 3.5–5.1)
PROT SERPL-MCNC: 6.8 G/DL (ref 6.3–8.2)
RBC # BLD AUTO: 3.76 M/UL (ref 4.23–5.67)
SODIUM SERPL-SCNC: 143 MMOL/L (ref 136–145)
WBC # BLD AUTO: 4.6 K/UL (ref 4.3–11.1)

## 2018-12-13 PROCEDURE — 83735 ASSAY OF MAGNESIUM: CPT

## 2018-12-13 PROCEDURE — 86334 IMMUNOFIX E-PHORESIS SERUM: CPT

## 2018-12-13 PROCEDURE — 85025 COMPLETE CBC W/AUTO DIFF WBC: CPT

## 2018-12-13 PROCEDURE — 82784 ASSAY IGA/IGD/IGG/IGM EACH: CPT

## 2018-12-13 PROCEDURE — 80053 COMPREHEN METABOLIC PANEL: CPT

## 2018-12-13 NOTE — PROGRESS NOTES
Pt was seen and labs reviewed by Dr. Niranjan Carrizales. Pt is still responding to current treatment, so will continue. Pt will return to clinic in 5 weeks with labs.

## 2018-12-14 LAB
ALBUMIN SERPL ELPH-MCNC: 3.16 G/DL (ref 3.2–5.6)
ALBUMIN/GLOB SERPL: 1 {RATIO}
ALPHA1 GLOB SERPL ELPH-MCNC: 0.27 G/DL (ref 0.1–0.4)
ALPHA2 GLOB SERPL ELPH-MCNC: 0.68 G/DL (ref 0.4–1.2)
B-GLOBULIN SERPL QL ELPH: 1 G/DL (ref 0.6–1.3)
GAMMA GLOB MFR SERPL ELPH: 1.28 G/DL (ref 0.5–1.6)
IGA SERPL-MCNC: 261 MG/DL (ref 85–499)
IGG SERPL-MCNC: 1169 MG/DL (ref 610–1616)
IGM SERPL-MCNC: 14 MG/DL (ref 35–242)
M PROTEIN SERPL ELPH-MCNC: 0.26 G/DL
PROT PATTERN SERPL ELPH-IMP: ABNORMAL
PROT PATTERN SPEC IFE-IMP: ABNORMAL
PROT SERPL-MCNC: 6.4 G/DL (ref 6.3–8.2)

## 2019-01-17 ENCOUNTER — PATIENT OUTREACH (OUTPATIENT)
Dept: CASE MANAGEMENT | Age: 84
End: 2019-01-17

## 2019-01-17 ENCOUNTER — HOSPITAL ENCOUNTER (OUTPATIENT)
Dept: LAB | Age: 84
Discharge: HOME | End: 2019-01-17
Payer: MEDICARE

## 2019-01-17 DIAGNOSIS — C90.00 MULTIPLE MYELOMA NOT HAVING ACHIEVED REMISSION (HCC): ICD-10-CM

## 2019-01-17 LAB
ALBUMIN SERPL-MCNC: 3.1 G/DL (ref 3.2–4.6)
ALBUMIN/GLOB SERPL: 0.9 {RATIO} (ref 1.2–3.5)
ALP SERPL-CCNC: 100 U/L (ref 50–136)
ALT SERPL-CCNC: 20 U/L (ref 12–65)
ANION GAP SERPL CALC-SCNC: 2 MMOL/L (ref 7–16)
AST SERPL-CCNC: 15 U/L (ref 15–37)
BASOPHILS # BLD: 0.1 K/UL (ref 0–0.2)
BASOPHILS NFR BLD: 1 % (ref 0–2)
BILIRUB SERPL-MCNC: 0.3 MG/DL (ref 0.2–1.1)
BUN SERPL-MCNC: 21 MG/DL (ref 8–23)
CALCIUM SERPL-MCNC: 8.1 MG/DL (ref 8.3–10.4)
CHLORIDE SERPL-SCNC: 111 MMOL/L (ref 98–107)
CO2 SERPL-SCNC: 30 MMOL/L (ref 21–32)
CREAT SERPL-MCNC: 1.22 MG/DL (ref 0.8–1.5)
DIFFERENTIAL METHOD BLD: ABNORMAL
EOSINOPHIL # BLD: 0.4 K/UL (ref 0–0.8)
EOSINOPHIL NFR BLD: 10 % (ref 0.5–7.8)
ERYTHROCYTE [DISTWIDTH] IN BLOOD BY AUTOMATED COUNT: 15.4 % (ref 11.9–14.6)
GLOBULIN SER CALC-MCNC: 3.6 G/DL (ref 2.3–3.5)
GLUCOSE SERPL-MCNC: 131 MG/DL (ref 65–100)
HCT VFR BLD AUTO: 37 % (ref 41.1–50.3)
HGB BLD-MCNC: 12 G/DL (ref 13.6–17.2)
IMM GRANULOCYTES # BLD AUTO: 0 K/UL (ref 0–0.5)
IMM GRANULOCYTES NFR BLD AUTO: 0 % (ref 0–5)
LYMPHOCYTES # BLD: 1 K/UL (ref 0.5–4.6)
LYMPHOCYTES NFR BLD: 26 % (ref 13–44)
MAGNESIUM SERPL-MCNC: 2.4 MG/DL (ref 1.8–2.4)
MCH RBC QN AUTO: 31.2 PG (ref 26.1–32.9)
MCHC RBC AUTO-ENTMCNC: 32.4 G/DL (ref 31.4–35)
MCV RBC AUTO: 96.1 FL (ref 79.6–97.8)
MONOCYTES # BLD: 0.3 K/UL (ref 0.1–1.3)
MONOCYTES NFR BLD: 8 % (ref 4–12)
NEUTS SEG # BLD: 2 K/UL (ref 1.7–8.2)
NEUTS SEG NFR BLD: 54 % (ref 43–78)
NRBC # BLD: 0 K/UL (ref 0–0.2)
PLATELET # BLD AUTO: 118 K/UL (ref 150–450)
PMV BLD AUTO: 10.1 FL (ref 9.4–12.3)
POTASSIUM SERPL-SCNC: 3.8 MMOL/L (ref 3.5–5.1)
PROT SERPL-MCNC: 6.7 G/DL (ref 6.3–8.2)
RBC # BLD AUTO: 3.85 M/UL (ref 4.23–5.67)
SODIUM SERPL-SCNC: 143 MMOL/L (ref 136–145)
WBC # BLD AUTO: 3.6 K/UL (ref 4.3–11.1)

## 2019-01-17 PROCEDURE — 84165 PROTEIN E-PHORESIS SERUM: CPT

## 2019-01-17 PROCEDURE — 86334 IMMUNOFIX E-PHORESIS SERUM: CPT

## 2019-01-17 PROCEDURE — 80053 COMPREHEN METABOLIC PANEL: CPT

## 2019-01-17 PROCEDURE — 85025 COMPLETE CBC W/AUTO DIFF WBC: CPT

## 2019-01-17 PROCEDURE — 83735 ASSAY OF MAGNESIUM: CPT

## 2019-01-17 NOTE — PROGRESS NOTES
Pt was seen and labs reviewed by Dr. Maria Eugenia Allen. Labs are remaining stable, will continue current regimen. Pt will return to clinic in 4 weeks with labs.

## 2019-01-18 LAB
ALBUMIN SERPL ELPH-MCNC: 3.19 G/DL (ref 3.2–5.6)
ALBUMIN/GLOB SERPL: 1 {RATIO}
ALPHA1 GLOB SERPL ELPH-MCNC: 0.23 G/DL (ref 0.1–0.4)
ALPHA2 GLOB SERPL ELPH-MCNC: 0.59 G/DL (ref 0.4–1.2)
B-GLOBULIN SERPL QL ELPH: 0.98 G/DL (ref 0.6–1.3)
GAMMA GLOB MFR SERPL ELPH: 1.32 G/DL (ref 0.5–1.6)
IGA SERPL-MCNC: 285 MG/DL (ref 85–499)
IGG SERPL-MCNC: 1205 MG/DL (ref 610–1616)
IGM SERPL-MCNC: 19 MG/DL (ref 35–242)
M PROTEIN SERPL ELPH-MCNC: 0.38 G/DL
PROT PATTERN SERPL ELPH-IMP: ABNORMAL
PROT PATTERN SPEC IFE-IMP: ABNORMAL
PROT SERPL-MCNC: 6.3 G/DL (ref 6.3–8.2)

## 2019-02-14 ENCOUNTER — PATIENT OUTREACH (OUTPATIENT)
Dept: CASE MANAGEMENT | Age: 84
End: 2019-02-14

## 2019-02-14 ENCOUNTER — HOSPITAL ENCOUNTER (OUTPATIENT)
Dept: LAB | Age: 84
Discharge: HOME | End: 2019-02-14
Payer: MEDICARE

## 2019-02-14 DIAGNOSIS — C90.00 MULTIPLE MYELOMA NOT HAVING ACHIEVED REMISSION (HCC): ICD-10-CM

## 2019-02-14 LAB
ALBUMIN SERPL-MCNC: 3.3 G/DL (ref 3.2–4.6)
ALBUMIN/GLOB SERPL: 0.8 {RATIO} (ref 1.2–3.5)
ALP SERPL-CCNC: 106 U/L (ref 50–136)
ALT SERPL-CCNC: 20 U/L (ref 12–65)
ANION GAP SERPL CALC-SCNC: 4 MMOL/L (ref 7–16)
AST SERPL-CCNC: 17 U/L (ref 15–37)
BASOPHILS # BLD: 0.1 K/UL (ref 0–0.2)
BASOPHILS NFR BLD: 2 % (ref 0–2)
BILIRUB SERPL-MCNC: 0.4 MG/DL (ref 0.2–1.1)
BUN SERPL-MCNC: 25 MG/DL (ref 8–23)
CALCIUM SERPL-MCNC: 8.2 MG/DL (ref 8.3–10.4)
CHLORIDE SERPL-SCNC: 112 MMOL/L (ref 98–107)
CO2 SERPL-SCNC: 28 MMOL/L (ref 21–32)
CREAT SERPL-MCNC: 1.37 MG/DL (ref 0.8–1.5)
DIFFERENTIAL METHOD BLD: ABNORMAL
EOSINOPHIL # BLD: 0.2 K/UL (ref 0–0.8)
EOSINOPHIL NFR BLD: 4 % (ref 0.5–7.8)
ERYTHROCYTE [DISTWIDTH] IN BLOOD BY AUTOMATED COUNT: 15.5 % (ref 11.9–14.6)
GLOBULIN SER CALC-MCNC: 3.9 G/DL (ref 2.3–3.5)
GLUCOSE SERPL-MCNC: 100 MG/DL (ref 65–100)
HCT VFR BLD AUTO: 36.9 % (ref 41.1–50.3)
HGB BLD-MCNC: 11.7 G/DL (ref 13.6–17.2)
IMM GRANULOCYTES # BLD AUTO: 0 K/UL (ref 0–0.5)
IMM GRANULOCYTES NFR BLD AUTO: 0 % (ref 0–5)
LYMPHOCYTES # BLD: 1.3 K/UL (ref 0.5–4.6)
LYMPHOCYTES NFR BLD: 30 % (ref 13–44)
MAGNESIUM SERPL-MCNC: 2.3 MG/DL (ref 1.8–2.4)
MCH RBC QN AUTO: 30.4 PG (ref 26.1–32.9)
MCHC RBC AUTO-ENTMCNC: 31.7 G/DL (ref 31.4–35)
MCV RBC AUTO: 95.8 FL (ref 79.6–97.8)
MONOCYTES # BLD: 0.7 K/UL (ref 0.1–1.3)
MONOCYTES NFR BLD: 14 % (ref 4–12)
NEUTS SEG # BLD: 2.2 K/UL (ref 1.7–8.2)
NEUTS SEG NFR BLD: 49 % (ref 43–78)
NRBC # BLD: 0 K/UL (ref 0–0.2)
PLATELET # BLD AUTO: 134 K/UL (ref 150–450)
PMV BLD AUTO: 9.6 FL (ref 9.4–12.3)
POTASSIUM SERPL-SCNC: 4.3 MMOL/L (ref 3.5–5.1)
PROT SERPL-MCNC: 7.2 G/DL (ref 6.3–8.2)
RBC # BLD AUTO: 3.85 M/UL (ref 4.23–5.67)
SODIUM SERPL-SCNC: 144 MMOL/L (ref 136–145)
WBC # BLD AUTO: 4.5 K/UL (ref 4.3–11.1)

## 2019-02-14 PROCEDURE — 84165 PROTEIN E-PHORESIS SERUM: CPT

## 2019-02-14 PROCEDURE — 83735 ASSAY OF MAGNESIUM: CPT

## 2019-02-14 PROCEDURE — 86334 IMMUNOFIX E-PHORESIS SERUM: CPT

## 2019-02-14 PROCEDURE — 85025 COMPLETE CBC W/AUTO DIFF WBC: CPT

## 2019-02-14 PROCEDURE — 80053 COMPREHEN METABOLIC PANEL: CPT

## 2019-02-14 NOTE — PROGRESS NOTES
Pt was seen and labs reviewed by Dr. Carley Pack. Pt is doing well and tolerating treatment well, will continue. Pt will return to clinic on 3/14 with labs.

## 2019-02-15 LAB
ALBUMIN SERPL ELPH-MCNC: 3.37 G/DL (ref 3.2–5.6)
ALBUMIN/GLOB SERPL: 1 {RATIO}
ALPHA1 GLOB SERPL ELPH-MCNC: 0.26 G/DL (ref 0.1–0.4)
ALPHA2 GLOB SERPL ELPH-MCNC: 0.65 G/DL (ref 0.4–1.2)
B-GLOBULIN SERPL QL ELPH: 1.1 G/DL (ref 0.6–1.3)
GAMMA GLOB MFR SERPL ELPH: 1.42 G/DL (ref 0.5–1.6)
IGA SERPL-MCNC: 255 MG/DL (ref 85–499)
IGG SERPL-MCNC: 1290 MG/DL (ref 610–1616)
IGM SERPL-MCNC: 18 MG/DL (ref 35–242)
M PROTEIN SERPL ELPH-MCNC: 0.24 G/DL
PROT PATTERN SERPL ELPH-IMP: ABNORMAL
PROT PATTERN SPEC IFE-IMP: ABNORMAL
PROT SERPL-MCNC: 6.8 G/DL (ref 6.3–8.2)

## 2019-03-21 ENCOUNTER — HOSPITAL ENCOUNTER (OUTPATIENT)
Dept: LAB | Age: 84
Discharge: HOME | End: 2019-03-21
Payer: MEDICARE

## 2019-03-21 DIAGNOSIS — C90.00 MULTIPLE MYELOMA NOT HAVING ACHIEVED REMISSION (HCC): ICD-10-CM

## 2019-03-21 LAB
ALBUMIN SERPL-MCNC: 3.1 G/DL (ref 3.2–4.6)
ALBUMIN/GLOB SERPL: 0.8 {RATIO} (ref 1.2–3.5)
ALP SERPL-CCNC: 99 U/L (ref 50–136)
ALT SERPL-CCNC: 20 U/L (ref 12–65)
ANION GAP SERPL CALC-SCNC: 5 MMOL/L (ref 7–16)
AST SERPL-CCNC: 14 U/L (ref 15–37)
BASOPHILS # BLD: 0.1 K/UL (ref 0–0.2)
BASOPHILS NFR BLD: 1 % (ref 0–2)
BILIRUB SERPL-MCNC: 0.6 MG/DL (ref 0.2–1.1)
BUN SERPL-MCNC: 19 MG/DL (ref 8–23)
CALCIUM SERPL-MCNC: 8.1 MG/DL (ref 8.3–10.4)
CHLORIDE SERPL-SCNC: 111 MMOL/L (ref 98–107)
CO2 SERPL-SCNC: 28 MMOL/L (ref 21–32)
CREAT SERPL-MCNC: 1.18 MG/DL (ref 0.8–1.5)
DIFFERENTIAL METHOD BLD: ABNORMAL
EOSINOPHIL # BLD: 0.2 K/UL (ref 0–0.8)
EOSINOPHIL NFR BLD: 5 % (ref 0.5–7.8)
ERYTHROCYTE [DISTWIDTH] IN BLOOD BY AUTOMATED COUNT: 15.4 % (ref 11.9–14.6)
GLOBULIN SER CALC-MCNC: 3.7 G/DL (ref 2.3–3.5)
GLUCOSE SERPL-MCNC: 115 MG/DL (ref 65–100)
HCT VFR BLD AUTO: 35.8 % (ref 41.1–50.3)
HGB BLD-MCNC: 11.5 G/DL (ref 13.6–17.2)
IMM GRANULOCYTES # BLD AUTO: 0 K/UL (ref 0–0.5)
IMM GRANULOCYTES NFR BLD AUTO: 0 % (ref 0–5)
LYMPHOCYTES # BLD: 1.1 K/UL (ref 0.5–4.6)
LYMPHOCYTES NFR BLD: 30 % (ref 13–44)
MAGNESIUM SERPL-MCNC: 2.2 MG/DL (ref 1.8–2.4)
MCH RBC QN AUTO: 30.2 PG (ref 26.1–32.9)
MCHC RBC AUTO-ENTMCNC: 32.1 G/DL (ref 31.4–35)
MCV RBC AUTO: 94 FL (ref 79.6–97.8)
MONOCYTES # BLD: 0.6 K/UL (ref 0.1–1.3)
MONOCYTES NFR BLD: 15 % (ref 4–12)
NEUTS SEG # BLD: 1.8 K/UL (ref 1.7–8.2)
NEUTS SEG NFR BLD: 49 % (ref 43–78)
NRBC # BLD: 0 K/UL (ref 0–0.2)
PLATELET # BLD AUTO: 118 K/UL (ref 150–450)
PMV BLD AUTO: 9.7 FL (ref 9.4–12.3)
POTASSIUM SERPL-SCNC: 3.7 MMOL/L (ref 3.5–5.1)
PROT SERPL-MCNC: 6.8 G/DL (ref 6.3–8.2)
RBC # BLD AUTO: 3.81 M/UL (ref 4.23–5.67)
SODIUM SERPL-SCNC: 144 MMOL/L (ref 136–145)
WBC # BLD AUTO: 3.7 K/UL (ref 4.3–11.1)

## 2019-03-21 PROCEDURE — 84165 PROTEIN E-PHORESIS SERUM: CPT

## 2019-03-21 PROCEDURE — 80053 COMPREHEN METABOLIC PANEL: CPT

## 2019-03-21 PROCEDURE — 86334 IMMUNOFIX E-PHORESIS SERUM: CPT

## 2019-03-21 PROCEDURE — 85025 COMPLETE CBC W/AUTO DIFF WBC: CPT

## 2019-03-21 PROCEDURE — 83735 ASSAY OF MAGNESIUM: CPT

## 2019-03-22 LAB
ALBUMIN SERPL ELPH-MCNC: 3.14 G/DL (ref 3.2–5.6)
ALBUMIN/GLOB SERPL: 1 {RATIO}
ALPHA1 GLOB SERPL ELPH-MCNC: 0.22 G/DL (ref 0.1–0.4)
ALPHA2 GLOB SERPL ELPH-MCNC: 0.67 G/DL (ref 0.4–1.2)
B-GLOBULIN SERPL QL ELPH: 1.15 G/DL (ref 0.6–1.3)
GAMMA GLOB MFR SERPL ELPH: 1.11 G/DL (ref 0.5–1.6)
IGA SERPL-MCNC: 267 MG/DL (ref 85–499)
IGG SERPL-MCNC: 1102 MG/DL (ref 610–1616)
IGM SERPL-MCNC: 16 MG/DL (ref 35–242)
M PROTEIN SERPL ELPH-MCNC: 0.07 G/DL
PROT PATTERN SERPL ELPH-IMP: ABNORMAL
PROT PATTERN SPEC IFE-IMP: ABNORMAL
PROT SERPL-MCNC: 6.3 G/DL (ref 6.3–8.2)

## 2019-04-25 ENCOUNTER — PATIENT OUTREACH (OUTPATIENT)
Dept: CASE MANAGEMENT | Age: 84
End: 2019-04-25

## 2019-04-25 ENCOUNTER — HOSPITAL ENCOUNTER (OUTPATIENT)
Dept: LAB | Age: 84
Discharge: HOME | End: 2019-04-25
Payer: MEDICARE

## 2019-04-25 DIAGNOSIS — C90.00 MULTIPLE MYELOMA NOT HAVING ACHIEVED REMISSION (HCC): ICD-10-CM

## 2019-04-25 LAB
ALBUMIN SERPL-MCNC: 3 G/DL (ref 3.2–4.6)
ALBUMIN/GLOB SERPL: 0.8 {RATIO} (ref 1.2–3.5)
ALP SERPL-CCNC: 115 U/L (ref 50–136)
ALT SERPL-CCNC: 19 U/L (ref 12–65)
ANION GAP SERPL CALC-SCNC: 7 MMOL/L (ref 7–16)
AST SERPL-CCNC: 15 U/L (ref 15–37)
BASOPHILS # BLD: 0.1 K/UL (ref 0–0.2)
BASOPHILS NFR BLD: 1 % (ref 0–2)
BILIRUB SERPL-MCNC: 0.3 MG/DL (ref 0.2–1.1)
BUN SERPL-MCNC: 16 MG/DL (ref 8–23)
CALCIUM SERPL-MCNC: 8.4 MG/DL (ref 8.3–10.4)
CHLORIDE SERPL-SCNC: 109 MMOL/L (ref 98–107)
CO2 SERPL-SCNC: 27 MMOL/L (ref 21–32)
CREAT SERPL-MCNC: 1.28 MG/DL (ref 0.8–1.5)
DIFFERENTIAL METHOD BLD: ABNORMAL
EOSINOPHIL # BLD: 0.4 K/UL (ref 0–0.8)
EOSINOPHIL NFR BLD: 10 % (ref 0.5–7.8)
ERYTHROCYTE [DISTWIDTH] IN BLOOD BY AUTOMATED COUNT: 14.9 % (ref 11.9–14.6)
GLOBULIN SER CALC-MCNC: 3.7 G/DL (ref 2.3–3.5)
GLUCOSE SERPL-MCNC: 93 MG/DL (ref 65–100)
HCT VFR BLD AUTO: 37.7 % (ref 41.1–50.3)
HGB BLD-MCNC: 11.9 G/DL (ref 13.6–17.2)
IMM GRANULOCYTES # BLD AUTO: 0 K/UL (ref 0–0.5)
IMM GRANULOCYTES NFR BLD AUTO: 1 % (ref 0–5)
LYMPHOCYTES # BLD: 1.1 K/UL (ref 0.5–4.6)
LYMPHOCYTES NFR BLD: 29 % (ref 13–44)
MAGNESIUM SERPL-MCNC: 2.3 MG/DL (ref 1.8–2.4)
MCH RBC QN AUTO: 29.3 PG (ref 26.1–32.9)
MCHC RBC AUTO-ENTMCNC: 31.6 G/DL (ref 31.4–35)
MCV RBC AUTO: 92.9 FL (ref 79.6–97.8)
MONOCYTES # BLD: 0.5 K/UL (ref 0.1–1.3)
MONOCYTES NFR BLD: 14 % (ref 4–12)
NEUTS SEG # BLD: 1.7 K/UL (ref 1.7–8.2)
NEUTS SEG NFR BLD: 45 % (ref 43–78)
NRBC # BLD: 0 K/UL (ref 0–0.2)
PLATELET # BLD AUTO: 105 K/UL (ref 150–450)
PMV BLD AUTO: 10.8 FL (ref 9.4–12.3)
POTASSIUM SERPL-SCNC: 3.7 MMOL/L (ref 3.5–5.1)
PROT SERPL-MCNC: 6.7 G/DL (ref 6.3–8.2)
RBC # BLD AUTO: 4.06 M/UL (ref 4.23–5.67)
SODIUM SERPL-SCNC: 143 MMOL/L (ref 136–145)
WBC # BLD AUTO: 3.8 K/UL (ref 4.3–11.1)

## 2019-04-25 PROCEDURE — 83735 ASSAY OF MAGNESIUM: CPT

## 2019-04-25 PROCEDURE — 86334 IMMUNOFIX E-PHORESIS SERUM: CPT

## 2019-04-25 PROCEDURE — 84165 PROTEIN E-PHORESIS SERUM: CPT

## 2019-04-25 PROCEDURE — 85025 COMPLETE CBC W/AUTO DIFF WBC: CPT

## 2019-04-25 PROCEDURE — 80053 COMPREHEN METABOLIC PANEL: CPT

## 2019-04-26 LAB
ALBUMIN SERPL ELPH-MCNC: 3.44 G/DL (ref 3.2–5.6)
ALBUMIN/GLOB SERPL: 1.2 {RATIO}
ALPHA1 GLOB SERPL ELPH-MCNC: 0.13 G/DL (ref 0.1–0.4)
ALPHA2 GLOB SERPL ELPH-MCNC: 0.45 G/DL (ref 0.4–1.2)
B-GLOBULIN SERPL QL ELPH: 0.86 G/DL (ref 0.6–1.3)
GAMMA GLOB MFR SERPL ELPH: 1.32 G/DL (ref 0.5–1.6)
IGA SERPL-MCNC: 280 MG/DL (ref 85–499)
IGG SERPL-MCNC: 1182 MG/DL (ref 610–1616)
IGM SERPL-MCNC: 17 MG/DL (ref 35–242)
M PROTEIN SERPL ELPH-MCNC: 0.26 G/DL
PROT PATTERN SERPL ELPH-IMP: ABNORMAL
PROT PATTERN SPEC IFE-IMP: ABNORMAL
PROT SERPL-MCNC: 6.2 G/DL (ref 6.3–8.2)

## 2019-05-23 ENCOUNTER — PATIENT OUTREACH (OUTPATIENT)
Dept: CASE MANAGEMENT | Age: 84
End: 2019-05-23

## 2019-05-23 ENCOUNTER — HOSPITAL ENCOUNTER (OUTPATIENT)
Dept: LAB | Age: 84
Discharge: HOME | End: 2019-05-23
Payer: MEDICARE

## 2019-05-23 DIAGNOSIS — C90.00 MULTIPLE MYELOMA NOT HAVING ACHIEVED REMISSION (HCC): ICD-10-CM

## 2019-05-23 LAB
ALBUMIN SERPL-MCNC: 3.1 G/DL (ref 3.2–4.6)
ALBUMIN/GLOB SERPL: 0.8 {RATIO} (ref 1.2–3.5)
ALP SERPL-CCNC: 106 U/L (ref 50–136)
ALT SERPL-CCNC: 19 U/L (ref 12–65)
ANION GAP SERPL CALC-SCNC: 7 MMOL/L (ref 7–16)
AST SERPL-CCNC: 16 U/L (ref 15–37)
BASOPHILS # BLD: 0.1 K/UL (ref 0–0.2)
BASOPHILS NFR BLD: 1 % (ref 0–2)
BILIRUB SERPL-MCNC: 0.5 MG/DL (ref 0.2–1.1)
BUN SERPL-MCNC: 17 MG/DL (ref 8–23)
CALCIUM SERPL-MCNC: 8.2 MG/DL (ref 8.3–10.4)
CHLORIDE SERPL-SCNC: 107 MMOL/L (ref 98–107)
CO2 SERPL-SCNC: 29 MMOL/L (ref 21–32)
CREAT SERPL-MCNC: 1.46 MG/DL (ref 0.8–1.5)
DIFFERENTIAL METHOD BLD: ABNORMAL
EOSINOPHIL # BLD: 0.4 K/UL (ref 0–0.8)
EOSINOPHIL NFR BLD: 10 % (ref 0.5–7.8)
ERYTHROCYTE [DISTWIDTH] IN BLOOD BY AUTOMATED COUNT: 15.8 % (ref 11.9–14.6)
GLOBULIN SER CALC-MCNC: 3.7 G/DL (ref 2.3–3.5)
GLUCOSE SERPL-MCNC: 121 MG/DL (ref 65–100)
HCT VFR BLD AUTO: 37.8 % (ref 41.1–50.3)
HGB BLD-MCNC: 12.1 G/DL (ref 13.6–17.2)
IMM GRANULOCYTES # BLD AUTO: 0 K/UL (ref 0–0.5)
IMM GRANULOCYTES NFR BLD AUTO: 0 % (ref 0–5)
LYMPHOCYTES # BLD: 1.1 K/UL (ref 0.5–4.6)
LYMPHOCYTES NFR BLD: 30 % (ref 13–44)
MAGNESIUM SERPL-MCNC: 2 MG/DL (ref 1.8–2.4)
MCH RBC QN AUTO: 29.1 PG (ref 26.1–32.9)
MCHC RBC AUTO-ENTMCNC: 32 G/DL (ref 31.4–35)
MCV RBC AUTO: 90.9 FL (ref 79.6–97.8)
MONOCYTES # BLD: 0.4 K/UL (ref 0.1–1.3)
MONOCYTES NFR BLD: 10 % (ref 4–12)
NEUTS SEG # BLD: 1.7 K/UL (ref 1.7–8.2)
NEUTS SEG NFR BLD: 48 % (ref 43–78)
NRBC # BLD: 0 K/UL (ref 0–0.2)
PLATELET # BLD AUTO: 111 K/UL (ref 150–450)
PMV BLD AUTO: 9.9 FL (ref 9.4–12.3)
POTASSIUM SERPL-SCNC: 3.3 MMOL/L (ref 3.5–5.1)
PROT SERPL-MCNC: 6.8 G/DL (ref 6.3–8.2)
RBC # BLD AUTO: 4.16 M/UL (ref 4.23–5.67)
SODIUM SERPL-SCNC: 143 MMOL/L (ref 136–145)
WBC # BLD AUTO: 3.5 K/UL (ref 4.3–11.1)

## 2019-05-23 PROCEDURE — 80053 COMPREHEN METABOLIC PANEL: CPT

## 2019-05-23 PROCEDURE — 85025 COMPLETE CBC W/AUTO DIFF WBC: CPT

## 2019-05-23 PROCEDURE — 83735 ASSAY OF MAGNESIUM: CPT

## 2019-05-23 PROCEDURE — 84165 PROTEIN E-PHORESIS SERUM: CPT

## 2019-05-23 PROCEDURE — 86334 IMMUNOFIX E-PHORESIS SERUM: CPT

## 2019-05-23 NOTE — PROGRESS NOTES
Pt was seen and labs reviewed by Dr. Jerel Cadet. Pt is doing well, no complaints. MM labs are remaining stable, we will continue current Revlimid dose. Potassium is a little low, so pt instructed to eat/drink foods high in potassium, he verbalized understanding. Pt will return to clinic with labs on 6/27.

## 2019-05-24 LAB
ALBUMIN SERPL ELPH-MCNC: 3.42 G/DL (ref 3.2–5.6)
ALBUMIN/GLOB SERPL: 1.1 {RATIO}
ALPHA1 GLOB SERPL ELPH-MCNC: 0.23 G/DL (ref 0.1–0.4)
ALPHA2 GLOB SERPL ELPH-MCNC: 0.63 G/DL (ref 0.4–1.2)
B-GLOBULIN SERPL QL ELPH: 1.02 G/DL (ref 0.6–1.3)
GAMMA GLOB MFR SERPL ELPH: 1.3 G/DL (ref 0.5–1.6)
IGA SERPL-MCNC: 275 MG/DL (ref 85–499)
IGG SERPL-MCNC: 1188 MG/DL (ref 610–1616)
IGM SERPL-MCNC: 17 MG/DL (ref 35–242)
M PROTEIN SERPL ELPH-MCNC: 0.39 G/DL
PROT PATTERN SERPL ELPH-IMP: ABNORMAL
PROT PATTERN SPEC IFE-IMP: ABNORMAL
PROT SERPL-MCNC: 6.6 G/DL (ref 6.3–8.2)

## 2019-06-27 ENCOUNTER — PATIENT OUTREACH (OUTPATIENT)
Dept: CASE MANAGEMENT | Age: 84
End: 2019-06-27

## 2019-06-27 ENCOUNTER — HOSPITAL ENCOUNTER (OUTPATIENT)
Dept: LAB | Age: 84
Discharge: HOME | End: 2019-06-27
Payer: MEDICARE

## 2019-06-27 DIAGNOSIS — C90.00 MULTIPLE MYELOMA NOT HAVING ACHIEVED REMISSION (HCC): ICD-10-CM

## 2019-06-27 LAB
ALBUMIN SERPL-MCNC: 3.3 G/DL (ref 3.2–4.6)
ALBUMIN/GLOB SERPL: 0.9 {RATIO} (ref 1.2–3.5)
ALP SERPL-CCNC: 115 U/L (ref 50–136)
ALT SERPL-CCNC: 22 U/L (ref 12–65)
ANION GAP SERPL CALC-SCNC: 3 MMOL/L (ref 7–16)
AST SERPL-CCNC: 21 U/L (ref 15–37)
BASOPHILS # BLD: 0 K/UL (ref 0–0.2)
BASOPHILS NFR BLD: 1 % (ref 0–2)
BILIRUB SERPL-MCNC: 0.5 MG/DL (ref 0.2–1.1)
BUN SERPL-MCNC: 16 MG/DL (ref 8–23)
CALCIUM SERPL-MCNC: 8.4 MG/DL (ref 8.3–10.4)
CHLORIDE SERPL-SCNC: 108 MMOL/L (ref 98–107)
CO2 SERPL-SCNC: 32 MMOL/L (ref 21–32)
CREAT SERPL-MCNC: 1.29 MG/DL (ref 0.8–1.5)
DIFFERENTIAL METHOD BLD: ABNORMAL
EOSINOPHIL # BLD: 0.3 K/UL (ref 0–0.8)
EOSINOPHIL NFR BLD: 9 % (ref 0.5–7.8)
ERYTHROCYTE [DISTWIDTH] IN BLOOD BY AUTOMATED COUNT: 16.4 % (ref 11.9–14.6)
GLOBULIN SER CALC-MCNC: 3.6 G/DL (ref 2.3–3.5)
GLUCOSE SERPL-MCNC: 93 MG/DL (ref 65–100)
HCT VFR BLD AUTO: 37.2 % (ref 41.1–50.3)
HGB BLD-MCNC: 12 G/DL (ref 13.6–17.2)
IMM GRANULOCYTES # BLD AUTO: 0 K/UL (ref 0–0.5)
IMM GRANULOCYTES NFR BLD AUTO: 0 % (ref 0–5)
LYMPHOCYTES # BLD: 1.1 K/UL (ref 0.5–4.6)
LYMPHOCYTES NFR BLD: 37 % (ref 13–44)
MAGNESIUM SERPL-MCNC: 2.3 MG/DL (ref 1.8–2.4)
MCH RBC QN AUTO: 29.3 PG (ref 26.1–32.9)
MCHC RBC AUTO-ENTMCNC: 32.3 G/DL (ref 31.4–35)
MCV RBC AUTO: 91 FL (ref 79.6–97.8)
MONOCYTES # BLD: 0.5 K/UL (ref 0.1–1.3)
MONOCYTES NFR BLD: 17 % (ref 4–12)
NEUTS SEG # BLD: 1 K/UL (ref 1.7–8.2)
NEUTS SEG NFR BLD: 35 % (ref 43–78)
NRBC # BLD: 0 K/UL (ref 0–0.2)
PLATELET # BLD AUTO: 127 K/UL (ref 150–450)
PMV BLD AUTO: 10.1 FL (ref 9.4–12.3)
POTASSIUM SERPL-SCNC: 3.4 MMOL/L (ref 3.5–5.1)
PROT SERPL-MCNC: 6.9 G/DL (ref 6.3–8.2)
RBC # BLD AUTO: 4.09 M/UL (ref 4.23–5.67)
SODIUM SERPL-SCNC: 143 MMOL/L (ref 136–145)
WBC # BLD AUTO: 3 K/UL (ref 4.3–11.1)

## 2019-06-27 PROCEDURE — 84165 PROTEIN E-PHORESIS SERUM: CPT

## 2019-06-27 PROCEDURE — 80053 COMPREHEN METABOLIC PANEL: CPT

## 2019-06-27 PROCEDURE — 85025 COMPLETE CBC W/AUTO DIFF WBC: CPT

## 2019-06-27 PROCEDURE — 83735 ASSAY OF MAGNESIUM: CPT

## 2019-06-27 PROCEDURE — 86334 IMMUNOFIX E-PHORESIS SERUM: CPT

## 2019-06-27 NOTE — PROGRESS NOTES
Pt was seen today by Dr. Monika Moreno. Labs reviewed. He is doing well. He will continue with his Revlimid 10 mg daily. He will return on 7/25. navigation will be signing off at this time. Advised pt to call with any further needs before next visit.

## 2019-07-01 LAB
ALBUMIN SERPL ELPH-MCNC: 3.62 G/DL (ref 3.2–5.6)
ALBUMIN/GLOB SERPL: 1.1 {RATIO}
ALPHA1 GLOB SERPL ELPH-MCNC: 0.27 G/DL (ref 0.1–0.4)
ALPHA2 GLOB SERPL ELPH-MCNC: 0.77 G/DL (ref 0.4–1.2)
B-GLOBULIN SERPL QL ELPH: 0.98 G/DL (ref 0.6–1.3)
GAMMA GLOB MFR SERPL ELPH: 1.35 G/DL (ref 0.5–1.6)
IGA SERPL-MCNC: 280 MG/DL (ref 85–499)
IGG SERPL-MCNC: 1157 MG/DL (ref 610–1616)
IGM SERPL-MCNC: 17 MG/DL (ref 35–242)
M PROTEIN SERPL ELPH-MCNC: ABNORMAL G/DL
PROT PATTERN SERPL ELPH-IMP: ABNORMAL
PROT PATTERN SPEC IFE-IMP: ABNORMAL
PROT SERPL-MCNC: 7 G/DL (ref 6.3–8.2)

## 2019-08-22 ENCOUNTER — PATIENT OUTREACH (OUTPATIENT)
Dept: CASE MANAGEMENT | Age: 84
End: 2019-08-22

## 2019-08-22 ENCOUNTER — HOSPITAL ENCOUNTER (OUTPATIENT)
Dept: LAB | Age: 84
Discharge: HOME | End: 2019-08-22
Payer: MEDICARE

## 2019-08-22 DIAGNOSIS — C90.00 MULTIPLE MYELOMA NOT HAVING ACHIEVED REMISSION (HCC): ICD-10-CM

## 2019-08-22 LAB
ALBUMIN SERPL-MCNC: 2.9 G/DL (ref 3.2–4.6)
ALBUMIN/GLOB SERPL: 0.7 {RATIO} (ref 1.2–3.5)
ALP SERPL-CCNC: 124 U/L (ref 50–136)
ALT SERPL-CCNC: 24 U/L (ref 12–65)
ANION GAP SERPL CALC-SCNC: 8 MMOL/L (ref 7–16)
AST SERPL-CCNC: 21 U/L (ref 15–37)
BASOPHILS # BLD: 0 K/UL (ref 0–0.2)
BASOPHILS NFR BLD: 1 % (ref 0–2)
BILIRUB SERPL-MCNC: 0.4 MG/DL (ref 0.2–1.1)
BUN SERPL-MCNC: 13 MG/DL (ref 8–23)
CALCIUM SERPL-MCNC: 8.4 MG/DL (ref 8.3–10.4)
CHLORIDE SERPL-SCNC: 105 MMOL/L (ref 98–107)
CO2 SERPL-SCNC: 31 MMOL/L (ref 21–32)
CREAT SERPL-MCNC: 1.33 MG/DL (ref 0.8–1.5)
DIFFERENTIAL METHOD BLD: ABNORMAL
EOSINOPHIL # BLD: 0.3 K/UL (ref 0–0.8)
EOSINOPHIL NFR BLD: 8 % (ref 0.5–7.8)
ERYTHROCYTE [DISTWIDTH] IN BLOOD BY AUTOMATED COUNT: 16.3 % (ref 11.9–14.6)
GLOBULIN SER CALC-MCNC: 4 G/DL (ref 2.3–3.5)
GLUCOSE SERPL-MCNC: 116 MG/DL (ref 65–100)
HCT VFR BLD AUTO: 38.4 % (ref 41.1–50.3)
HGB BLD-MCNC: 12.4 G/DL (ref 13.6–17.2)
IMM GRANULOCYTES # BLD AUTO: 0 K/UL (ref 0–0.5)
IMM GRANULOCYTES NFR BLD AUTO: 0 % (ref 0–5)
KAPPA LC FREE SER-MCNC: 66.08 MG/L (ref 3.3–19.4)
KAPPA LC FREE/LAMBDA FREE SER: 0.92 {RATIO} (ref 0.26–1.65)
LAMBDA LC FREE SERPL-MCNC: 71.87 MG/L (ref 5.71–26.3)
LYMPHOCYTES # BLD: 0.9 K/UL (ref 0.5–4.6)
LYMPHOCYTES NFR BLD: 28 % (ref 13–44)
MCH RBC QN AUTO: 29.7 PG (ref 26.1–32.9)
MCHC RBC AUTO-ENTMCNC: 32.3 G/DL (ref 31.4–35)
MCV RBC AUTO: 92.1 FL (ref 79.6–97.8)
MONOCYTES # BLD: 0.4 K/UL (ref 0.1–1.3)
MONOCYTES NFR BLD: 12 % (ref 4–12)
NEUTS SEG # BLD: 1.7 K/UL (ref 1.7–8.2)
NEUTS SEG NFR BLD: 51 % (ref 43–78)
NRBC # BLD: 0 K/UL (ref 0–0.2)
PLATELET # BLD AUTO: 127 K/UL (ref 150–450)
PMV BLD AUTO: 11.1 FL (ref 9.4–12.3)
POTASSIUM SERPL-SCNC: 2.9 MMOL/L (ref 3.5–5.1)
PROT SERPL-MCNC: 6.9 G/DL (ref 6.3–8.2)
RBC # BLD AUTO: 4.17 M/UL (ref 4.23–5.67)
SODIUM SERPL-SCNC: 144 MMOL/L (ref 136–145)
WBC # BLD AUTO: 3.2 K/UL (ref 4.3–11.1)

## 2019-08-22 PROCEDURE — 86334 IMMUNOFIX E-PHORESIS SERUM: CPT

## 2019-08-22 PROCEDURE — 84165 PROTEIN E-PHORESIS SERUM: CPT

## 2019-08-22 PROCEDURE — 83883 ASSAY NEPHELOMETRY NOT SPEC: CPT

## 2019-08-22 PROCEDURE — 85025 COMPLETE CBC W/AUTO DIFF WBC: CPT

## 2019-08-22 PROCEDURE — 80053 COMPREHEN METABOLIC PANEL: CPT

## 2019-08-22 PROCEDURE — 36415 COLL VENOUS BLD VENIPUNCTURE: CPT

## 2019-08-22 NOTE — PROGRESS NOTES
Pt was seen and labs reviewed (chemistries pending) by Mary Lou Pavon NP. Pt is doing well and is in rehab post a fall a few weeks ago. Labs are remaining stable. Will continue current Revlimid dose. Pt will return to clinic to see Dr. Shelbi Light in 4 weeks with labs. Navigation will remain signed off unless needed again in the future.

## 2019-08-22 NOTE — PROGRESS NOTES
After pt left, his K+ was noted to be 2.9. Arlyn Martines NP  Sent in script for KDUR 20 meq TID for 3 days and she would like pt to come back for labs on Monday. Pt was called and notified of this lab and that he needs to start taking the [facility] today. Pt verbalized understanding and stated that his medications are delivered by the pharmacy and he would ensure that this would be done today. Pt was told that he needs to return early next week for lab check, but he states he will need to arrange transportation. Pt states he will call navigator back once he determines when his transport can bring him here for lab draw. Navigator to follow.

## 2019-08-23 ENCOUNTER — PATIENT OUTREACH (OUTPATIENT)
Dept: CASE MANAGEMENT | Age: 84
End: 2019-08-23

## 2019-08-23 LAB
ALBUMIN SERPL ELPH-MCNC: 2.6 G/DL (ref 3.2–5.6)
ALBUMIN/GLOB SERPL: 0.7 {RATIO} (ref 1.2–3.5)
ALPHA1 GLOB SERPL ELPH-MCNC: 0.36 G/DL (ref 0.1–0.4)
ALPHA2 GLOB SERPL ELPH-MCNC: 0.89 G/DL (ref 0.4–1.2)
B-GLOBULIN SERPL QL ELPH: 1.08 G/DL (ref 0.6–1.3)
GAMMA GLOB MFR SERPL ELPH: 1.48 G/DL (ref 0.5–1.6)
IGA SERPL-MCNC: 366 MG/DL (ref 85–499)
IGG SERPL-MCNC: 1147 MG/DL (ref 610–1616)
IGM SERPL-MCNC: 22 MG/DL (ref 35–242)
M PROTEIN SERPL ELPH-MCNC: ABNORMAL G/DL
PROT PATTERN SERPL ELPH-IMP: ABNORMAL
PROT PATTERN SPEC IFE-IMP: ABNORMAL
PROT SERPL-MCNC: 6.4 G/DL (ref 6.3–8.2)

## 2019-10-10 ENCOUNTER — HOSPITAL ENCOUNTER (OUTPATIENT)
Dept: LAB | Age: 84
Discharge: HOME | End: 2019-10-10
Payer: MEDICARE

## 2019-10-10 DIAGNOSIS — C90.00 MULTIPLE MYELOMA NOT HAVING ACHIEVED REMISSION (HCC): ICD-10-CM

## 2019-10-10 LAB
ALBUMIN SERPL-MCNC: 3.1 G/DL (ref 3.2–4.6)
ALBUMIN/GLOB SERPL: 0.8 {RATIO} (ref 1.2–3.5)
ALP SERPL-CCNC: 107 U/L (ref 50–136)
ALT SERPL-CCNC: 16 U/L (ref 12–65)
ANION GAP SERPL CALC-SCNC: 5 MMOL/L (ref 7–16)
AST SERPL-CCNC: 14 U/L (ref 15–37)
BASOPHILS # BLD: 0 K/UL (ref 0–0.2)
BASOPHILS NFR BLD: 2 % (ref 0–2)
BILIRUB SERPL-MCNC: 0.4 MG/DL (ref 0.2–1.1)
BUN SERPL-MCNC: 15 MG/DL (ref 8–23)
CALCIUM SERPL-MCNC: 8.1 MG/DL (ref 8.3–10.4)
CHLORIDE SERPL-SCNC: 111 MMOL/L (ref 98–107)
CO2 SERPL-SCNC: 28 MMOL/L (ref 21–32)
CREAT SERPL-MCNC: 1.2 MG/DL (ref 0.8–1.5)
DIFFERENTIAL METHOD BLD: ABNORMAL
EOSINOPHIL # BLD: 0.3 K/UL (ref 0–0.8)
EOSINOPHIL NFR BLD: 11 % (ref 0.5–7.8)
ERYTHROCYTE [DISTWIDTH] IN BLOOD BY AUTOMATED COUNT: 16.8 % (ref 11.9–14.6)
GLOBULIN SER CALC-MCNC: 3.7 G/DL (ref 2.3–3.5)
GLUCOSE SERPL-MCNC: 113 MG/DL (ref 65–100)
HCT VFR BLD AUTO: 36.2 % (ref 41.1–50.3)
HGB BLD-MCNC: 11.4 G/DL (ref 13.6–17.2)
IMM GRANULOCYTES # BLD AUTO: 0 K/UL (ref 0–0.5)
IMM GRANULOCYTES NFR BLD AUTO: 0 % (ref 0–5)
KAPPA LC FREE SER-MCNC: 63.83 MG/L (ref 3.3–19.4)
KAPPA LC FREE/LAMBDA FREE SER: 1.16 {RATIO} (ref 0.26–1.65)
LAMBDA LC FREE SERPL-MCNC: 55.05 MG/L (ref 5.71–26.3)
LYMPHOCYTES # BLD: 0.9 K/UL (ref 0.5–4.6)
LYMPHOCYTES NFR BLD: 34 % (ref 13–44)
MAGNESIUM SERPL-MCNC: 2 MG/DL (ref 1.8–2.4)
MCH RBC QN AUTO: 30.2 PG (ref 26.1–32.9)
MCHC RBC AUTO-ENTMCNC: 31.5 G/DL (ref 31.4–35)
MCV RBC AUTO: 95.8 FL (ref 79.6–97.8)
MONOCYTES # BLD: 0.4 K/UL (ref 0.1–1.3)
MONOCYTES NFR BLD: 15 % (ref 4–12)
NEUTS SEG # BLD: 1 K/UL (ref 1.7–8.2)
NEUTS SEG NFR BLD: 39 % (ref 43–78)
NRBC # BLD: 0 K/UL (ref 0–0.2)
PLATELET # BLD AUTO: 103 K/UL (ref 150–450)
PMV BLD AUTO: 10.3 FL (ref 9.4–12.3)
POTASSIUM SERPL-SCNC: 3.7 MMOL/L (ref 3.5–5.1)
PROT SERPL-MCNC: 6.8 G/DL (ref 6.3–8.2)
RBC # BLD AUTO: 3.78 M/UL (ref 4.23–5.67)
SODIUM SERPL-SCNC: 144 MMOL/L (ref 136–145)
WBC # BLD AUTO: 2.7 K/UL (ref 4.3–11.1)

## 2019-10-10 PROCEDURE — 83883 ASSAY NEPHELOMETRY NOT SPEC: CPT

## 2019-10-10 PROCEDURE — 84165 PROTEIN E-PHORESIS SERUM: CPT

## 2019-10-10 PROCEDURE — 86334 IMMUNOFIX E-PHORESIS SERUM: CPT

## 2019-10-10 PROCEDURE — 80053 COMPREHEN METABOLIC PANEL: CPT

## 2019-10-10 PROCEDURE — 85025 COMPLETE CBC W/AUTO DIFF WBC: CPT

## 2019-10-10 PROCEDURE — 83735 ASSAY OF MAGNESIUM: CPT

## 2019-10-11 LAB
ALBUMIN SERPL ELPH-MCNC: 3.33 G/DL (ref 3.2–5.6)
ALBUMIN/GLOB SERPL: 1.1 {RATIO}
ALPHA1 GLOB SERPL ELPH-MCNC: 0.25 G/DL (ref 0.1–0.4)
ALPHA2 GLOB SERPL ELPH-MCNC: 0.55 G/DL (ref 0.4–1.2)
B-GLOBULIN SERPL QL ELPH: 0.98 G/DL (ref 0.6–1.3)
GAMMA GLOB MFR SERPL ELPH: 1.2 G/DL (ref 0.5–1.6)
IGA SERPL-MCNC: 343 MG/DL (ref 85–499)
IGG SERPL-MCNC: 1020 MG/DL (ref 610–1616)
IGM SERPL-MCNC: 18 MG/DL (ref 35–242)
M PROTEIN SERPL ELPH-MCNC: ABNORMAL G/DL
PROT PATTERN SERPL ELPH-IMP: ABNORMAL
PROT PATTERN SPEC IFE-IMP: ABNORMAL
PROT SERPL-MCNC: 6.3 G/DL (ref 6.3–8.2)

## 2019-11-14 ENCOUNTER — PATIENT OUTREACH (OUTPATIENT)
Dept: CASE MANAGEMENT | Age: 84
End: 2019-11-14

## 2019-11-14 ENCOUNTER — HOSPITAL ENCOUNTER (OUTPATIENT)
Dept: LAB | Age: 84
Discharge: HOME | End: 2019-11-14
Payer: MEDICARE

## 2019-11-14 DIAGNOSIS — C90.00 MULTIPLE MYELOMA NOT HAVING ACHIEVED REMISSION (HCC): ICD-10-CM

## 2019-11-14 LAB
ALBUMIN SERPL-MCNC: 3.2 G/DL (ref 3.2–4.6)
ALBUMIN/GLOB SERPL: 0.9 {RATIO} (ref 1.2–3.5)
ALP SERPL-CCNC: 115 U/L (ref 50–136)
ALT SERPL-CCNC: 16 U/L (ref 12–65)
ANION GAP SERPL CALC-SCNC: 5 MMOL/L (ref 7–16)
AST SERPL-CCNC: 14 U/L (ref 15–37)
BASOPHILS # BLD: 0 K/UL (ref 0–0.2)
BASOPHILS NFR BLD: 2 % (ref 0–2)
BILIRUB SERPL-MCNC: 0.5 MG/DL (ref 0.2–1.1)
BUN SERPL-MCNC: 17 MG/DL (ref 8–23)
CALCIUM SERPL-MCNC: 8 MG/DL (ref 8.3–10.4)
CHLORIDE SERPL-SCNC: 110 MMOL/L (ref 98–107)
CO2 SERPL-SCNC: 28 MMOL/L (ref 21–32)
CREAT SERPL-MCNC: 1.2 MG/DL (ref 0.8–1.5)
DIFFERENTIAL METHOD BLD: ABNORMAL
EOSINOPHIL # BLD: 0.3 K/UL (ref 0–0.8)
EOSINOPHIL NFR BLD: 11 % (ref 0.5–7.8)
ERYTHROCYTE [DISTWIDTH] IN BLOOD BY AUTOMATED COUNT: 16.9 % (ref 11.9–14.6)
GLOBULIN SER CALC-MCNC: 3.5 G/DL (ref 2.3–3.5)
GLUCOSE SERPL-MCNC: 94 MG/DL (ref 65–100)
HCT VFR BLD AUTO: 34.9 % (ref 41.1–50.3)
HGB BLD-MCNC: 11.1 G/DL (ref 13.6–17.2)
IMM GRANULOCYTES # BLD AUTO: 0 K/UL (ref 0–0.5)
IMM GRANULOCYTES NFR BLD AUTO: 0 % (ref 0–5)
KAPPA LC FREE SER-MCNC: 63.54 MG/L (ref 3.3–19.4)
KAPPA LC FREE/LAMBDA FREE SER: 1.13 {RATIO} (ref 0.26–1.65)
LAMBDA LC FREE SERPL-MCNC: 56.25 MG/L (ref 5.71–26.3)
LYMPHOCYTES # BLD: 0.7 K/UL (ref 0.5–4.6)
LYMPHOCYTES NFR BLD: 27 % (ref 13–44)
MAGNESIUM SERPL-MCNC: 2.1 MG/DL (ref 1.8–2.4)
MCH RBC QN AUTO: 30.7 PG (ref 26.1–32.9)
MCHC RBC AUTO-ENTMCNC: 31.8 G/DL (ref 31.4–35)
MCV RBC AUTO: 96.7 FL (ref 79.6–97.8)
MONOCYTES # BLD: 0.5 K/UL (ref 0.1–1.3)
MONOCYTES NFR BLD: 19 % (ref 4–12)
NEUTS SEG # BLD: 1.1 K/UL (ref 1.7–8.2)
NEUTS SEG NFR BLD: 41 % (ref 43–78)
NRBC # BLD: 0 K/UL (ref 0–0.2)
PLATELET # BLD AUTO: 105 K/UL (ref 150–450)
PMV BLD AUTO: 10.5 FL (ref 9.4–12.3)
POTASSIUM SERPL-SCNC: 4 MMOL/L (ref 3.5–5.1)
PROT SERPL-MCNC: 6.7 G/DL (ref 6.3–8.2)
RBC # BLD AUTO: 3.61 M/UL (ref 4.23–5.67)
SODIUM SERPL-SCNC: 143 MMOL/L (ref 136–145)
WBC # BLD AUTO: 2.7 K/UL (ref 4.3–11.1)

## 2019-11-14 PROCEDURE — 86334 IMMUNOFIX E-PHORESIS SERUM: CPT

## 2019-11-14 PROCEDURE — 83883 ASSAY NEPHELOMETRY NOT SPEC: CPT

## 2019-11-14 PROCEDURE — 83735 ASSAY OF MAGNESIUM: CPT

## 2019-11-14 PROCEDURE — 80053 COMPREHEN METABOLIC PANEL: CPT

## 2019-11-14 PROCEDURE — 84165 PROTEIN E-PHORESIS SERUM: CPT

## 2019-11-14 PROCEDURE — 85025 COMPLETE CBC W/AUTO DIFF WBC: CPT

## 2019-11-14 NOTE — PROGRESS NOTES
Pt was seen and labs reviewed by Dr. July Love. WBC and platelets remain a little low. We will change Revlimid dose to 10 mg for 21 days on, 7 days off. Pt told to finish out his current supply and wait 7 days before starting new prescription. He verbalized understanding. Pt has no complaints today. He will return to clinic in 4 weeks with labs.

## 2019-11-15 LAB
ALBUMIN SERPL ELPH-MCNC: 3.37 G/DL (ref 3.2–5.6)
ALBUMIN/GLOB SERPL: 1.1 {RATIO}
ALPHA1 GLOB SERPL ELPH-MCNC: 0.24 G/DL (ref 0.1–0.4)
ALPHA2 GLOB SERPL ELPH-MCNC: 0.63 G/DL (ref 0.4–1.2)
B-GLOBULIN SERPL QL ELPH: 1.02 G/DL (ref 0.6–1.3)
GAMMA GLOB MFR SERPL ELPH: 1.23 G/DL (ref 0.5–1.6)
IGA SERPL-MCNC: 345 MG/DL (ref 85–499)
IGG SERPL-MCNC: 1136 MG/DL (ref 610–1616)
IGM SERPL-MCNC: 18 MG/DL (ref 35–242)
M PROTEIN SERPL ELPH-MCNC: ABNORMAL G/DL
PROT PATTERN SERPL ELPH-IMP: ABNORMAL
PROT PATTERN SPEC IFE-IMP: ABNORMAL
PROT SERPL-MCNC: 6.5 G/DL (ref 6.3–8.2)

## 2019-11-18 ENCOUNTER — APPOINTMENT (OUTPATIENT)
Dept: CT IMAGING | Age: 84
DRG: 565 | End: 2019-11-18
Attending: EMERGENCY MEDICINE
Payer: MEDICARE

## 2019-11-18 ENCOUNTER — APPOINTMENT (OUTPATIENT)
Dept: GENERAL RADIOLOGY | Age: 84
DRG: 565 | End: 2019-11-18
Attending: EMERGENCY MEDICINE
Payer: MEDICARE

## 2019-11-18 ENCOUNTER — HOSPITAL ENCOUNTER (INPATIENT)
Age: 84
LOS: 5 days | Discharge: NURSING, REHAB OR LONG-TERM CARE | DRG: 565 | End: 2019-11-23
Attending: EMERGENCY MEDICINE | Admitting: INTERNAL MEDICINE
Payer: MEDICARE

## 2019-11-18 DIAGNOSIS — D61.818 PANCYTOPENIA (HCC): ICD-10-CM

## 2019-11-18 DIAGNOSIS — R53.1 GENERAL WEAKNESS: ICD-10-CM

## 2019-11-18 DIAGNOSIS — T79.6XXA TRAUMATIC RHABDOMYOLYSIS, INITIAL ENCOUNTER (HCC): Primary | ICD-10-CM

## 2019-11-18 DIAGNOSIS — C90.00 MULTIPLE MYELOMA NOT HAVING ACHIEVED REMISSION (HCC): ICD-10-CM

## 2019-11-18 PROBLEM — M62.82 RHABDOMYOLYSIS: Status: ACTIVE | Noted: 2019-11-18

## 2019-11-18 PROBLEM — N17.9 AKI (ACUTE KIDNEY INJURY) (HCC): Status: ACTIVE | Noted: 2019-11-18

## 2019-11-18 LAB
ALBUMIN SERPL-MCNC: 3 G/DL (ref 3.2–4.6)
ALBUMIN/GLOB SERPL: 0.8 {RATIO} (ref 1.2–3.5)
ALP SERPL-CCNC: 110 U/L (ref 50–136)
ALT SERPL-CCNC: 42 U/L (ref 12–65)
ANION GAP SERPL CALC-SCNC: 10 MMOL/L (ref 7–16)
AST SERPL-CCNC: 116 U/L (ref 15–37)
ATRIAL RATE: 73 BPM
BASOPHILS # BLD: 0 K/UL (ref 0–0.2)
BASOPHILS NFR BLD: 2 % (ref 0–2)
BILIRUB SERPL-MCNC: 1.2 MG/DL (ref 0.2–1.1)
BUN SERPL-MCNC: 18 MG/DL (ref 8–23)
CALCIUM SERPL-MCNC: 8.2 MG/DL (ref 8.3–10.4)
CALCULATED P AXIS, ECG09: 51 DEGREES
CALCULATED R AXIS, ECG10: -3 DEGREES
CALCULATED T AXIS, ECG11: 58 DEGREES
CHLORIDE SERPL-SCNC: 109 MMOL/L (ref 98–107)
CK SERPL-CCNC: 4269 U/L (ref 21–215)
CO2 SERPL-SCNC: 25 MMOL/L (ref 21–32)
CREAT SERPL-MCNC: 1.38 MG/DL (ref 0.8–1.5)
DIAGNOSIS, 93000: NORMAL
DIFFERENTIAL METHOD BLD: ABNORMAL
EOSINOPHIL # BLD: 0 K/UL (ref 0–0.8)
EOSINOPHIL NFR BLD: 1 % (ref 0.5–7.8)
ERYTHROCYTE [DISTWIDTH] IN BLOOD BY AUTOMATED COUNT: 16.8 % (ref 11.9–14.6)
GLOBULIN SER CALC-MCNC: 3.8 G/DL (ref 2.3–3.5)
GLUCOSE SERPL-MCNC: 133 MG/DL (ref 65–100)
HCT VFR BLD AUTO: 35.2 % (ref 41.1–50.3)
HGB BLD-MCNC: 11.3 G/DL (ref 13.6–17.2)
IMM GRANULOCYTES # BLD AUTO: 0 K/UL (ref 0–0.5)
IMM GRANULOCYTES NFR BLD AUTO: 1 % (ref 0–5)
LYMPHOCYTES # BLD: 0.3 K/UL (ref 0.5–4.6)
LYMPHOCYTES NFR BLD: 30 % (ref 13–44)
MCH RBC QN AUTO: 30.5 PG (ref 26.1–32.9)
MCHC RBC AUTO-ENTMCNC: 32.1 G/DL (ref 31.4–35)
MCV RBC AUTO: 94.9 FL (ref 79.6–97.8)
MONOCYTES # BLD: 0.3 K/UL (ref 0.1–1.3)
MONOCYTES NFR BLD: 26 % (ref 4–12)
MYOGLOBIN SERPL-MCNC: NORMAL NG/ML
NEUTS SEG # BLD: 0.5 K/UL (ref 1.7–8.2)
NEUTS SEG NFR BLD: 40 % (ref 43–78)
NRBC # BLD: 0 K/UL (ref 0–0.2)
P-R INTERVAL, ECG05: 232 MS
PLATELET # BLD AUTO: 117 K/UL (ref 150–450)
PLATELET COMMENTS,PCOM: SLIGHT
PMV BLD AUTO: 11.3 FL (ref 9.4–12.3)
POTASSIUM SERPL-SCNC: 3.5 MMOL/L (ref 3.5–5.1)
PROT SERPL-MCNC: 6.8 G/DL (ref 6.3–8.2)
Q-T INTERVAL, ECG07: 528 MS
QRS DURATION, ECG06: 86 MS
QTC CALCULATION (BEZET), ECG08: 581 MS
RBC # BLD AUTO: 3.71 M/UL (ref 4.23–5.6)
RBC MORPH BLD: ABNORMAL
SODIUM SERPL-SCNC: 144 MMOL/L (ref 136–145)
VENTRICULAR RATE, ECG03: 73 BPM
WBC # BLD AUTO: 1.1 K/UL (ref 4.3–11.1)
WBC MORPH BLD: ABNORMAL

## 2019-11-18 PROCEDURE — 83874 ASSAY OF MYOGLOBIN: CPT

## 2019-11-18 PROCEDURE — 65270000029 HC RM PRIVATE

## 2019-11-18 PROCEDURE — 82550 ASSAY OF CK (CPK): CPT

## 2019-11-18 PROCEDURE — 99284 EMERGENCY DEPT VISIT MOD MDM: CPT | Performed by: EMERGENCY MEDICINE

## 2019-11-18 PROCEDURE — 93005 ELECTROCARDIOGRAM TRACING: CPT | Performed by: EMERGENCY MEDICINE

## 2019-11-18 PROCEDURE — 74011000302 HC RX REV CODE- 302: Performed by: EMERGENCY MEDICINE

## 2019-11-18 PROCEDURE — 71046 X-RAY EXAM CHEST 2 VIEWS: CPT

## 2019-11-18 PROCEDURE — 74011250636 HC RX REV CODE- 250/636: Performed by: INTERNAL MEDICINE

## 2019-11-18 PROCEDURE — 70450 CT HEAD/BRAIN W/O DYE: CPT

## 2019-11-18 PROCEDURE — 72100 X-RAY EXAM L-S SPINE 2/3 VWS: CPT

## 2019-11-18 PROCEDURE — 86580 TB INTRADERMAL TEST: CPT | Performed by: EMERGENCY MEDICINE

## 2019-11-18 PROCEDURE — 85025 COMPLETE CBC W/AUTO DIFF WBC: CPT

## 2019-11-18 PROCEDURE — 80053 COMPREHEN METABOLIC PANEL: CPT

## 2019-11-18 RX ORDER — SODIUM CHLORIDE 0.9 % (FLUSH) 0.9 %
5-40 SYRINGE (ML) INJECTION AS NEEDED
Status: DISCONTINUED | OUTPATIENT
Start: 2019-11-18 | End: 2019-11-23 | Stop reason: HOSPADM

## 2019-11-18 RX ORDER — SODIUM CHLORIDE 9 MG/ML
100 INJECTION, SOLUTION INTRAVENOUS CONTINUOUS
Status: DISCONTINUED | OUTPATIENT
Start: 2019-11-18 | End: 2019-11-19

## 2019-11-18 RX ORDER — ACETAMINOPHEN 325 MG/1
650 TABLET ORAL
Status: DISCONTINUED | OUTPATIENT
Start: 2019-11-18 | End: 2019-11-23 | Stop reason: HOSPADM

## 2019-11-18 RX ORDER — ONDANSETRON 2 MG/ML
4 INJECTION INTRAMUSCULAR; INTRAVENOUS
Status: DISCONTINUED | OUTPATIENT
Start: 2019-11-18 | End: 2019-11-23 | Stop reason: HOSPADM

## 2019-11-18 RX ORDER — BISACODYL 5 MG
5 TABLET, DELAYED RELEASE (ENTERIC COATED) ORAL DAILY PRN
Status: DISCONTINUED | OUTPATIENT
Start: 2019-11-18 | End: 2019-11-23 | Stop reason: HOSPADM

## 2019-11-18 RX ORDER — SODIUM CHLORIDE 0.9 % (FLUSH) 0.9 %
5-40 SYRINGE (ML) INJECTION EVERY 8 HOURS
Status: DISCONTINUED | OUTPATIENT
Start: 2019-11-18 | End: 2019-11-23 | Stop reason: HOSPADM

## 2019-11-18 RX ORDER — SODIUM CHLORIDE 9 MG/ML
150 INJECTION, SOLUTION INTRAVENOUS CONTINUOUS
Status: DISCONTINUED | OUTPATIENT
Start: 2019-11-18 | End: 2019-11-19

## 2019-11-18 RX ORDER — DONEPEZIL HYDROCHLORIDE 5 MG/1
10 TABLET, FILM COATED ORAL DAILY
Status: DISCONTINUED | OUTPATIENT
Start: 2019-11-19 | End: 2019-11-23 | Stop reason: HOSPADM

## 2019-11-18 RX ADMIN — Medication 10 ML: at 18:58

## 2019-11-18 RX ADMIN — TUBERCULIN PURIFIED PROTEIN DERIVATIVE 5 UNITS: 5 INJECTION, SOLUTION INTRADERMAL at 18:56

## 2019-11-18 RX ADMIN — SODIUM CHLORIDE 100 ML/HR: 900 INJECTION, SOLUTION INTRAVENOUS at 18:56

## 2019-11-18 NOTE — ED TRIAGE NOTES
Pt in from [facility] c/o mid back pain after fall at 0600 this morning. Pt was unable to get up off floor until 1400. PT found by physical therapy. Pt incontinent of stool and vomited on himself. Pt bgl 174 with ems. Denies other complaints.

## 2019-11-18 NOTE — PROGRESS NOTES
11/18/19 1846 Dual Skin Pressure Injury Assessment Dual Skin Pressure Injury Assessment WDL Second Care Provider (Based on Facility Policy) Jovanna Sterling Allevyn placed on sacrum. Red blanchable elbows, mini Allevyn placed bilaterally

## 2019-11-18 NOTE — PROGRESS NOTES
Care Management Interventions Mode of Transport at Discharge: BLS Transition of Care Consult (CM Consult): SNF Partner SNF: Yes Current Support Network: Adult Group Home Confirm Follow Up Transport: Family Plan discussed with Pt/Family/Caregiver: Yes Freedom of Choice Offered: Yes Discharge Location Discharge Placement: Skilled nursing facility Patient seen in ER s/p fall at home. Patient lives at [address] [place] [address] in [facility]. He uses a walker for mobility and is followed by their outpt PT. Patient alert and orientated but [place]. He is w.o his hearing aides inER. He is anxious to return home but unable to stand with staff. He complained of being dizzy and feeling like he could fall. He was agreeable to rehab with little encouragement. He did not have a pref towards facilites but wanted a pvt room if possible. He has no local family. I called his dtr who lives in [facility] on chart. She reports he had a positive   prior stay at [facility] so this was her first choice. [employer] precert will be required. We will start precert once Pt eval obtained. PPD to be placed. Dtr can be reached by cell. Will follow.

## 2019-11-18 NOTE — ED PROVIDER NOTES
Arrived by EMS from independent living where he apparently fallen about 6:00 this morning when getting up to go the bathroom. He states he suffers from chronic vertigo and became dizzy and fell down and then was unable to get up. He pressed his life alert and apparently nobody came until 1:00 with a scheduled home health care visit and he was found on the floor. He had been incontinent of stool and urine. He denies any head injury he does complain of some new low back pain and some right-sided chest wall pain. He reports a cough for the past 10 days. He reports vomiting this morning while still in bed. Review of systems otherwise negative. The history is provided by the patient and medical records. Fall The accident occurred 6 to 12 hours ago. The fall occurred while walking. He fell from a height of ground level. He landed on hard floor. There was no blood loss. Point of impact: back. Pain location: lumbar and right chest. The pain is at a severity of 6/10. The pain is moderate. He was not ambulatory at the scene. There was no entrapment after the fall. There was no drug use involved in the accident. There was no alcohol use involved in the accident. Associated symptoms include nausea, vomiting and extremity weakness. Pertinent negatives include no visual change, no numbness, no abdominal pain, no headaches, no hearing loss, no loss of consciousness, no tingling and no laceration. The risk factors include being elderly and dementia. He has tried nothing for the symptoms. Past Medical History:  
Diagnosis Date  Acute encephalopathy 3/22/2015  Altered mental status 9/16/2014  Alzheimer disease ([facility]  Anemia 9/16/2014 MILD  Anxiety  Asymmetrical sensorineural hearing loss  Bipolar disorder ([address] NOT TREATED, DIAGNOSED MANY YEARS AGO  Cancer ([facility] multiple myeloma  Cataract 9/8/2016  Cortical hemorrhage ([address] 9/17/2014  Elevated AST (SGOT) 9/16/2014  GERD (gastroesophageal reflux disease)   
 not Tx  
 H/O seasonal allergies  History of TIA (transient ischemic attack) 9/16/2014  Hypomagnesemia 9/16/2014 MILD  Panic attack  Senile dementia ([facility] 5/19/2017  Stroke ([facility] ? TIA, PUT ON PLAVIX, TOOK SELF OFF  Syncope and collapse 3/22/2015  Tinea corporis 9/16/2014  Tinnitus Past Surgical History:  
Procedure Laterality Date  HX APPENDECTOMY  HX COLONOSCOPY  MULTIPLE OVER THE YEARS  
 NO CANCER  
 HX OTHER SURGICAL  AGE 23  
 COLONIC POLYP REMOVAL  
 HX VASCULAR ACCESS Family History:  
Problem Relation Age of Onset  Diabetes Mother COMPLICATIONS OF DM  
 Cancer Mother  Heart Disease Mother  Lung Disease Father BLACK LUNG  Dementia Sister  Heart Disease Brother \"OLD AGE\"  Other Son ESTRANGED, IN [place]  
 Other Daughter   
     1 ESTRANGED, [address] [place] [address], [address] [place] Social History Socioeconomic History  Marital status:  Spouse name: Not on file  Number of children: Not on file  Years of education: Not on file  Highest education level: Not on file Occupational History  Not on file Social Needs  Financial resource strain: Not on file  Food insecurity:  
  Worry: Not on file Inability: Not on file  Transportation needs:  
  Medical: Not on file Non-medical: Not on file Tobacco Use  Smoking status: Former Smoker Packs/day: 2.00 Years: 16.00 Pack years: 32.00 Types: Cigarettes  Smokeless tobacco: Never Used  Tobacco comment: QUIT AGE 37 Substance and Sexual Activity  Alcohol use: No  
 Drug use: No  
 Sexual activity: Not Currently Partners: Female Birth control/protection: None Lifestyle  Physical activity:  
  Days per week: Not on file Minutes per session: Not on file  Stress: Not on file Relationships  Social connections:  
  Talks on phone: Not on file Gets together: Not on file Attends [employer] service: Not on file Active member of club or organization: Not on file Attends meetings of clubs or organizations: Not on file Relationship status: Not on file  Intimate partner violence:  
  Fear of current or ex partner: Not on file Emotionally abused: Not on file Physically abused: Not on file Forced sexual activity: Not on file Other Topics Concern  Not on file Social History Narrative 9/16/14:  PATIENT IS , LIVES WITH DOGCATARINA. LIVED MOST OF LIVE IN [place], OWNED A BUSINESS [facility]. HE MOVED TO [facility] FOR 5 YEARS, HAS BEEN HERE (?) FOR ABOUT 10 YEARS. HAS A DAUGHTER IN [place] THAT IS NOT SPEAKING TO HIM, A SON IN [place] WHO IS NOT SPEAKING TO HIM, AND A DAUGHTER IN [facility]. BROTHER AND SISTER ARE DEAD. ONLY FRIEND IS SERA TORRES, ([facility]: [phone]), A  WHO LOOKS AFTER THE PATIENT. CALL HIM ON DISCHARGE AS HE HAS PATIENT'S DOG AND HOUSE KEYS. ALLERGIES: Patient has no known allergies. Review of Systems Gastrointestinal: Positive for nausea and vomiting. Negative for abdominal pain. Musculoskeletal: Positive for extremity weakness. Neurological: Negative for tingling, loss of consciousness, numbness and headaches. All other systems reviewed and are negative. Vitals:  
 11/18/19 1515 BP: 116/55 Pulse: 75 Resp: 16 Temp: 100.4 °F (38 °C) SpO2: 92% Weight: 117.9 kg (260 lb) Height: 5' 10\" (1.778 m) Physical Exam  
Constitutional: He is oriented to person, place, and time. He appears well-developed and well-nourished. HENT:  
Head: Normocephalic and atraumatic. Eyes: Pupils are equal, round, and reactive to light. Conjunctivae and EOM are normal.  
Neck: Normal range of motion. Neck supple. Cardiovascular: Normal rate and regular rhythm.   
Pulmonary/Chest: Effort normal and breath sounds normal.  
 Abdominal: Soft. Bowel sounds are normal.  
Musculoskeletal: Normal range of motion. He exhibits tenderness. Lumbar Neurological: He is alert and oriented to person, place, and time. Skin: Skin is warm and dry. Capillary refill takes less than 2 seconds. No laceration noted. Psychiatric: He has a normal mood and affect. His behavior is normal.  
Nursing note and vitals reviewed. MDM Number of Diagnoses or Management Options Amount and/or Complexity of Data Reviewed Clinical lab tests: ordered and reviewed Tests in the radiology section of CPT®: ordered and reviewed Review and summarize past medical records: yes Independent visualization of images, tracings, or specimens: yes (EKG at 1529: Is a sinus rhythm with sinus rhythm 3, no acute ischemic changes and no ectopy.) Risk of Complications, Morbidity, and/or Mortality Presenting problems: high Diagnostic procedures: moderate Management options: moderate Patient Progress Patient progress: stable Procedures

## 2019-11-18 NOTE — PROGRESS NOTES
Pt arrived to floor. VSS. Pt given meal tray, IV fluids started. Pt brief changed, orientated to room.

## 2019-11-18 NOTE — H&P
History and Physical 
 
Patient: Tsering Paredes MRN: [id number]  SSN: [id number] YOB: 1935  Age: 82 y.o. Sex: male Subjective:  
  
Tsering Paredes is a 82 y.o. male who came to ER due to s/p fall this morning. Patient lives at an assisted-living facility. He fell this morning after experiencing vertigo that he normally has. He tried pressing on his alert button on his neck, but did not reach anyone. Reportedly 6 hours later he could reach help and ambulance brought him to ER. He was found to have elevated CK and Mb. Also with elevated creatinine. Patient denies pain at back, buttock, thighs, legs at the moment. Patient could not tell me whether or why he is taking Eliquis. It is listed on his home medication. Other PMH is listed below. He also has multiple myeloma that is not in remission yet. Dementia, hard of hearing, Hospitalist service is requested to admit the patient. Past Medical History:  
Diagnosis Date  Acute encephalopathy 3/22/2015  Altered mental status 9/16/2014  Alzheimer disease ([address]  Anemia 9/16/2014 MILD  Anxiety  Asymmetrical sensorineural hearing loss  Bipolar disorder ([address] NOT TREATED, DIAGNOSED MANY YEARS AGO  Cancer ([address] multiple myeloma  Cataract 9/8/2016  Cortical hemorrhage ([address] 9/17/2014  Elevated AST (SGOT) 9/16/2014  GERD (gastroesophageal reflux disease)   
 not Tx  
 H/O seasonal allergies  History of TIA (transient ischemic attack) 9/16/2014  Hypomagnesemia 9/16/2014 MILD  Panic attack  Senile dementia ([address] 5/19/2017  Stroke ([address] ? TIA, PUT ON PLAVIX, TOOK SELF OFF  Syncope and collapse 3/22/2015  Tinea corporis 9/16/2014  Tinnitus Past Surgical History:  
Procedure Laterality Date  HX APPENDECTOMY  HX COLONOSCOPY  MULTIPLE OVER THE YEARS  
 NO CANCER  
 HX OTHER SURGICAL  AGE 23  
 COLONIC POLYP REMOVAL  
  HX VASCULAR ACCESS Family History Problem Relation Age of Onset  Diabetes Mother COMPLICATIONS OF DM  
 Cancer Mother  Heart Disease Mother  Lung Disease Father BLACK LUNG  Dementia Sister  Heart Disease Brother \"OLD AGE\"  Other Son ESTRANGED, [place]  
 Other Daughter   
     1 ESTRANGED, [address] [place] [address] [facility] [address] [place] Social History Tobacco Use  Smoking status: Former Smoker Packs/day: 2.00 Years: 16.00 Pack years: 32.00 Types: Cigarettes  Smokeless tobacco: Never Used  Tobacco comment: QUIT AGE 37 Substance Use Topics  Alcohol use: No  
  
Prior to Admission medications Medication Sig Start Date End Date Taking? Authorizing Provider  
lenalidomide (REVLIMID) 10 mg cap Take 10 mg daily for 21 days, then 7 days off  Indications: multiple myeloma 11/14/19  Yes Eliceo Chahal MD  
ELIQUIS 2.5 mg tablet  4/29/19  Yes Provider, Historical  
clonazePAM (KLONOPIN) 0.5 mg tablet  5/16/19  Yes Provider, Historical  
levETIRAcetam (KEPPRA) 750 mg tablet  5/8/19  Yes Provider, Historical  
QUEtiapine (SEROQUEL) 25 mg tablet  5/2/19  Yes Provider, Historical  
sertraline (ZOLOFT) 50 mg tablet  5/8/19  Yes Provider, Historical  
lovastatin (MEVACOR) 40 mg tablet Take 1 Tab by mouth nightly. Patient taking differently: Take 20 mg by mouth nightly. 6/26/17  Yes Yudi Patricio MD  
donepezil (ARICEPT) 10 mg tablet Take 1 Tab by mouth daily. 6/26/17  Yes Yudi Patricio MD  
  
 
No Known Allergies Review of Systems: 
 
Constitutional: Negative for chills and fever. HENT: Negative for rhinorrhea and sore throat. Eyes: Negative for pain, redness and visual disturbance. Respiratory: Negative for chest tightness, shortness of breath and wheezing. Cardiovascular: Negative for chest pain and leg swelling. Gastrointestinal: Negative for abdominal pain, bowel incontinence, diarrhea, nausea and vomiting. Genitourinary: Negative for bladder incontinence, dysuria and hematuria. Musculoskeletal: Negative for back pain, gait problem, neck pain and neck stiffness. Skin: Negative for color change and rash. Neurological: negative for speech difficulty. Negative for focal weakness, weakness, numbness, headaches and + loss of balance and fall as stated above. Psychiatric/Behavioral: Negative for agitation, confusion and + memory loss. Objective:  
 
Vitals:  
 11/18/19 1515 BP: 116/55 Pulse: 75 Resp: 16 Temp: 100.4 °F (38 °C) SpO2: 92% Weight: 117.9 kg (260 lb) Height: 5' 10\" (1.778 m) Physical Exam: 
 
General:                    The patient is an elderly male in no acute respiratory distress. He is very hard of hearing. He states that he was rushed to ER without his hearing aid. Head:                                   Normocephalic/atraumatic. Eyes:                                   palpebral pallor, no scleral icterus. ENT:                                    External auricular and nasal exam within normal limits. Mucous membranes are dry. Neck:                                   Supple, non-tender, no JVD. Lungs:                       Clear to auscultation bilaterally without wheezes or crackles. No respiratory distress or accessory muscle use. Heart:                                  Regular rate and rhythm, without murmurs, rubs, or gallops. Abdomen:                  Soft, non-tender, very distended due to obesity with normoactive bowel sounds. Genitourinary:           No tenderness over the bladder or bilateral CVAs. Extremities:               Without clubbing, cyanosis, or edema. No hematoma Skin:                                    Normal color, texture, and turgor. No rashes, lesions, or jaundice.  
Pulses:                      Radial and dorsalis pedis pulses present 2+ bilaterally. Capillary refill <2s. Neurologic:                CN II-XII grossly intact and symmetrical.  
                                            Moving all four extremities well with no focal deficits. Psychiatric:                Pleasant demeanor, appropriate affect. Alert and oriented x 3 Lab and data Recent Results (from the past 24 hour(s)) CBC WITH AUTOMATED DIFF Collection Time: 11/18/19  4:30 PM  
Result Value Ref Range WBC 1.1 (LL) 4.3 - 11.1 K/uL  
 RBC 3.71 (L) 4.23 - 5.6 M/uL  
 HGB 11.3 (L) 13.6 - 17.2 g/dL HCT 35.2 (L) 41.1 - 50.3 % MCV 94.9 79.6 - 97.8 FL  
 MCH 30.5 26.1 - 32.9 PG  
 MCHC 32.1 31.4 - 35.0 g/dL  
 RDW 16.8 (H) 11.9 - 14.6 % PLATELET 745 (L) 343 - 450 K/uL MPV 11.3 9.4 - 12.3 FL ABSOLUTE NRBC 0.00 0.0 - 0.2 K/uL NEUTROPHILS 40 (L) 43 - 78 % LYMPHOCYTES 30 13 - 44 % MONOCYTES 26 (H) 4.0 - 12.0 % EOSINOPHILS 1 0.5 - 7.8 % BASOPHILS 2 0.0 - 2.0 % IMMATURE GRANULOCYTES 1 0.0 - 5.0 %  
 ABS. NEUTROPHILS 0.5 (L) 1.7 - 8.2 K/UL  
 ABS. LYMPHOCYTES 0.3 (L) 0.5 - 4.6 K/UL  
 ABS. MONOCYTES 0.3 0.1 - 1.3 K/UL  
 ABS. EOSINOPHILS 0.0 0.0 - 0.8 K/UL  
 ABS. BASOPHILS 0.0 0.0 - 0.2 K/UL  
 ABS. IMM. GRANS. 0.0 0.0 - 0.5 K/UL  
 RBC COMMENTS SLIGHT 
ANISOCYTOSIS 
    
 WBC COMMENTS Result Confirmed By Smear PLATELET COMMENTS SLIGHT    
 DF MANUAL METABOLIC PANEL, COMPREHENSIVE Collection Time: 11/18/19  4:30 PM  
Result Value Ref Range Sodium 144 136 - 145 mmol/L Potassium 3.5 3.5 - 5.1 mmol/L Chloride 109 (H) 98 - 107 mmol/L  
 CO2 25 21 - 32 mmol/L Anion gap 10 7 - 16 mmol/L Glucose 133 (H) 65 - 100 mg/dL BUN 18 8 - 23 MG/DL Creatinine 1.38 0.8 - 1.5 MG/DL  
 GFR est AA >60 >60 ml/min/1.73m2 GFR est non-AA 52 (L) >60 ml/min/1.73m2 Calcium 8.2 (L) 8.3 - 10.4 MG/DL  Bilirubin, total 1.2 (H) 0.2 - 1.1 MG/DL  
 ALT (SGPT) 42 12 - 65 U/L  
 AST (SGOT) 116 (H) 15 - 37 U/L Alk. phosphatase 110 50 - 136 U/L Protein, total 6.8 6.3 - 8.2 g/dL Albumin 3.0 (L) 3.2 - 4.6 g/dL Globulin 3.8 (H) 2.3 - 3.5 g/dL A-G Ratio 0.8 (L) 1.2 - 3.5 CK Collection Time: 11/18/19  4:30 PM  
Result Value Ref Range CK 4,269 (H) 21 - 215 U/L MYOGLOBIN Collection Time: 11/18/19  4:30 PM  
Result Value Ref Range Myoglobin 10,446 ng/mL CT head 11- IMPRESSION: No CT evidence of acute intracranial abnormality. XR chest  
11- IMPRESSION: No acute findings in the chest 
  
XR spine 11- IMPRESSION: No evidence of fracture or other acute abnormality in the lumbar 
spine. I have reviewed chest x-ray myself. Assessment:  
 
Hospital Problems  Date Reviewed: 8/8/2018 Codes Class Noted POA * (Principal) Traumatic rhabdomyolysis ([facility] ICD-10-CM: T79. Gwenith Floss ICD-9-CM: 958.6  11/18/2019 Unknown ANIKET (acute kidney injury) ([facility] ICD-10-CM: N17.9 ICD-9-CM: 584.9  11/18/2019 Unknown Rhabdomyolysis ICD-10-CM: U81.79 ICD-9-CM: 728.88  11/18/2019 Unknown Multiple myeloma not having achieved remission ([facility] ICD-10-CM: C90.00 ICD-9-CM: 203.00  8/2/2017 Yes Mixed hyperlipidemia ICD-10-CM: E78.2 ICD-9-CM: 272.2  6/26/2017 Yes Plan: S/P fall Acute rhabdomyolysis ANIKET Admit to medical floor. IV fluid. Monitor symptoms. Recheck BMP, CK. Monitor renal function and intake and output. Avoid nephrotoxic agents. History of multiple myeloma Ask oncology to see to help with treatment plan especially with pancytopenia. Unclear why patient has Eliquis on his medication list. Due to recent fall and rhabdomyolysis, will hold this for now. If there is real indication (which will need to be clarified, may need to resume soon).   
 
Patient requires hospital stay as an in-patient and anticipated stay is more than 2 midnights due to the serious nature of the illness. Healthcare power of  could not be determined now. Patient could not tell me. I have discussed with patient regarding advance directive. Patient would like to have a full-code status. I have discussed the plan of care with patient. DVT prophylaxis : patient has low platelet, anemia and ?history of taking Eliquis and s/p fall with rhabdomyolysis. Will not give [address] or SCD at this time. Patient has no pain now. Will monitor. Further treatments will depend on initial responses and findings. Signed By: Gamal Márquez MD   
 November 18, 2019

## 2019-11-18 NOTE — PROGRESS NOTES
TRANSFER - IN REPORT: 
 
Verbal report received from Julia(name) on Emiliano Jones  being received from ER(unit) for routine progression of care Report consisted of patients Situation, Background, Assessment and  
Recommendations(SBAR). Information from the following report(s) SBAR, ED Summary, Intake/Output and Recent Results was reviewed with the receiving nurse. Opportunity for questions and clarification was provided. Assessment completed upon patients arrival to unit and care assumed.

## 2019-11-19 PROBLEM — E87.6 HYPOKALEMIA: Status: ACTIVE | Noted: 2019-11-19

## 2019-11-19 PROBLEM — N18.9 CKD (CHRONIC KIDNEY DISEASE): Status: ACTIVE | Noted: 2019-11-19

## 2019-11-19 LAB
ANION GAP SERPL CALC-SCNC: 5 MMOL/L (ref 7–16)
BASOPHILS # BLD: 0 K/UL (ref 0–0.2)
BASOPHILS NFR BLD: 1 % (ref 0–2)
BUN SERPL-MCNC: 20 MG/DL (ref 8–23)
CALCIUM SERPL-MCNC: 8.1 MG/DL (ref 8.3–10.4)
CHLORIDE SERPL-SCNC: 110 MMOL/L (ref 98–107)
CK SERPL-CCNC: 5551 U/L (ref 21–215)
CO2 SERPL-SCNC: 27 MMOL/L (ref 21–32)
CREAT SERPL-MCNC: 1.27 MG/DL (ref 0.8–1.5)
DIFFERENTIAL METHOD BLD: ABNORMAL
EOSINOPHIL # BLD: 0 K/UL (ref 0–0.8)
EOSINOPHIL NFR BLD: 1 % (ref 0.5–7.8)
ERYTHROCYTE [DISTWIDTH] IN BLOOD BY AUTOMATED COUNT: 17 % (ref 11.9–14.6)
GLUCOSE SERPL-MCNC: 109 MG/DL (ref 65–100)
HCT VFR BLD AUTO: 32.6 % (ref 41.1–50.3)
HGB BLD-MCNC: 10.6 G/DL (ref 13.6–17.2)
IMM GRANULOCYTES # BLD AUTO: 0 K/UL (ref 0–0.5)
IMM GRANULOCYTES NFR BLD AUTO: 1 % (ref 0–5)
LYMPHOCYTES # BLD: 0.7 K/UL (ref 0.5–4.6)
LYMPHOCYTES NFR BLD: 35 % (ref 13–44)
MCH RBC QN AUTO: 30.5 PG (ref 26.1–32.9)
MCHC RBC AUTO-ENTMCNC: 32.5 G/DL (ref 31.4–35)
MCV RBC AUTO: 93.7 FL (ref 79.6–97.8)
MM INDURATION POC: 0 MM (ref 0–5)
MONOCYTES # BLD: 0.5 K/UL (ref 0.1–1.3)
MONOCYTES NFR BLD: 24 % (ref 4–12)
NEUTS SEG # BLD: 0.9 K/UL (ref 1.7–8.2)
NEUTS SEG NFR BLD: 38 % (ref 43–78)
NRBC # BLD: 0 K/UL (ref 0–0.2)
PLATELET # BLD AUTO: 100 K/UL (ref 150–450)
PLATELET COMMENTS,PCOM: SLIGHT
PMV BLD AUTO: 11.2 FL (ref 9.4–12.3)
POTASSIUM SERPL-SCNC: 3.3 MMOL/L (ref 3.5–5.1)
PPD POC: NEGATIVE NEGATIVE
RBC # BLD AUTO: 3.48 M/UL (ref 4.23–5.6)
RBC MORPH BLD: ABNORMAL
SODIUM SERPL-SCNC: 142 MMOL/L (ref 136–145)
WBC # BLD AUTO: 2.1 K/UL (ref 4.3–11.1)
WBC MORPH BLD: ABNORMAL

## 2019-11-19 PROCEDURE — 80048 BASIC METABOLIC PNL TOTAL CA: CPT

## 2019-11-19 PROCEDURE — 74011250637 HC RX REV CODE- 250/637: Performed by: INTERNAL MEDICINE

## 2019-11-19 PROCEDURE — 74011250636 HC RX REV CODE- 250/636: Performed by: FAMILY MEDICINE

## 2019-11-19 PROCEDURE — 74011250636 HC RX REV CODE- 250/636: Performed by: INTERNAL MEDICINE

## 2019-11-19 PROCEDURE — 82550 ASSAY OF CK (CPK): CPT

## 2019-11-19 PROCEDURE — 97161 PT EVAL LOW COMPLEX 20 MIN: CPT

## 2019-11-19 PROCEDURE — 65270000029 HC RM PRIVATE

## 2019-11-19 PROCEDURE — 36415 COLL VENOUS BLD VENIPUNCTURE: CPT

## 2019-11-19 PROCEDURE — 94640 AIRWAY INHALATION TREATMENT: CPT

## 2019-11-19 PROCEDURE — 99223 1ST HOSP IP/OBS HIGH 75: CPT | Performed by: INTERNAL MEDICINE

## 2019-11-19 PROCEDURE — 77030020263 HC SOL INJ SOD CL0.9% LFCR 1000ML

## 2019-11-19 PROCEDURE — 97530 THERAPEUTIC ACTIVITIES: CPT

## 2019-11-19 PROCEDURE — 94760 N-INVAS EAR/PLS OXIMETRY 1: CPT

## 2019-11-19 PROCEDURE — 74011000250 HC RX REV CODE- 250: Performed by: FAMILY MEDICINE

## 2019-11-19 PROCEDURE — 85025 COMPLETE CBC W/AUTO DIFF WBC: CPT

## 2019-11-19 RX ORDER — ALBUTEROL SULFATE 0.83 MG/ML
2.5 SOLUTION RESPIRATORY (INHALATION)
Status: DISCONTINUED | OUTPATIENT
Start: 2019-11-19 | End: 2019-11-20

## 2019-11-19 RX ORDER — FUROSEMIDE 10 MG/ML
20 INJECTION INTRAMUSCULAR; INTRAVENOUS ONCE
Status: COMPLETED | OUTPATIENT
Start: 2019-11-19 | End: 2019-11-19

## 2019-11-19 RX ORDER — HYDROCODONE BITARTRATE AND HOMATROPINE METHYLBROMIDE 1.5; 5 MG/5ML; MG/5ML
5 SYRUP ORAL
Status: DISCONTINUED | OUTPATIENT
Start: 2019-11-19 | End: 2019-11-23 | Stop reason: HOSPADM

## 2019-11-19 RX ORDER — POTASSIUM CHLORIDE 20 MEQ/1
40 TABLET, EXTENDED RELEASE ORAL
Status: COMPLETED | OUTPATIENT
Start: 2019-11-19 | End: 2019-11-19

## 2019-11-19 RX ORDER — GUAIFENESIN 600 MG/1
1200 TABLET, EXTENDED RELEASE ORAL EVERY 12 HOURS
Status: DISCONTINUED | OUTPATIENT
Start: 2019-11-19 | End: 2019-11-23 | Stop reason: HOSPADM

## 2019-11-19 RX ORDER — SODIUM CHLORIDE 9 MG/ML
75 INJECTION, SOLUTION INTRAVENOUS CONTINUOUS
Status: DISCONTINUED | OUTPATIENT
Start: 2019-11-19 | End: 2019-11-20

## 2019-11-19 RX ADMIN — POTASSIUM CHLORIDE 40 MEQ: 20 TABLET, EXTENDED RELEASE ORAL at 12:02

## 2019-11-19 RX ADMIN — ALBUTEROL SULFATE 2.5 MG: 2.5 SOLUTION RESPIRATORY (INHALATION) at 13:15

## 2019-11-19 RX ADMIN — SODIUM CHLORIDE 75 ML/HR: 900 INJECTION, SOLUTION INTRAVENOUS at 23:39

## 2019-11-19 RX ADMIN — HYDROCODONE BITARTRATE AND HOMATROPINE METHYLBROMIDE 5 ML: 5; 1.5 SOLUTION ORAL at 17:28

## 2019-11-19 RX ADMIN — FUROSEMIDE 20 MG: 10 INJECTION, SOLUTION INTRAMUSCULAR; INTRAVENOUS at 04:03

## 2019-11-19 RX ADMIN — ALBUTEROL SULFATE 2.5 MG: 2.5 SOLUTION RESPIRATORY (INHALATION) at 05:40

## 2019-11-19 RX ADMIN — DONEPEZIL HYDROCHLORIDE 10 MG: 5 TABLET, FILM COATED ORAL at 08:24

## 2019-11-19 RX ADMIN — HYDROCODONE BITARTRATE AND HOMATROPINE METHYLBROMIDE 5 ML: 5; 1.5 SOLUTION ORAL at 21:14

## 2019-11-19 RX ADMIN — ACETAMINOPHEN 650 MG: 325 TABLET, FILM COATED ORAL at 05:33

## 2019-11-19 RX ADMIN — ALBUTEROL SULFATE 2.5 MG: 2.5 SOLUTION RESPIRATORY (INHALATION) at 07:23

## 2019-11-19 RX ADMIN — GUAIFENESIN 1200 MG: 600 TABLET ORAL at 21:13

## 2019-11-19 RX ADMIN — GUAIFENESIN 1200 MG: 600 TABLET ORAL at 12:02

## 2019-11-19 RX ADMIN — ALBUTEROL SULFATE 2.5 MG: 2.5 SOLUTION RESPIRATORY (INHALATION) at 20:02

## 2019-11-19 NOTE — PROGRESS NOTES
Hospitalist Note Admit Date:  2019  3:13 PM  
Name:  Janet Cuello Age:  82 y.o. 
:  1935 MRN:  [id number] PCP:  Mynor Pittman NP Treatment Team: Attending Provider: Ilda Parkinson MD; Consulting Provider: Karina Taylor MD; Physical Therapist: Ingrid Perez, PT 
 
HPI/Subjective:  
Mr. Marcella Hester is an 83 y/o WM admitted on  after a fall that resulted in rhabdo, CK 4,000s.  
 
: Up to chair, feels much better, no complaints. Has PPM but says he fell yesterday after he tripped on his loafers (denies chest pain, palpitations, SOB, dizziness, syncope). ROS neg otherwise. Objective:  
 
Patient Vitals for the past 24 hrs: 
 Temp Pulse Resp BP SpO2  
19 0755 98.9 °F (37.2 °C) 65 16 105/41 95 % 19 0726     96 % 19 0540     95 % 19 0403  61  109/64   
19 0350 97.8 °F (36.6 °C) 61 18 109/64 93 % 19 2351 98.9 °F (37.2 °C) 63 18 109/47 92 % 19 1949 99.5 °F (37.5 °C) 64 18 135/61 97 % 19 1824 98.9 °F (37.2 °C)      
19 1822  84  123/57 95 % 19 1515 100.4 °F (38 °C) 75 16 116/55 92 % Oxygen Therapy O2 Sat (%): 95 % (19 0755) Pulse via Oximetry: 63 beats per minute (19 0726) O2 Device: Room air (19 2366) Estimated body mass index is 37.31 kg/m² as calculated from the following: 
  Height as of this encounter: 5' 10\" (1.778 m). Weight as of this encounter: 117.9 kg (260 lb). Intake/Output Summary (Last 24 hours) at 2019 1053 Last data filed at 2019 3682 Gross per 24 hour Intake 1000 ml Output  Net 1000 ml *Note that automatically entered I/Os may not be accurate; dependent on patient compliance with collection and accurate  by techs. General:    Well nourished. Alert. CV:   RRR. No murmur, rub, or gallop. Lungs:   B/l rhonchi. RA O2. Intermittent cough. Abdomen:   Soft, nontender, nondistended. Extremities: Warm and dry. No cyanosis. Mild b/l LE edema. Skin:     No rashes or jaundice. Neuro:  No gross focal deficits Data Review: 
I have reviewed all labs, meds, and studies from the last 24 hours: 
 
Recent Results (from the past 24 hour(s)) EKG, 12 LEAD, INITIAL Collection Time: 11/18/19  3:29 PM  
Result Value Ref Range Ventricular Rate 73 BPM  
 Atrial Rate 73 BPM  
 P-R Interval 232 ms QRS Duration 86 ms  
 Q-T Interval 528 ms QTC Calculation (Bezet) 581 ms Calculated P Axis 51 degrees Calculated R Axis -3 degrees Calculated T Axis 58 degrees Diagnosis    
  !! AGE AND GENDER SPECIFIC ECG ANALYSIS !! Sinus rhythm with sinus arrhythmia with 1st degree A-V block Prolonged QT Abnormal ECG When compared with ECG of 07-OCT-2015 10:38, 
AR interval has increased Nonspecific T wave abnormality no longer evident in Anterolateral leads QT has lengthened Confirmed by Sherif Swain ([id number]) on 11/18/2019 10:11:50 PM 
  
CBC WITH AUTOMATED DIFF Collection Time: 11/18/19  4:30 PM  
Result Value Ref Range WBC 1.1 (LL) 4.3 - 11.1 K/uL  
 RBC 3.71 (L) 4.23 - 5.6 M/uL  
 HGB 11.3 (L) 13.6 - 17.2 g/dL HCT 35.2 (L) 41.1 - 50.3 % MCV 94.9 79.6 - 97.8 FL  
 MCH 30.5 26.1 - 32.9 PG  
 MCHC 32.1 31.4 - 35.0 g/dL  
 RDW 16.8 (H) 11.9 - 14.6 % PLATELET 526 (L) 557 - 450 K/uL MPV 11.3 9.4 - 12.3 FL ABSOLUTE NRBC 0.00 0.0 - 0.2 K/uL NEUTROPHILS 40 (L) 43 - 78 % LYMPHOCYTES 30 13 - 44 % MONOCYTES 26 (H) 4.0 - 12.0 % EOSINOPHILS 1 0.5 - 7.8 % BASOPHILS 2 0.0 - 2.0 % IMMATURE GRANULOCYTES 1 0.0 - 5.0 %  
 ABS. NEUTROPHILS 0.5 (L) 1.7 - 8.2 K/UL  
 ABS. LYMPHOCYTES 0.3 (L) 0.5 - 4.6 K/UL  
 ABS. MONOCYTES 0.3 0.1 - 1.3 K/UL  
 ABS. EOSINOPHILS 0.0 0.0 - 0.8 K/UL  
 ABS. BASOPHILS 0.0 0.0 - 0.2 K/UL  
 ABS. IMM. GRANS. 0.0 0.0 - 0.5 K/UL  
 RBC COMMENTS SLIGHT 
ANISOCYTOSIS 
    
 WBC COMMENTS Result Confirmed By Smear PLATELET COMMENTS SLIGHT DF MANUAL METABOLIC PANEL, COMPREHENSIVE Collection Time: 11/18/19  4:30 PM  
Result Value Ref Range Sodium 144 136 - 145 mmol/L Potassium 3.5 3.5 - 5.1 mmol/L Chloride 109 (H) 98 - 107 mmol/L  
 CO2 25 21 - 32 mmol/L Anion gap 10 7 - 16 mmol/L Glucose 133 (H) 65 - 100 mg/dL BUN 18 8 - 23 MG/DL Creatinine 1.38 0.8 - 1.5 MG/DL  
 GFR est AA >60 >60 ml/min/1.73m2 GFR est non-AA 52 (L) >60 ml/min/1.73m2 Calcium 8.2 (L) 8.3 - 10.4 MG/DL Bilirubin, total 1.2 (H) 0.2 - 1.1 MG/DL  
 ALT (SGPT) 42 12 - 65 U/L  
 AST (SGOT) 116 (H) 15 - 37 U/L Alk. phosphatase 110 50 - 136 U/L Protein, total 6.8 6.3 - 8.2 g/dL Albumin 3.0 (L) 3.2 - 4.6 g/dL Globulin 3.8 (H) 2.3 - 3.5 g/dL A-G Ratio 0.8 (L) 1.2 - 3.5 CK Collection Time: 11/18/19  4:30 PM  
Result Value Ref Range CK 4,269 (H) 21 - 215 U/L MYOGLOBIN Collection Time: 11/18/19  4:30 PM  
Result Value Ref Range Myoglobin 10,446 ng/mL METABOLIC PANEL, BASIC Collection Time: 11/19/19  5:39 AM  
Result Value Ref Range Sodium 142 136 - 145 mmol/L Potassium 3.3 (L) 3.5 - 5.1 mmol/L Chloride 110 (H) 98 - 107 mmol/L  
 CO2 27 21 - 32 mmol/L Anion gap 5 (L) 7 - 16 mmol/L Glucose 109 (H) 65 - 100 mg/dL BUN 20 8 - 23 MG/DL Creatinine 1.27 0.8 - 1.5 MG/DL  
 GFR est AA >60 >60 ml/min/1.73m2 GFR est non-AA 58 (L) >60 ml/min/1.73m2 Calcium 8.1 (L) 8.3 - 10.4 MG/DL  
CBC WITH AUTOMATED DIFF Collection Time: 11/19/19  5:39 AM  
Result Value Ref Range WBC 2.1 (L) 4.3 - 11.1 K/uL  
 RBC 3.48 (L) 4.23 - 5.6 M/uL  
 HGB 10.6 (L) 13.6 - 17.2 g/dL HCT 32.6 (L) 41.1 - 50.3 % MCV 93.7 79.6 - 97.8 FL  
 MCH 30.5 26.1 - 32.9 PG  
 MCHC 32.5 31.4 - 35.0 g/dL  
 RDW 17.0 (H) 11.9 - 14.6 % PLATELET 295 (L) 144 - 450 K/uL MPV 11.2 9.4 - 12.3 FL ABSOLUTE NRBC 0.00 0.0 - 0.2 K/uL NEUTROPHILS 38 (L) 43 - 78 % LYMPHOCYTES 35 13 - 44 %  MONOCYTES 24 (H) 4.0 - 12.0 %  
 EOSINOPHILS 1 0.5 - 7.8 % BASOPHILS 1 0.0 - 2.0 % IMMATURE GRANULOCYTES 1 0.0 - 5.0 %  
 ABS. NEUTROPHILS 0.9 (L) 1.7 - 8.2 K/UL  
 ABS. LYMPHOCYTES 0.7 0.5 - 4.6 K/UL  
 ABS. MONOCYTES 0.5 0.1 - 1.3 K/UL  
 ABS. EOSINOPHILS 0.0 0.0 - 0.8 K/UL  
 ABS. BASOPHILS 0.0 0.0 - 0.2 K/UL  
 ABS. IMM. GRANS. 0.0 0.0 - 0.5 K/UL  
 RBC COMMENTS SLIGHT 
ANISOCYTOSIS 
    
 WBC COMMENTS Result Confirmed By Smear PLATELET COMMENTS SLIGHT    
 DF AUTOMATED    
CK Collection Time: 11/19/19  5:39 AM  
Result Value Ref Range CK 5,551 (H) 21 - 215 U/L All Micro Results None No results found for this visit on 11/18/19. Current Meds: 
Current Facility-Administered Medications Medication Dose Route Frequency  albuterol (PROVENTIL VENTOLIN) nebulizer solution 2.5 mg  2.5 mg Nebulization Q6H RT  
 0.9% sodium chloride infusion  75 mL/hr IntraVENous CONTINUOUS  
 guaiFENesin ER (MUCINEX) tablet 1,200 mg  1,200 mg Oral Q12H  potassium chloride (K-DUR, KLOR-CON) SR tablet 40 mEq  40 mEq Oral NOW  tuberculin injection 5 Units  5 Units IntraDERMal ONCE  
 sodium chloride (NS) flush 5-40 mL  5-40 mL IntraVENous Q8H  
 sodium chloride (NS) flush 5-40 mL  5-40 mL IntraVENous PRN  
 acetaminophen (TYLENOL) tablet 650 mg  650 mg Oral Q6H PRN  
 ondansetron (ZOFRAN) injection 4 mg  4 mg IntraVENous Q4H PRN  
 bisacodyl (DULCOLAX) tablet 5 mg  5 mg Oral DAILY PRN  
 donepezil (ARICEPT) tablet 10 mg  10 mg Oral DAILY Other Studies (last 24 hours): Xr Chest Pa Lat Result Date: 11/18/2019 Chest X-ray INDICATION: Right chest wall pain after falling, persistent cough PA and lateral views of the chest were obtained. FINDINGS: The lungs are clear. There are no infiltrates or effusions. There is stable cardiomegaly. Pacemaker and vascular port are present. No definite fracture identified.   
 
IMPRESSION: No acute findings in the chest  
 
Xr Spine Lumb 2 Or 3 V 
 
 Result Date: 11/18/2019 Lumbar Spine INDICATION:  Back pain after falling AP and lateral views of the lumbar spine were obtained FINDINGS: There is multilevel degenerative change. There is no evidence of fracture or subluxation. The vertebrae are well aligned. No bony lesions are seen. IMPRESSION: No evidence of fracture or other acute abnormality in the lumbar spine. Ct Head Wo Cont Result Date: 11/18/2019 Head CT INDICATION: Fall, weakness Multiple axial images obtained through the brain without intravenous contrast. Radiation dose reduction techniques were used for this study: All CT scans performed at this facility use one or all of the following: Automated exposure control, adjustment of the mA and/or kVp according to patient's size, iterative reconstruction. FINDINGS: No areas of abnormal attenuation are seen in the brain. There is no CT evidence of acute hemorrhage or infarction. The ventricles are normal in size. There are no extra-axial fluid collections. No masses are seen. The sinuses are clear. There are no bony lesions. IMPRESSION: No CT evidence of acute intracranial abnormality. Assessment and Plan:  
 
Hospital Problems as of 11/19/2019 Date Reviewed: 8/8/2018 Codes Class Noted - Resolved POA  
 CKD (chronic kidney disease) ICD-10-CM: N18.9 ICD-9-CM: 585.9  11/19/2019 - Present Unknown Hypokalemia ICD-10-CM: E87.6 ICD-9-CM: 276.8  11/19/2019 - Present Unknown * (Principal) Traumatic rhabdomyolysis ([facility] [address] ICD-10-CM: T79. Allyne Oppenheim ICD-9-CM: 958.6  11/18/2019 - Present Unknown Rhabdomyolysis ICD-10-CM: C20.37 ICD-9-CM: 728.88  11/18/2019 - Present Unknown Multiple myeloma not having achieved remission ([facility] [address] ICD-10-CM: C90.00 ICD-9-CM: 203.00  8/2/2017 - Present Yes Fall ICD-10-CM: W19. Thanh Hooper ICD-9-CM: E888.9  7/18/2017 - Present Yes  Overview Signed 7/18/2017  2:03 PM by Shade Lewis MD  
 Pt had another fall, in his yard; he was chasing after his cat and lost his balance. No injuries sustained; fall witnessed by neighbor. No LOC. Mixed hyperlipidemia ICD-10-CM: E78.2 ICD-9-CM: 272.2  6/26/2017 - Present Yes Plan: # Traumatic rhabdo - IVFs held overnight with concern for fluid overload. No evidence of that today, resume fluids. Mobilize. PT/OT. # Cough - CXR clear on admission. RA O2. Mucinex, nebs. # HypoK - Replace # Fall - Tripped on loafers. No suggestion of cardiac or other cause. # CKD - Cr at baseline. Did not have ANIKET. # H/o multiple myeloma/pancytopenia - Counts at baseline. No bleeding. No fevers. DC planning/Dispo: Back to [facility] when able. Diet:  DIET CARDIAC 
DVT ppx: SCDs only Signed: 
Helene Linares MD

## 2019-11-19 NOTE — PROGRESS NOTES
Report received from [address] [place] [address] Pt is resting quietly in bed. Bed is low and locked with call light in reach. resp is even and unlabored. Scattered wheezing heard on right side. Pt states that he has a pacemaker. Pt is also very hard of hearing. Pt is A&O x4. No needs are voiced at this time.

## 2019-11-19 NOTE — PROGRESS NOTES
Dr. Serge Wilson paged for pt complaining of cough. Pt has expiatory wheezing in upper bilateral lungs. Dr. Serge Wilson stated to stop continues fluids, give lasix 20 mg IV once, and ordered breathing treatments.

## 2019-11-19 NOTE — PROGRESS NOTES
Assessment complete. Pt sitting up in bed waiting for breakfast.  A&Ox3. S1 S2 auscultated. Respirations even and unlabored, pt has coarse breath sounds throughout with scattered wheezing. Pt has active bowel sounds. Pt voices no needs at this time. Call light in reach.

## 2019-11-19 NOTE — PROGRESS NOTES
Called about crackles and wheezing on exam. 93% on RA. PE- alert, hard of hearing, no acute distress Heart - RRR, no murmur Lungs - Expiratory wheezing at upper lobes bilaterally. No obvious crackles on exam.  
 
Hold IV fluids tonight since concern of crackles on exam per nursing staff until day shift can evaluate. Start scheduled albuterol treatments. Patient denies tobacco abuse.

## 2019-11-19 NOTE — PROGRESS NOTES
Care Management Interventions Mode of Transport at Discharge: BLS Transition of Care Consult (CM Consult): SNF Partner SNF: Yes Current Support Network: Adult Group Home Confirm Follow Up Transport: Family Plan discussed with Pt/Family/Caregiver: Yes Freedom of Choice Offered: Yes Discharge Location Discharge Placement: Skilled nursing facility JUAN spoke with pt who is A&O. PTA pt indep with ADL'S, lives at [facility]. Pt states he prefers to return to his apt at [facility] but is willing to consider STR. JUAN spoke with pt's daughter Gaye Villarrealster [phone] ) who lives in [place] to discuss d/c. [facility] preference but they do not have a bed till Sat, JUAN told daughter she would contact [facility] ( then realized they are no longer in network with [facility] ). JUAN sent referral to [facility] via [address] and received bed offer. JUAN initiated referral to [facility], clinical faxed to both [phone] and [phone] [facility]

## 2019-11-19 NOTE — CONSULTS
[facility] Hematology & Oncology Inpatient Hematology / Oncology Consult Note Reason for Consult:  ANIKET (acute kidney injury) ([facility] [N17.9] Rhabdomyolysis [M62.82] Referring Physician:  Claudell Carbine, MD 
 
History of Present Illness: Mr. Laurel Monroe is a 82 y.o. male admitted on 11/18/2019 with a primary diagnosis of ANIKET and rhabdomyolysis. He is a known patient of Dr. Maikol Lantigua. In 8/2017 he was diagnosed with Multiple Myeloma, IgG Kappa, ISS Stage II, at diagnosis his IgG level was 3865 with an M-spike of 3.26 gm/dl, he received 4 cycles of CyBorD and achieved a LA; his IgG level decreased to 1338 with an M-spike of 0.40 gm/dl, in 3/2018 he was started on Revlimid and in 11/2018 his IgG level decreased to 1167 with an M-spike of 0.13 gm/dl. He continues to take Revlimid for 21/28 days. He presented to ED after he fell after experiencing vertigo. He tried pressing his alert button on his neck, but did not reach anyone. Reportedly, 6 hours later, he could reach help and ambulance brought him to ED. He was found to have elevated CK and Mb. Also with elevated creatinine. WBC 2.1/, Hgb 10.6, Plt 100k  (Counts are close to his baseline). We were consulted for recommendations for his pancytopenia. Review of Systems: 
Constitutional +weakness. Denies fever, chills, weight loss, appetite changes, fatigue, night sweats. HEENT Denies trauma, blurry vision, hearing loss, ear pain, nosebleeds, sore throat, neck pain and ear discharge. Skin Denies lesions or rashes. Lungs Denies dyspnea, cough, sputum production or hemoptysis. Cardiovascular Denies chest pain, palpitations, or lower extremity edema. Gastrointestinal Denies nausea, vomiting, changes in bowel habits, bloody or black stools, abdominal pain.  Denies dysuria, frequency or hesitancy of urination. Neuro +vertigo. Denies headaches, visual changes or ataxia.  Denies dizziness, tingling, tremors, sensory change, speech change, focal weakness or headaches. Hematology Denies easy bruising or bleeding, denies gingival bleeding or epistaxis. Endo Denies heat/cold intolerance, denies diabetes or thyroid abnormalities. MSK Denies back pain, arthralgias, myalgias or frequent falls. Psychiatric/Behavioral Denies depression and substance abuse. The patient is not nervous/anxious. No Known Allergies Past Medical History:  
Diagnosis Date  Acute encephalopathy 3/22/2015  Altered mental status 9/16/2014  Alzheimer disease ([address]  Anemia 9/16/2014 MILD  Anxiety  Asymmetrical sensorineural hearing loss  Bipolar disorder ([address] NOT TREATED, DIAGNOSED MANY YEARS AGO  Cancer ([address] multiple myeloma  Cataract 9/8/2016  Cortical hemorrhage ([address] 9/17/2014  Elevated AST (SGOT) 9/16/2014  GERD (gastroesophageal reflux disease)   
 not Tx  
 H/O seasonal allergies  History of TIA (transient ischemic attack) 9/16/2014  Hypomagnesemia 9/16/2014 MILD  Panic attack  Senile dementia ([address] 5/19/2017  Stroke ([address] ? TIA, PUT ON PLAVIX, TOOK SELF OFF  Syncope and collapse 3/22/2015  Tinea corporis 9/16/2014  Tinnitus Past Surgical History:  
Procedure Laterality Date  HX APPENDECTOMY  HX COLONOSCOPY  MULTIPLE OVER THE YEARS  
 NO CANCER  
 HX OTHER SURGICAL  AGE 23  
 COLONIC POLYP REMOVAL  
 HX VASCULAR ACCESS Family History Problem Relation Age of Onset  Diabetes Mother COMPLICATIONS OF DM  
 Cancer Mother  Heart Disease Mother  Lung Disease Father BLACK LUNG  Dementia Sister  Heart Disease Brother \"OLD AGE\"  Other Son ESTRANGED, IN [place]  
 Other Daughter   
     1 ESTRANGED, [address] [place] [address] [facility] [address] [place] Social History Socioeconomic History  Marital status:  Spouse name: Not on file  Number of children: Not on file  Years of education: Not on file  Highest education level: Not on file Occupational History  Not on file Social Needs  Financial resource strain: Not on file  Food insecurity:  
  Worry: Not on file Inability: Not on file  Transportation needs:  
  Medical: Not on file Non-medical: Not on file Tobacco Use  Smoking status: Former Smoker Packs/day: 2.00 Years: 16.00 Pack years: 32.00 Types: Cigarettes  Smokeless tobacco: Never Used  Tobacco comment: QUIT AGE 37 Substance and Sexual Activity  Alcohol use: No  
 Drug use: No  
 Sexual activity: Not Currently Partners: Female Birth control/protection: None Lifestyle  Physical activity:  
  Days per week: Not on file Minutes per session: Not on file  Stress: Not on file Relationships  Social connections:  
  Talks on phone: Not on file Gets together: Not on file Attends [employer]ism service: Not on file Active member of club or organization: Not on file Attends meetings of clubs or organizations: Not on file Relationship status: Not on file  Intimate partner violence:  
  Fear of current or ex partner: Not on file Emotionally abused: Not on file Physically abused: Not on file Forced sexual activity: Not on file Other Topics Concern  Not on file Social History Narrative 9/16/14:  PATIENT IS , LIVES WITH CATARINA MAYA. 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 HE MOVED TO [place] FOR 5 YEARS, HAS BEEN HERE (?) FOR ABOUT 10 YEARS. HAS A DAUGHTER IN [place] THAT IS NOT SPEAKING TO HIM, A SON IN [place] WHO IS NOT SPEAKING TO HIM, AND A DAUGHTER IN [place]. BROTHER AND SISTER ARE DEAD. ONLY FRIEND IS SERA TORRES, ([facility]: [phone]), A  WHO LOOKS AFTER THE PATIENT. CALL HIM ON DISCHARGE AS HE HAS PATIENT'S DOG AND HOUSE KEYS. Current Facility-Administered Medications Medication Dose Route Frequency Provider Last Rate Last Dose  albuterol (PROVENTIL VENTOLIN) nebulizer solution 2.5 mg  2.5 mg Nebulization Q6H RT Bulah Flight, DO   2.5 mg at 19 1315  
 0.9% sodium chloride infusion  75 mL/hr IntraVENous CONTINUOUS Vy Jones MD 75 mL/hr at 19 1100 75 mL/hr at 19 1100  
 guaiFENesin ER (MUCINEX) tablet 1,200 mg  1,200 mg Oral Q12H Vy Jones MD   1,200 mg at 19 1202  tuberculin injection 5 Units  5 Units IntraDERMal Raji Zelaya DO   5 Units at 19 7155  sodium chloride (NS) flush 5-40 mL  5-40 mL IntraVENous Q8H Elgin Anglin MD   Stopped at 19 2200  
 sodium chloride (NS) flush 5-40 mL  5-40 mL IntraVENous PRN Ashley Whalen MD      
 acetaminophen (TYLENOL) tablet 650 mg  650 mg Oral Q6H PRN Ashley Whalen MD   650 mg at 19 0533  ondansetron (ZOFRAN) injection 4 mg  4 mg IntraVENous Q4H PRN Ashley Whalen MD      
 bisacodyl (DULCOLAX) tablet 5 mg  5 mg Oral DAILY PRN Ashley Whalen MD      
 donepezil (ARICEPT) tablet 10 mg  10 mg Oral DAILY Elgin Anglin MD   10 mg at 19 5593 OBJECTIVE: 
Patient Vitals for the past 8 hrs: 
 BP Temp Pulse Resp SpO2  
19 1316     96 % 19 1236 115/61 97.4 °F (36.3 °C) 61 18 94 % 19 0755 105/41 98.9 °F (37.2 °C) 65 16 95 % 19 0726     96 % Temp (24hrs), Av.8 °F (37.1 °C), Min:97.4 °F (36.3 °C), Max:100.4 °F (38 °C) No intake/output data recorded. Physical Exam: 
Constitutional: Well developed, well nourished male in no acute distress, sitting comfortably in the hospital bed. HEENT: Normocephalic and atraumatic. Oropharynx is clear, mucous membranes are moist.  Extraocular muscles are intact. Sclerae anicteric. Neck supple without JVD. No thyromegaly present. Skin Warm and dry. No bruising and no rash noted. No erythema. No pallor. Respiratory Lungs are clear to auscultation bilaterally without wheezes, rales or rhonchi, normal air exchange without accessory muscle use. CVS Normal rate, regular rhythm and normal S1 and S2. No murmurs, gallops, or rubs. Abdomen Soft, nontender and nondistended, normoactive bowel sounds. No palpable mass. No hepatosplenomegaly. Neuro Grossly nonfocal with no obvious sensory or motor deficits. MSK Normal range of motion in general.  No edema and no tenderness. Psych Appropriate mood and affect. Labs:   
Recent Results (from the past 24 hour(s)) EKG, 12 LEAD, INITIAL Collection Time: 11/18/19  3:29 PM  
Result Value Ref Range Ventricular Rate 73 BPM  
 Atrial Rate 73 BPM  
 P-R Interval 232 ms QRS Duration 86 ms  
 Q-T Interval 528 ms QTC Calculation (Bezet) 581 ms Calculated P Axis 51 degrees Calculated R Axis -3 degrees Calculated T Axis 58 degrees Diagnosis    
  !! AGE AND GENDER SPECIFIC ECG ANALYSIS !! Sinus rhythm with sinus arrhythmia with 1st degree A-V block Prolonged QT Abnormal ECG When compared with ECG of 07-OCT-2015 10:38, 
OR interval has increased Nonspecific T wave abnormality no longer evident in Anterolateral leads QT has lengthened Confirmed by Roman Jolly ([id number]) on 11/18/2019 10:11:50 PM 
  
CBC WITH AUTOMATED DIFF Collection Time: 11/18/19  4:30 PM  
Result Value Ref Range WBC 1.1 (LL) 4.3 - 11.1 K/uL  
 RBC 3.71 (L) 4.23 - 5.6 M/uL  
 HGB 11.3 (L) 13.6 - 17.2 g/dL HCT 35.2 (L) 41.1 - 50.3 % MCV 94.9 79.6 - 97.8 FL  
 MCH 30.5 26.1 - 32.9 PG  
 MCHC 32.1 31.4 - 35.0 g/dL  
 RDW 16.8 (H) 11.9 - 14.6 % PLATELET 183 (L) 318 - 450 K/uL MPV 11.3 9.4 - 12.3 FL ABSOLUTE NRBC 0.00 0.0 - 0.2 K/uL NEUTROPHILS 40 (L) 43 - 78 % LYMPHOCYTES 30 13 - 44 % MONOCYTES 26 (H) 4.0 - 12.0 % EOSINOPHILS 1 0.5 - 7.8 % BASOPHILS 2 0.0 - 2.0 % IMMATURE GRANULOCYTES 1 0.0 - 5.0 % ABS. NEUTROPHILS 0.5 (L) 1.7 - 8.2 K/UL  
 ABS. LYMPHOCYTES 0.3 (L) 0.5 - 4.6 K/UL  
 ABS. MONOCYTES 0.3 0.1 - 1.3 K/UL  
 ABS. EOSINOPHILS 0.0 0.0 - 0.8 K/UL  
 ABS. BASOPHILS 0.0 0.0 - 0.2 K/UL  
 ABS. IMM. GRANS. 0.0 0.0 - 0.5 K/UL  
 RBC COMMENTS SLIGHT 
ANISOCYTOSIS 
    
 WBC COMMENTS Result Confirmed By Smear PLATELET COMMENTS SLIGHT    
 DF MANUAL METABOLIC PANEL, COMPREHENSIVE Collection Time: 11/18/19  4:30 PM  
Result Value Ref Range Sodium 144 136 - 145 mmol/L Potassium 3.5 3.5 - 5.1 mmol/L Chloride 109 (H) 98 - 107 mmol/L  
 CO2 25 21 - 32 mmol/L Anion gap 10 7 - 16 mmol/L Glucose 133 (H) 65 - 100 mg/dL BUN 18 8 - 23 MG/DL Creatinine 1.38 0.8 - 1.5 MG/DL  
 GFR est AA >60 >60 ml/min/1.73m2 GFR est non-AA 52 (L) >60 ml/min/1.73m2 Calcium 8.2 (L) 8.3 - 10.4 MG/DL Bilirubin, total 1.2 (H) 0.2 - 1.1 MG/DL  
 ALT (SGPT) 42 12 - 65 U/L  
 AST (SGOT) 116 (H) 15 - 37 U/L Alk. phosphatase 110 50 - 136 U/L Protein, total 6.8 6.3 - 8.2 g/dL Albumin 3.0 (L) 3.2 - 4.6 g/dL Globulin 3.8 (H) 2.3 - 3.5 g/dL A-G Ratio 0.8 (L) 1.2 - 3.5 CK Collection Time: 11/18/19  4:30 PM  
Result Value Ref Range CK 4,269 (H) 21 - 215 U/L MYOGLOBIN Collection Time: 11/18/19  4:30 PM  
Result Value Ref Range Myoglobin 10,446 ng/mL METABOLIC PANEL, BASIC Collection Time: 11/19/19  5:39 AM  
Result Value Ref Range Sodium 142 136 - 145 mmol/L Potassium 3.3 (L) 3.5 - 5.1 mmol/L Chloride 110 (H) 98 - 107 mmol/L  
 CO2 27 21 - 32 mmol/L Anion gap 5 (L) 7 - 16 mmol/L Glucose 109 (H) 65 - 100 mg/dL BUN 20 8 - 23 MG/DL Creatinine 1.27 0.8 - 1.5 MG/DL  
 GFR est AA >60 >60 ml/min/1.73m2 GFR est non-AA 58 (L) >60 ml/min/1.73m2 Calcium 8.1 (L) 8.3 - 10.4 MG/DL  
CBC WITH AUTOMATED DIFF Collection Time: 11/19/19  5:39 AM  
Result Value Ref Range WBC 2.1 (L) 4.3 - 11.1 K/uL  
 RBC 3.48 (L) 4.23 - 5.6 M/uL HGB 10.6 (L) 13.6 - 17.2 g/dL HCT 32.6 (L) 41.1 - 50.3 % MCV 93.7 79.6 - 97.8 FL  
 MCH 30.5 26.1 - 32.9 PG  
 MCHC 32.5 31.4 - 35.0 g/dL  
 RDW 17.0 (H) 11.9 - 14.6 % PLATELET 255 (L) 022 - 450 K/uL MPV 11.2 9.4 - 12.3 FL ABSOLUTE NRBC 0.00 0.0 - 0.2 K/uL NEUTROPHILS 38 (L) 43 - 78 % LYMPHOCYTES 35 13 - 44 % MONOCYTES 24 (H) 4.0 - 12.0 % EOSINOPHILS 1 0.5 - 7.8 % BASOPHILS 1 0.0 - 2.0 % IMMATURE GRANULOCYTES 1 0.0 - 5.0 %  
 ABS. NEUTROPHILS 0.9 (L) 1.7 - 8.2 K/UL  
 ABS. LYMPHOCYTES 0.7 0.5 - 4.6 K/UL  
 ABS. MONOCYTES 0.5 0.1 - 1.3 K/UL  
 ABS. EOSINOPHILS 0.0 0.0 - 0.8 K/UL  
 ABS. BASOPHILS 0.0 0.0 - 0.2 K/UL  
 ABS. IMM. GRANS. 0.0 0.0 - 0.5 K/UL  
 RBC COMMENTS SLIGHT 
ANISOCYTOSIS 
    
 WBC COMMENTS Result Confirmed By Smear PLATELET COMMENTS SLIGHT    
 DF AUTOMATED    
CK Collection Time: 11/19/19  5:39 AM  
Result Value Ref Range CK 5,551 (H) 21 - 215 U/L Imaging: 
CT HEAD WITHOUT CONTRAST [[id number]] Collected: 11/18/19 1604 Order Status: Completed Updated: 11/18/19 1608 Narrative:    
Head CT INDICATION: Fall, weakness Multiple axial images obtained through the brain without intravenous contrast.  
Radiation dose reduction techniques were used for this study:  All CT scans 
performed at this facility use one or all of the following: Automated exposure 
control, adjustment of the mA and/or kVp according to patient's size, iterative 
reconstruction. FINDINGS: No areas of abnormal attenuation are seen in the brain. There is no CT 
evidence of acute hemorrhage or infarction. The ventricles are normal in size. There are no extra-axial fluid collections. No masses are seen. The sinuses are 
clear. There are no bony lesions. Impression:    
IMPRESSION: No CT evidence of acute intracranial abnormality. XR CHEST PA LAT [[id number]] Collected: 11/18/19 0330 Order Status: Completed Updated: 11/18/19 7283 Narrative:    
Chest X-ray INDICATION: Right chest wall pain after falling, persistent cough PA and lateral views of the chest were obtained. FINDINGS: The lungs are clear. There are no infiltrates or effusions. Rere Eye is 
stable cardiomegaly.  Pacemaker and vascular port are present.  No definite 
fracture identified. Impression:    
IMPRESSION: No acute findings in the chest  
XR SPINE LUMB 2 OR 3 V [[id number]] Collected: 11/18/19 1552 Order Status: Completed Updated: 11/18/19 1557 Narrative:    
Lumbar Spine INDICATION:  Back pain after falling AP and lateral views of the lumbar spine were obtained FINDINGS: There is multilevel degenerative change.  There is no evidence of 
fracture or subluxation. The vertebrae are well aligned. No bony lesions are 
seen. Impression:    
IMPRESSION: No evidence of fracture or other acute abnormality in the lumbar 
spine. ASSESSMENT: 
Problem List  Date Reviewed: 8/8/2018 Codes Class Noted CKD (chronic kidney disease) ICD-10-CM: N18.9 ICD-9-CM: 585.9  11/19/2019 Hypokalemia ICD-10-CM: E87.6 ICD-9-CM: 276.8  11/19/2019 * (Principal) Traumatic rhabdomyolysis ([facility] [address] ICD-10-CM: T79. Naida Seo ICD-9-CM: 958.6  11/18/2019 Rhabdomyolysis ICD-10-CM: E23.38 ICD-9-CM: 728.88  11/18/2019 Urinary frequency ICD-10-CM: R35.0 ICD-9-CM: 788.41  8/8/2018 S/P placement of cardiac pacemaker ICD-10-CM: Z95.0 ICD-9-CM: V45.01  8/2/2018 Severe obesity (BMI 35.0-39. 9) with comorbidity ([facility] [address] ICD-10-CM: E66.01 
ICD-9-CM: 278.01  4/12/2018 Multiple myeloma not having achieved remission ([facility] [address] ICD-10-CM: C90.00 ICD-9-CM: 203.00  8/2/2017 Gastroesophageal reflux disease ICD-10-CM: K21.9 ICD-9-CM: 530.81  8/2/2017 Bilateral ankle pain ICD-10-CM: M25.571, M25.572 ICD-9-CM: 719.47  7/31/2017 Pollen allergies ICD-10-CM: Z91.09 
ICD-9-CM: V15.09  7/31/2017  Overview Signed 7/31/2017  2:27 PM by Sandip Tompkins MD  
 Pt would like to consider allergy shots. Will refer to Allergy Fall ICD-10-CM: W19. Guzman Grief ICD-9-CM: E888.9  7/18/2017 Overview Signed 7/18/2017  2:03 PM by Mitul Mcdowell MD  
  Pt had another fall, in his yard; he was chasing after his cat and lost his balance. No injuries sustained; fall witnessed by neighbor. No LOC. Vitamin B12 deficiency ICD-10-CM: E53.8 ICD-9-CM: 266.2  7/18/2017 Overview Signed 7/18/2017  2:06 PM by Mitul Mcdowell MD  
  Pt just started taking Vitamin B 12 supplement. Abnormal laboratory test ICD-10-CM: R89.9 ICD-9-CM: 796.4  7/18/2017 Overview Addendum 7/31/2017  2:26 PM by Mitul Mcdowell MD  
  UPEP results reviewed with pt. Pt has referral to Oncology to further evaluate. Mixed hyperlipidemia ICD-10-CM: E78.2 ICD-9-CM: 272.2  6/26/2017 OA (osteoarthritis) ICD-10-CM: M19.90 ICD-9-CM: 715.90  6/26/2017 Senile dementia ([facility] ICD-10-CM: F03.90 ICD-9-CM: 290.0  5/19/2017 Cataract ICD-10-CM: H26.9 ICD-9-CM: 366.9  9/8/2016 Asymmetrical sensorineural hearing loss ICD-10-CM: H90.5 ICD-9-CM: 389.16  Unknown Bipolar disorder ([facility] ICD-10-CM: F31.9 ICD-9-CM: 296.80  3/22/2015 Overview Addendum 7/6/2017  5:01 PM by Mitul Mcdowell MD  
  Currently treated with Lexapro and Xanax. Given pt's age, I would recommend continuing current regimen; refill rx. Follow up 3 months or prn. Pt is working on weaning to a lower dose of Xanax. Syncope and collapse ICD-10-CM: R55 
ICD-9-CM: 780.2  3/22/2015 Acute encephalopathy ICD-10-CM: G93.40 ICD-9-CM: 348.30  3/22/2015 Cortical hemorrhage (HCC) ICD-10-CM: I61.1 ICD-9-CM: 31 62 12  9/17/2014 Syncope ICD-10-CM: R55 
ICD-9-CM: 780.2  9/16/2014 Overview Signed 7/6/2017  5:00 PM by Mitul Mcdowell MD  
  Refer to Cardiology to evaluated. Concern for symptomatic bradycardia. History of TIA (transient ischemic attack) ICD-10-CM: Z86.73 
ICD-9-CM: V12.54  9/16/2014 Altered mental status ICD-10-CM: R41.82 
ICD-9-CM: 780.97  9/16/2014 Anemia ICD-10-CM: D64.9 ICD-9-CM: 285.9  9/16/2014 Overview Addendum 7/6/2017  5:01 PM by Harmony Lovett MD  
  Recheck CBC. Tinea corporis ICD-10-CM: B35.4 ICD-9-CM: 110.5  9/16/2014 Hypomagnesemia ICD-10-CM: H40.33 
ICD-9-CM: 275.2  9/16/2014 Overview Signed 9/16/2014 10:14 PM by Ko Simpson NP  
  MILD Elevated AST (SGOT) ICD-10-CM: R74.0 ICD-9-CM: 790.4  9/16/2014 RECOMMENDATIONS: 
· Hold Revlimid for now · He is OK from a Hematology point of view for discharge · He has an appointment to see me in clinic next month. Lab studies and imaging studies were personally reviewed. Thank you for allowing us to participate in the care of Mr. Robin Wheeler. Lucinda Vazquez MD 
[facility] Hematology & Oncology [address] [address] Office : [phone] Fax : [phone]

## 2019-11-19 NOTE — PROGRESS NOTES
Problem: Mobility Impaired (Adult and Pediatric) Goal: *Acute Goals and Plan of Care (Insert Text) Description STG: 
(1.)Mr. Tristian Pinedo will move from supine to sit and sit to supine  with CONTACT GUARD ASSIST within 3 treatment day(s). (2.)Mr. Tristian Pinedo will transfer from bed to chair and chair to bed with STAND BY ASSIST using the least restrictive device within 3 treatment day(s). (3.)Mr. Tristian Pinedo will ambulate with STAND BY ASSIST for 25 feet with the least restrictive device within 3 treatment day(s). LTG: 
(1.)Mr. Tristian Pinedo will move from supine to sit and sit to supine  in bed with MODIFIED INDEPENDENCE within 7 treatment day(s). (2.)Mr. Tristian Pinedo will transfer from bed to chair and chair to bed with MODIFIED INDEPENDENCE using the least restrictive device within 7 treatment day(s). (3.)Mr. Tristian Pinedo will ambulate with MODIFIED INDEPENDENCE for 50 feet with the least restrictive device within 7 treatment day(s). ________________________________________________________________________________________________ Outcome: Progressing Towards Goal 
  
PHYSICAL THERAPY: Initial Assessment and AM 11/19/2019 INPATIENT: PT Visit Days : 1 Payor: Kerr Kristynesther / Plan: [address] [place] [address] HMO / Product Type: [employer] /   
  
NAME/AGE/GENDER: Khushi Varghese is a 82 y.o. male PRIMARY DIAGNOSIS: ANIKET (acute kidney injury) ([facility] [N17.9] Rhabdomyolysis [M62.82] Traumatic rhabdomyolysis ([facility] Traumatic rhabdomyolysis ([facility] ICD-10: Treatment Diagnosis:  
 Generalized Muscle Weakness (M62.81) Difficulty in walking, Not elsewhere classified (R26.2) Precaution/Allergies: 
Patient has no known allergies. ASSESSMENT:  
 
Mr. Tristian Pinedo presents with above diagnoses. Patient demonstrates generalized weakness affecting his balance, mobility, and independence.   Patient reports using a rollator at home for mobility and has been working with therapy in his home on balance. He did have a fall at home and was unable to get himself up or reach help and remained on the floor for an extended period of time. Patient would benefit from therapy to improve his mobility, balance, safety, and independence. Patient participated well this am and was happy to be able to get out of bed and ambulate some. He does require some cueing for safe maneuvering and positioning of the walker as he tends to keep it too far in front which alters his base of support and  balance. This section established at most recent assessment PROBLEM LIST (Impairments causing functional limitations): 
Decreased Strength Decreased ADL/Functional Activities Decreased Transfer Abilities Decreased Ambulation Ability/Technique Decreased Balance Decreased Cognition INTERVENTIONS PLANNED: (Benefits and precautions of physical therapy have been discussed with the patient.) Balance Exercise Bed Mobility Gait Training Home Exercise Program (HEP) Therapeutic Activites Therapeutic Exercise/Strengthening TREATMENT PLAN: Frequency/Duration: daily for duration of hospital stay Rehabilitation Potential For Stated Goals: Good REHAB RECOMMENDATIONS (at time of discharge pending progress):   
Placement: It is my opinion, based on this patient's performance to date, that Mr. Javad Singleton may benefit from participating in 1-2 additional therapy sessions in order to continue to assess for rehab potential and then make recommendation for disposition at discharge. Equipment:  
None at this time HISTORY:  
History of Present Injury/Illness (Reason for Referral): Donald Liu is a 82 y.o. male who came to ER due to s/p fall this morning. Patient lives at an assisted-living facility. He fell this morning after experiencing vertigo that he normally has. He tried pressing on his alert button on his neck, but did not reach anyone.  Reportedly 6 hours later he could reach help and ambulance brought him to ER. He was found to have elevated CK and Mb. Also with elevated creatinine. Patient denies pain at back, buttock, thighs, legs at the moment. Patient could not tell me whether or why he is taking Eliquis. It is listed on his home medication. Other PMH is listed below. He also has multiple myeloma that is not in remission yet. Dementia, hard of hearing,  
Past Medical History/Comorbidities:  
Mr. 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 Mr. Laurel Monroe  has a past surgical history that includes hx appendectomy; hx other surgical (AGE 23); hx colonoscopy (MULTIPLE OVER THE YEARS); and hx vascular access. Social History/Living Environment:  
Home Environment: Independent living([facility]) Living Alone: Yes Support Systems: Family member(s) Patient Expects to be Discharged to[de-identified] Unknown(independent living vs SNF for STR) Current DME Used/Available at Home: Dorn Olszewski, rollator Prior Level of Function/Work/Activity: 
Ambulating with a rollator. Getting therapy for balance. Incontinent. Number of Personal Factors/Comorbidities that affect the Plan of Care: 1-2: MODERATE COMPLEXITY EXAMINATION:  
Most Recent Physical Functioning:  
Gross Assessment: 
AROM: Generally decreased, functional 
Strength: Generally decreased, functional 
Coordination: Generally decreased, functional 
 Posture: 
  
Balance: 
Sitting: Intact Standing: With support Bed Mobility: 
Rolling: Minimum assistance Supine to Sit: Minimum assistance Scooting: Stand-by assistance Wheelchair Mobility: 
  
Transfers: 
Sit to Stand: Contact guard assistance;Minimum assistance Stand to Sit: Contact guard assistance Bed to Chair: Contact guard assistance;Minimum assistance Gait: 
  
Base of Support: Center of gravity altered Speed/Connie: Shuffled; Slow Step Length: Left shortened;Right shortened Gait Abnormalities: Decreased step clearance Distance (ft): 10 Feet (ft)(x 2) Assistive Device: Walker, rolling Ambulation - Level of Assistance: Contact guard assistance;Minimal assistance Interventions: Safety awareness training;Verbal cues Body Structures Involved: Muscles Body Functions Affected: Movement Related Activities and Participation Affected: Mobility Self Care Number of elements that affect the Plan of Care: 4+: HIGH COMPLEXITY CLINICAL PRESENTATION:  
Presentation: Stable and uncomplicated: LOW COMPLEXITY CLINICAL DECISION MAKIN [facility] [address] [id number] AM-PAC 6 Clicks Basic Mobility Inpatient Short Form How much difficulty does the patient currently have. .. Unable A Lot A Little None 1. Turning over in bed (including adjusting bedclothes, sheets and blankets)? ? 1   ? 2   ? 3   ? 4  
2. Sitting down on and standing up from a chair with arms ( e.g., wheelchair, bedside commode, etc.)   ? 1   ? 2   ? 3   ? 4  
3. Moving from lying on back to sitting on the side of the bed?   ? 1   ? 2   ? 3   ? 4 How much help from another person does the patient currently need. .. Total A Lot A Little None 4. Moving to and from a bed to a chair (including a wheelchair)? ? 1   ? 2   ? 3   ? 4  
5. Need to walk in hospital room? ? 1   ? 2   ? 3   ? 4  
6. Climbing 3-5 steps with a railing?    ? 1   ? 2   ? 3   ? 4  
 © 2007, Trustees of [address] [place] [address] [address] [address], under license to [employer]. All rights reserved Score:  Initial: 17 Most Recent: X (Date: -- ) Interpretation of Tool:  Represents activities that are increasingly more difficult (i.e. Bed mobility, Transfers, Gait). Medical Necessity:    
Patient is expected to demonstrate progress in strength, balance and coordination 
 to increase independence with mobility. . 
Reason for Services/Other Comments: 
Patient continues to require skilled intervention due to weakness affecting balance, mobility, safety, and independence. .  
Use of outcome tool(s) and clinical judgement create a POC that gives a: Clear prediction of patient's progress: LOW COMPLEXITY  
  
 
 
 
TREATMENT:  
(In addition to Assessment/Re-Assessment sessions the following treatments were rendered) Pre-treatment Symptoms/Complaints:  Patient anxious to get out of bed. Pain: Initial:  
Pain Intensity 1: 0  Post Session:  0 Therapeutic Activity: (    10 minutes): Therapeutic activities including toileting, hygiene, and brief change  to improve mobility, strength, balance and coordination. Required minimal Safety awareness training;Verbal cues to promote static and dynamic balance in standing. Braces/Orthotics/Lines/Etc:  
O2 Device: Room air Treatment/Session Assessment:   
Response to Treatment:  Patient participated well and was very happy to be out of bed. Interdisciplinary Collaboration:  
Physical Therapist 
Registered Nurse  Certified Nursing Assistant/Patient Care Technician After treatment position/precautions:  
Up in chair Bed/Chair-wheels locked Call light within reach RN notified Compliance with Program/Exercises: Will assess as treatment progresses Recommendations/Intent for next treatment session: \"Next visit will focus on advancements to more challenging activities and reduction in assistance provided\". Total Treatment Duration: PT Patient Time In/Time Out Time In: 6864 Time Out: 9995 Lissa Layton, PT

## 2019-11-20 LAB
ANION GAP SERPL CALC-SCNC: 5 MMOL/L (ref 7–16)
BUN SERPL-MCNC: 20 MG/DL (ref 8–23)
CALCIUM SERPL-MCNC: 8 MG/DL (ref 8.3–10.4)
CHLORIDE SERPL-SCNC: 111 MMOL/L (ref 98–107)
CO2 SERPL-SCNC: 25 MMOL/L (ref 21–32)
CREAT SERPL-MCNC: 1.15 MG/DL (ref 0.8–1.5)
GLUCOSE SERPL-MCNC: 130 MG/DL (ref 65–100)
MM INDURATION POC: 0 MM (ref 0–5)
POTASSIUM SERPL-SCNC: 3.4 MMOL/L (ref 3.5–5.1)
PPD POC: NORMAL
SODIUM SERPL-SCNC: 141 MMOL/L (ref 136–145)

## 2019-11-20 PROCEDURE — 94760 N-INVAS EAR/PLS OXIMETRY 1: CPT

## 2019-11-20 PROCEDURE — 36415 COLL VENOUS BLD VENIPUNCTURE: CPT

## 2019-11-20 PROCEDURE — 97530 THERAPEUTIC ACTIVITIES: CPT

## 2019-11-20 PROCEDURE — 94640 AIRWAY INHALATION TREATMENT: CPT

## 2019-11-20 PROCEDURE — 74011000250 HC RX REV CODE- 250: Performed by: FAMILY MEDICINE

## 2019-11-20 PROCEDURE — 80048 BASIC METABOLIC PNL TOTAL CA: CPT

## 2019-11-20 PROCEDURE — 74011250637 HC RX REV CODE- 250/637: Performed by: INTERNAL MEDICINE

## 2019-11-20 PROCEDURE — 65270000029 HC RM PRIVATE

## 2019-11-20 RX ORDER — ALBUTEROL SULFATE 0.83 MG/ML
2.5 SOLUTION RESPIRATORY (INHALATION)
Status: DISCONTINUED | OUTPATIENT
Start: 2019-11-20 | End: 2019-11-22

## 2019-11-20 RX ORDER — POTASSIUM CHLORIDE 20 MEQ/1
40 TABLET, EXTENDED RELEASE ORAL
Status: COMPLETED | OUTPATIENT
Start: 2019-11-20 | End: 2019-11-20

## 2019-11-20 RX ADMIN — GUAIFENESIN 1200 MG: 600 TABLET ORAL at 08:03

## 2019-11-20 RX ADMIN — GUAIFENESIN 1200 MG: 600 TABLET ORAL at 21:32

## 2019-11-20 RX ADMIN — HYDROCODONE BITARTRATE AND HOMATROPINE METHYLBROMIDE 5 ML: 5; 1.5 SOLUTION ORAL at 16:37

## 2019-11-20 RX ADMIN — ALBUTEROL SULFATE 2.5 MG: 2.5 SOLUTION RESPIRATORY (INHALATION) at 08:21

## 2019-11-20 RX ADMIN — ALBUTEROL SULFATE 2.5 MG: 2.5 SOLUTION RESPIRATORY (INHALATION) at 01:10

## 2019-11-20 RX ADMIN — POTASSIUM CHLORIDE 40 MEQ: 20 TABLET, EXTENDED RELEASE ORAL at 09:31

## 2019-11-20 RX ADMIN — DONEPEZIL HYDROCHLORIDE 10 MG: 5 TABLET, FILM COATED ORAL at 08:03

## 2019-11-20 RX ADMIN — HYDROCODONE BITARTRATE AND HOMATROPINE METHYLBROMIDE 5 ML: 5; 1.5 SOLUTION ORAL at 21:32

## 2019-11-20 RX ADMIN — Medication 5 ML: at 21:32

## 2019-11-20 RX ADMIN — HYDROCODONE BITARTRATE AND HOMATROPINE METHYLBROMIDE 5 ML: 5; 1.5 SOLUTION ORAL at 05:50

## 2019-11-20 NOTE — PROGRESS NOTES
Respiratory Care Services Policy Number: [id number] Title: Aerosolized Medication Protocol Effective Date: 10/1998 Revised Date: 06/13, 03/16, 11/17, 07/19 Reviewed Date: 05/14/ 03/15 , 06/17, 5/18 I. Policy: The Aerosolized Medication Protocol shall by implemented by Respiratory Care Practitioners (RCP) for patients with orders to receive aerosol therapy with medication. II. Purpose: To open and maintain obstructed airways, the RCP, will utilize the following  
protocol to select the indicated aerosolized medication(s) and determine the most effective method of delivery to the patient. III. Patient Type: All patients who are determined to meet aerosolized medication criteria as  
       outlined in this protocol. IV. Responsibility: Director, [address], registered Respiratory Care Practitioners (RCP's) with documented competency in the performance of respiratory therapeutic techniques. V. Equipment needed: A. Stethoscope B. Pulse oximeter C. AeroEclipse nebulizer D. Dry Powder Inhaler (DPI) VI. Protocol: A. The following conditions are accepted indications for aerosolized medication therapy. 1. Bronchospasm/wheezing 2. Impaired mucociliary clearance 3. Tracheobronchial mucosal congestion/and laryngeal stridor 4. Diseases which commonly require aerosolized medication therapy include, but are not limited to: 
a. Asthma/reactive airway disease 
b. Bronchitis/emphysema (COPD) c. Cystic fibrosis 
d. Severe laryngitis/tracheitis 
e. Bronchiectasis 
f. Smoke inhalation or chemical trauma to the lung or upper airway 
g. Physical trauma to the upper airway 
h. Laryngotracheobronchitis 
i. Bronchiolitis 
j. Non-specific wheezing B. Indications for bronchodilator medications will include: 
a. Bronchospasm/ wheezing 
b. Asthma/reactive airway disease 
c. Chronic obstructive pulmonary disease d. Obstructive defect on pulmonary function testing C. Administration of medications 1. If a bronchodilator or any other type of respiratory medication is needed, a physician order must be indicated in the medication section in the patient's EMR. 2. When the physician specifies the medication and dosage at the time of request, the ordered medication will be used as part of the care plan. D. The following guidelines will be utilized in the evaluation and selection of the appropriate delivery device for indicated medication(s): 
1. Unassisted aerosol (UA) is the preferred method of aerosol delivery and indicated if 
a. Ventilation is inadequate 
b. Patient demonstrates wheezing  
c. Routine treatments shall be given via the AeroEclipse nebulizer. d. The Aerogen nebulizer shall be used in the following circumstances: 
i. ER patients and they will continue with this nebulizer if admitted to 8th floor or ICU. 
ii. Patients in ICU  
iii. Patients on 8th floor with severe wheezing (at the RCP's discretion) 2. Dry PowderInhaler (DPI)  
a. Patient should be alert/cooperative 
b. Able to perform 3 second breath hold. c. Patient has used DPI therapy previously, either at home or in the hospital. 
d. Note: The only approved inhaler on formulary is Spiriva. VII. Guidelines:  
Monitor patient's vital signs and evaluate patient's clinical status. The need to change medication and/or modality may be indicated by: 1. A pulse greater than 120 bpm, or if a pulse increase of 20 bpm occurs with bronchodilator medications. 2. Significant worsening of dyspnea or wheezing occurring during or within 30 minutes of discontinuing therapy. 3. Worsening of patient's sensorium (e.g. patient becomes confused or obtunded, and unable to follow directions). 4. Worsening of patient's chest x-ray. 5. Change in sputum (e.g. increased pulmonary infiltrate, which might indicate need for volume expansion therapy). 6. Patient has difficulty coughing up secretions, which might indicate need for acetylcysteine and/or bronchial hygiene therapy. 7. Call physician immediately if dyspnea worsens and is not responsive to modifications allowed by protocol. VIII. Clinical Responsibility: 1. The therapy assessment guidelines will be used to evaluate all patients receiving aerosolized medications with the exception of critical care areas. 1. RCP's will perform changes in therapy per protocol. 2. It will be the responsibility of RCP to provide instruction regarding respiratory medications, possible side effects, aerosol therapy and proper DPI technique, as well as, spacer usage to patients ordered DPI therapy. 3. Current therapy that is part of a patient's home regimen will not be discontinued. a. Provide spacer and educate patient on proper inhaler technique if needed. IX. Documentation A. Document assessment findings in the respiratory assessment section of the patient's EMR. B. Document changes in therapy per protocol in the respiratory orders section and in the care plan section of the patient's EMR. C. Document patient education in the patient education section of the patient's EMR. X. Outcome Criteria: A. Relief of wheezes and obstruction B. Improved cough and sputum color and consistency C. Improved chest x-ray D. Improved arterial oxygen tension and or SaO2 
E. Improved Peak Flow on asthmatic patients XI. Related Protocols: A. Respiratory Patient Care Protocols B. Bronchial Hygiene Therapy C. Oxygen Protocol Reference: L - Respiratory Care Department Policy, Procedure and Protocol Guideline Manual, 1995, BERNADETTE Wolfe. L -  Therapist Driven Respiratory Care Protocols  A Practitioner's Guide for Criteria-Based Respiratory Care by Jonathan Caballero M.D., and BERNADETTE Estevez RRT. L - The rationale for therapist-driven protocols: an update. Respiratory Care 1998; [id number].  -[facility] Clinical Practice Guidelines.

## 2019-11-20 NOTE — PROGRESS NOTES
Sw received call from Kailee this pm asking about pt's usual living setting. Cecil spoke with Juanis to inform again pt lives alone in an apt at [address] [place] [address]. She informed she would proceed with this auth and call Sw back. Cecil spoke with Mine Bertrand to inform of status. Will await decision. Delvin Vee

## 2019-11-20 NOTE — PROGRESS NOTES
Problem: Mobility Impaired (Adult and Pediatric) Goal: *Acute Goals and Plan of Care (Insert Text) Description STG: 
(1.)Mr. Keyla Abbott will move from supine to sit and sit to supine  with CONTACT GUARD ASSIST within 3 treatment day(s). (2.)Mr. Keyla Abbott will transfer from bed to chair and chair to bed with STAND BY ASSIST using the least restrictive device within 3 treatment day(s). (3.)Mr. Keyla Abbott will ambulate with STAND BY ASSIST for 25 feet with the least restrictive device within 3 treatment day(s). LTG: 
(1.)Mr. Keyla Abbott will move from supine to sit and sit to supine  in bed with MODIFIED INDEPENDENCE within 7 treatment day(s). (2.)Mr. Keyla Abbott will transfer from bed to chair and chair to bed with MODIFIED INDEPENDENCE using the least restrictive device within 7 treatment day(s). (3.)Mr. Keyla Abbott will ambulate with MODIFIED INDEPENDENCE for 50 feet with the least restrictive device within 7 treatment day(s). ________________________________________________________________________________________________ Outcome: Progressing Towards Goal 
  
PHYSICAL THERAPY: Daily Note and AM 11/20/2019 INPATIENT: PT Visit Days : 2 Payor: Vivian Wang / Plan: [address] [place] [address] HMO / Product Type: [employer] /   
  
NAME/AGE/GENDER: Katie Daniels is a 82 y.o. male PRIMARY DIAGNOSIS: ANIKET (acute kidney injury) ([facility] [N17.9] Rhabdomyolysis [M62.82] Traumatic rhabdomyolysis ([facility] Traumatic rhabdomyolysis ([facility] ICD-10: Treatment Diagnosis:  
 · Generalized Muscle Weakness (M62.81) · Difficulty in walking, Not elsewhere classified (R26.2) Precaution/Allergies: 
Patient has no known allergies. ASSESSMENT:  
 
Mr. Keyla Abbott presents with above diagnoses. Patient demonstrates generalized weakness affecting his balance, mobility, and independence.   Patient reports using a rollator at home for mobility and has been working with therapy in his home on balance. He did have a fall at home and was unable to get himself up or reach help and remained on the floor for an extended period of time. Patient would benefit from therapy to improve his mobility, balance, safety, and independence. Patient participated well this am and was happy to be able to get out of bed and ambulate some. He does require some cueing for safe maneuvering and positioning of the walker as he tends to keep it too far in front which alters his base of support and  balance. 11/20/19:  Patient not as eager to get out of bed this am and needed some convincing. He required much more assist for supine to sit. Yesterday he was bridging and rolling fairly well but nowhere near as mobile today. Less steady in sitting and standing as well. Required increased assist to control descent down into the chair. Patient definitely not at a level today to return to his independent living and will need additional rehab. This section established at most recent assessment PROBLEM LIST (Impairments causing functional limitations): 1. Decreased Strength 2. Decreased ADL/Functional Activities 3. Decreased Transfer Abilities 4. Decreased Ambulation Ability/Technique 5. Decreased Balance 6. Decreased Cognition INTERVENTIONS PLANNED: (Benefits and precautions of physical therapy have been discussed with the patient.) 1. Balance Exercise 2. Bed Mobility 3. Gait Training 4. Home Exercise Program (HEP) 5. Therapeutic Activites 6. Therapeutic Exercise/Strengthening TREATMENT PLAN: Frequency/Duration: daily for duration of hospital stay Rehabilitation Potential For Stated Goals: Good REHAB RECOMMENDATIONS (at time of discharge pending progress):   
Placement:  
It is my opinion, based on this patient's performance to date, that Mr. Rupinder Hemphill may benefit from participating in 1-2 additional therapy sessions in order to continue to assess for rehab potential and then make recommendation for disposition at discharge. Equipment:  
? None at this time HISTORY:  
History of Present Injury/Illness (Reason for Referral): Janet Cuello is a 82 y.o. male who came to ER due to s/p fall this morning. Patient lives at an assisted-living facility. He fell this morning after experiencing vertigo that he normally has. He tried pressing on his alert button on his neck, but did not reach anyone. Reportedly 6 hours later he could reach help and ambulance brought him to ER. He was found to have elevated CK and Mb. Also with elevated creatinine. Patient denies pain at back, buttock, thighs, legs at the moment. Patient could not tell me whether or why he is taking Eliquis. It is listed on his home medication. Other PMH is listed below. He also has multiple myeloma that is not in remission yet. Dementia, hard of hearing,  
Past Medical History/Comorbidities:  
Mr. Marcella Hester  has a past medical history of Acute encephalopathy (3/22/2015), Altered mental status (9/16/2014), Alzheimer disease ([address] Anemia (9/16/2014), Anxiety, Asymmetrical sensorineural hearing loss, Bipolar disorder ([address] Cancer ([address] Cataract (9/8/2016), Cortical hemorrhage ([address] (9/17/2014), Elevated AST (SGOT) (9/16/2014), GERD (gastroesophageal reflux disease), H/O seasonal allergies, History of TIA (transient ischemic attack) (9/16/2014), Hypomagnesemia (9/16/2014), Panic attack, Senile dementia ([address] (5/19/2017), Stroke ([address] Syncope and collapse (3/22/2015), Tinea corporis (9/16/2014), and Tinnitus. Mr. Marcella Hester  has a past surgical history that includes hx appendectomy; hx other surgical (AGE 23); hx colonoscopy (MULTIPLE OVER THE YEARS); and hx vascular access. Social History/Living Environment:  
Home Environment: Independent living One/Two Story Residence: One story Living Alone: Yes Support Systems: Family member(s) Patient Expects to be Discharged to[de-identified] Unknown Current DME Used/Available at Home: Sandra Kinney rollator Prior Level of Function/Work/Activity: 
Ambulating with a rollator. Getting therapy for balance. Incontinent. Number of Personal Factors/Comorbidities that affect the Plan of Care: 1-2: MODERATE COMPLEXITY EXAMINATION:  
Most Recent Physical Functioning:  
Gross Assessment: 
AROM: Generally decreased, functional 
Strength: Generally decreased, functional 
Coordination: Generally decreased, functional 
         
  
Posture: 
  
Balance: 
Sitting - Static: Good (unsupported) Sitting - Dynamic: Fair (occasional) Standing - Static: Good Standing - Dynamic : Fair Bed Mobility: 
Supine to Sit: Moderate assistance;Maximum assistance Wheelchair Mobility: 
  
Transfers: 
Sit to Stand: Minimum assistance Stand to Sit: Moderate assistance(to control descent) Bed to Chair: Minimum assistance Gait: 
  
Base of Support: Center of gravity altered Speed/Connie: Shuffled; Slow Step Length: Left shortened;Right shortened Gait Abnormalities: Decreased step clearance Distance (ft): 5 Feet (ft) Assistive Device: Walker, rolling Ambulation - Level of Assistance: Minimal assistance Interventions: Safety awareness training;Verbal cues Body Structures Involved: 1. Muscles Body Functions Affected: 1. Movement Related Activities and Participation Affected: 1. Mobility 2. Self Care Number of elements that affect the Plan of Care: 4+: HIGH COMPLEXITY CLINICAL PRESENTATION:  
Presentation: Stable and uncomplicated: LOW COMPLEXITY CLINICAL DECISION MAKIN \[facility] [place] [facility]\"" [address] [address] AM-PAC 6 Clicks Basic Mobility Inpatient Short Form How much difficulty does the patient currently have. .. Unable A Lot A Little None 1. Turning over in bed (including adjusting bedclothes, sheets and blankets)? ? 1   ? 2   ? 3   ? 4  
2.   Sitting down on and standing up from a chair with arms ( e.g., wheelchair, bedside commode, etc.)   ? 1   ? 2   ? 3   ? 4  
3. Moving from lying on back to sitting on the side of the bed?   ? 1   ? 2   ? 3   ? 4 How much help from another person does the patient currently need. .. Total A Lot A Little None 4. Moving to and from a bed to a chair (including a wheelchair)? ? 1   ? 2   ? 3   ? 4  
5. Need to walk in hospital room? ? 1   ? 2   ? 3   ? 4  
6. Climbing 3-5 steps with a railing? ? 1   ? 2   ? 3   ? 4  
© 2007, Trustees of [address] [place] [address] [address] [address], under license to [employer]. All rights reserved Score:  Initial: 17 Most Recent: X (Date: -- ) Interpretation of Tool:  Represents activities that are increasingly more difficult (i.e. Bed mobility, Transfers, Gait). Medical Necessity:    
· Patient is expected to demonstrate progress in strength, balance and coordination ·  to increase independence with mobility. · . Reason for Services/Other Comments: 
· Patient continues to require skilled intervention due to weakness affecting balance, mobility, safety, and independence. · . Use of outcome tool(s) and clinical judgement create a POC that gives a: Clear prediction of patient's progress: LOW COMPLEXITY  
  
 
 
 
TREATMENT:  
(In addition to Assessment/Re-Assessment sessions the following treatments were rendered) Pre-treatment Symptoms/Complaints:  Patient not feeling as well today. Pain: Initial:  
Pain Intensity 1: 0  Post Session:  0 Therapeutic Activity: (    30 minutes): Therapeutic activities including supine to sit, scooting, sitting balance, sit <> stand, standing balance with the walker, and ambulation to the chair with rolling walker  to improve mobility, strength, balance and coordination. Required minimal Safety awareness training;Verbal cues to promote static and dynamic balance in standing. Braces/Orthotics/Lines/Etc:  
· IV 
· O2 Device: Room air Treatment/Session Assessment: · Response to Treatment:  Patient participated fairly well. Not as mobile as yesterday and required much more assist. 
· Interdisciplinary Collaboration:  
o Physical Therapist 
o Registered Nurse · After treatment position/precautions:  
o Up in chair 
o Bed/Chair-wheels locked 
o Call light within reach · Compliance with Program/Exercises: Will assess as treatment progresses · Recommendations/Intent for next treatment session: \"Next visit will focus on advancements to more challenging activities and reduction in assistance provided\". Total Treatment Duration: PT Patient Time In/Time Out Time In: 1020 Time Out: 1050 Bharat Gannon, PT

## 2019-11-20 NOTE — PROGRESS NOTES
Pt resting in bed and is alert and oriented x 4. he denies pain and is on room air. RR even and unlabored. IVF infusing. Call light in reach and pt instructed to call for assistance if needed. Will monitor.

## 2019-11-20 NOTE — PROGRESS NOTES
Hospitalist Note Admit Date:  2019  3:13 PM  
Name:  Vishal Smallwood Age:  82 y.o. 
:  1935 MRN:  [id number] PCP:  Noel Gomez NP Treatment Team: Attending Provider: Antoni Saleh MD; Physical Therapist: Eduard Ramsey PT 
 
HPI/Subjective:  
Mr. Abbi West is an 83 y/o WM admitted on  after a fall that resulted in rhabdo, CK 4,000s.  
 
: Resting in bed, feels ok. Still coughing. RA O2. No chest pain. Did work with PT yesterday, hasn't yet this morning. No issues with pain or appetite/fluid intake. ROS neg otherwise. Objective:  
 
Patient Vitals for the past 24 hrs: 
 Temp Pulse Resp BP SpO2  
19 0821 99.4 °F (37.4 °C) 73 18 107/57 95 % 19 0338 99 °F (37.2 °C) 75 18 145/73 92 % 19 0110     93 % 19 2328 99 °F (37.2 °C) 75 18 125/64 93 % 19 2002     96 % 19 1944 99.7 °F (37.6 °C) 66 18 117/79 94 % 19 1613 99 °F (37.2 °C) 60 18 134/64 98 % 19 1316     96 % 19 1236 97.4 °F (36.3 °C) 61 18 115/61 94 % Oxygen Therapy O2 Sat (%): 95 % (19 0821) Pulse via Oximetry: 72 beats per minute (19 0821) O2 Device: Room air (19 7648) Estimated body mass index is 37.31 kg/m² as calculated from the following: 
  Height as of this encounter: 5' 10\" (1.778 m). Weight as of this encounter: 117.9 kg (260 lb). Intake/Output Summary (Last 24 hours) at 2019 1033 Last data filed at 2019 0474 Gross per 24 hour Intake 2349 ml Output  Net 2349 ml  
   
*Note that automatically entered I/Os may not be accurate; dependent on patient compliance with collection and accurate  by techs. General:    Well nourished. Alert. Obese. CV:   RRR. No murmur, rub, or gallop. Lungs:   B/l rhonchi, intermittent coughing, RA O2. Abdomen:   Soft, nontender, nondistended. Extremities: Warm and dry. No cyanosis. Trace edema. Skin:     No rashes or jaundice. Neuro:  No gross focal deficits Data Review: 
I have reviewed all labs, meds, and studies from the last 24 hours: 
 
Recent Results (from the past 24 hour(s)) PLEASE READ & DOCUMENT PPD TEST IN 24 HRS Collection Time: 11/19/19  6:00 PM  
Result Value Ref Range PPD Negative Negative  
 mm Induration 0 0 - 5 mm METABOLIC PANEL, BASIC Collection Time: 11/20/19  5:25 AM  
Result Value Ref Range Sodium 141 136 - 145 mmol/L Potassium 3.4 (L) 3.5 - 5.1 mmol/L Chloride 111 (H) 98 - 107 mmol/L  
 CO2 25 21 - 32 mmol/L Anion gap 5 (L) 7 - 16 mmol/L Glucose 130 (H) 65 - 100 mg/dL BUN 20 8 - 23 MG/DL Creatinine 1.15 0.8 - 1.5 MG/DL  
 GFR est AA >60 >60 ml/min/1.73m2 GFR est non-AA >60 >60 ml/min/1.73m2 Calcium 8.0 (L) 8.3 - 10.4 MG/DL All Micro Results None No results found for this visit on 11/18/19. Current Meds: 
Current Facility-Administered Medications Medication Dose Route Frequency  albuterol (PROVENTIL VENTOLIN) nebulizer solution 2.5 mg  2.5 mg Nebulization Q6H RT  
 guaiFENesin ER (MUCINEX) tablet 1,200 mg  1,200 mg Oral Q12H  
 HYDROcodone-homatropine (HYCODAN) 5-1.5 mg/5 mL (5 mL) syrup 5 mL  5 mL Oral Q4H PRN  
 sodium chloride (NS) flush 5-40 mL  5-40 mL IntraVENous Q8H  
 sodium chloride (NS) flush 5-40 mL  5-40 mL IntraVENous PRN  
 acetaminophen (TYLENOL) tablet 650 mg  650 mg Oral Q6H PRN  
 ondansetron (ZOFRAN) injection 4 mg  4 mg IntraVENous Q4H PRN  
 bisacodyl (DULCOLAX) tablet 5 mg  5 mg Oral DAILY PRN  
 donepezil (ARICEPT) tablet 10 mg  10 mg Oral DAILY Other Studies (last 24 hours): No results found. Assessment and Plan:  
 
Hospital Problems as of 11/20/2019 Date Reviewed: 8/8/2018 Codes Class Noted - Resolved POA  
 CKD (chronic kidney disease) ICD-10-CM: N18.9 ICD-9-CM: 585.9  11/19/2019 - Present Unknown Hypokalemia ICD-10-CM: E87.6 ICD-9-CM: 276.8  11/19/2019 - Present Unknown * (Principal) [facility]dom[facility]olysis [facility] ICD-10-CM: T79. Riley Hopkins ICD-9-CM: 958.6  11/18/2019 - Present Yes Rhabdomyolysis ICD-10-CM: P92.41 ICD-9-CM: 728.88  11/18/2019 - Present Unknown Multiple myeloma not having achieved remission ([facility] ICD-10-CM: C90.00 ICD-9-CM: 203.00  8/2/2017 - Present Yes Fall ICD-10-CM: W19. Tori Hamper ICD-9-CM: E888.9  7/18/2017 - Present Yes Overview Signed 7/18/2017  2:03 PM by Giovanni Taylor MD  
  Pt had another fall, in his yard; he was chasing after his cat and lost his balance. No injuries sustained; fall witnessed by neighbor. No LOC. Mixed hyperlipidemia ICD-10-CM: E78.2 ICD-9-CM: 272.2  6/26/2017 - Present Yes Plan: # Traumatic rhabdo - Resolved. DC fluids today.  
  
# Cough - Present for 2 weeks, CXR clear on admission. RA O2. Mucinex, nebs, cough suppressant. If becomes febrile or requires O2 will repeat CXR. 
  
# HypoK - Replace again 
  
# Mechanical fall - Therapies, ? STR 
  
# CKD - Cr at baseline. Did not have ANIKET.  
  
# H/o multiple myeloma/pancytopenia - Counts at baseline. No bleeding. No fevers DC planning/Dispo: Back to [facility] vs STR pending further PT evaluation. Diet:  DIET CARDIAC 
DVT ppx: SCDs Signed: 
Eliel Buitrago MD

## 2019-11-21 ENCOUNTER — APPOINTMENT (OUTPATIENT)
Dept: GENERAL RADIOLOGY | Age: 84
DRG: 565 | End: 2019-11-21
Attending: INTERNAL MEDICINE
Payer: MEDICARE

## 2019-11-21 PROBLEM — R50.9 FEVER: Status: ACTIVE | Noted: 2019-11-21

## 2019-11-21 PROCEDURE — 74011250637 HC RX REV CODE- 250/637: Performed by: INTERNAL MEDICINE

## 2019-11-21 PROCEDURE — 74011636637 HC RX REV CODE- 636/637: Performed by: INTERNAL MEDICINE

## 2019-11-21 PROCEDURE — 97110 THERAPEUTIC EXERCISES: CPT

## 2019-11-21 PROCEDURE — 71045 X-RAY EXAM CHEST 1 VIEW: CPT

## 2019-11-21 PROCEDURE — 65270000029 HC RM PRIVATE

## 2019-11-21 PROCEDURE — 74011250636 HC RX REV CODE- 250/636: Performed by: INTERNAL MEDICINE

## 2019-11-21 RX ORDER — AZITHROMYCIN 250 MG/1
250 TABLET, FILM COATED ORAL DAILY
Status: DISCONTINUED | OUTPATIENT
Start: 2019-11-22 | End: 2019-11-23 | Stop reason: HOSPADM

## 2019-11-21 RX ORDER — FUROSEMIDE 10 MG/ML
40 INJECTION INTRAMUSCULAR; INTRAVENOUS ONCE
Status: COMPLETED | OUTPATIENT
Start: 2019-11-21 | End: 2019-11-21

## 2019-11-21 RX ORDER — AZITHROMYCIN 250 MG/1
500 TABLET, FILM COATED ORAL
Status: COMPLETED | OUTPATIENT
Start: 2019-11-21 | End: 2019-11-21

## 2019-11-21 RX ORDER — PREDNISONE 10 MG/1
20 TABLET ORAL
Status: DISCONTINUED | OUTPATIENT
Start: 2019-11-21 | End: 2019-11-22

## 2019-11-21 RX ADMIN — FUROSEMIDE 40 MG: 10 INJECTION, SOLUTION INTRAMUSCULAR; INTRAVENOUS at 14:11

## 2019-11-21 RX ADMIN — Medication 5 ML: at 14:00

## 2019-11-21 RX ADMIN — GUAIFENESIN 1200 MG: 600 TABLET ORAL at 20:31

## 2019-11-21 RX ADMIN — ACETAMINOPHEN 650 MG: 325 TABLET, FILM COATED ORAL at 00:50

## 2019-11-21 RX ADMIN — DONEPEZIL HYDROCHLORIDE 10 MG: 5 TABLET, FILM COATED ORAL at 08:56

## 2019-11-21 RX ADMIN — PREDNISONE 20 MG: 10 TABLET ORAL at 09:58

## 2019-11-21 RX ADMIN — HYDROCODONE BITARTRATE AND HOMATROPINE METHYLBROMIDE 5 ML: 5; 1.5 SOLUTION ORAL at 08:56

## 2019-11-21 RX ADMIN — Medication 10 ML: at 05:23

## 2019-11-21 RX ADMIN — BENZOCAINE AND MENTHOL 1 LOZENGE: 15; 2.6 LOZENGE ORAL at 09:58

## 2019-11-21 RX ADMIN — Medication 10 ML: at 22:00

## 2019-11-21 RX ADMIN — GUAIFENESIN 1200 MG: 600 TABLET ORAL at 08:56

## 2019-11-21 RX ADMIN — HYDROCODONE BITARTRATE AND HOMATROPINE METHYLBROMIDE 5 ML: 5; 1.5 SOLUTION ORAL at 20:35

## 2019-11-21 RX ADMIN — AZITHROMYCIN MONOHYDRATE 500 MG: 250 TABLET ORAL at 17:01

## 2019-11-21 NOTE — PROGRESS NOTES
Cecil finally received auth this pm.  Md informed pt fluid overloaded and wants to observe another day. Pt had fever over night and Md ordered CXR. Holding discharge today. Hope to transfer tomorrow. Packet complete. [employer]

## 2019-11-21 NOTE — PROGRESS NOTES
Hospitalist Note Admit Date:  2019  3:13 PM  
Name:  Yuridia Valdes Age:  82 y.o. 
:  1935 MRN:  [id number] PCP:  Tea Julian NP Treatment Team: Attending Provider: Milla Corado MD; Care Manager: Shira Romo LMSW; Physical Therapy Assistant: Bowen Olivares PTA; Utilization Review: David Humphries RN; Staff Nurse: Zoe Fernandes RN; Student Nurse: Fatmata Moy 
 
HPI/Subjective:  
Mr. Alfredo Mcmanus is an 81 y/o WM admitted on  after a fall that resulted in rhabdo, CK 4,000s.  
 
: Tm overnight 100.8F. Says his cough is better, able to get some sputum up but not much. Breathing overall is better but still coughing some. Remains on RA. No abdo pain, N/V/D or other new symptoms. Did not do as well w PT yesterday as he did the day before, now rec STR at [place]. Objective:  
 
Patient Vitals for the past 24 hrs: 
 Temp Pulse Resp BP SpO2  
19 0850 97.8 °F (36.6 °C) 70 20 125/63 93 % 19 0404 99.2 °F (37.3 °C) 67 18 115/57 93 % 19 0003 (!) 100.8 °F (38.2 °C) 68 18 117/54 93 % 19 1947 98.9 °F (37.2 °C) 77 18 124/54 97 % 19 1559 98.8 °F (37.1 °C) 71 18 123/69 94 % 19 1200 99 °F (37.2 °C) 77 18 112/59 93 % Oxygen Therapy O2 Sat (%): 93 % (19 0850) Pulse via Oximetry: 72 beats per minute (19 0821) O2 Device: Room air (19 7522) Estimated body mass index is 37.31 kg/m² as calculated from the following: 
  Height as of this encounter: 5' 10\" (1.778 m). Weight as of this encounter: 117.9 kg (260 lb). No intake or output data in the 24 hours ending 19 0853 *Note that automatically entered I/Os may not be accurate; dependent on patient compliance with collection and accurate  by [employer]. General:    Well nourished. Alert. Obese. CV:   RRR. No murmur, rub, or gallop. Lungs:   B/l rhonchi. RA O2. Abdomen:   Soft, nontender, nondistended. Extremities: Warm and dry. No cyanosis or edema. Skin:     No rashes or jaundice. Neuro:  No gross focal deficits Data Review: 
I have reviewed all labs, meds, and studies from the last 24 hours: 
 
Recent Results (from the past 24 hour(s)) PLEASE READ & DOCUMENT PPD TEST IN 48 HRS Collection Time: 11/20/19  6:01 PM  
Result Value Ref Range PPD    
 mm Induration 0 0 - 5 mm All Micro Results None No results found for this visit on 11/18/19. Current Meds: 
Current Facility-Administered Medications Medication Dose Route Frequency  benzocaine-menthol (CEPACOL) lozenge  1 Lozenge Oral Q2H PRN  predniSONE (DELTASONE) tablet 20 mg  20 mg Oral DAILY WITH BREAKFAST  albuterol (PROVENTIL VENTOLIN) nebulizer solution 2.5 mg  2.5 mg Nebulization Q6H PRN  
 guaiFENesin ER (MUCINEX) tablet 1,200 mg  1,200 mg Oral Q12H  
 HYDROcodone-homatropine (HYCODAN) 5-1.5 mg/5 mL (5 mL) syrup 5 mL  5 mL Oral Q4H PRN  
 sodium chloride (NS) flush 5-40 mL  5-40 mL IntraVENous Q8H  
 sodium chloride (NS) flush 5-40 mL  5-40 mL IntraVENous PRN  
 acetaminophen (TYLENOL) tablet 650 mg  650 mg Oral Q6H PRN  
 ondansetron (ZOFRAN) injection 4 mg  4 mg IntraVENous Q4H PRN  
 bisacodyl (DULCOLAX) tablet 5 mg  5 mg Oral DAILY PRN  
 donepezil (ARICEPT) tablet 10 mg  10 mg Oral DAILY Other Studies (last 24 hours): No results found. Assessment and Plan:  
 
Hospital Problems as of 11/21/2019 Date Reviewed: 8/8/2018 Codes Class Noted - Resolved POA Fever ICD-10-CM: R50.9 ICD-9-CM: 780.60  11/21/2019 - Present Unknown  
   
 CKD (chronic kidney disease) ICD-10-CM: N18.9 ICD-9-CM: 585.9  11/19/2019 - Present Unknown Hypokalemia ICD-10-CM: E87.6 ICD-9-CM: 276.8  11/19/2019 - Present Unknown * (Principal) [facility]olysis [facility] ICD-10-CM: T79. Tiff Lomax ICD-9-CM: 958.6  11/18/2019 - Present Yes  Rhabdomyolysis ICD-10-CM: N90.92 
 ICD-9-CM: 728.88  11/18/2019 - Present Unknown Multiple myeloma not having achieved remission ([facility] ICD-10-CM: C90.00 ICD-9-CM: 203.00  8/2/2017 - Present Yes Fall ICD-10-CM: W19. Yuliana Ansari ICD-9-CM: E888.9  7/18/2017 - Present Yes Overview Signed 7/18/2017  2:03 PM by Yudi Patricio MD  
  Pt had another fall, in his yard; he was chasing after his cat and lost his balance. No injuries sustained; fall witnessed by neighbor. No LOC. Mixed hyperlipidemia ICD-10-CM: E78.2 ICD-9-CM: 272.2  6/26/2017 - Present Yes Plan: # Cough             - Present for 2 weeks, CXR clear on admission but given fever overnight will repeat this AM. Start low dose prednisone. Consider azithro or something else based on CXR results. # Traumatic rhabdo 
            - Resolved. Off fluids. 
  
# HypoK             - Replaced. F/u tomorrow. 
  
# Mechanical fall/weakness             - Therapies, ? STR 
  
# CKD 
            - Cr at baseline. Did not have ANIKET.  
  
# H/o multiple myeloma/pancytopenia 
            - Counts at baseline. No bleeding. No fevers DC planning/Dispo: STR placement pending. Diet:  DIET CARDIAC 
DVT ppx: SCDs Signed: 
Cecilia Kent MD

## 2019-11-21 NOTE — PROGRESS NOTES
Pt resting in bed and is alert and oriented x 4. he denies pain and is on room air. RR even and unlabored. Call light in reach and pt instructed to call for assistance if needed. Will monitor.

## 2019-11-21 NOTE — PROGRESS NOTES
Problem: Mobility Impaired (Adult and Pediatric) Goal: *Acute Goals and Plan of Care (Insert Text) Description STG: 
(1.)Mr. Marcella Hester will move from supine to sit and sit to supine  with CONTACT GUARD ASSIST within 3 treatment day(s). (2.)Mr. Marcella Hester will transfer from bed to chair and chair to bed with STAND BY ASSIST using the least restrictive device within 3 treatment day(s). (3.)Mr. Marcella Hester will ambulate with STAND BY ASSIST for 25 feet with the least restrictive device within 3 treatment day(s). LTG: 
(1.)Mr. Marcella Hester will move from supine to sit and sit to supine  in bed with MODIFIED INDEPENDENCE within 7 treatment day(s). (2.)Mr. Marcella Hester will transfer from bed to chair and chair to bed with MODIFIED INDEPENDENCE using the least restrictive device within 7 treatment day(s). (3.)Mr. Marcella Hester will ambulate with MODIFIED INDEPENDENCE for 50 feet with the least restrictive device within 7 treatment day(s). ________________________________________________________________________________________________ Outcome: Progressing Towards Goal 
  
PHYSICAL THERAPY: Daily Note and AM 11/21/2019 INPATIENT: PT Visit Days : 3 Payor: Carlin Pate / Plan: [address] [place] [address] HMO / Product Type: Secured Mail Care Medicare /   
  
NAME/AGE/GENDER: Janet Cuello is a 82 y.o. male PRIMARY DIAGNOSIS: ANIKET (acute kidney injury) ([facility] [N17.9] Rhabdomyolysis [M62.82] Traumatic rhabdomyolysis ([facility] Traumatic rhabdomyolysis ([facility] ICD-10: Treatment Diagnosis:  
 · Generalized Muscle Weakness (M62.81) · Difficulty in walking, Not elsewhere classified (R26.2) Precaution/Allergies: 
Patient has no known allergies. ASSESSMENT:  
 
Mr. Marcella Hester presents with above diagnoses. Patient demonstrates generalized weakness affecting his balance, mobility, and independence.   Patient reports using a rollator at home for mobility and has been working with therapy in his home on balance. He did have a fall at home and was unable to get himself up or reach help and remained on the floor for an extended period of time. Patient would benefit from therapy to improve his mobility, balance, safety, and independence. Patient participated well this am and was happy to be able to get out of bed and ambulate some. He does require some cueing for safe maneuvering and positioning of the walker as he tends to keep it too far in front which alters his base of support and  balance. 11/20/19:  Patient not as eager to get out of bed this am and needed some convincing. He required much more assist for supine to sit. Yesterday he was bridging and rolling fairly well but nowhere near as mobile today. Less steady in sitting and standing as well. Required increased assist to control descent down into the chair. Patient definitely not at a level today to return to his independent living and will need additional rehab.    
11/21 need encouragement to work with therapy. Finally agree to do exercises. Stated I am tired and have not slept. Left in supine with needs in reach. This section established at most recent assessment PROBLEM LIST (Impairments causing functional limitations): 1. Decreased Strength 2. Decreased ADL/Functional Activities 3. Decreased Transfer Abilities 4. Decreased Ambulation Ability/Technique 5. Decreased Balance 6. Decreased Cognition INTERVENTIONS PLANNED: (Benefits and precautions of physical therapy have been discussed with the patient.) 1. Balance Exercise 2. Bed Mobility 3. Gait Training 4. Home Exercise Program (HEP) 5. Therapeutic Activites 6. Therapeutic Exercise/Strengthening TREATMENT PLAN: Frequency/Duration: daily for duration of hospital stay Rehabilitation Potential For Stated Goals: Good REHAB RECOMMENDATIONS (at time of discharge pending progress):   
Placement: It is my opinion, based on this patient's performance to date, that Mr. Dorothey Blizzard may benefit from participating in 1-2 additional therapy sessions in order to continue to assess for rehab potential and then make recommendation for disposition at discharge. Equipment:  
? None at this time HISTORY:  
History of Present Injury/Illness (Reason for Referral): Hannah Ruvalcaba is a 82 y.o. male who came to ER due to s/p fall this morning. Patient lives at an assisted-living facility. He fell this morning after experiencing vertigo that he normally has. He tried pressing on his alert button on his neck, but did not reach anyone. Reportedly 6 hours later he could reach help and ambulance brought him to ER. He was found to have elevated CK and Mb. Also with elevated creatinine. Patient denies pain at back, buttock, thighs, legs at the moment. Patient could not tell me whether or why he is taking Eliquis. It is listed on his home medication. Other PMH is listed below. He also has multiple myeloma that is not in remission yet. Dementia, hard of hearing,  
Past Medical History/Comorbidities:  
Mr. Dorothey Blizzard  has a past medical history of Acute encephalopathy (3/22/2015), Altered mental status (9/16/2014), Alzheimer disease ([address] Anemia (9/16/2014), Anxiety, Asymmetrical sensorineural hearing loss, Bipolar disorder ([address] Cancer ([address] Cataract (9/8/2016), Cortical hemorrhage ([address] (9/17/2014), Elevated AST (SGOT) (9/16/2014), GERD (gastroesophageal reflux disease), H/O seasonal allergies, History of TIA (transient ischemic attack) (9/16/2014), Hypomagnesemia (9/16/2014), Panic attack, Senile dementia ([address] (5/19/2017), Stroke ([address] Syncope and collapse (3/22/2015), Tinea corporis (9/16/2014), and Tinnitus. Mr. Dorothey Blizzard  has a past surgical history that includes hx appendectomy; hx other surgical (AGE 23); hx colonoscopy (MULTIPLE OVER THE YEARS); and hx vascular access. Social History/Living Environment:  
Home Environment: Independent living One/Two Story Residence: One story Living Alone: Yes Support Systems: Family member(s) Patient Expects to be Discharged to[de-identified] Unknown Current DME Used/Available at Home: [address], rollator Prior Level of Function/Work/Activity: 
Ambulating with a rollator. Getting therapy for balance. Incontinent. Number of Personal Factors/Comorbidities that affect the Plan of Care: 1-2: MODERATE COMPLEXITY EXAMINATION:  
Most Recent Physical Functioning:  
Gross Assessment: 
  
         
  
Posture: 
  
Balance: 
  Bed Mobility: 
  
Wheelchair Mobility: 
  
Transfers: 
  
Gait: 
  
   
  
Body Structures Involved: 1. Muscles Body Functions Affected: 1. Movement Related Activities and Participation Affected: 1. Mobility 2. Self Care Number of elements that affect the Plan of Care: 4+: HIGH COMPLEXITY CLINICAL PRESENTATION:  
Presentation: Stable and uncomplicated: LOW COMPLEXITY CLINICAL DECISION MAKIN[address] [place] [id number] AM-PAC 6 Clicks Basic Mobility Inpatient Short Form How much difficulty does the patient currently have. .. Unable A Lot A Little None 1. Turning over in bed (including adjusting bedclothes, sheets and blankets)? ? 1   ? 2   ? 3   ? 4  
2. Sitting down on and standing up from a chair with arms ( e.g., wheelchair, bedside commode, etc.)   ? 1   ? 2   ? 3   ? 4  
3. Moving from lying on back to sitting on the side of the bed?   ? 1   ? 2   ? 3   ? 4 How much help from another person does the patient currently need. .. Total A Lot A Little None 4. Moving to and from a bed to a chair (including a wheelchair)? ? 1   ? 2   ? 3   ? 4  
5. Need to walk in hospital room? ? 1   ? 2   ? 3   ? 4  
6. Climbing 3-5 steps with a railing? ? 1   ? 2   ? 3   ? 4  
© 2007, Trustees of [address] [place] [address] [address] [address], under license to [employer]. All rights reserved Score:  Initial: 17 Most Recent: X (Date: -- ) Interpretation of Tool:  Represents activities that are increasingly more difficult (i.e. Bed mobility, Transfers, Gait). Medical Necessity:    
· Patient is expected to demonstrate progress in strength, balance and coordination ·  to increase independence with mobility. · . Reason for Services/Other Comments: 
· Patient continues to require skilled intervention due to weakness affecting balance, mobility, safety, and independence. · . Use of outcome tool(s) and clinical judgement create a POC that gives a: Clear prediction of patient's progress: LOW COMPLEXITY  
  
 
 
 
TREATMENT:  
(In addition to Assessment/Re-Assessment sessions the following treatments were rendered) Pre-treatment Symptoms/Complaints:  Patient not feeling good today, very sleepy. Pain: Initial:  
   Post Session:   
 
Therapeutic Activity: (    30 minutes): Therapeutic activities including supine to sit, scooting, sitting balance, sit <> stand, standing balance with the walker, and ambulation to the chair with rolling walker  to improve mobility, strength, balance and coordination. Required minimal   to promote static and dynamic balance in standing. Braces/Orthotics/Lines/Etc:  
· IV 
· O2 Device: Room air Treatment/Session Assessment:   
· Response to Treatment:  Patient participated fair. · Interdisciplinary Collaboration:  
o Physical Therapy Assistant 
o Registered Nurse · After treatment position/precautions:  
o Up in chair 
o Bed/Chair-wheels locked 
o Call light within reach · Compliance with Program/Exercises: Will assess as treatment progresses · Recommendations/Intent for next treatment session: \"Next visit will focus on advancements to more challenging activities and reduction in assistance provided\". Total Treatment Duration: PT Patient Time In/Time Out Time In: 1100 Time Out: 1115 Leonie Madrigal, PTA

## 2019-11-22 PROBLEM — J20.9 ACUTE BRONCHITIS: Status: ACTIVE | Noted: 2019-11-22

## 2019-11-22 LAB
ANION GAP SERPL CALC-SCNC: 7 MMOL/L (ref 7–16)
BASOPHILS # BLD: 0 K/UL (ref 0–0.2)
BASOPHILS NFR BLD: 1 % (ref 0–2)
BUN SERPL-MCNC: 22 MG/DL (ref 8–23)
CALCIUM SERPL-MCNC: 8.5 MG/DL (ref 8.3–10.4)
CHLORIDE SERPL-SCNC: 106 MMOL/L (ref 98–107)
CO2 SERPL-SCNC: 27 MMOL/L (ref 21–32)
CREAT SERPL-MCNC: 1.06 MG/DL (ref 0.8–1.5)
DIFFERENTIAL METHOD BLD: ABNORMAL
EOSINOPHIL # BLD: 0 K/UL (ref 0–0.8)
EOSINOPHIL NFR BLD: 0 % (ref 0.5–7.8)
ERYTHROCYTE [DISTWIDTH] IN BLOOD BY AUTOMATED COUNT: 16.2 % (ref 11.9–14.6)
GLUCOSE SERPL-MCNC: 118 MG/DL (ref 65–100)
HCT VFR BLD AUTO: 33.8 % (ref 41.1–50.3)
HGB BLD-MCNC: 10.9 G/DL (ref 13.6–17.2)
IMM GRANULOCYTES # BLD AUTO: 0 K/UL (ref 0–0.5)
IMM GRANULOCYTES NFR BLD AUTO: 0 % (ref 0–5)
LYMPHOCYTES # BLD: 0.7 K/UL (ref 0.5–4.6)
LYMPHOCYTES NFR BLD: 26 % (ref 13–44)
MAGNESIUM SERPL-MCNC: 2.2 MG/DL (ref 1.8–2.4)
MCH RBC QN AUTO: 30.4 PG (ref 26.1–32.9)
MCHC RBC AUTO-ENTMCNC: 32.2 G/DL (ref 31.4–35)
MCV RBC AUTO: 94.2 FL (ref 79.6–97.8)
MONOCYTES # BLD: 1.1 K/UL (ref 0.1–1.3)
MONOCYTES NFR BLD: 41 % (ref 4–12)
NEUTS SEG # BLD: 0.9 K/UL (ref 1.7–8.2)
NEUTS SEG NFR BLD: 32 % (ref 43–78)
NRBC # BLD: 0 K/UL (ref 0–0.2)
PLATELET # BLD AUTO: 137 K/UL (ref 150–450)
PLATELET COMMENTS,PCOM: ADEQUATE
PMV BLD AUTO: 10.5 FL (ref 9.4–12.3)
POTASSIUM SERPL-SCNC: 3.3 MMOL/L (ref 3.5–5.1)
RBC # BLD AUTO: 3.59 M/UL (ref 4.23–5.6)
RBC MORPH BLD: ABNORMAL
SODIUM SERPL-SCNC: 140 MMOL/L (ref 136–145)
WBC # BLD AUTO: 2.7 K/UL (ref 4.3–11.1)
WBC MORPH BLD: ABNORMAL

## 2019-11-22 PROCEDURE — 74011000250 HC RX REV CODE- 250: Performed by: INTERNAL MEDICINE

## 2019-11-22 PROCEDURE — 74011250637 HC RX REV CODE- 250/637: Performed by: INTERNAL MEDICINE

## 2019-11-22 PROCEDURE — 94640 AIRWAY INHALATION TREATMENT: CPT

## 2019-11-22 PROCEDURE — 94760 N-INVAS EAR/PLS OXIMETRY 1: CPT

## 2019-11-22 PROCEDURE — 97530 THERAPEUTIC ACTIVITIES: CPT

## 2019-11-22 PROCEDURE — 85025 COMPLETE CBC W/AUTO DIFF WBC: CPT

## 2019-11-22 PROCEDURE — 36415 COLL VENOUS BLD VENIPUNCTURE: CPT

## 2019-11-22 PROCEDURE — 80048 BASIC METABOLIC PNL TOTAL CA: CPT

## 2019-11-22 PROCEDURE — 74011636637 HC RX REV CODE- 636/637: Performed by: INTERNAL MEDICINE

## 2019-11-22 PROCEDURE — 74011250636 HC RX REV CODE- 250/636: Performed by: INTERNAL MEDICINE

## 2019-11-22 PROCEDURE — 65270000029 HC RM PRIVATE

## 2019-11-22 PROCEDURE — 97110 THERAPEUTIC EXERCISES: CPT

## 2019-11-22 PROCEDURE — 83735 ASSAY OF MAGNESIUM: CPT

## 2019-11-22 RX ORDER — ALBUTEROL SULFATE 0.83 MG/ML
2.5 SOLUTION RESPIRATORY (INHALATION)
Status: DISCONTINUED | OUTPATIENT
Start: 2019-11-22 | End: 2019-11-23 | Stop reason: HOSPADM

## 2019-11-22 RX ORDER — FUROSEMIDE 10 MG/ML
40 INJECTION INTRAMUSCULAR; INTRAVENOUS ONCE
Status: COMPLETED | OUTPATIENT
Start: 2019-11-22 | End: 2019-11-22

## 2019-11-22 RX ORDER — BUDESONIDE 0.5 MG/2ML
500 INHALANT ORAL
Status: DISCONTINUED | OUTPATIENT
Start: 2019-11-22 | End: 2019-11-23 | Stop reason: HOSPADM

## 2019-11-22 RX ORDER — PREDNISONE 10 MG/1
40 TABLET ORAL
Status: DISCONTINUED | OUTPATIENT
Start: 2019-11-22 | End: 2019-11-23 | Stop reason: HOSPADM

## 2019-11-22 RX ORDER — POTASSIUM CHLORIDE 20 MEQ/1
40 TABLET, EXTENDED RELEASE ORAL
Status: COMPLETED | OUTPATIENT
Start: 2019-11-22 | End: 2019-11-22

## 2019-11-22 RX ADMIN — ALBUTEROL SULFATE 2.5 MG: 2.5 SOLUTION RESPIRATORY (INHALATION) at 12:33

## 2019-11-22 RX ADMIN — GUAIFENESIN 1200 MG: 600 TABLET ORAL at 10:30

## 2019-11-22 RX ADMIN — PREDNISONE 40 MG: 10 TABLET ORAL at 10:30

## 2019-11-22 RX ADMIN — ALBUTEROL SULFATE 2.5 MG: 2.5 SOLUTION RESPIRATORY (INHALATION) at 08:04

## 2019-11-22 RX ADMIN — AZITHROMYCIN MONOHYDRATE 250 MG: 250 TABLET ORAL at 10:30

## 2019-11-22 RX ADMIN — POTASSIUM CHLORIDE 40 MEQ: 20 TABLET, EXTENDED RELEASE ORAL at 10:30

## 2019-11-22 RX ADMIN — Medication 10 ML: at 06:00

## 2019-11-22 RX ADMIN — BUDESONIDE 500 MCG: 0.5 INHALANT RESPIRATORY (INHALATION) at 08:04

## 2019-11-22 RX ADMIN — Medication 10 ML: at 14:16

## 2019-11-22 RX ADMIN — Medication 10 ML: at 22:03

## 2019-11-22 RX ADMIN — FUROSEMIDE 40 MG: 10 INJECTION, SOLUTION INTRAMUSCULAR; INTRAVENOUS at 10:32

## 2019-11-22 RX ADMIN — ALBUTEROL SULFATE 2.5 MG: 2.5 SOLUTION RESPIRATORY (INHALATION) at 15:59

## 2019-11-22 RX ADMIN — DONEPEZIL HYDROCHLORIDE 10 MG: 5 TABLET, FILM COATED ORAL at 10:30

## 2019-11-22 RX ADMIN — HYDROCODONE BITARTRATE AND HOMATROPINE METHYLBROMIDE 5 ML: 5; 1.5 SOLUTION ORAL at 10:29

## 2019-11-22 RX ADMIN — GUAIFENESIN 1200 MG: 600 TABLET ORAL at 20:05

## 2019-11-22 NOTE — PROGRESS NOTES
Nutrition Reason for assessment: Length of Stay Assessment:  
Food/Nutrition Patient History:  Admitted s/p fall with rhabdo now resolved, now with mild volume overload and bronchitis. PMH remarkable for dementia, [place], multiple myeloma. Pt reports at baseline he eats well. He keeps ensure on hand for occasional meal replacement. He indicates during stay he has been eating poorly secondary to limited appetite. He is primarily selecting \"light items\" including yogurt, grapes, sandwich. DIET CARDIAC Regular Anthropometrics:Height: 5' 10\" (177.8 cm),  Weight: 117.9 kg (260 lb), Weight Source: Patient stated, Body mass index is 37.31 kg/m². BMI class of obese Macronutrient needs: 118 kg Listed body weight EER:  7691-7679 kcal /day (15-20 kcal/kg) EPR:  66-88 grams protein/day (15% calories) Intake/Comparative Standards: Recorded meal(s): none. At bedside he had consumed yogurt and had declined ordering lunch. Based on pt recall and RD observation he potentially meets ~25-50% of kcal and ~25-50% of protein needs Nutrition Diagnosis:  
Inadequate oral intake related to poor appetite as evidenced by acute decline in intake meeting ~25-50% estimated needs. Intervention: 
Meals and snacks: Continue current diet. Nutrition Supplement Therapy: Ensure Enlive TID. Serving provided for lunch. Coordination of Nutrition Care: IDT rounds. Discharge Nutrition Plan: Continue to use ensure at home for meal replacement per established history. [place],  [address] [place] [address], [facility]

## 2019-11-22 NOTE — PROGRESS NOTES
Shift assessment complete. Pt alert and oriented x4. Respirations even and unlabored. Lung sounds coarse on room air with non-productive cough. HR regular. Abdomen obese, soft, with active bowel sounds heard in all four quadrants. Skin intact, +1 non-pitting edema noted to BLE. Pt sitting in recliner with BLE elevated. Pt denies pain and nausea. IV patent and capped. Safety measures in place, call light within reach. Will continue to monitor.

## 2019-11-22 NOTE — PROGRESS NOTES
Hospitalist Note Admit Date:  2019  3:13 PM  
Name:  Neelam Stanton Age:  82 y.o. 
:  1935 MRN:  [id number] PCP:  Erica Juarez NP Treatment Team: Attending Provider: Norah Pete MD; Care Manager: Berlin Monzon LMSW; Utilization Review: Armen Tariq RN 
 
HPI/Subjective:  
Mr. Mare Servin is an 83 y/o WM admitted on  after a fall that resulted in rhabdo, CK 4,000s. Developed some mild volume overload and bronchitis since admission. : CXR yesterday showed some mild interstitial prominence, given lasix. Started on azithro and pred 20. Still coughing today but afebrile overnight and remains stable on RA O2. No chest pain. He feels better overall. ROS neg otherwise. Objective:  
 
Patient Vitals for the past 24 hrs: 
 Temp Pulse Resp BP SpO2  
19 0840 97.9 °F (36.6 °C) 72 20 104/54 90 % 19 0805     94 % 19 2332 98.7 °F (37.1 °C) 96 20 120/63 96 % 19 1946 99.2 °F (37.3 °C) 96 20 101/63 93 % 19 1603 100 °F (37.8 °C) (!) 107 20 103/65 93 % 19 1304 98 °F (36.7 °C) 72 18 138/60 93 % 19 1250 98.9 °F (37.2 °C) 68 22 136/63 93 % Oxygen Therapy O2 Sat (%): 90 % (19 0840) Pulse via Oximetry: 77 beats per minute (19 08) O2 Device: Room air (19) Estimated body mass index is 37.31 kg/m² as calculated from the following: 
  Height as of this encounter: 5' 10\" (1.778 m). Weight as of this encounter: 117.9 kg (260 lb). No intake or output data in the 24 hours ending 19 8228 *Note that automatically entered I/Os may not be accurate; dependent on patient compliance with collection and accurate  by [employer]. General:    Well nourished. Alert. Obese. Weak appearing. CV:   RRR. No murmur, rub, or gallop. Lungs:   B/l rhonchi, some wheezes. RA O2. Abdomen:   Soft, nontender, nondistended. Extremities: Warm and dry. No cyanosis or edema. Skin:     No rashes or jaundice. Neuro:  No gross focal deficits Data Review: 
I have reviewed all labs, meds, and studies from the last 24 hours: 
 
Recent Results (from the past 24 hour(s)) CBC WITH AUTOMATED DIFF Collection Time: 11/22/19  5:24 AM  
Result Value Ref Range WBC 2.7 (L) 4.3 - 11.1 K/uL  
 RBC 3.59 (L) 4.23 - 5.6 M/uL  
 HGB 10.9 (L) 13.6 - 17.2 g/dL HCT 33.8 (L) 41.1 - 50.3 % MCV 94.2 79.6 - 97.8 FL  
 MCH 30.4 26.1 - 32.9 PG  
 MCHC 32.2 31.4 - 35.0 g/dL  
 RDW 16.2 (H) 11.9 - 14.6 % PLATELET 497 (L) 469 - 450 K/uL MPV 10.5 9.4 - 12.3 FL ABSOLUTE NRBC 0.00 0.0 - 0.2 K/uL NEUTROPHILS 32 (L) 43 - 78 % LYMPHOCYTES 26 13 - 44 % MONOCYTES 41 (H) 4.0 - 12.0 % EOSINOPHILS 0 (L) 0.5 - 7.8 % BASOPHILS 1 0.0 - 2.0 % IMMATURE GRANULOCYTES 0 0.0 - 5.0 %  
 ABS. NEUTROPHILS 0.9 (L) 1.7 - 8.2 K/UL  
 ABS. LYMPHOCYTES 0.7 0.5 - 4.6 K/UL  
 ABS. MONOCYTES 1.1 0.1 - 1.3 K/UL  
 ABS. EOSINOPHILS 0.0 0.0 - 0.8 K/UL  
 ABS. BASOPHILS 0.0 0.0 - 0.2 K/UL  
 ABS. IMM. GRANS. 0.0 0.0 - 0.5 K/UL  
 RBC COMMENTS SLIGHT 
ANISOCYTOSIS 
    
 WBC COMMENTS Result Confirmed By Smear PLATELET COMMENTS ADEQUATE    
 DF AUTOMATED METABOLIC PANEL, BASIC Collection Time: 11/22/19  5:24 AM  
Result Value Ref Range Sodium 140 136 - 145 mmol/L Potassium 3.3 (L) 3.5 - 5.1 mmol/L Chloride 106 98 - 107 mmol/L  
 CO2 27 21 - 32 mmol/L Anion gap 7 7 - 16 mmol/L Glucose 118 (H) 65 - 100 mg/dL BUN 22 8 - 23 MG/DL Creatinine 1.06 0.8 - 1.5 MG/DL  
 GFR est AA >60 >60 ml/min/1.73m2 GFR est non-AA >60 >60 ml/min/1.73m2 Calcium 8.5 8.3 - 10.4 MG/DL MAGNESIUM Collection Time: 11/22/19  5:24 AM  
Result Value Ref Range Magnesium 2.2 1.8 - 2.4 mg/dL All Micro Results None No results found for this visit on 11/18/19. Current Meds: 
Current Facility-Administered Medications Medication Dose Route Frequency  potassium chloride (K-DUR, KLOR-CON) SR tablet 40 mEq  40 mEq Oral NOW  predniSONE (DELTASONE) tablet 40 mg  40 mg Oral DAILY WITH BREAKFAST  budesonide (PULMICORT) 500 mcg/2 ml nebulizer suspension  500 mcg Nebulization BID RT  
 albuterol (PROVENTIL VENTOLIN) nebulizer solution 2.5 mg  2.5 mg Nebulization Q4HWA RT  
 furosemide (LASIX) injection 40 mg  40 mg IntraVENous ONCE  
 benzocaine-menthol (CEPACOL) lozenge  1 Lozenge Oral Q2H PRN  
 azithromycin (ZITHROMAX) tablet 250 mg  250 mg Oral DAILY  guaiFENesin ER (MUCINEX) tablet 1,200 mg  1,200 mg Oral Q12H  
 HYDROcodone-homatropine (HYCODAN) 5-1.5 mg/5 mL (5 mL) syrup 5 mL  5 mL Oral Q4H PRN  
 sodium chloride (NS) flush 5-40 mL  5-40 mL IntraVENous Q8H  
 sodium chloride (NS) flush 5-40 mL  5-40 mL IntraVENous PRN  
 acetaminophen (TYLENOL) tablet 650 mg  650 mg Oral Q6H PRN  
 ondansetron (ZOFRAN) injection 4 mg  4 mg IntraVENous Q4H PRN  
 bisacodyl (DULCOLAX) tablet 5 mg  5 mg Oral DAILY PRN  
 donepezil (ARICEPT) tablet 10 mg  10 mg Oral DAILY Other Studies (last 24 hours): Xr Chest Sngl V Result Date: 11/21/2019 CHEST X-RAY, one view. HISTORY:  Productive cough and fever. TECHNIQUE:  AP upright portable view COMPARISON: Exam 3 days prior, 18 November 2019. FINDINGS:   *The lungs: Mild interstitial prominence. No lobar consolidation. *The heart size: is normal. *The costophrenic angles: are sharp. *The pulmonary vasculature: is unremarkable. *Included portion of the upper abdomen: is unremarkable. *Bones: No gross bony lesions. *Other: Right-sided chest port left-sided cardiac pacemaker present IMPRESSION:  No lobar pneumonia. Mild interstitial prominence. Assessment and Plan:  
 
Hospital Problems as of 11/22/2019 Date Reviewed: 8/8/2018 Codes Class Noted - Resolved POA Acute bronchitis ICD-10-CM: J20.9 ICD-9-CM: 466.0  11/22/2019 - Present Unknown Fever ICD-10-CM: R50.9 ICD-9-CM: 780.60  11/21/2019 - Present Yes  
   
 CKD (chronic kidney disease) ICD-10-CM: N18.9 ICD-9-CM: 585.9  11/19/2019 - Present Yes Hypokalemia ICD-10-CM: E87.6 ICD-9-CM: 276.8  11/19/2019 - Present Yes * (Principal) Traumatic rhabdomyolysis ([facility] ICD-10-CM: T79. Himanshu Armstrong ICD-9-CM: 958.6  11/18/2019 - Present Yes Rhabdomyolysis ICD-10-CM: R52.64 ICD-9-CM: 728.88  11/18/2019 - Present Yes Multiple myeloma not having achieved remission ([facility] ICD-10-CM: C90.00 ICD-9-CM: 203.00  8/2/2017 - Present Yes Fall ICD-10-CM: W19. Dinora Dos Santos ICD-9-CM: E888.9  7/18/2017 - Present Yes Overview Signed 7/18/2017  2:03 PM by Lissa Lynn MD  
  Pt had another fall, in his yard; he was chasing after his cat and lost his balance. No injuries sustained; fall witnessed by neighbor. No LOC. Mixed hyperlipidemia ICD-10-CM: E78.2 ICD-9-CM: 272.2  6/26/2017 - Present Yes Plan: # Acute bronchitis             - [id number] CXR normal. 11/21 CXR showed some mild interstitial prominence but no infiltrate, given lasix. Started z-pack, increase prednisone today, schedule nebs from prn, lasix one more time and f/u output. Treating aggressively since I think his resp status will limit his ability to participate at rehab. 
  
# Traumatic rhabdo 
            - [id number]. Off fluids. 
  
# HypoK             - Replace again, Mg normal 
  
# Mechanical fall/weakness             - [id number], STR 
  
# CKD 
            - Cr at baseline. Did not have ANIKET.  
  
# H/o multiple myeloma/pancytopenia 
            - Counts at baseline. No bleeding. No fevers since 11/21. DC planning/Dispo: To STR when medically stable, hopefully 1 or 2 more days. Diet:  DIET CARDIAC 
DVT ppx: SCDs Signed: 
Yaa Rodriguez MD

## 2019-11-22 NOTE — PROGRESS NOTES
Per MD note from today pt is not stable for d/c. Per Kelly Arshad with [facility] ) Dahlia Marks is valid till Sat. On Friday pt could admit to 1-4 All room # 6 )  unsure if [facility] will change for Sat admission. If pt can not admit to SNF on Sat will need to remain here and a new auth will need to be initiated with [facility],.

## 2019-11-23 VITALS
RESPIRATION RATE: 17 BRPM | SYSTOLIC BLOOD PRESSURE: 109 MMHG | BODY MASS INDEX: 37.22 KG/M2 | DIASTOLIC BLOOD PRESSURE: 60 MMHG | WEIGHT: 260 LBS | OXYGEN SATURATION: 91 % | HEIGHT: 70 IN | TEMPERATURE: 98.3 F | HEART RATE: 70 BPM

## 2019-11-23 PROBLEM — M62.82 RHABDOMYOLYSIS: Status: RESOLVED | Noted: 2019-11-18 | Resolved: 2019-11-23
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
## 2019-11-23 PROBLEM — T79.6XXA TRAUMATIC RHABDOMYOLYSIS (HCC): Status: RESOLVED | Noted: 2019-11-18 | Resolved: 2019-11-23

## 2019-11-23 PROBLEM — R50.9 FEVER: Status: RESOLVED | Noted: 2019-11-21 | Resolved: 2019-11-23

## 2019-11-23 PROBLEM — J20.9 ACUTE BRONCHITIS: Status: RESOLVED | Noted: 2019-11-22 | Resolved: 2019-11-23

## 2019-11-23 PROBLEM — E87.6 HYPOKALEMIA: Status: RESOLVED | Noted: 2019-11-19 | Resolved: 2019-11-23

## 2019-11-23 PROCEDURE — 74011636637 HC RX REV CODE- 636/637: Performed by: INTERNAL MEDICINE

## 2019-11-23 PROCEDURE — 74011250637 HC RX REV CODE- 250/637: Performed by: INTERNAL MEDICINE

## 2019-11-23 RX ORDER — PREDNISONE 20 MG/1
40 TABLET ORAL
Qty: 6 TAB | Refills: 0 | Status: SHIPPED | OUTPATIENT
Start: 2019-11-23 | End: 2019-11-26

## 2019-11-23 RX ORDER — AZITHROMYCIN 250 MG/1
250 TABLET, FILM COATED ORAL DAILY
Qty: 3 TAB | Refills: 0 | Status: SHIPPED | OUTPATIENT
Start: 2019-11-23 | End: 2019-11-26

## 2019-11-23 RX ORDER — ALBUTEROL SULFATE 0.83 MG/ML
2.5 SOLUTION RESPIRATORY (INHALATION)
Qty: 30 NEBULE | Refills: 0 | Status: SHIPPED
Start: 2019-11-23 | End: 2022-01-01

## 2019-11-23 RX ADMIN — GUAIFENESIN 1200 MG: 600 TABLET ORAL at 09:05

## 2019-11-23 RX ADMIN — DONEPEZIL HYDROCHLORIDE 10 MG: 5 TABLET, FILM COATED ORAL at 09:05

## 2019-11-23 RX ADMIN — Medication 10 ML: at 05:27

## 2019-11-23 RX ADMIN — AZITHROMYCIN MONOHYDRATE 250 MG: 250 TABLET ORAL at 09:05

## 2019-11-23 RX ADMIN — PREDNISONE 40 MG: 10 TABLET ORAL at 09:05

## 2019-11-23 NOTE — DISCHARGE INSTRUCTIONS
Patient Education        Bronchitis: Care Instructions  Your Care Instructions    Bronchitis is inflammation of the bronchial tubes, which carry air to the lungs. The tubes swell and produce mucus, or phlegm. The mucus and inflamed bronchial tubes make you cough. You may have trouble breathing. Most cases of bronchitis are caused by viruses like those that cause colds. Antibiotics usually do not help and they may be harmful. Bronchitis usually develops rapidly and lasts about 2 to 3 weeks in otherwise healthy people. Follow-up care is a key part of your treatment and safety. Be sure to make and go to all appointments, and call your doctor if you are having problems. It's also a good idea to know your test results and keep a list of the medicines you take. How can you care for yourself at home? · Take all medicines exactly as prescribed. Call your doctor if you think you are having a problem with your medicine. · Get some extra rest.  · Take an over-the-counter pain medicine, such as acetaminophen (Tylenol), ibuprofen (Advil, Motrin), or naproxen (Aleve) to reduce fever and relieve body aches. Read and follow all instructions on the label. · Do not take two or more pain medicines at the same time unless the doctor told you to. Many pain medicines have acetaminophen, which is Tylenol. Too much acetaminophen (Tylenol) can be harmful. · Take an over-the-counter cough medicine that contains dextromethorphan to help quiet a dry, hacking cough so that you can sleep. Avoid cough medicines that have more than one active ingredient. Read and follow all instructions on the label. · Breathe moist air from a humidifier, hot shower, or sink filled with hot water. The heat and moisture will thin mucus so you can cough it out. · Do not smoke. Smoking can make bronchitis worse. If you need help quitting, talk to your doctor about stop-smoking programs and medicines.  These can increase your chances of quitting for good.  When should you call for help? Call 911 anytime you think you may need emergency care. For example, call if:    · You have severe trouble breathing.    Call your doctor now or seek immediate medical care if:    · You have new or worse trouble breathing.     · You cough up dark brown or bloody mucus (sputum).     · You have a new or higher fever.     · You have a new rash.    Watch closely for changes in your health, and be sure to contact your doctor if:    · You cough more deeply or more often, especially if you notice more mucus or a change in the color of your mucus.     · You are not getting better as expected. Where can you learn more? Go to http://jillStirludwin.info/. Enter H333 in the search box to learn more about \"Bronchitis: Care Instructions. \"  Current as of: June 9, 2019  Content Version: 12.2  © 8169-3838 [employer]. Care instructions adapted under license by [employer] (which disclaims liability or warranty for this information). If you have questions about a medical condition or this instruction, always ask your healthcare professional. [address] any warranty or liability for your use of this information. Patient Education        Rhabdomyolysis: Care Instructions  Your Care Instructions    When you have rhabdomyolysis (say \"yxg-lbk-el-AH-kalin-suss\"), dying muscle cells cause toxins to build up in the blood. If not treated, it can cause life-threatening damage to the body's organs. It can be caused by many things, such as severe muscle injury, some medicines (like statins), the flu, and certain blood infections. Symptoms may include weak muscles, pain, stiffness, fever, and nausea. Your urine may also be dark. You will get treatment in the hospital. If possible, the doctor will stop the cause of muscle cell death. The doctor will take steps to protect your organs.  You may have to stop taking certain medicines if they are the cause of the problem. You will also get treatment to help the kidneys remove the toxins from your blood. This includes plenty of fluids. You may get fluids through a vein (by IV). You may also need dialysis. Follow-up care is a key part of your treatment and safety. Be sure to make and go to all appointments, and call your doctor if you are having problems. It's also a good idea to know your test results and keep a list of the medicines you take. How can you care for yourself at home? · Take pain medicines exactly as directed. ? If the doctor gave you a prescription medicine for pain, take it as prescribed. ? If you are not taking a prescription pain medicine, ask your doctor if you can take an over-the-counter medicine. · Talk to your doctor about whether you need to stop taking any medicines. Follow your doctor's instructions about stopping medicines. · Drink plenty of fluids, enough so that your urine is light yellow or clear like water. If you have kidney, heart, or liver disease and have to limit fluids, talk with your doctor before you increase the amount of fluids you drink. When should you call for help? Call your doctor now or seek immediate medical care if:    · You have new or worse muscle pain.     · You have less urine than normal or no urine.     · You have new swelling in your arms or feet.     · You have blood in your urine.    Watch closely for changes in your health, and be sure to contact your doctor if you do not get better as expected. Where can you learn more? Go to http://jill-ludwin.info/. Enter F129 in the search box to learn more about \"Rhabdomyolysis: Care Instructions. \"  Current as of: October 31, 2018  Content Version: 12.2  © 5860-6859 [employer]. Care instructions adapted under license by [employer] (which disclaims liability or warranty for this information).  If you have questions about a medical condition or this instruction, always ask your healthcare professional. [place] [address] any warranty or liability for your use of this information.

## 2019-11-23 NOTE — DISCHARGE SUMMARY
Hospitalist Discharge Summary Admit Date:  2019  3:13 PM  
Name:  Mai Vergara Age:  82 y.o. 
:  1935 MRN:  [id number] PCP:  Michelle Mart NP Treatment Team: Attending Provider: Jaye Delcid MD; Care Manager: Sheron Smith LMSW; Utilization Review: Damien Miller RN; : Sonja Prakash; Staff Nurse: Dolores Marte RN; Physical Therapist: Jose Rosales PT 
 
Problem List for this Hospitalization: 
Hospital Problems as of 2019 Date Reviewed: 2018 Codes Class Noted - Resolved POA  
 CKD (chronic kidney disease) ICD-10-CM: N18.9 ICD-9-CM: 585.9  2019 - Present Yes Multiple myeloma not having achieved remission ([facility] ICD-10-CM: C90.00 ICD-9-CM: 203.00  2017 - Present Yes Mixed hyperlipidemia ICD-10-CM: E78.2 ICD-9-CM: 272.2  2017 - Present Yes RESOLVED: Acute bronchitis ICD-10-CM: J20.9 ICD-9-CM: 466.0  2019 - 2019 Yes RESOLVED: Fever ICD-10-CM: R50.9 ICD-9-CM: 780.60  2019 - 2019 Yes RESOLVED: Hypokalemia ICD-10-CM: E87.6 ICD-9-CM: 276.8  2019 - 2019 Yes * (Principal) RESOLVED: Traumatic rhabdomyolysis ([facility] ICD-10-CM: T79. Giancarlo Denier ICD-9-CM: 958.6  2019 - 2019 Yes RESOLVED: Rhabdomyolysis ICD-10-CM: K41.85 ICD-9-CM: 728.88  2019 - 2019 Yes RESOLVED: Fall ICD-10-CM: W19. John Ego ICD-9-CM: E888.9  2017 - 2019 Yes Overview Signed 2017  2:03 PM by Jerman Cadet MD  
  Pt had another fall, in his yard; he was chasing after his cat and lost his balance. No injuries sustained; fall witnessed by neighbor. No LOC. Hospital Course: Mr. Aishwarya Gutiérrez is a very nice 81 y/o WM with a history of MM followed by Dr. Monika Moreno who presented on  after a fall at his facility.  He tripped on some loafers and became stuck in between his bed and a chair and was unable to move for several hours. On presentation he had elevated CK but renal function was normal. He was admitted for rhabdomyolysis. He was given IVFs and he improved. He complained of a cough, CXR at admission was unremarkable, though his cough worsened and he was febrile once to 100.8F, however repeat CXR was notable only for some mild interstitial prominence. He was given IV Lasix with good response. He is felt to have acute bronchitis and was started on nebulizers, prednisone and azithromycin. He never required supplemental O2 and had no recurrent fevers. He worked with PT and needs STR at discharge which has been arranged. Heme/Onc consulted during his stay and recommend to hold Revlimid for now and he should be due for an outpatient follow up soon so this should be verified. Pancytopenia likely multifactorial from MM and age/chronic disease. His breathing is much better and he is now medically stable for discharge. Disposition: [facility] Activity: Activity as tolerated Diet: DIET CARDIAC Regular DIET NUTRITIONAL SUPPLEMENTS All Meals; Ensure [employer] Code Status: Full Code Follow up instructions, discharge meds at bottom of this note. Plan was discussed with patient, nursing, CM. All questions answered. Patient was stable at time of discharge. Patient will call a physician or return if any concerns. Diagnostic Imaging/Tests:  
Xr Chest Pa Lat Result Date: 11/18/2019 Chest X-ray INDICATION: Right chest wall pain after falling, persistent cough PA and lateral views of the chest were obtained. FINDINGS: The lungs are clear. There are no infiltrates or effusions. There is stable cardiomegaly. Pacemaker and vascular port are present. No definite fracture identified. IMPRESSION: No acute findings in the chest  
 
Xr Spine Lumb 2 Or 3 V Result Date: 11/18/2019 Lumbar Spine INDICATION:  Back pain after falling AP and lateral views of the lumbar spine were obtained FINDINGS: There is multilevel degenerative change. There is no evidence of fracture or subluxation. The vertebrae are well aligned. No bony lesions are seen. IMPRESSION: No evidence of fracture or other acute abnormality in the lumbar spine. Ct Head Wo Cont Result Date: 11/18/2019 Head CT INDICATION: Fall, weakness Multiple axial images obtained through the brain without intravenous contrast. Radiation dose reduction techniques were used for this study: All CT scans performed at this facility use one or all of the following: Automated exposure control, adjustment of the mA and/or kVp according to patient's size, iterative reconstruction. FINDINGS: No areas of abnormal attenuation are seen in the brain. There is no CT evidence of acute hemorrhage or infarction. The ventricles are normal in size. There are no extra-axial fluid collections. No masses are seen. The sinuses are clear. There are no bony lesions. IMPRESSION: No CT evidence of acute intracranial abnormality. Echocardiogram results: No results found for this visit on 11/18/19. Procedures done this admission: * No surgery found * All Micro Results None Labs: Results:  
   
BMP, Mg, Phos Recent Labs  
  11/22/19 
0524   
K 3.3*  
 CO2 27 AGAP 7  
BUN 22  
CREA 1.06  
CA 8.5 * MG 2.2  
  
CBC Recent Labs  
  11/22/19 
0524 WBC 2.7*  
RBC 3.59* HGB 10.9* HCT 33.8*  
* GRANS 32* LYMPH 26 EOS 0* MONOS 41* BASOS 1 IG 0 ANEU 0.9* ABL 0.7 ALINE 0.0 ABM 1.1 ABB 0.0 AIG 0.0  
  
LFT No results for input(s): SGOT, ALT, TBIL, AP, TP, ALB, GLOB, AGRAT, GPT in the last 72 hours. Cardiac Testing Lab Results Component Value Date/Time  BNP 98 09/16/2014 06:18 PM  
 CK 5,551 (H) 11/19/2019 05:39 AM  
 CK 4,269 (H) 11/18/2019 04:30 PM  
 Troponin-I, Qt. 0.08 (H) 09/19/2014 02:10 PM  
 Troponin-I, Qt. 0.24 (H) 09/18/2014 10:10 PM  
 Troponin-I, Qt. 0.04 09/18/2014 05:07 PM  
  
Coagulation Tests Lab Results Component Value Date/Time Prothrombin time 11.7 10/07/2015 10:40 AM  
 INR 1.1 10/07/2015 10:40 AM  
  
A1c No results found for: HBA1C, HGBE8, [id number] Lipid Panel Lab Results Component Value Date/Time Cholesterol, total 142 06/26/2017 12:46 PM  
 HDL Cholesterol 66 06/26/2017 12:46 PM  
 LDL, calculated 51 06/26/2017 12:46 PM  
 VLDL, calculated 25 06/26/2017 12:46 PM  
 Triglyceride 127 06/26/2017 12:46 PM  
 CHOL/HDL Ratio 2.3 09/17/2014 04:45 AM  
  
Thyroid Panel Lab Results Component Value Date/Time TSH 1.880 06/26/2017 12:46 PM  
 TSH 0.737 03/23/2015 05:01 AM  
    
Most Recent UA Lab Results Component Value Date/Time Color YELLOW 08/04/2017 08:00 AM  
 Appearance CLEAR 08/04/2017 08:00 AM  
 Specific gravity 1.020 08/04/2017 08:00 AM  
 pH (UA) 6.0 08/04/2017 08:00 AM  
 Protein NEGATIVE  08/04/2017 08:00 AM  
 Glucose NEGATIVE  08/04/2017 08:00 AM  
 Ketone NEGATIVE  08/04/2017 08:00 AM  
 Bilirubin NEGATIVE  08/04/2017 08:00 AM  
 Blood NEGATIVE  08/04/2017 08:00 AM  
 Urobilinogen 2.0 (H) 08/04/2017 08:00 AM  
 Nitrites NEGATIVE  08/04/2017 08:00 AM  
 Leukocyte Esterase NEGATIVE  08/04/2017 08:00 AM  
  
 
No Known Allergies Immunization History Administered Date(s) Administered  Influenza Vaccine 02/01/2017  Pneumococcal Vaccine (Unspecified Type) 04/01/2012, 10/07/2013  TB Skin Test (PPD) Intradermal 08/07/2017, 11/18/2019 All Labs from Last 24 Hrs: 
No results found for this or any previous visit (from the past 24 hour(s)). Current Med List in Hospital:  
Current Facility-Administered Medications Medication Dose Route Frequency  predniSONE (DELTASONE) tablet 40 mg  40 mg Oral DAILY WITH BREAKFAST  budesonide (PULMICORT) 500 mcg/2 ml nebulizer suspension  500 mcg Nebulization BID RT  
 albuterol (PROVENTIL VENTOLIN) nebulizer solution 2.5 mg  2.5 mg Nebulization Q4HWA RT  
  benzocaine-menthol (CEPACOL) lozenge  1 Lozenge Oral Q2H PRN  
 azithromycin (ZITHROMAX) tablet 250 mg  250 mg Oral DAILY  guaiFENesin ER (MUCINEX) tablet 1,200 mg  1,200 mg Oral Q12H  
 HYDROcodone-homatropine (HYCODAN) 5-1.5 mg/5 mL (5 mL) syrup 5 mL  5 mL Oral Q4H PRN  
 sodium chloride (NS) flush 5-40 mL  5-40 mL IntraVENous Q8H  
 sodium chloride (NS) flush 5-40 mL  5-40 mL IntraVENous PRN  
 acetaminophen (TYLENOL) tablet 650 mg  650 mg Oral Q6H PRN  
 ondansetron (ZOFRAN) injection 4 mg  4 mg IntraVENous Q4H PRN  
 bisacodyl (DULCOLAX) tablet 5 mg  5 mg Oral DAILY PRN  
 donepezil (ARICEPT) tablet 10 mg  10 mg Oral DAILY Discharge Exam: 
Patient Vitals for the past 24 hrs: 
 Temp Pulse Resp BP SpO2  
11/23/19 0319 98.1 °F (36.7 °C) 73 17 115/72 97 % 11/22/19 2310 98.3 °F (36.8 °C) 60 19 117/73 91 % 11/22/19 1951 98.9 °F (37.2 °C) 89 18 116/63 95 % 11/22/19 1559     97 % 11/22/19 1546 98.5 °F (36.9 °C) 69 18 111/56 91 % 11/22/19 1234     93 % 11/22/19 0840 97.9 °F (36.6 °C) 72 20 104/54 90 % 11/22/19 0805     94 % Oxygen Therapy O2 Sat (%): 97 % (11/23/19 0319) Pulse via Oximetry: 69 beats per minute (11/22/19 1559) O2 Device: Room air (11/22/19 1559) Estimated body mass index is 37.31 kg/m² as calculated from the following: 
  Height as of this encounter: 5' 10\" (1.778 m). Weight as of this encounter: 117.9 kg (260 lb). No intake or output data in the 24 hours ending 11/23/19 7276 *Note that automatically entered I/Os may not be accurate; dependent on patient compliance with collection and accurate  by assistants. General:    Well nourished. Alert. Eyes:   Normal sclerae. Extraocular movements intact. ENT:  Normocephalic, atraumatic. Moist mucous membranes CV:   Regular rate and rhythm. No murmur, rub, or gallop. Lungs:  Mild b/l end exp wheezes, rhonchi much improved.  RA O2. 
 Abdomen: Soft, nontender, nondistended. Bowel sounds normal.  
Extremities: Warm and dry. No cyanosis or edema. Neurologic: CN II-XII grossly intact. Sensation intact. Skin:     No rashes or jaundice. Psych:  Normal mood and affect. Discharge Info:  
Current Discharge Medication List  
  
START taking these medications Details  
azithromycin (ZITHROMAX) 250 mg tablet Take 1 Tab by mouth daily for 3 days. Qty: 3 Tab, Refills: 0  
  
guaiFENesin ER (MUCINEX) 1,200 mg Ta12 ER tablet Take 1 Tab by mouth every twelve (12) hours. Qty: 20 Tab, Refills: 0  
  
predniSONE (DELTASONE) 20 mg tablet Take 40 mg by mouth daily (with breakfast) for 3 days. Qty: 6 Tab, Refills: 0  
  
albuterol (PROVENTIL VENTOLIN) 2.5 mg /3 mL (0.083 %) nebu 3 mL by Nebulization route every four to six (4-6) hours as needed for Other (Cough, SOB, wheezing). Qty: 30 Nebule, Refills: 0 CONTINUE these medications which have NOT CHANGED Details ELIQUIS 2.5 mg tablet   
  
clonazePAM (KLONOPIN) 0.5 mg tablet   
  
levETIRAcetam (KEPPRA) 750 mg tablet QUEtiapine (SEROQUEL) 25 mg tablet   
  
sertraline (ZOLOFT) 50 mg tablet   
  
lovastatin (MEVACOR) 40 mg tablet Take 1 Tab by mouth nightly. Qty: 90 Tab, Refills: 3  
  
donepezil (ARICEPT) 10 mg tablet Take 1 Tab by mouth daily. Qty: 90 Tab, Refills: 3 STOP taking these medications  
  
 lenalidomide (REVLIMID) 10 mg cap Comments:  
Reason for Stopping: Follow Up Orders: Follow-up Appointments Procedures  FOLLOW UP VISIT Appointment in: Other (Specify) PCP/facility physician Hematology/Oncology -- as previously scheduled PCP/facility physician Hematology/Oncology -- as previously scheduled Standing Status:   Standing Number of Occurrences:   1 Order Specific Question:   Appointment in Answer: Other (Specify) Follow-up Information Follow up With Specialties Details Why Contact Info [place] [facility] [place]   [address] [facility] [address] [place] [address] [facility] 
[phone] Tyrell Trevino, VINCE Nurse Practitioner In 1 week Hospital follow up. Yamil Cox MD Hematology and Oncology  Routine follow up. Multiple Myeloma. [address] [facility] [address] [facility] [address] 
[phone] Time spent in patient discharge planning and coordination 35 minutes.  
 
Signed: 
Forbes Riedel, MD

## 2019-11-23 NOTE — PROGRESS NOTES
Shift assessment done. Pt in bed, watching tv. Respirations even and unlabored. No acute signs of distress noted. Denies needs and pain this time. Call light in reach. Bed low and locked. Bed alarm on. Will continue to monitor.

## 2019-11-23 NOTE — PROGRESS NOTES
Problem: Falls - Risk of 
Goal: *Absence of Falls Description Document Kim Gomez Fall Risk and appropriate interventions in the flowsheet. Outcome: Progressing Towards Goal 
Note: Fall Risk Interventions: 
Mobility Interventions: Bed/chair exit alarm, Patient to call before getting OOB Medication Interventions: Bed/chair exit alarm, Patient to call before getting OOB Elimination Interventions: Bed/chair exit alarm, Patient to call for help with toileting needs History of Falls Interventions: Bed/chair exit alarm, Door open when patient unattended, Evaluate medications/consider consulting pharmacy Problem: Patient Education: Go to Patient Education Activity Goal: Patient/Family Education Outcome: Progressing Towards Goal 
  
Problem: Patient Education: Go to Patient Education Activity Goal: Patient/Family Education Outcome: Progressing Towards Goal 
  
Problem: Nutrition Deficit Goal: *Optimize nutritional status Outcome: Progressing Towards Goal

## 2019-11-23 NOTE — PROGRESS NOTES
Patient discussed during morning huddle and is anticipated to discharge today. Plan for discharge is as follows: 
 
Care Management Interventions PCP Verified by CM: Yes Mode of Transport at Discharge: BLS Transition of Care Consult (CM Consult): SNF Partner SNF: Yes Physical Therapy Consult: Yes Occupational Therapy Consult: Yes Current Support Network: Adult Group Home Confirm Follow Up Transport: Self Plan discussed with Pt/Family/Caregiver: Yes Freedom of Choice Offered: Yes Discharge Location Discharge Placement: Skilled nursing facility([facility]) Milestones and interventions have been entered. MIRACLE Arroyo  [email or web address]

## 2019-12-18 NOTE — CDMP QUERY
Patient admitted with jolanta and rhabdo after a fall. Multiple Myeloma not in remission. On Revlimid  and has documented pancytopenia (OR documented anemia, leukopenia or neutropenia, and thrombocytopenia). If possible, please document in progress notes and discharge summary further specificity regarding pancytopenia: ? Antineoplastic (chemotherapy) induced pancytopenia 
? Other drug/medication induced pancytopenia, please specify _____________ ? Pancytopenia due multiple myeloma 
? Pancytopenia due to both multiple myeloma and chemo 
? Pancytopenia due to myelodysplastic syndrome 
? Other cause (please specify) ____________ ? Clinically unable to determine ? Unknown The medical record reflects the following: 
 
  Risk Factors: Multiple myeloma. Actively on oral chemo Clinical Indicators: wbx- 1.1, platelets-117, FHL-0.3 Treatment: iv fluids, labs, onc consult, xray Thank you, Matt Lennon [address] [place] [address] 
[phone]

## 2019-12-26 ENCOUNTER — HOSPITAL ENCOUNTER (OUTPATIENT)
Dept: LAB | Age: 84
Discharge: HOME | End: 2019-12-26
Payer: MEDICARE

## 2019-12-26 ENCOUNTER — PATIENT OUTREACH (OUTPATIENT)
Dept: CASE MANAGEMENT | Age: 84
End: 2019-12-26

## 2019-12-26 DIAGNOSIS — C90.00 MULTIPLE MYELOMA NOT HAVING ACHIEVED REMISSION (HCC): ICD-10-CM

## 2019-12-26 LAB
ALBUMIN SERPL-MCNC: 3.3 G/DL (ref 3.2–4.6)
ALBUMIN/GLOB SERPL: 0.9 {RATIO} (ref 1.2–3.5)
ALP SERPL-CCNC: 103 U/L (ref 50–136)
ALT SERPL-CCNC: 16 U/L (ref 12–65)
ANION GAP SERPL CALC-SCNC: 3 MMOL/L (ref 7–16)
AST SERPL-CCNC: 17 U/L (ref 15–37)
BASOPHILS # BLD: 0 K/UL (ref 0–0.2)
BASOPHILS NFR BLD: 0 % (ref 0–2)
BILIRUB SERPL-MCNC: 0.5 MG/DL (ref 0.2–1.1)
BUN SERPL-MCNC: 14 MG/DL (ref 8–23)
CALCIUM SERPL-MCNC: 8.9 MG/DL (ref 8.3–10.4)
CHLORIDE SERPL-SCNC: 111 MMOL/L (ref 98–107)
CO2 SERPL-SCNC: 29 MMOL/L (ref 21–32)
CREAT SERPL-MCNC: 1.17 MG/DL (ref 0.8–1.5)
DIFFERENTIAL METHOD BLD: ABNORMAL
EOSINOPHIL # BLD: 0.1 K/UL (ref 0–0.8)
EOSINOPHIL NFR BLD: 2 % (ref 0.5–7.8)
ERYTHROCYTE [DISTWIDTH] IN BLOOD BY AUTOMATED COUNT: 16.8 % (ref 11.9–14.6)
GLOBULIN SER CALC-MCNC: 3.5 G/DL (ref 2.3–3.5)
GLUCOSE SERPL-MCNC: 116 MG/DL (ref 65–100)
HCT VFR BLD AUTO: 38.6 % (ref 41.1–50.3)
HGB BLD-MCNC: 12.3 G/DL (ref 13.6–17.2)
IMM GRANULOCYTES # BLD AUTO: 0 K/UL (ref 0–0.5)
IMM GRANULOCYTES NFR BLD AUTO: 0 % (ref 0–5)
KAPPA LC FREE SER-MCNC: 33.63 MG/L (ref 3.3–19.4)
KAPPA LC FREE/LAMBDA FREE SER: 0.85 {RATIO} (ref 0.26–1.65)
LAMBDA LC FREE SERPL-MCNC: 39.54 MG/L (ref 5.71–26.3)
LYMPHOCYTES # BLD: 1.2 K/UL (ref 0.5–4.6)
LYMPHOCYTES NFR BLD: 26 % (ref 13–44)
MAGNESIUM SERPL-MCNC: 2.2 MG/DL (ref 1.8–2.4)
MCH RBC QN AUTO: 30.6 PG (ref 26.1–32.9)
MCHC RBC AUTO-ENTMCNC: 31.9 G/DL (ref 31.4–35)
MCV RBC AUTO: 96 FL (ref 79.6–97.8)
MONOCYTES # BLD: 0.5 K/UL (ref 0.1–1.3)
MONOCYTES NFR BLD: 11 % (ref 4–12)
NEUTS SEG # BLD: 2.8 K/UL (ref 1.7–8.2)
NEUTS SEG NFR BLD: 61 % (ref 43–78)
NRBC # BLD: 0 K/UL (ref 0–0.2)
PLATELET # BLD AUTO: 137 K/UL (ref 150–450)
PMV BLD AUTO: 9.7 FL (ref 9.4–12.3)
POTASSIUM SERPL-SCNC: 3.9 MMOL/L (ref 3.5–5.1)
PROT SERPL-MCNC: 6.8 G/DL (ref 6.3–8.2)
RBC # BLD AUTO: 4.02 M/UL (ref 4.23–5.67)
SODIUM SERPL-SCNC: 143 MMOL/L (ref 136–145)
WBC # BLD AUTO: 4.5 K/UL (ref 4.3–11.1)

## 2019-12-26 PROCEDURE — 83883 ASSAY NEPHELOMETRY NOT SPEC: CPT

## 2019-12-26 PROCEDURE — 80053 COMPREHEN METABOLIC PANEL: CPT

## 2019-12-26 PROCEDURE — 83735 ASSAY OF MAGNESIUM: CPT

## 2019-12-26 PROCEDURE — 84165 PROTEIN E-PHORESIS SERUM: CPT

## 2019-12-26 PROCEDURE — 86334 IMMUNOFIX E-PHORESIS SERUM: CPT

## 2019-12-26 PROCEDURE — 85025 COMPLETE CBC W/AUTO DIFF WBC: CPT

## 2019-12-26 NOTE — PROGRESS NOTES
Pt was seen and labs reviewed by Dr. Naila Light. Labs are stable today. Pt reports he feels very weak and states he has been having watery diarrhea 3-4 times a day \"for a very long time\". We will attempt to get a stool sample to check for C diff. Pt states he is unable to get a sample here and has transportation issues. Navigator printed order for test and sent pt home with hat and specimen container and he was given instructions on how to obtain sample. Navigator to contact his NP at [facility] to see if she will be able to help facilitate this. We will plan to restart Revlimid once diarrhea is controlled. Pt will return to clinic in 4 weeks with labs.

## 2019-12-27 LAB
ALBUMIN SERPL ELPH-MCNC: 3.15 G/DL (ref 3.2–5.6)
ALBUMIN/GLOB SERPL: 1 {RATIO}
ALPHA1 GLOB SERPL ELPH-MCNC: 0.25 G/DL (ref 0.1–0.4)
ALPHA2 GLOB SERPL ELPH-MCNC: 0.63 G/DL (ref 0.4–1.2)
B-GLOBULIN SERPL QL ELPH: 1.14 G/DL (ref 0.6–1.3)
GAMMA GLOB MFR SERPL ELPH: 1.22 G/DL (ref 0.5–1.6)
IGA SERPL-MCNC: 325 MG/DL (ref 85–499)
IGG SERPL-MCNC: 1204 MG/DL (ref 610–1616)
IGM SERPL-MCNC: 17 MG/DL (ref 35–242)
M PROTEIN SERPL ELPH-MCNC: ABNORMAL G/DL
PROT PATTERN SERPL ELPH-IMP: ABNORMAL
PROT PATTERN SPEC IFE-IMP: ABNORMAL
PROT SERPL-MCNC: 6.4 G/DL (ref 6.3–8.2)

## 2020-01-02 ENCOUNTER — PATIENT OUTREACH (OUTPATIENT)
Dept: CASE MANAGEMENT | Age: 85
End: 2020-01-02

## 2020-01-02 NOTE — PROGRESS NOTES
C Diff results resulted negative from assisted living. Dr. Rios Muse aware. We will proceed with Revlimid. New script printed and placed in [facility] AMANDA folder. VM left with patient to start upon receipt.

## 2020-02-13 ENCOUNTER — HOSPITAL ENCOUNTER (OUTPATIENT)
Dept: LAB | Age: 85
Discharge: HOME | End: 2020-02-13
Payer: MEDICARE

## 2020-02-13 ENCOUNTER — PATIENT OUTREACH (OUTPATIENT)
Dept: CASE MANAGEMENT | Age: 85
End: 2020-02-13

## 2020-02-13 DIAGNOSIS — C90.00 MULTIPLE MYELOMA NOT HAVING ACHIEVED REMISSION (HCC): ICD-10-CM

## 2020-02-13 LAB
ALBUMIN SERPL-MCNC: 3 G/DL (ref 3.2–4.6)
ALBUMIN/GLOB SERPL: 0.8 {RATIO} (ref 1.2–3.5)
ALP SERPL-CCNC: 119 U/L (ref 50–136)
ALT SERPL-CCNC: 21 U/L (ref 12–65)
ANION GAP SERPL CALC-SCNC: 3 MMOL/L (ref 7–16)
AST SERPL-CCNC: 19 U/L (ref 15–37)
BASOPHILS # BLD: 0.1 K/UL (ref 0–0.2)
BASOPHILS NFR BLD: 1 % (ref 0–2)
BILIRUB SERPL-MCNC: 0.6 MG/DL (ref 0.2–1.1)
BUN SERPL-MCNC: 14 MG/DL (ref 8–23)
CALCIUM SERPL-MCNC: 8.2 MG/DL (ref 8.3–10.4)
CHLORIDE SERPL-SCNC: 111 MMOL/L (ref 98–107)
CO2 SERPL-SCNC: 29 MMOL/L (ref 21–32)
CREAT SERPL-MCNC: 1.08 MG/DL (ref 0.8–1.5)
DIFFERENTIAL METHOD BLD: ABNORMAL
EOSINOPHIL # BLD: 0.4 K/UL (ref 0–0.8)
EOSINOPHIL NFR BLD: 7 % (ref 0.5–7.8)
ERYTHROCYTE [DISTWIDTH] IN BLOOD BY AUTOMATED COUNT: 15.9 % (ref 11.9–14.6)
GLOBULIN SER CALC-MCNC: 3.6 G/DL (ref 2.3–3.5)
GLUCOSE SERPL-MCNC: 93 MG/DL (ref 65–100)
HCT VFR BLD AUTO: 37 % (ref 41.1–50.3)
HGB BLD-MCNC: 11.7 G/DL (ref 13.6–17.2)
IMM GRANULOCYTES # BLD AUTO: 0 K/UL (ref 0–0.5)
IMM GRANULOCYTES NFR BLD AUTO: 0 % (ref 0–5)
KAPPA LC FREE SER-MCNC: 55.89 MG/L (ref 3.3–19.4)
KAPPA LC FREE/LAMBDA FREE SER: 0.95 {RATIO} (ref 0.26–1.65)
LAMBDA LC FREE SERPL-MCNC: 58.96 MG/L (ref 5.71–26.3)
LYMPHOCYTES # BLD: 1.4 K/UL (ref 0.5–4.6)
LYMPHOCYTES NFR BLD: 25 % (ref 13–44)
MAGNESIUM SERPL-MCNC: 2.2 MG/DL (ref 1.8–2.4)
MCH RBC QN AUTO: 30.1 PG (ref 26.1–32.9)
MCHC RBC AUTO-ENTMCNC: 31.6 G/DL (ref 31.4–35)
MCV RBC AUTO: 95.1 FL (ref 79.6–97.8)
MONOCYTES # BLD: 0.5 K/UL (ref 0.1–1.3)
MONOCYTES NFR BLD: 10 % (ref 4–12)
NEUTS SEG # BLD: 3.1 K/UL (ref 1.7–8.2)
NEUTS SEG NFR BLD: 57 % (ref 43–78)
NRBC # BLD: 0 K/UL (ref 0–0.2)
PLATELET # BLD AUTO: 146 K/UL (ref 150–450)
PMV BLD AUTO: 10.5 FL (ref 9.4–12.3)
POTASSIUM SERPL-SCNC: 4 MMOL/L (ref 3.5–5.1)
PROT SERPL-MCNC: 6.6 G/DL (ref 6.3–8.2)
RBC # BLD AUTO: 3.89 M/UL (ref 4.23–5.67)
SODIUM SERPL-SCNC: 143 MMOL/L (ref 136–145)
WBC # BLD AUTO: 5.5 K/UL (ref 4.3–11.1)

## 2020-02-13 PROCEDURE — 83735 ASSAY OF MAGNESIUM: CPT

## 2020-02-13 PROCEDURE — 85025 COMPLETE CBC W/AUTO DIFF WBC: CPT

## 2020-02-13 PROCEDURE — 83883 ASSAY NEPHELOMETRY NOT SPEC: CPT

## 2020-02-13 PROCEDURE — 86334 IMMUNOFIX E-PHORESIS SERUM: CPT

## 2020-02-13 PROCEDURE — 84165 PROTEIN E-PHORESIS SERUM: CPT

## 2020-02-13 PROCEDURE — 80053 COMPREHEN METABOLIC PANEL: CPT

## 2020-02-13 NOTE — PROGRESS NOTES
Pt was seen and labs reviewed by Dr. Mynor Dietrich. Pt states he is doing well and diarrhea has resolved. He has been taking his Revlimid without any adverse effects. We will continue current dose. Pt will return to clinic in 4 weeks with labs.

## 2020-02-13 NOTE — PROGRESS NOTES
_R Port accessed per protocol with a 0.75 guzman needle. Flushed with normal saline 10cc. Positive blood return. Labs drawn per order.  Flushed with 10cc of normal saline and       hep locked no  Still accessed no  Dressing applied yes  - gauze and tape after needle removal

## 2020-02-14 LAB
ALBUMIN SERPL ELPH-MCNC: 3.17 G/DL (ref 3.2–5.6)
ALBUMIN/GLOB SERPL: 1 {RATIO}
ALPHA1 GLOB SERPL ELPH-MCNC: 0.24 G/DL (ref 0.1–0.4)
ALPHA2 GLOB SERPL ELPH-MCNC: 0.58 G/DL (ref 0.4–1.2)
B-GLOBULIN SERPL QL ELPH: 1.07 G/DL (ref 0.6–1.3)
GAMMA GLOB MFR SERPL ELPH: 1.24 G/DL (ref 0.5–1.6)
IGA SERPL-MCNC: 330 MG/DL (ref 85–499)
IGG SERPL-MCNC: 1133 MG/DL (ref 610–1616)
IGM SERPL-MCNC: 23 MG/DL (ref 35–242)
M PROTEIN SERPL ELPH-MCNC: ABNORMAL G/DL
PROT PATTERN SERPL ELPH-IMP: ABNORMAL
PROT PATTERN SPEC IFE-IMP: ABNORMAL
PROT SERPL-MCNC: 6.3 G/DL (ref 6.3–8.2)

## 2020-03-12 ENCOUNTER — HOSPITAL ENCOUNTER (OUTPATIENT)
Dept: LAB | Age: 85
Discharge: HOME | End: 2020-03-12
Payer: MEDICARE

## 2020-03-12 ENCOUNTER — PATIENT OUTREACH (OUTPATIENT)
Dept: CASE MANAGEMENT | Age: 85
End: 2020-03-12

## 2020-03-12 DIAGNOSIS — C90.00 MULTIPLE MYELOMA NOT HAVING ACHIEVED REMISSION (HCC): ICD-10-CM

## 2020-03-12 LAB
ALBUMIN SERPL-MCNC: 3 G/DL (ref 3.2–4.6)
ALBUMIN/GLOB SERPL: 0.8 {RATIO} (ref 1.2–3.5)
ALP SERPL-CCNC: 120 U/L (ref 50–136)
ALT SERPL-CCNC: 25 U/L (ref 12–65)
ANION GAP SERPL CALC-SCNC: 5 MMOL/L (ref 7–16)
AST SERPL-CCNC: 20 U/L (ref 15–37)
BASOPHILS # BLD: 0 K/UL (ref 0–0.2)
BASOPHILS NFR BLD: 2 % (ref 0–2)
BILIRUB SERPL-MCNC: 0.4 MG/DL (ref 0.2–1.1)
BUN SERPL-MCNC: 16 MG/DL (ref 8–23)
CALCIUM SERPL-MCNC: 8.5 MG/DL (ref 8.3–10.4)
CHLORIDE SERPL-SCNC: 112 MMOL/L (ref 98–107)
CO2 SERPL-SCNC: 28 MMOL/L (ref 21–32)
CREAT SERPL-MCNC: 1.3 MG/DL (ref 0.8–1.5)
DIFFERENTIAL METHOD BLD: ABNORMAL
EOSINOPHIL # BLD: 0.3 K/UL (ref 0–0.8)
EOSINOPHIL NFR BLD: 12 % (ref 0.5–7.8)
ERYTHROCYTE [DISTWIDTH] IN BLOOD BY AUTOMATED COUNT: 15.9 % (ref 11.9–14.6)
GLOBULIN SER CALC-MCNC: 3.7 G/DL (ref 2.3–3.5)
GLUCOSE SERPL-MCNC: 111 MG/DL (ref 65–100)
HCT VFR BLD AUTO: 36.6 % (ref 41.1–50.3)
HGB BLD-MCNC: 11.7 G/DL (ref 13.6–17.2)
IMM GRANULOCYTES # BLD AUTO: 0 K/UL (ref 0–0.5)
IMM GRANULOCYTES NFR BLD AUTO: 0 % (ref 0–5)
LYMPHOCYTES # BLD: 1.1 K/UL (ref 0.5–4.6)
LYMPHOCYTES NFR BLD: 39 % (ref 13–44)
MAGNESIUM SERPL-MCNC: 2.1 MG/DL (ref 1.8–2.4)
MCH RBC QN AUTO: 30.2 PG (ref 26.1–32.9)
MCHC RBC AUTO-ENTMCNC: 32 G/DL (ref 31.4–35)
MCV RBC AUTO: 94.3 FL (ref 79.6–97.8)
MONOCYTES # BLD: 0.4 K/UL (ref 0.1–1.3)
MONOCYTES NFR BLD: 15 % (ref 4–12)
NEUTS SEG # BLD: 0.9 K/UL (ref 1.7–8.2)
NEUTS SEG NFR BLD: 33 % (ref 43–78)
NRBC # BLD: 0 K/UL (ref 0–0.2)
PLATELET # BLD AUTO: 104 K/UL (ref 150–450)
PMV BLD AUTO: 9.8 FL (ref 9.4–12.3)
POTASSIUM SERPL-SCNC: 4 MMOL/L (ref 3.5–5.1)
PROT SERPL-MCNC: 6.7 G/DL (ref 6.3–8.2)
RBC # BLD AUTO: 3.88 M/UL (ref 4.23–5.67)
SODIUM SERPL-SCNC: 145 MMOL/L (ref 136–145)
WBC # BLD AUTO: 2.7 K/UL (ref 4.3–11.1)

## 2020-03-12 PROCEDURE — 36415 COLL VENOUS BLD VENIPUNCTURE: CPT

## 2020-03-12 PROCEDURE — 82784 ASSAY IGA/IGD/IGG/IGM EACH: CPT

## 2020-03-12 PROCEDURE — 83735 ASSAY OF MAGNESIUM: CPT

## 2020-03-12 PROCEDURE — 80053 COMPREHEN METABOLIC PANEL: CPT

## 2020-03-12 PROCEDURE — 83883 ASSAY NEPHELOMETRY NOT SPEC: CPT

## 2020-03-12 PROCEDURE — 85025 COMPLETE CBC W/AUTO DIFF WBC: CPT

## 2020-03-12 NOTE — PROGRESS NOTES
Pt was seen and labs reviewed by Dr. Shannon Zuniga. Pt is tolerating Revlimid well. His labs are remaining stable. He will return to clinic on 4/16 with labs. Navigation is signing off at this time.

## 2020-03-13 LAB
KAPPA LC FREE SER-MCNC: 61.1 MG/L (ref 3.3–19.4)
KAPPA LC FREE/LAMBDA FREE SER: 0.92 {RATIO} (ref 0.26–1.65)
LAMBDA LC FREE SERPL-MCNC: 66.3 MG/L (ref 5.7–26.3)

## 2020-03-16 LAB
ALBUMIN SERPL ELPH-MCNC: 3 G/DL (ref 2.9–4.4)
ALBUMIN/GLOB SERPL: 1.1 {RATIO} (ref 0.7–1.7)
ALPHA1 GLOB SERPL ELPH-MCNC: 0.2 G/DL (ref 0–0.4)
ALPHA2 GLOB SERPL ELPH-MCNC: 0.5 G/DL (ref 0.4–1)
B-GLOBULIN SERPL ELPH-MCNC: 1 G/DL (ref 0.7–1.3)
GAMMA GLOB SERPL ELPH-MCNC: 1.1 G/DL (ref 0.4–1.8)
GLOBULIN SER-MCNC: 2.8 G/DL (ref 2.2–3.9)
IGA SERPL-MCNC: 354 MG/DL (ref 61–437)
IGG SERPL-MCNC: 1155 MG/DL (ref 700–1600)
IGM SERPL-MCNC: 15 MG/DL (ref 15–143)
INTERPRETATION SERPL IEP-IMP: ABNORMAL
M PROTEIN SERPL ELPH-MCNC: ABNORMAL G/DL
PROT SERPL-MCNC: 5.8 G/DL (ref 6–8.5)

## 2020-04-18 ENCOUNTER — APPOINTMENT (OUTPATIENT)
Dept: CT IMAGING | Age: 85
End: 2020-04-18
Attending: INTERNAL MEDICINE
Payer: MEDICARE

## 2020-04-18 ENCOUNTER — HOSPITAL ENCOUNTER (OUTPATIENT)
Age: 85
Setting detail: OBSERVATION
Discharge: NURSING, REHAB OR LONG-TERM CARE | End: 2020-04-21
Attending: EMERGENCY MEDICINE | Admitting: INTERNAL MEDICINE
Payer: MEDICARE

## 2020-04-18 DIAGNOSIS — C90.00 MULTIPLE MYELOMA, REMISSION STATUS UNSPECIFIED (HCC): ICD-10-CM

## 2020-04-18 DIAGNOSIS — R19.5 PASSAGE OF LOOSE STOOLS: ICD-10-CM

## 2020-04-18 DIAGNOSIS — C90.00 MULTIPLE MYELOMA NOT HAVING ACHIEVED REMISSION (HCC): ICD-10-CM

## 2020-04-18 DIAGNOSIS — F31.60 BIPOLAR AFFECTIVE DISORDER, CURRENT EPISODE MIXED, CURRENT EPISODE SEVERITY UNSPECIFIED (HCC): ICD-10-CM

## 2020-04-18 DIAGNOSIS — R29.6 FALLS FREQUENTLY: Primary | ICD-10-CM

## 2020-04-18 DIAGNOSIS — I48.91 ATRIAL FIBRILLATION, UNSPECIFIED TYPE (HCC): Chronic | ICD-10-CM

## 2020-04-18 PROBLEM — R00.1 BRADYCARDIA: Chronic | Status: ACTIVE | Noted: 2020-04-18

## 2020-04-18 LAB
ALBUMIN SERPL-MCNC: 3.1 G/DL (ref 3.2–4.6)
ALBUMIN/GLOB SERPL: 0.8 {RATIO} (ref 1.2–3.5)
ALP SERPL-CCNC: 104 U/L (ref 50–136)
ALT SERPL-CCNC: 32 U/L (ref 12–65)
ANION GAP SERPL CALC-SCNC: 8 MMOL/L (ref 7–16)
AST SERPL-CCNC: 56 U/L (ref 15–37)
BACTERIA URNS QL MICRO: 0 /HPF
BASOPHILS # BLD: 0.1 K/UL (ref 0–0.2)
BASOPHILS NFR BLD: 1 % (ref 0–2)
BILIRUB SERPL-MCNC: 0.7 MG/DL (ref 0.2–1.1)
BUN SERPL-MCNC: 15 MG/DL (ref 8–23)
CALCIUM SERPL-MCNC: 8.5 MG/DL (ref 8.3–10.4)
CASTS URNS QL MICRO: NORMAL /LPF
CHLORIDE SERPL-SCNC: 109 MMOL/L (ref 98–107)
CK SERPL-CCNC: 546 U/L (ref 21–215)
CO2 SERPL-SCNC: 25 MMOL/L (ref 21–32)
CREAT SERPL-MCNC: 1.01 MG/DL (ref 0.8–1.5)
DIFFERENTIAL METHOD BLD: ABNORMAL
EOSINOPHIL # BLD: 0.1 K/UL (ref 0–0.8)
EOSINOPHIL NFR BLD: 2 % (ref 0.5–7.8)
EPI CELLS #/AREA URNS HPF: NORMAL /HPF
ERYTHROCYTE [DISTWIDTH] IN BLOOD BY AUTOMATED COUNT: 16.9 % (ref 11.9–14.6)
GLOBULIN SER CALC-MCNC: 3.7 G/DL (ref 2.3–3.5)
GLUCOSE SERPL-MCNC: 97 MG/DL (ref 65–100)
HCT VFR BLD AUTO: 35.7 % (ref 41.1–50.3)
HGB BLD-MCNC: 11.5 G/DL (ref 13.6–17.2)
IMM GRANULOCYTES # BLD AUTO: 0 K/UL (ref 0–0.5)
IMM GRANULOCYTES NFR BLD AUTO: 1 % (ref 0–5)
LYMPHOCYTES # BLD: 1 K/UL (ref 0.5–4.6)
LYMPHOCYTES NFR BLD: 23 % (ref 13–44)
MAGNESIUM SERPL-MCNC: 2.1 MG/DL (ref 1.8–2.4)
MCH RBC QN AUTO: 29.9 PG (ref 26.1–32.9)
MCHC RBC AUTO-ENTMCNC: 32.2 G/DL (ref 31.4–35)
MCV RBC AUTO: 93 FL (ref 79.6–97.8)
MONOCYTES # BLD: 0.6 K/UL (ref 0.1–1.3)
MONOCYTES NFR BLD: 13 % (ref 4–12)
NEUTS SEG # BLD: 2.6 K/UL (ref 1.7–8.2)
NEUTS SEG NFR BLD: 61 % (ref 43–78)
NRBC # BLD: 0 K/UL (ref 0–0.2)
PLATELET # BLD AUTO: 133 K/UL (ref 150–450)
PMV BLD AUTO: 10.6 FL (ref 9.4–12.3)
POTASSIUM SERPL-SCNC: 3.7 MMOL/L (ref 3.5–5.1)
PROT SERPL-MCNC: 6.8 G/DL (ref 6.3–8.2)
RBC # BLD AUTO: 3.84 M/UL (ref 4.23–5.6)
RBC #/AREA URNS HPF: NORMAL /HPF
SODIUM SERPL-SCNC: 142 MMOL/L (ref 136–145)
WBC # BLD AUTO: 4.4 K/UL (ref 4.3–11.1)
WBC URNS QL MICRO: NORMAL /HPF

## 2020-04-18 PROCEDURE — 81015 MICROSCOPIC EXAM OF URINE: CPT

## 2020-04-18 PROCEDURE — 85025 COMPLETE CBC W/AUTO DIFF WBC: CPT

## 2020-04-18 PROCEDURE — 70450 CT HEAD/BRAIN W/O DYE: CPT

## 2020-04-18 PROCEDURE — 74011250636 HC RX REV CODE- 250/636: Performed by: EMERGENCY MEDICINE

## 2020-04-18 PROCEDURE — 83735 ASSAY OF MAGNESIUM: CPT

## 2020-04-18 PROCEDURE — 82550 ASSAY OF CK (CPK): CPT

## 2020-04-18 PROCEDURE — 93005 ELECTROCARDIOGRAM TRACING: CPT | Performed by: EMERGENCY MEDICINE

## 2020-04-18 PROCEDURE — 80053 COMPREHEN METABOLIC PANEL: CPT

## 2020-04-18 PROCEDURE — 99285 EMERGENCY DEPT VISIT HI MDM: CPT

## 2020-04-18 PROCEDURE — 80177 DRUG SCRN QUAN LEVETIRACETAM: CPT

## 2020-04-18 RX ADMIN — SODIUM CHLORIDE 500 ML: 900 INJECTION, SOLUTION INTRAVENOUS at 19:20

## 2020-04-18 NOTE — ED TRIAGE NOTES
Patient presents to the Er via EMS. Patient sustained a fall @ 1030 this morning and was found on floor by assisted living staff. Patient has new onset bladder and bowel incontinence. Patient takes blood thinner, patient denies LOC.  Patient A&Ox4

## 2020-04-19 PROBLEM — D69.6 THROMBOCYTOPENIA (HCC): Status: ACTIVE | Noted: 2020-04-19

## 2020-04-19 PROBLEM — R56.9 SEIZURE (HCC): Chronic | Status: ACTIVE | Noted: 2020-04-19

## 2020-04-19 LAB
ANION GAP SERPL CALC-SCNC: 10 MMOL/L (ref 7–16)
ATRIAL RATE: 60 BPM
BASOPHILS # BLD: 0 K/UL (ref 0–0.2)
BASOPHILS NFR BLD: 1 % (ref 0–2)
BUN SERPL-MCNC: 15 MG/DL (ref 8–23)
CALCIUM SERPL-MCNC: 8.1 MG/DL (ref 8.3–10.4)
CALCULATED R AXIS, ECG10: -17 DEGREES
CALCULATED T AXIS, ECG11: 16 DEGREES
CHLORIDE SERPL-SCNC: 111 MMOL/L (ref 98–107)
CO2 SERPL-SCNC: 25 MMOL/L (ref 21–32)
CREAT SERPL-MCNC: 0.95 MG/DL (ref 0.8–1.5)
DIAGNOSIS, 93000: NORMAL
DIFFERENTIAL METHOD BLD: ABNORMAL
EOSINOPHIL # BLD: 0.1 K/UL (ref 0–0.8)
EOSINOPHIL NFR BLD: 3 % (ref 0.5–7.8)
ERYTHROCYTE [DISTWIDTH] IN BLOOD BY AUTOMATED COUNT: 16.7 % (ref 11.9–14.6)
GLUCOSE SERPL-MCNC: 115 MG/DL (ref 65–100)
HCT VFR BLD AUTO: 33.7 % (ref 41.1–50.3)
HGB BLD-MCNC: 10.8 G/DL (ref 13.6–17.2)
IMM GRANULOCYTES # BLD AUTO: 0 K/UL (ref 0–0.5)
IMM GRANULOCYTES NFR BLD AUTO: 0 % (ref 0–5)
LYMPHOCYTES # BLD: 1 K/UL (ref 0.5–4.6)
LYMPHOCYTES NFR BLD: 31 % (ref 13–44)
MAGNESIUM SERPL-MCNC: 2 MG/DL (ref 1.8–2.4)
MCH RBC QN AUTO: 30.1 PG (ref 26.1–32.9)
MCHC RBC AUTO-ENTMCNC: 32 G/DL (ref 31.4–35)
MCV RBC AUTO: 93.9 FL (ref 79.6–97.8)
MONOCYTES # BLD: 0.4 K/UL (ref 0.1–1.3)
MONOCYTES NFR BLD: 12 % (ref 4–12)
NEUTS SEG # BLD: 1.7 K/UL (ref 1.7–8.2)
NEUTS SEG NFR BLD: 53 % (ref 43–78)
NRBC # BLD: 0 K/UL (ref 0–0.2)
P-R INTERVAL, ECG05: 152 MS
PLATELET # BLD AUTO: 117 K/UL (ref 150–450)
PMV BLD AUTO: 9.4 FL (ref 9.4–12.3)
POTASSIUM SERPL-SCNC: 3 MMOL/L (ref 3.5–5.1)
Q-T INTERVAL, ECG07: 464 MS
QRS DURATION, ECG06: 90 MS
QTC CALCULATION (BEZET), ECG08: 464 MS
RBC # BLD AUTO: 3.59 M/UL (ref 4.23–5.6)
SODIUM SERPL-SCNC: 146 MMOL/L (ref 136–145)
VENTRICULAR RATE, ECG03: 60 BPM
WBC # BLD AUTO: 3.3 K/UL (ref 4.3–11.1)

## 2020-04-19 PROCEDURE — 83735 ASSAY OF MAGNESIUM: CPT

## 2020-04-19 PROCEDURE — 97161 PT EVAL LOW COMPLEX 20 MIN: CPT

## 2020-04-19 PROCEDURE — 77030040361 HC SLV COMPR DVT MDII -B

## 2020-04-19 PROCEDURE — 99218 HC RM OBSERVATION: CPT

## 2020-04-19 PROCEDURE — 74011000302 HC RX REV CODE- 302: Performed by: INTERNAL MEDICINE

## 2020-04-19 PROCEDURE — 97530 THERAPEUTIC ACTIVITIES: CPT

## 2020-04-19 PROCEDURE — 87045 FECES CULTURE AEROBIC BACT: CPT

## 2020-04-19 PROCEDURE — 85025 COMPLETE CBC W/AUTO DIFF WBC: CPT

## 2020-04-19 PROCEDURE — 74011250637 HC RX REV CODE- 250/637: Performed by: INTERNAL MEDICINE

## 2020-04-19 PROCEDURE — 87077 CULTURE AEROBIC IDENTIFY: CPT

## 2020-04-19 PROCEDURE — 74011250636 HC RX REV CODE- 250/636: Performed by: INTERNAL MEDICINE

## 2020-04-19 PROCEDURE — 87177 OVA AND PARASITES SMEARS: CPT

## 2020-04-19 PROCEDURE — 80048 BASIC METABOLIC PNL TOTAL CA: CPT

## 2020-04-19 PROCEDURE — 97165 OT EVAL LOW COMPLEX 30 MIN: CPT

## 2020-04-19 PROCEDURE — 0107U C DIFF TOX AG DETCJ IA STOOL: CPT

## 2020-04-19 PROCEDURE — 86580 TB INTRADERMAL TEST: CPT | Performed by: INTERNAL MEDICINE

## 2020-04-19 RX ORDER — DIPHENHYDRAMINE HCL 25 MG
25 CAPSULE ORAL
Status: DISCONTINUED | OUTPATIENT
Start: 2020-04-19 | End: 2020-04-21 | Stop reason: HOSPADM

## 2020-04-19 RX ORDER — ALBUTEROL SULFATE 0.83 MG/ML
2.5 SOLUTION RESPIRATORY (INHALATION)
Status: DISCONTINUED | OUTPATIENT
Start: 2020-04-19 | End: 2020-04-21 | Stop reason: HOSPADM

## 2020-04-19 RX ORDER — SODIUM CHLORIDE 0.9 % (FLUSH) 0.9 %
5-40 SYRINGE (ML) INJECTION EVERY 8 HOURS
Status: DISCONTINUED | OUTPATIENT
Start: 2020-04-19 | End: 2020-04-21 | Stop reason: HOSPADM

## 2020-04-19 RX ORDER — ATORVASTATIN CALCIUM 10 MG/1
10 TABLET, FILM COATED ORAL
Status: DISCONTINUED | OUTPATIENT
Start: 2020-04-19 | End: 2020-04-21 | Stop reason: HOSPADM

## 2020-04-19 RX ORDER — CLONAZEPAM 0.5 MG/1
0.5 TABLET ORAL 2 TIMES DAILY
Status: DISCONTINUED | OUTPATIENT
Start: 2020-04-19 | End: 2020-04-21 | Stop reason: HOSPADM

## 2020-04-19 RX ORDER — NALOXONE HYDROCHLORIDE 0.4 MG/ML
0.4 INJECTION, SOLUTION INTRAMUSCULAR; INTRAVENOUS; SUBCUTANEOUS AS NEEDED
Status: DISCONTINUED | OUTPATIENT
Start: 2020-04-19 | End: 2020-04-21 | Stop reason: HOSPADM

## 2020-04-19 RX ORDER — POTASSIUM CHLORIDE 20 MEQ/1
40 TABLET, EXTENDED RELEASE ORAL EVERY 4 HOURS
Status: COMPLETED | OUTPATIENT
Start: 2020-04-19 | End: 2020-04-19

## 2020-04-19 RX ORDER — DONEPEZIL HYDROCHLORIDE 5 MG/1
10 TABLET, FILM COATED ORAL DAILY
Status: DISCONTINUED | OUTPATIENT
Start: 2020-04-19 | End: 2020-04-21 | Stop reason: HOSPADM

## 2020-04-19 RX ORDER — ONDANSETRON 2 MG/ML
4 INJECTION INTRAMUSCULAR; INTRAVENOUS
Status: DISCONTINUED | OUTPATIENT
Start: 2020-04-19 | End: 2020-04-21 | Stop reason: HOSPADM

## 2020-04-19 RX ORDER — HYDROCODONE BITARTRATE AND ACETAMINOPHEN 5; 325 MG/1; MG/1
1 TABLET ORAL
Status: DISCONTINUED | OUTPATIENT
Start: 2020-04-19 | End: 2020-04-21 | Stop reason: HOSPADM

## 2020-04-19 RX ORDER — MEMANTINE HYDROCHLORIDE 5 MG/1
10 TABLET ORAL DAILY
Status: DISCONTINUED | OUTPATIENT
Start: 2020-04-19 | End: 2020-04-21 | Stop reason: HOSPADM

## 2020-04-19 RX ORDER — AMOXICILLIN 250 MG
2 CAPSULE ORAL
Status: DISCONTINUED | OUTPATIENT
Start: 2020-04-19 | End: 2020-04-21 | Stop reason: HOSPADM

## 2020-04-19 RX ORDER — SODIUM CHLORIDE, SODIUM LACTATE, POTASSIUM CHLORIDE, CALCIUM CHLORIDE 600; 310; 30; 20 MG/100ML; MG/100ML; MG/100ML; MG/100ML
75 INJECTION, SOLUTION INTRAVENOUS CONTINUOUS
Status: DISCONTINUED | OUTPATIENT
Start: 2020-04-19 | End: 2020-04-19

## 2020-04-19 RX ORDER — SERTRALINE HYDROCHLORIDE 50 MG/1
50 TABLET, FILM COATED ORAL DAILY
Status: DISCONTINUED | OUTPATIENT
Start: 2020-04-19 | End: 2020-04-21 | Stop reason: HOSPADM

## 2020-04-19 RX ORDER — SODIUM CHLORIDE 0.9 % (FLUSH) 0.9 %
5-40 SYRINGE (ML) INJECTION AS NEEDED
Status: DISCONTINUED | OUTPATIENT
Start: 2020-04-19 | End: 2020-04-21 | Stop reason: HOSPADM

## 2020-04-19 RX ORDER — ACETAMINOPHEN 325 MG/1
650 TABLET ORAL
Status: DISCONTINUED | OUTPATIENT
Start: 2020-04-19 | End: 2020-04-21 | Stop reason: HOSPADM

## 2020-04-19 RX ADMIN — CLONAZEPAM 0.5 MG: 1 TABLET ORAL at 08:39

## 2020-04-19 RX ADMIN — LEVETIRACETAM 750 MG: 500 TABLET ORAL at 08:40

## 2020-04-19 RX ADMIN — ATORVASTATIN CALCIUM 10 MG: 10 TABLET, FILM COATED ORAL at 22:11

## 2020-04-19 RX ADMIN — POTASSIUM CHLORIDE 40 MEQ: 1500 TABLET, EXTENDED RELEASE ORAL at 08:39

## 2020-04-19 RX ADMIN — APIXABAN 2.5 MG: 2.5 TABLET, FILM COATED ORAL at 08:41

## 2020-04-19 RX ADMIN — TUBERCULIN PURIFIED PROTEIN DERIVATIVE 5 UNITS: 5 INJECTION, SOLUTION INTRADERMAL at 01:07

## 2020-04-19 RX ADMIN — Medication 10 ML: at 01:09

## 2020-04-19 RX ADMIN — SODIUM CHLORIDE, SODIUM LACTATE, POTASSIUM CHLORIDE, AND CALCIUM CHLORIDE 75 ML/HR: 600; 310; 30; 20 INJECTION, SOLUTION INTRAVENOUS at 08:44

## 2020-04-19 RX ADMIN — DIPHENHYDRAMINE HYDROCHLORIDE 25 MG: 25 CAPSULE ORAL at 22:11

## 2020-04-19 RX ADMIN — LEVETIRACETAM 750 MG: 500 TABLET ORAL at 17:33

## 2020-04-19 RX ADMIN — ATORVASTATIN CALCIUM 10 MG: 10 TABLET, FILM COATED ORAL at 01:07

## 2020-04-19 RX ADMIN — APIXABAN 2.5 MG: 2.5 TABLET, FILM COATED ORAL at 20:44

## 2020-04-19 RX ADMIN — Medication 5 ML: at 20:47

## 2020-04-19 RX ADMIN — POTASSIUM CHLORIDE 40 MEQ: 1500 TABLET, EXTENDED RELEASE ORAL at 13:33

## 2020-04-19 RX ADMIN — CLONAZEPAM 0.5 MG: 1 TABLET ORAL at 17:33

## 2020-04-19 RX ADMIN — ACETAMINOPHEN 650 MG: 325 TABLET, FILM COATED ORAL at 01:07

## 2020-04-19 RX ADMIN — DONEPEZIL HYDROCHLORIDE 10 MG: 5 TABLET, FILM COATED ORAL at 08:41

## 2020-04-19 RX ADMIN — SERTRALINE HYDROCHLORIDE 50 MG: 50 TABLET ORAL at 08:41

## 2020-04-19 RX ADMIN — MEMANTINE 10 MG: 5 TABLET ORAL at 08:42

## 2020-04-19 RX ADMIN — Medication 10 ML: at 06:00

## 2020-04-19 NOTE — PROGRESS NOTES
TRANSFER - IN REPORT: 
 
Verbal report received from [place] [facility] on Cathy Hall  being received from ED for routine progression of care Report consisted of patients Situation, Background, Assessment and  
Recommendations(SBAR). Information from the following report(s) ED Summary  was reviewed with the receiving nurse. Opportunity for questions and clarification was provided. Assessment completed upon patients arrival to unit and care assumed.

## 2020-04-19 NOTE — PROGRESS NOTES
Staff inquired about Bahai patient receiving communion. Staff said patient is not dying presently. Shared that we don't have any volunteers to visit per policy. Will see if  can provide elements to staff as requested. Found the elements and will take them to staff soon.  
 
Keeley Moulton, staff [place] 05, [address] [facility] Daniel  /   [email or web address]

## 2020-04-19 NOTE — PROGRESS NOTES
Problem: Self Care Deficits Care Plan (Adult) Goal: *Acute Goals and Plan of Care (Insert Text) Description: 1. Patient will perform grooming with supervision. 2. Patient will perform Upper body dressing with supervision 3. Patient will perform lower body dressing with minimal assist.  
4. Patient will perform upper and lower body bathing with contact guard assist. 
5. Patient will perform toilet transfers with minimal assist. 
6. Patient will perform shower transfer with minimal assist. 
7. Patient will participate in 30 + minutes of ADL/ therapeutic exercise/therapeutic activity with min rest breaks to increase activity tolerance for self care. 8. Patient will perform ADL functional mobility in room with CGA. Goals to be achieved in 7 days. Outcome: Progressing Towards Goal 
  
OCCUPATIONAL THERAPY: Initial Assessment, Daily Note, and AM 4/19/2020 OBSERVATION:   
Payor: Jorge Machado / Plan: [address] [place] [address] HMO / Product Type: [employer] Medicare /  
  
NAME/AGE/GENDER: Yessy Klein is a 82 y.o. male PRIMARY DIAGNOSIS:  Falls frequently [R29.6] Falls frequently Falls frequently ICD-10: Treatment Diagnosis:  
 · Generalized Muscle Weakness (M62.81) · Difficulty in walking, Not elsewhere classified (R26.2) Precautions/Allergies: 
   Patient has no known allergies. ASSESSMENT:  
 
Mr. Israel Goyal presents with decreased functional mobility, strength and self care. He lives at [facility] in independent living. He uses a rollator for gait. He has had frequent falls. Pt would benefit from skilled OT to increase his independence. This am he was unsteady in standing and transferring to [facility]. Recommend STR. This section established at most recent assessment PROBLEM LIST (Impairments causing functional limitations): 1. Decreased Strength 2. Decreased ADL/Functional Activities 3. Decreased Transfer Abilities 4. Decreased Ambulation Ability/Technique 5. Decreased Balance 6. Decreased Activity Tolerance INTERVENTIONS PLANNED: (Benefits and precautions of occupational therapy have been discussed with the patient.) 1. Activities of daily living training 2. Adaptive equipment training 3. Balance training 4. Clothing management 5. Therapeutic activity 6. Therapeutic exercise TREATMENT PLAN: Frequency/Duration: Follow patient 4x/week to address above goals. Rehabilitation Potential For Stated Goals: Good REHAB RECOMMENDATIONS (at time of discharge pending progress):   
Placement: It is my opinion, based on this patient's performance to date, that Mr. Israel Goyal may benefit from intensive therapy at a [address] [place] [address] after discharge due to the functional deficits listed above that are likely to improve with skilled rehabilitation. Equipment:  
? None at this time OCCUPATIONAL PROFILE AND HISTORY:  
History of Present Injury/Illness (Reason for Referral): Falls frequently Past Medical History/Comorbidities:  
Mr. Israel Goyal  has a past medical history of Acute encephalopathy (3/22/2015), Altered mental status (9/16/2014), Alzheimer disease ([address] Anemia (9/16/2014), Anxiety, Asymmetrical sensorineural hearing loss, Bipolar disorder ([address] Cancer ([address] Cataract (9/8/2016), Cortical hemorrhage ([address] (9/17/2014), Elevated AST (SGOT) (9/16/2014), GERD (gastroesophageal reflux disease), H/O seasonal allergies, History of TIA (transient ischemic attack) (9/16/2014), Hypomagnesemia (9/16/2014), Panic attack, Senile dementia ([address] (5/19/2017), Stroke ([address] Syncope and collapse (3/22/2015), Tinea corporis (9/16/2014), and Tinnitus. Mr. Israel Goyal  has a past surgical history that includes hx appendectomy; hx other surgical (AGE 23); hx colonoscopy (MULTIPLE OVER THE YEARS); and hx vascular access. Social History/Living Environment:  
Home Environment: Independent living # Steps to Enter: 0 One/Two Story Residence: One story Living Alone: Yes Support Systems: Family member(s) Patient Expects to be Discharged to[de-identified] Independent living facility Current DME Used/Available at Home: eric Araujo Prior Level of Function/Work/Activity: 
Independent with ADLs; ambulates with rollator; lives at [facility] Number of Personal Factors/Comorbidities that affect the Plan of Care: Brief history (0):  LOW COMPLEXITY ASSESSMENT OF OCCUPATIONAL PERFORMANCE[de-identified]  
Activities of Daily Living:  
Basic ADLs (From Assessment) Complex ADLs (From Assessment) Feeding: Setup Oral Facial Hygiene/Grooming: Setup Bathing: Minimum assistance, Moderate assistance Upper Body Dressing: Setup Lower Body Dressing: Moderate assistance Toileting: Minimum assistance Grooming/Bathing/Dressing Activities of Daily Living Cognitive Retraining Safety/Judgement: Fall prevention Functional Transfers Bathroom Mobility: Moderate assistance Toilet Transfer : Minimum assistance; Moderate assistance(BSC) Shower Transfer: Moderate assistance Bed/Mat Mobility Supine to Sit: Moderate assistance; Additional time Sit to Stand: Minimum assistance; Moderate assistance; Additional time Stand to Sit: Minimum assistance; Additional time Bed to Chair: Minimum assistance; Moderate assistance; Additional time Most Recent Physical Functioning:  
Gross Assessment: 
AROM: Generally decreased, functional(BUE) Strength: Generally decreased, functional(BUE) Coordination: Generally decreased, functional(BUE) Tone: Normal 
Sensation: Intact Posture: 
Posture (WDL): Exceptions to [facility] Posture Assessment: Rounded shoulders, Trunk flexion Balance: 
Sitting: High guard Standing: Impaired;Pull to stand; With support Standing - Static: Fair Standing - Dynamic : Constant support;Poor Bed Mobility: 
Supine to Sit: Moderate assistance; Additional time Wheelchair Mobility: 
  
Transfers: Sit to Stand: Minimum assistance; Moderate assistance; Additional time Stand to Sit: Minimum assistance; Additional time Bed to Chair: Minimum assistance; Moderate assistance; Additional time Duration: 8 Minutes Patient Vitals for the past 6 hrs: 
 BP BP Patient Position SpO2 Pulse 20 0501 147/68  96 % 60  
20 0601 137/54  93 % 62  
20 0700 132/74  95 % 60  
20 0811 134/52 Supine   Mental Status Neurologic State: Alert Orientation Level: Oriented X4 Cognition: Follows commands Perception: Appears intact Perseveration: No perseveration noted Safety/Judgement: Fall prevention Physical Skills Involved: 
1. Balance 2. Strength 3. Activity Tolerance Cognitive Skills Affected (resulting in the inability to perform in a timely and safe manner): 1. none  Psychosocial Skills Affected: 1. none Number of elements that affect the Plan of Care: 1-3:  LOW COMPLEXITY CLINICAL DECISION MAKIN[address] [place] [id number] AM-PAC 6 Clicks Daily Activity Inpatient Short Form How much help from another person does the patient currently need. .. Total A Lot A Little None 1. Putting on and taking off regular lower body clothing? [] 1   [x] 2   [] 3   [] 4  
2. Bathing (including washing, rinsing, drying)? [] 1   [x] 2   [] 3   [] 4  
3. Toileting, which includes using toilet, bedpan or urinal?   [] 1   [] 2   [x] 3   [] 4  
4. Putting on and taking off regular upper body clothing? [] 1   [] 2   [x] 3   [] 4  
5. Taking care of personal grooming such as brushing teeth? [] 1   [] 2   [x] 3   [] 4  
6. Eating meals? [] 1   [] 2   [] 3   [x] 4  
© , Trustees of [address] [place] [address], under license to [employer]. All rights reserved Score:  Initial: 17 Most Recent: X (Date: -- ) Interpretation of Tool:  Represents activities that are increasingly more difficult (i.e. Bed mobility, Transfers, Gait). Medical Necessity: · Patient is expected to demonstrate progress in  
· strength, balance, coordination, and functional technique ·  to  
· increase independence with self care and functional mobility · . Reason for Services/Other Comments: 
· Patient continues to require skilled intervention due to · Decrease self care and functional mobility · . Use of outcome tool(s) and clinical judgement create a POC that gives a: LOW COMPLEXITY  
 
 
 
TREATMENT:  
(In addition to Assessment/Re-Assessment sessions the following treatments were rendered) Pre-treatment Symptoms/Complaints:  tolerated well Pain: Initial:  
Pain Intensity 1: 0  Post Session:  0 Therapeutic Activity: (10 Minutes): Therapeutic activities including Chair transfers and [facility] transfers to improve strength and balance. Required minimal verbal cues  to promote static balance in standing. Assessment/Reassessment (5) Braces/Orthotics/Lines/Etc:  
· ICU monitors · O2 Device: Room air Treatment/Session Assessment:   
· Response to Treatment:  tolerated well · Interdisciplinary Collaboration:  
o Physical Therapist 
o Registered Nurse · After treatment position/precautions:  
o Up in chair 
o Bed/Chair-wheels locked 
o Call light within reach 
o RN notified 
o LEs reclined · Compliance with Program/Exercises: Compliant all of the time. · Recommendations/Intent for next treatment session: \"Next visit will focus on advancements to more challenging activities and reduction in assistance provided\". Total Treatment Duration: OT Patient Time In/Time Out Time In: 4015 Time Out: 3761 Delvin Paiz OT

## 2020-04-19 NOTE — PROGRESS NOTES
Unable to sit patient up on side of bed. He states he cannot sit up but wants to go to the bed side commode. Patient cannot move legs.

## 2020-04-19 NOTE — PROGRESS NOTES
SW reviewed patient's chart and conducted a baseline assessment. Discharge plan at this time is as follows:  
 
Care Management Interventions PCP Verified by CM: Yes Mode of Transport at Discharge: BLS Transition of Care Consult (CM Consult): SNF Physical Therapy Consult: Yes Occupational Therapy Consult: Yes Current Support Network: Own Home(Independent Living at [facility]) Confirm Follow Up Transport: Other (see comment) The Plan for Transition of Care is Related to the Following Treatment Goals : Increase strength, balance, and independence The Patient and/or Patient Representative was Provided with a Choice of Provider and Agrees with the Discharge Plan?: Yes Name of the Patient Representative Who was Provided with a Choice of Provider and Agrees with the Discharge Plan: Mendez Her Freedom of Choice List was Provided with Basic Dialogue that Supports the Patient's Individualized Plan of Care/Goals, Treatment Preferences and Shares the Quality Data Associated with the Providers?: Yes Discharge Location Discharge Placement: Skilled nursing facility Erich Severin, BSW  [address] * [email or web address]   
( [phone]

## 2020-04-19 NOTE — PROGRESS NOTES
TRANSFER - OUT REPORT: 
 
Verbal report given to Deidra Zhang RN(name) on Boris Osorio  being transferred to [facility](unit) for routine progression of care Report consisted of patients Situation, Background, Assessment and  
Recommendations(SBAR). Information from the following report(s) SBAR, Kardex, ED Summary, Accordion, Med Rec Status and Cardiac Rhythm a fib and sometimes sinus was reviewed with the receiving nurse. Lines:  
Venous Access Device Upper chest (subclavicular area, right (Active) Peripheral IV 04/18/20 Left Hand (Active) Site Assessment Clean, dry, & intact 4/19/2020  7:00 AM  
Phlebitis Assessment 0 4/19/2020  7:00 AM  
Infiltration Assessment 0 4/19/2020  7:00 AM  
Dressing Status Clean, dry, & intact 4/19/2020  7:00 AM  
Dressing Type Transparent 4/19/2020  7:00 AM  
Hub Color/Line Status Flushed 4/19/2020 12:30 AM  
  
 
Opportunity for questions and clarification was provided. Patient transported with: 
 Monitor

## 2020-04-19 NOTE — PROGRESS NOTES
Problem: Mobility Impaired (Adult and Pediatric) Goal: *Acute Goals and Plan of Care (Insert Text) Description: LTG: 
(1.)Mr. Ko Coto will move from supine to sit and sit to supine  in bed with MINIMAL ASSIST within 7 treatment day(s). (2.)Mr. Ko Coto will transfer from bed to chair and chair to bed with CONTACT GUARD ASSIST using the least restrictive device within 7 treatment day(s). (3.)Mr. Ko Coto will ambulate with CONTACT GUARD ASSIST for 50 feet with the least restrictive device within 7 treatment day(s). (4)Mr. Ko Coto will perform HEP with cues and assistance to increase safety on his feet in 7 days. ________________________________________________________________________________________________ Outcome: Progressing Towards Goal 
  
PHYSICAL THERAPY: Initial Assessment and AM 4/19/2020 OBSERVATION: PT Visit Days : 1 Payor: Marian Barros / Plan: [address] [place] [address] HMO / Product Type: Managed Care Medicare /   
  
NAME/AGE/GENDER: Andrea Contreras is a 82 y.o. male PRIMARY DIAGNOSIS: Falls frequently [R29.6] Falls frequently Falls frequently ICD-10: Treatment Diagnosis:  
 Generalized Muscle Weakness (M62.81) Other abnormalities of gait and mobility (R26.89) History of falling (Z91.81) Precaution/Allergies: 
Patient has no known allergies. ASSESSMENT:  
 
Mr. Ko Coto presents with decreased functional mobility and decreased safety on his feet. He lives at [facility] in independent living. He uses a rollator for gait. He has been falling. This am, he reports that he is fine and needs to get back home soon. He needed assistance with all transfers and to take a few steps with RW to the chair. He has posterior lean with walking to the chair. He is not safe without assistance. I tried to make him aware that he is not safe and needs assistance but he seems to be in some denial about his lack of mobility. He needs snf. Will follow. This section established at most recent assessment PROBLEM LIST (Impairments causing functional limitations): 
Decreased Strength Decreased ADL/Functional Activities Decreased Transfer Abilities Decreased Ambulation Ability/Technique Decreased Balance Decreased Activity Tolerance Increased Fatigue Decreased Flexibility/Joint Mobility Decreased Knowledge of Precautions Decreased [place] with Home Exercise Program 
 INTERVENTIONS PLANNED: (Benefits and precautions of physical therapy have been discussed with the patient.) Balance Exercise Bed Mobility Family Education Gait Training Home Exercise Program (HEP) Range of Motion (ROM) Therapeutic Activites Therapeutic Exercise/Strengthening Transfer Training TREATMENT PLAN: Frequency/Duration: daily for duration of hospital stay Rehabilitation Potential For Stated Goals: Fair REHAB RECOMMENDATIONS (at time of discharge pending progress):   
Placement: It is my opinion, based on this patient's performance to date, that Mr. Israel Goyal may benefit from intensive therapy at a [address] [place] [address] after discharge due to the functional deficits listed above that are likely to improve with skilled rehabilitation and concerns that he/she may be unsafe to be unsupervised at home due to falling. Equipment:  
None at this time HISTORY:  
History of Present Injury/Illness (Reason for Referral): Mr. Israel Goyal (pronounced k-goes-e) is an 27-year-old gentleman presenting from assisted living with frequent falls. Information obtained from patient as he was a reliable source. For the past 3 days he has fallen each day, requiring the fire department or EMS to pick him up off the ground. On the second fall his nurse practitioner recommended come to ER for evaluation but he refused. On the third fall he realizes he is falling a lot and wanted to come to the hospital for further evaluation. He denies any loss of consciousness during these falls. He says that he would walk approximately 3 feet away from his bed using his rolling walker, his legs would get weak and he would fall. He is unable to bring himself up to stand. He does admit to poor oral intake for the past few days secondary to bowel and bladder incontinence. However he states he has been having the bowel and bladder incontinence issues for more than 6 months. He has no recent medication changes as far as he knows and no recent antibiotic use. He does have history of multiple myeloma and has been treated with lenalidomide. 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 ER consult to hospitalist for observation for his frequent falls. Past Medical History/Comorbidities:  
Mr. Nishant Santos  has a past medical history of Acute encephalopathy (3/22/2015), Altered mental status (9/16/2014), Alzheimer disease ([address] Anemia (9/16/2014), Anxiety, Asymmetrical sensorineural hearing loss, Bipolar disorder ([address] Cancer ([address] Cataract (9/8/2016), Cortical hemorrhage ([address] (9/17/2014), Elevated AST (SGOT) (9/16/2014), GERD (gastroesophageal reflux disease), H/O seasonal allergies, History of TIA (transient ischemic attack) (9/16/2014), Hypomagnesemia (9/16/2014), Panic attack, Senile dementia ([address] (5/19/2017), Stroke ([address] Syncope and collapse (3/22/2015), Tinea corporis (9/16/2014), and Tinnitus. Mr. Nishant Santos  has a past surgical history that includes hx appendectomy; hx other surgical (AGE 23); hx colonoscopy (MULTIPLE OVER THE YEARS); and hx vascular access. Social History/Living Environment:  
Home Environment: Independent living # Steps to Enter: 0 One/Two Story Residence: One story Living Alone: Yes Support Systems: Family member(s) Patient Expects to be Discharged to[de-identified] Independent living facility Current DME Used/Available at Home: eric Mejia Prior Level of Function/Work/Activity: 
Falling, independent, rollator Number of Personal Factors/Comorbidities that affect the Plan of Care: 3+: HIGH COMPLEXITY EXAMINATION:  
Most Recent Physical Functioning:  
Gross Assessment: 
AROM: Generally decreased, functional(Le's) Strength: Generally decreased, functional(Le's) Sensation: Intact Posture: 
Posture (WDL): Exceptions to [facility] Posture Assessment: Rounded shoulders, Trunk flexion Balance: 
Sitting: High guard Standing: Impaired;Pull to stand; With support Standing - Static: Fair Standing - Dynamic : Constant support;Poor Bed Mobility: 
Supine to Sit: Moderate assistance; Additional time Wheelchair Mobility: 
  
Transfers: 
Sit to Stand: Minimum assistance; Moderate assistance; Additional time Stand to Sit: Additional time;Minimum assistance Bed to Chair: Minimum assistance; Moderate assistance; Additional time Duration: 8 Minutes Gait: 
  
Base of Support: Widened Speed/Connie: Delayed Step Length: Left shortened;Right shortened Gait Abnormalities: Decreased step clearance;Shuffling gait; Other(posterior lean) Distance (ft): 4 Feet (ft) Assistive Device: Walker, rolling Ambulation - Level of Assistance: Minimal assistance; Moderate assistance Interventions: Manual cues; Safety awareness training; Tactile cues; Verbal cues Body Structures Involved: 
Bones Joints Muscles Ligaments Body Functions Affected: Movement Related Activities and Participation Affected: Mobility Number of elements that affect the Plan of Care: 3: MODERATE COMPLEXITY CLINICAL PRESENTATION:  
Presentation: Stable and uncomplicated: LOW COMPLEXITY CLINICAL DECISION MAKIN [facility] [address] [address] AM-PAC 6 Clicks Basic Mobility Inpatient Short Form How much difficulty does the patient currently have. ..  Unable A Lot A Little None 1. Turning over in bed (including adjusting bedclothes, sheets and blankets)? [] 1   [x] 2   [] 3   [] 4  
2. Sitting down on and standing up from a chair with arms ( e.g., wheelchair, bedside commode, etc.)   [] 1   [] 2   [x] 3   [] 4  
3. Moving from lying on back to sitting on the side of the bed? [] 1   [x] 2   [] 3   [] 4 How much help from another person does the patient currently need. .. Total A Lot A Little None 4. Moving to and from a bed to a chair (including a wheelchair)? [] 1   [] 2   [x] 3   [] 4  
5. Need to walk in hospital room? [] 1   [] 2   [x] 3   [] 4  
6. Climbing 3-5 steps with a railing? [] 1   [x] 2   [] 3   [] 4  
© 2007, Trustees of [address] [place] [address], under license to [employer]. All rights reserved Score:  Initial: 15 Most Recent: X (Date: -- ) Interpretation of Tool:  Represents activities that are increasingly more difficult (i.e. Bed mobility, Transfers, Gait). Medical Necessity:    
Patient is expected to demonstrate progress in  
strength, range of motion, balance, coordination, and functional technique 
 to  
decrease assistance required with exercises and functional mobility Toy Raymundo Reason for Services/Other Comments: 
Patient  
continues to require present interventions due to patient's inability to perform exercises and functional mobility independently Toy Raymundo Use of outcome tool(s) and clinical judgement create a POC that gives a: Questionable prediction of patient's progress: MODERATE COMPLEXITY  
  
 
 
 
TREATMENT:  
(In addition to Assessment/Re-Assessment sessions the following treatments were rendered) Pre-treatment Symptoms/Complaints:  none Pain: Initial:  
   Post Session:  0 Therapeutic Activity: (  8 Minutes ):  Therapeutic activities including Bed transfers, Chair transfers, Ambulation on level ground, and standing with walker to improve mobility, strength, and balance.   Required minimal Manual cues; Safety awareness training; Tactile cues; Verbal cues to promote static and dynamic balance in standing. Braces/Orthotics/Lines/Etc:  
IV Icu lines O2 Device: Room air Treatment/Session Assessment:   
Response to Treatment:  not safe on his feet Interdisciplinary Collaboration: Occupational Therapist 
Registered Nurse  After treatment position/precautions:  
Up in chair Bed/Chair-wheels locked Bed in low position Call light within reach RN notified Compliance with Program/Exercises: Will assess as treatment progresses Recommendations/Intent for next treatment session: \"Next visit will focus on advancements to more challenging activities and reduction in assistance provided\". Total Treatment Duration: PT Patient Time In/Time Out Time In: 0805 Time Out: 0820 Katiuska Cummings, PT

## 2020-04-19 NOTE — PROGRESS NOTES
Patient is up with assistance from PT and OT and not that he is weak in spite of his belief that he can do things on his own. Breakfast provided and he tolerates it well.

## 2020-04-19 NOTE — PROGRESS NOTES
Progress Note Active Hospital Problems Diagnosis Date Noted  Seizure ([address] 04/19/2020  Thrombocytopenia ([address] 04/19/2020  Falls frequently 04/18/2020  A-fib ([address] 04/18/2020  Bradycardia 04/18/2020  Hypokalemia 11/19/2019  Multiple myeloma not having achieved remission ([address] 08/02/2017  Gastroesophageal reflux disease 08/02/2017  Bipolar disorder ([address] 03/22/2015 Currently treated with Lexapro and Xanax. Given pt's age, I would recommend continuing current regimen; refill rx. Follow up 3 months or prn. Pt is working on weaning to a lower dose of Xanax. Assessment Thrombocytopenia ([address] Thrombocytopenia which looks chronic follow the CBC Seizure ([facility] Continue his home medication, seizure precautions Bradycardia Status post permanent pacemaker, continue telemetry monitoring A-fib ([facility] Atrial fibrillation currently rate controlled, patient on anticoagulation Hypokalemia Supplementation follow the BMP Gastroesophageal reflux disease Continue his home medication * Falls frequently Patient states that he had 2 episodes of fall at assisted living, he denies any loss of consciousness or any seizure-like episode, he thinks he is very weak and he need rehab. PT OT evaluation, discharge planner consult for possible rehab placement Diarrhea we are going to check stool studies including C. difficile Length of Stay: 0 Assessment  Subjective 61-year-old  male patient with past medical history significant for multiple comorbidities including atrial fibrillation, hypertension admitted to the medical floor for chief complaint of recurrent falls. Patient states that at baseline he lives at assisted living facility, he states that he had couple of falls in the last 1 week. Patient denies any loss of consciousness or any seizure-like episode.   His only complaint was generalized weakness, also complaining of diarrhea which started from last night Objective Review of Systems Constitution: Negative. HENT: Negative. Eyes: Negative. Cardiovascular: Negative. Endocrine: Negative. Hematologic/Lymphatic: Negative. Skin: Negative. Musculoskeletal: Positive for falls. Gastrointestinal: Negative. Genitourinary: Negative. Neurological: Positive for weakness. Psychiatric/Behavioral: Negative. Physical Exam 
Constitutional:   
   Appearance: He is well-developed. HENT:  
   Head: Normocephalic and atraumatic. Eyes:  
   Conjunctiva/sclera: Conjunctivae normal.  
   Pupils: Pupils are equal, round, and reactive to light. Neck: Musculoskeletal: Normal range of motion and neck supple. Cardiovascular:  
   Rate and Rhythm: Normal rate. Rhythm irregular. Heart sounds: Normal heart sounds. Pulmonary:  
   Effort: Pulmonary effort is normal.  
   Breath sounds: Normal breath sounds. Abdominal:  
   General: Bowel sounds are normal.  
   Palpations: Abdomen is soft. Musculoskeletal: Normal range of motion. Skin: 
   General: Skin is warm and dry. Findings: No erythema or rash. Neurological:  
   Mental Status: He is alert and oriented to person, place, and time. Cranial Nerves: No cranial nerve deficit. Intake/Output Summary (Last 24 hours) at 4/19/2020 1052 Last data filed at 4/19/2020 0117 Gross per 24 hour Intake 420 ml Output 200 ml Net 220 ml Patient Vitals for the past 24 hrs: 
 Temp Pulse Resp BP SpO2  
04/19/20 0811    134/52   
04/19/20 0700 98.6 °F (37 °C) 60 16 132/74 95 % 04/19/20 0601  62 17 137/54 93 % 04/19/20 0501 98.2 °F (36.8 °C) 60 25 147/68 96 % 04/19/20 0401  60 16 127/57 93 % 04/19/20 0306  (!) 43 18 118/59 95 % 04/19/20 0201  66 17 139/50 96 % 04/19/20 0134  60 21 122/48 95 % 04/19/20 0100  60 21 136/46 96 % 04/19/20 0030 97.9 °F (36.6 °C) 60 20 140/60 96 % 04/19/20 0002  63 21 143/66 97 % 04/18/20 2256  60 19 147/65 96 % 04/18/20 2236  60 17 138/60 91 % 04/18/20 2216  60 17 132/60 92 % 04/18/20 2156  62 18 137/64 97 % 04/18/20 2136  60  152/65 99 % 04/18/20 2116  60 20 143/63 97 % 04/18/20 2056  66  144/67 98 % 04/18/20 2036    144/66 97 % 04/18/20 2016  60 19 137/63 96 % 04/18/20 1956  60 18 139/65 96 % 04/18/20 1936  61  140/63 95 % 04/18/20 1926     94 % 04/18/20 1916  60 19 146/67 96 % 04/18/20 1856    126/58 93 % 04/18/20 1831 98.5 °F (36.9 °C) 66 20 158/65 94 % Lab Results Component Value Date/Time WBC 3.3 (L) 04/19/2020 03:43 AM  
 RBC 3.59 (L) 04/19/2020 03:43 AM  
 HGB 10.8 (L) 04/19/2020 03:43 AM  
 HCT 33.7 (L) 04/19/2020 03:43 AM  
  (L) 04/19/2020 03:43 AM  
 CO2 25 04/19/2020 03:43 AM  
 BUN 15 04/19/2020 03:43 AM  
 
 
XR Results (maximum last 3): Results from Hospital Encounter encounter on 11/18/19 XR CHEST SNGL V Impression IMPRESSION:  No lobar pneumonia. Mild interstitial prominence. XR CHEST PA LAT Impression IMPRESSION: No acute findings in the chest 
  
XR SPINE LUMB 2 OR 3 V Impression IMPRESSION: No evidence of fracture or other acute abnormality in the lumbar 
spine. CT Results (maximum last 3): Results from [facility] encounter on 04/18/20 CT HEAD WO CONT Impression IMPRESSION: No acute process. Results from Hospital Encounter encounter on 11/18/19 CT HEAD WO CONT Impression IMPRESSION: No CT evidence of acute intracranial abnormality. Results from [facility] Encounter encounter on 08/02/17 CT BX BONE MARROW NDL/TROCAR Impression Impression: Uncomplicated CT guided right iliac bone marrow aspiration and core 
biopsy. Plan:  Bedrest observation for one hour. MRI Results (maximum last 3): Results from Hospital Encounter encounter on 09/16/14 MRI BRAIN WO CONT Impression IMPRESSION: 
 
1. No acute infarction. 2. Punctate signal abnormality in the region of the left precentral gyrus 
measuring approximately 5 mm in diameter. This may be a very small cortical 
hemorrhage. There is no associated mass effect on the brain. Consider a short 
followup head CT in several weeks to confirm resolution of this finding, unless 
earlier evaluation is indicated clinically. Birder Fothergill The critical results contained in this report were communicated to the 
hospitalist, Dr. Emiliano Avelar by me on 9/17/2014 at 9:35 AM. Nuclear Medicine Results (maximum last 3): No results found for this or any previous visit. US Results (maximum last 3): No results found for this or any previous visit. Scheduled Meds: 
Current Facility-Administered Medications Medication Dose Route Frequency  albuterol (PROVENTIL VENTOLIN) nebulizer solution 2.5 mg  2.5 mg Nebulization Q6H PRN  
 donepeziL (ARICEPT) tablet 10 mg  10 mg Oral DAILY  apixaban (ELIQUIS) tablet 2.5 mg  2.5 mg Oral BID  levETIRAcetam (KEPPRA) tablet 750 mg  750 mg Oral BID  atorvastatin (LIPITOR) tablet 10 mg  10 mg Oral QHS  memantine (NAMENDA) tablet 10 mg  10 mg Oral DAILY  sertraline (ZOLOFT) tablet 50 mg  50 mg Oral DAILY  sodium chloride (NS) flush 5-40 mL  5-40 mL IntraVENous Q8H  
 sodium chloride (NS) flush 5-40 mL  5-40 mL IntraVENous PRN  
 acetaminophen (TYLENOL) tablet 650 mg  650 mg Oral Q4H PRN  
 HYDROcodone-acetaminophen (NORCO) 5-325 mg per tablet 1 Tab  1 Tab Oral Q4H PRN  
 naloxone (NARCAN) injection 0.4 mg  0.4 mg IntraVENous PRN  
 diphenhydrAMINE (BENADRYL) capsule 25 mg  25 mg Oral Q4H PRN  
 ondansetron (ZOFRAN) injection 4 mg  4 mg IntraVENous Q4H PRN  
 senna-docusate (PERICOLACE) 8.6-50 mg per tablet 2 Tab  2 Tab Oral DAILY PRN  
 clonazePAM (KlonoPIN) tablet 0.5 mg  0.5 mg Oral BID  tuberculin injection 5 Units  5 Units IntraDERMal ONCE  
  potassium chloride (K-DUR, KLOR-CON) SR tablet 40 mEq  40 mEq Oral Q4H

## 2020-04-19 NOTE — PROGRESS NOTES
Admission assessment completed. No acute distress noted at the time of assessment. Will continue to monitor.

## 2020-04-19 NOTE — ED PROVIDER NOTES
Comes to our department with weakness in general and several falls this week. He states that he fell earlier and was in the floor for several hours until he could slide across the floor and get contact at his assisted living. He does have a history of some dementia. He states that he has urinary and stool incontinence issues that have been ongoing to his consistent report to me for greater than 1/2-year. He denies any known recent fever. Did not hit his head. Has what he calls bone cancer and review this is multiple myeloma. He takes an oral medication to address this. All that he presents to emergency room on this day occurred approximately 1030 this morning. States in the past he has had similar problems when he was dehydrated. The history is provided by the patient. Fall The fall occurred while standing. He fell from a height of ground level. He landed on hard floor. There was no blood loss. He was not ambulatory at the scene. There was no drug use involved in the accident. Pertinent negatives include no numbness, no nausea, no vomiting, no loss of consciousness, no tingling and no laceration. The risk factors include being elderly and recurrent falls. He has tried nothing for the symptoms. Past Medical History:  
Diagnosis Date  Acute encephalopathy 3/22/2015  Altered mental status 9/16/2014  Alzheimer disease ([address]  Anemia 9/16/2014 MILD  Anxiety  Asymmetrical sensorineural hearing loss  Bipolar disorder ([address] NOT TREATED, DIAGNOSED MANY YEARS AGO  Cancer ([address] multiple myeloma  Cataract 9/8/2016  Cortical hemorrhage ([address] 9/17/2014  Elevated AST (SGOT) 9/16/2014  GERD (gastroesophageal reflux disease)   
 not Tx  
 H/O seasonal allergies  History of TIA (transient ischemic attack) 9/16/2014  Hypomagnesemia 9/16/2014 MILD  Panic attack  Senile dementia ([address] 5/19/2017  Stroke ([address] ? TIA, PUT ON PLAVIX, TOOK SELF OFF  Syncope and collapse 3/22/2015  Tinea corporis 9/16/2014  Tinnitus Past Surgical History:  
Procedure Laterality Date  HX APPENDECTOMY  HX COLONOSCOPY  MULTIPLE OVER THE YEARS  
 NO CANCER  
 HX OTHER SURGICAL  AGE 23  
 COLONIC POLYP REMOVAL  
 HX VASCULAR ACCESS Family History:  
Problem Relation Age of Onset  Diabetes Mother COMPLICATIONS OF DM  
 Cancer Mother  Heart Disease Mother  Lung Disease Father BLACK LUNG  Dementia Sister  Heart Disease Brother \"OLD AGE\"  Other Son ESTRANGED, IN [place]  
 Other Daughter   
     1 ESTRANGED, [facility], [address] [place] [address] Dr Pinzon Social History Socioeconomic History  Marital status:  Spouse name: Not on file  Number of children: Not on file  Years of education: Not on file  Highest education level: Not on file Occupational History  Not on file Social Needs  Financial resource strain: Not on file  Food insecurity Worry: Not on file Inability: Not on file  Transportation needs Medical: Not on file Non-medical: Not on file Tobacco Use  Smoking status: Former Smoker Packs/day: 2.00 Years: 16.00 Pack years: 32.00 Types: Cigarettes  Smokeless tobacco: Never Used  Tobacco comment: QUIT AGE 37 Substance and Sexual Activity  Alcohol use: No  
 Drug use: No  
 Sexual activity: Not Currently Partners: Female Birth control/protection: None Lifestyle  Physical activity Days per week: Not on file Minutes per session: Not on file  Stress: Not on file Relationships  Social connections Talks on phone: Not on file Gets together: Not on file Attends Christian service: Not on file Active member of club or organization: Not on file Attends meetings of clubs or organizations: Not on file Relationship status: Not on file  Intimate partner violence Fear of current or ex partner: Not on file Emotionally abused: Not on file Physically abused: Not on file Forced sexual activity: Not on file Other Topics Concern  Not on file Social History Narrative 9/16/14:  PATIENT IS , LIVES WITH CATARINA MAYA. 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 HE MOVED TO [facility] FOR 5 YEARS, HAS BEEN HERE (?) FOR ABOUT 10 YEARS. HAS A DAUGHTER IN [place] THAT IS NOT SPEAKING TO HIM, A SON IN [place] WHO IS NOT SPEAKING TO HIM, AND A DAUGHTER IN [facility]. BROTHER AND SISTER ARE DEAD. ONLY FRIEND IS SERA TORRES, ([facility]: [phone]), A  WHO LOOKS AFTER THE PATIENT. CALL HIM ON DISCHARGE AS HE HAS PATIENT'S DOG AND HOUSE KEYS. ALLERGIES: Patient has no known allergies. Review of Systems Gastrointestinal: Negative for nausea and vomiting. Neurological: Negative for tingling, loss of consciousness and numbness. Vitals:  
 04/18/20 1936 04/18/20 1956 04/18/20 2016 04/18/20 2036 BP: 140/63 139/65 137/63 144/66 Pulse: 61 60 60 Resp:  18 19 Temp:      
SpO2: 95% 96% 96% 97% Physical Exam 
Constitutional:   
   Appearance: He is obese. He is not toxic-appearing or diaphoretic. HENT:  
   Right Ear: External ear normal.  
   Left Ear: External ear normal.  
   Mouth/Throat:  
   Mouth: Mucous membranes are dry. Eyes:  
   Conjunctiva/sclera: Conjunctivae normal.  
Neck: Musculoskeletal: Normal range of motion. Cardiovascular:  
   Rate and Rhythm: Normal rate. Rhythm irregular. Pulmonary:  
   Effort: No respiratory distress. Breath sounds: No wheezing or rhonchi. Chest:  
   Chest wall: No tenderness. Abdominal:  
   Palpations: Abdomen is soft. Musculoskeletal:     
   General: No swelling or tenderness. Right lower leg: Edema present. Left lower leg: Edema present. Skin: 
   Coloration: Skin is not jaundiced. Findings: No laceration. Neurological:  
   Mental Status: He is alert. Psychiatric:     
   Mood and Affect: Mood normal.     
   Thought Content: Thought content normal.  
 
  
 
MDM Number of Diagnoses or Management Options Diagnosis management comments: High degree of baseline multisystem debility with several falls (1 each day x 3). In floor for a prolonged period today. Will need admit Amount and/or Complexity of Data Reviewed Clinical lab tests: ordered and reviewed Independent visualization of images, tracings, or specimens: yes Risk of Complications, Morbidity, and/or Mortality Presenting problems: high Diagnostic procedures: low Management options: moderate Patient Progress Patient progress: stable Procedures Recent Results (from the past 12 hour(s)) MAGNESIUM Collection Time: 04/18/20  7:22 PM  
Result Value Ref Range Magnesium 2.1 1.8 - 2.4 mg/dL CBC WITH AUTOMATED DIFF Collection Time: 04/18/20  7:22 PM  
Result Value Ref Range WBC 4.4 4.3 - 11.1 K/uL  
 RBC 3.84 (L) 4.23 - 5.6 M/uL  
 HGB 11.5 (L) 13.6 - 17.2 g/dL HCT 35.7 (L) 41.1 - 50.3 % MCV 93.0 79.6 - 97.8 FL  
 MCH 29.9 26.1 - 32.9 PG  
 MCHC 32.2 31.4 - 35.0 g/dL  
 RDW 16.9 (H) 11.9 - 14.6 % PLATELET 915 (L) 094 - 450 K/uL MPV 10.6 9.4 - 12.3 FL ABSOLUTE NRBC 0.00 0.0 - 0.2 K/uL  
 DF AUTOMATED NEUTROPHILS 61 43 - 78 % LYMPHOCYTES 23 13 - 44 % MONOCYTES 13 (H) 4.0 - 12.0 % EOSINOPHILS 2 0.5 - 7.8 % BASOPHILS 1 0.0 - 2.0 % IMMATURE GRANULOCYTES 1 0.0 - 5.0 %  
 ABS. NEUTROPHILS 2.6 1.7 - 8.2 K/UL  
 ABS. LYMPHOCYTES 1.0 0.5 - 4.6 K/UL  
 ABS. MONOCYTES 0.6 0.1 - 1.3 K/UL  
 ABS. EOSINOPHILS 0.1 0.0 - 0.8 K/UL  
 ABS. BASOPHILS 0.1 0.0 - 0.2 K/UL  
 ABS. IMM. GRANS. 0.0 0.0 - 0.5 K/UL METABOLIC PANEL, COMPREHENSIVE Collection Time: 04/18/20  7:22 PM  
Result Value Ref Range Sodium 142 136 - 145 mmol/L  Potassium 3.7 3.5 - 5.1 mmol/L  
 Chloride 109 (H) 98 - 107 mmol/L  
 CO2 25 21 - 32 mmol/L Anion gap 8 7 - 16 mmol/L Glucose 97 65 - 100 mg/dL BUN 15 8 - 23 MG/DL Creatinine 1.01 0.8 - 1.5 MG/DL  
 GFR est AA >60 >60 ml/min/1.73m2 GFR est non-AA >60 >60 ml/min/1.73m2 Calcium 8.5 8.3 - 10.4 MG/DL Bilirubin, total 0.7 0.2 - 1.1 MG/DL  
 ALT (SGPT) 32 12 - 65 U/L  
 AST (SGOT) 56 (H) 15 - 37 U/L Alk. phosphatase 104 50 - 136 U/L Protein, total 6.8 6.3 - 8.2 g/dL Albumin 3.1 (L) 3.2 - 4.6 g/dL Globulin 3.7 (H) 2.3 - 3.5 g/dL A-G Ratio 0.8 (L) 1.2 - 3.5 CK Collection Time: 04/18/20  7:22 PM  
Result Value Ref Range  (H) 21 - 215 U/L  
URINE MICROSCOPIC Collection Time: 04/18/20  8:44 PM  
Result Value Ref Range WBC 0-3 0 /hpf  
 RBC 0-3 0 /hpf Epithelial cells 0-3 0 /hpf Bacteria 0 0 /hpf Casts 3-5 0 /lpf

## 2020-04-19 NOTE — PROGRESS NOTES
TRANSFER - IN REPORT: 
 
Verbal report received from Yamileth Mcbride RN (name) on Yessy Klein  being received from Intensive Care (unit) for routine progression of care Report consisted of patients Situation, Background, Assessment and  
Recommendations(SBAR). Information from the following report(s) Kardex, Recent Results and Med Rec Status was reviewed with the receiving nurse. Opportunity for questions and clarification was provided.

## 2020-04-19 NOTE — PROGRESS NOTES
SW discussed patient with PT/OT following rounds in ICU. Recommendation is for STR as patient is currently living in independent living at [facility]. SW will discuss with patient, will ensure a PPD has been placed. MIRACLE Rodriguez  [email or web address]   
( [phone]

## 2020-04-19 NOTE — PROGRESS NOTES
Skin assessment performed by this nurse and Mayito Hannah. Scratches and redness noted to upper back. Cyst noted to mid back, left sided. Abrasion noted to right thigh, posterior, no drainage or sx of infection noted at this time. Abrasion noted to RLE, interior, no drainage or s/sx of infection noted at this time. Bruising, purple/blue in color, noted to left great toe.

## 2020-04-19 NOTE — PROGRESS NOTES
Location of Assessment: ICU  Socioevironmental: 
SW met with patient who states that he has lived at [facility] for just over a year. Patient states that he has someone there who delivers meals to him, but otherwise has no additional assistance. The patient has three children, all of which live out of state. Ambulation/ADLs: 
Patient states that at his baseline he ambulates with a walker and is indp in his ADLs. Patient reports multiple falls over the last few months. Patient admits that he's more unbalanced and \"wobbly. \" Primary Care: 
Patient sees a NP who comes to his facility and is current. Needs:  
Patient's preference is to return to [facility] at discharge with PT/OT from the facility. However, after much discussion the patient admits that he is weaker than normal and would benefit from STR should he not improve. Patient's preference is for [facility] or [facility]. Referrals sent in CC link. PPD placed at admission. Awaiting bed offer. MIRACLE Sawant  [facility] [place] [place] * [email or web address]   
( [phone]

## 2020-04-19 NOTE — H&P
Hospitalist H&P Note Admit Date:  2020  6:30 PM  
Name:  Renata Blanco Age:  82 y.o. 
:  1935 MRN:  [id number] PCP:  Kathia Macedo NP Treatment Team: Attending Provider: Kvng Elizalde MD; Primary Nurse: Richard Valdes RN; Consulting Provider: Hazel Clark MD 
 
HPI:  
Mr. Rajendra Kim (pronounced k-goes-e) is an 31-year-old gentleman presenting from assisted living with frequent falls. Information obtained from patient as he was a reliable source. For the past 3 days he has fallen each day, requiring the fire department or EMS to pick him up off the ground. On the second fall his nurse practitioner recommended come to ER for evaluation but he refused. On the third fall he realizes he is falling a lot and wanted to come to the hospital for further evaluation. He denies any loss of consciousness during these falls. He says that he would walk approximately 3 feet away from his bed using his rolling walker, his legs would get weak and he would fall. He is unable to bring himself up to stand. He does admit to poor oral intake for the past few days secondary to bowel and bladder incontinence. However he states he has been having the bowel and bladder incontinence issues for more than 6 months. He has no recent medication changes as far as he knows and no recent antibiotic use. He does have history of multiple myeloma and has been treated with lenalidomide. Chart review shows that he does have bradycardia with a peripheral pacemaker placement but they did find that he has had atrial fibrillation with rapid ventricular rate in 2019. ER consult to hospitalist for observation for his frequent falls. 10 systems reviewed and negative except as noted in HPI. Past Medical History:  
Diagnosis Date  Acute encephalopathy 3/22/2015  Altered mental status 2014  Alzheimer disease ([facility]  Anemia 2014 MILD  Anxiety  Asymmetrical sensorineural hearing loss  Bipolar disorder ([facility] NOT TREATED, DIAGNOSED MANY YEARS AGO  Cancer ([facility] multiple myeloma  Cataract 9/8/2016  Cortical hemorrhage ([address] 9/17/2014  Elevated AST (SGOT) 9/16/2014  GERD (gastroesophageal reflux disease)   
 not Tx  
 H/O seasonal allergies  History of TIA (transient ischemic attack) 9/16/2014  Hypomagnesemia 9/16/2014 MILD  Panic attack  Senile dementia ([address] 5/19/2017  Stroke ([facility] ? TIA, PUT ON PLAVIX, TOOK SELF OFF  Syncope and collapse 3/22/2015  Tinea corporis 9/16/2014  Tinnitus Past Surgical History:  
Procedure Laterality Date  HX APPENDECTOMY  HX COLONOSCOPY  MULTIPLE OVER THE YEARS  
 NO CANCER  
 HX OTHER SURGICAL  AGE 23  
 COLONIC POLYP REMOVAL  
 HX VASCULAR ACCESS No Known Allergies Social History Tobacco Use  Smoking status: Former Smoker Packs/day: 2.00 Years: 16.00 Pack years: 32.00 Types: Cigarettes  Smokeless tobacco: Never Used  Tobacco comment: QUIT AGE 37 Substance Use Topics  Alcohol use: No  
  
Family History Problem Relation Age of Onset  Diabetes Mother COMPLICATIONS OF DM  
 Cancer Mother  Heart Disease Mother  Lung Disease Father BLACK LUNG  Dementia Sister  Heart Disease Brother \"OLD AGE\"  Other Son ESTRANGED, [place]  
 Other Daughter   
     1 ESTRANGED, [address] [place] Immunization History Administered Date(s) Administered  Influenza Vaccine 02/01/2017  Pneumococcal Vaccine (Unspecified Type) 04/01/2012, 10/07/2013  TB Skin Test (PPD) Intradermal 08/07/2017, 11/18/2019 PTA Medications: 
Prior to Admission Medications Prescriptions Last Dose Informant Patient Reported? Taking?   
ELIQUIS 2.5 mg tablet   Yes No  
QUEtiapine (SEROQUEL) 25 mg tablet   Yes No  
albuterol (PROVENTIL VENTOLIN) 2.5 mg /3 mL (0.083 %) nebu   No No  
 Sig: 3 mL by Nebulization route every four to six (4-6) hours as needed for Other (Cough, SOB, wheezing). clonazePAM (KLONOPIN) 0.5 mg tablet   Yes No  
donepezil (ARICEPT) 10 mg tablet   No No  
Sig: Take 1 Tab by mouth daily. gabapentin (NEURONTIN) 300 mg capsule   Yes No  
guaiFENesin ER (MUCINEX) 1,200 mg Ta12 ER tablet   No No  
Sig: Take 1 Tab by mouth every twelve (12) hours. lenalidomide (Revlimid) 10 mg cap   No No  
Sig: Take 10 mg daily for 21 days, then 7 days off  Indications: multiple myeloma  
levETIRAcetam (KEPPRA) 750 mg tablet   Yes No  
lovastatin (MEVACOR) 40 mg tablet   No No  
Sig: Take 1 Tab by mouth nightly. Patient taking differently: Take 20 mg by mouth nightly. memantine (NAMENDA) 10 mg tablet   Yes No  
potassium chloride (K-DUR, KLOR-CON) 20 mEq tablet   Yes No  
sertraline (ZOLOFT) 50 mg tablet   Yes No  
  
Facility-Administered Medications: None Objective:  
 
Patient Vitals for the past 24 hrs: 
 Temp Pulse Resp BP SpO2  
04/18/20 2136  60  152/65 99 % 04/18/20 2116  60 20 143/63 97 % 04/18/20 2056  66  144/67 98 % 04/18/20 2036    144/66 97 % 04/18/20 2016  60 19 137/63 96 % 04/18/20 1956  60 18 139/65 96 % 04/18/20 1936  61  140/63 95 % 04/18/20 1926     94 % 04/18/20 1916  60 19 146/67 96 % 04/18/20 1856    126/58 93 % 04/18/20 1831 98.5 °F (36.9 °C) 66 20 158/65 94 % Oxygen Therapy O2 Sat (%): 99 % (04/18/20 2136) Pulse via Oximetry: 60 beats per minute (04/18/20 2136) O2 Device: Room air (04/18/20 1831) Intake/Output Summary (Last 24 hours) at 4/18/2020 2242 Last data filed at 4/18/2020 2043 Gross per 24 hour Intake  Output 200 ml Net -200 ml Physical Exam: 
General:    Well nourished. Alert. Wearing a mask. Not on oxygen Eyes:   Normal sclera. Extraocular movements intact. ENT:  Normocephalic, atraumatic. Moist mucous membranes CV: RRR. Systolic murmur. Peripheral pulses present. Capillary refill normal 
Lungs:  CTAB. No wheezing, rhonchi, or rales. Abdomen: Soft, nontender, nondistended. Bowel sounds normal.  
Extremities: Warm and dry. No cyanosis or edema. Neurologic: CN II-XII grossly intact. Sensation intact. Skin:     No rashes or jaundice. Normal coloration. Small bruises noted on knees and left great toe Psych:  Normal mood and affect. I reviewed the labs, imaging, EKGs, telemetry, and other studies done this admission. Data Review:  
Recent Results (from the past 24 hour(s)) MAGNESIUM Collection Time: 04/18/20  7:22 PM  
Result Value Ref Range Magnesium 2.1 1.8 - 2.4 mg/dL CBC WITH AUTOMATED DIFF Collection Time: 04/18/20  7:22 PM  
Result Value Ref Range WBC 4.4 4.3 - 11.1 K/uL  
 RBC 3.84 (L) 4.23 - 5.6 M/uL  
 HGB 11.5 (L) 13.6 - 17.2 g/dL HCT 35.7 (L) 41.1 - 50.3 % MCV 93.0 79.6 - 97.8 FL  
 MCH 29.9 26.1 - 32.9 PG  
 MCHC 32.2 31.4 - 35.0 g/dL  
 RDW 16.9 (H) 11.9 - 14.6 % PLATELET 573 (L) 095 - 450 K/uL MPV 10.6 9.4 - 12.3 FL ABSOLUTE NRBC 0.00 0.0 - 0.2 K/uL  
 DF AUTOMATED NEUTROPHILS 61 43 - 78 % LYMPHOCYTES 23 13 - 44 % MONOCYTES 13 (H) 4.0 - 12.0 % EOSINOPHILS 2 0.5 - 7.8 % BASOPHILS 1 0.0 - 2.0 % IMMATURE GRANULOCYTES 1 0.0 - 5.0 %  
 ABS. NEUTROPHILS 2.6 1.7 - 8.2 K/UL  
 ABS. LYMPHOCYTES 1.0 0.5 - 4.6 K/UL  
 ABS. MONOCYTES 0.6 0.1 - 1.3 K/UL  
 ABS. EOSINOPHILS 0.1 0.0 - 0.8 K/UL  
 ABS. BASOPHILS 0.1 0.0 - 0.2 K/UL  
 ABS. IMM. GRANS. 0.0 0.0 - 0.5 K/UL METABOLIC PANEL, COMPREHENSIVE Collection Time: 04/18/20  7:22 PM  
Result Value Ref Range Sodium 142 136 - 145 mmol/L Potassium 3.7 3.5 - 5.1 mmol/L Chloride 109 (H) 98 - 107 mmol/L  
 CO2 25 21 - 32 mmol/L Anion gap 8 7 - 16 mmol/L Glucose 97 65 - 100 mg/dL BUN 15 8 - 23 MG/DL Creatinine 1.01 0.8 - 1.5 MG/DL  
 GFR est AA >60 >60 ml/min/1.73m2 GFR est non-AA >60 >60 ml/min/1.73m2 Calcium 8.5 8.3 - 10.4 MG/DL Bilirubin, total 0.7 0.2 - 1.1 MG/DL  
 ALT (SGPT) 32 12 - 65 U/L  
 AST (SGOT) 56 (H) 15 - 37 U/L Alk. phosphatase 104 50 - 136 U/L Protein, total 6.8 6.3 - 8.2 g/dL Albumin 3.1 (L) 3.2 - 4.6 g/dL Globulin 3.7 (H) 2.3 - 3.5 g/dL A-G Ratio 0.8 (L) 1.2 - 3.5 CK Collection Time: 04/18/20  7:22 PM  
Result Value Ref Range  (H) 21 - 215 U/L  
URINE MICROSCOPIC Collection Time: 04/18/20  8:44 PM  
Result Value Ref Range WBC 0-3 0 /hpf  
 RBC 0-3 0 /hpf Epithelial cells 0-3 0 /hpf Bacteria 0 0 /hpf Casts 3-5 0 /lpf All Micro Results None Other Studies: No results found. Assessment and Plan:  
 
Hospital Problems as of 4/19/2020 Date Reviewed: 8/8/2018 Codes Class Noted - Resolved POA Seizure ([facility] (Chronic) ICD-10-CM: R56.9 ICD-9-CM: 780.39  4/19/2020 - Present Yes * (Principal) Falls frequently ICD-10-CM: R29.6 ICD-9-CM: V15.88  4/18/2020 - Present Yes A-fib [facility]) (Chronic) ICD-10-CM: I48.91 
ICD-9-CM: 427.31  4/18/2020 - Present Yes Bradycardia (Chronic) ICD-10-CM: R00.1 ICD-9-CM: 427.89  4/18/2020 - Present Yes Multiple myeloma not having achieved remission ([facility] ICD-10-CM: C90.00 ICD-9-CM: 203.00  8/2/2017 - Present Yes Gastroesophageal reflux disease ICD-10-CM: K21.9 ICD-9-CM: 530.81  8/2/2017 - Present Yes Bipolar disorder ([facility] ICD-10-CM: F31.9 ICD-9-CM: 296.80  3/22/2015 - Present Yes Overview Addendum 7/6/2017  5:01 PM by Yeny Edouard MD  
  Currently treated with Lexapro and Xanax. Given pt's age, I would recommend continuing current regimen; refill rx. Follow up 3 months or prn. Pt is working on weaning to a lower dose of Xanax. PLAN: 
Frequent falls Observation placement with remote telemetry monitoring 
-lenalidomide could cause fatigue/dizziness, unclear if he is currently taking it 
-his NP puts his medications into a pill box to take so he does not know if any of these medications have changed [address] PT evaluation gentle IVF PPD placement for possible rehab placement Afib 
- continue eliquis Multiple myeloma 
-outpatient follow up Bradycardia with pacemaker - s/p Assurity MRI Pacemaker Implanted 07 July 2018 For Symptomatic bradycardia 
- consider interrogation of pacemaker to look for arrhythmias GERD 
-continue home medications Dementia 
- continue home medications HLD 
- continue statin Seizures - check keppra level 
- continue keppra 
- continue clonazepam however check  in AM to see if he is still on this DVT ppx:  eliquis Anticipated DC needs:  Unclear at this time Code status:  Full Estimated LOS: unclear at this time Risk:  High. Not a safe discharge back to assisted living due to the repeated falls Signed: 
Adrian Lopez MD

## 2020-04-19 NOTE — ASSESSMENT & PLAN NOTE
Patient states that he had 2 episodes of fall at assisted living, he denies any loss of consciousness or any seizure-like episode, he thinks he is very weak and he need rehab. PT OT evaluation, discharge planner consult for possible rehab placement Discussed with the discharge planner, as per her patient need precertification from patient insurance, possible rehab tomorrow

## 2020-04-19 NOTE — PROGRESS NOTES
04/19/20 1744 Dual Skin Pressure Injury Assessment Dual Skin Pressure Injury Assessment X Second Care Provider (Based on [address] [place] [address]) Humberto Jorge RN) Skin Integumentary Skin Integumentary (WDL) X Skin Color Appropriate for ethnicity; Ecchymosis (comment) Skin Condition/Temp Warm;Dry Skin Integrity Excoriation;Abrasion (between the buttoks; right posterior thigh) Turgor Non-tenting

## 2020-04-19 NOTE — ED NOTES
TRANSFER - OUT REPORT: 
 
Verbal report given to MIKE Noble on David Gasca  being transferred to [facility] [phone] for routine progression of care Report consisted of patients Situation, Background, Assessment and  
Recommendations(SBAR). Information from the following report(s) SBAR, ED Summary, MAR, Recent Results and Cardiac Rhythm NSR was reviewed with the receiving nurse. Lines:  
Venous Access Device Upper chest (subclavicular area, right (Active) Peripheral IV 04/18/20 Left Hand (Active) Opportunity for questions and clarification was provided. Patient transported with: 
 Registered Nurse

## 2020-04-20 LAB
ANION GAP SERPL CALC-SCNC: 5 MMOL/L (ref 7–16)
BASOPHILS # BLD: 0.1 K/UL (ref 0–0.2)
BASOPHILS NFR BLD: 1 % (ref 0–2)
BUN SERPL-MCNC: 13 MG/DL (ref 8–23)
C DIFF GDH STL QL: NORMAL
C DIFF TOX A+B STL QL IA: NORMAL
CALCIUM SERPL-MCNC: 8.2 MG/DL (ref 8.3–10.4)
CHLORIDE SERPL-SCNC: 111 MMOL/L (ref 98–107)
CK SERPL-CCNC: 158 U/L (ref 21–215)
CLINICAL CONSIDERATION: NORMAL
CO2 SERPL-SCNC: 28 MMOL/L (ref 21–32)
CREAT SERPL-MCNC: 0.94 MG/DL (ref 0.8–1.5)
DIFFERENTIAL METHOD BLD: ABNORMAL
EOSINOPHIL # BLD: 0.3 K/UL (ref 0–0.8)
EOSINOPHIL NFR BLD: 7 % (ref 0.5–7.8)
ERYTHROCYTE [DISTWIDTH] IN BLOOD BY AUTOMATED COUNT: 16.8 % (ref 11.9–14.6)
GLUCOSE SERPL-MCNC: 103 MG/DL (ref 65–100)
HCT VFR BLD AUTO: 37.8 % (ref 41.1–50.3)
HGB BLD-MCNC: 11.7 G/DL (ref 13.6–17.2)
IMM GRANULOCYTES # BLD AUTO: 0 K/UL (ref 0–0.5)
IMM GRANULOCYTES NFR BLD AUTO: 0 % (ref 0–5)
INTERPRETATION: NORMAL
LYMPHOCYTES # BLD: 1.1 K/UL (ref 0.5–4.6)
LYMPHOCYTES NFR BLD: 30 % (ref 13–44)
MCH RBC QN AUTO: 29.3 PG (ref 26.1–32.9)
MCHC RBC AUTO-ENTMCNC: 31 G/DL (ref 31.4–35)
MCV RBC AUTO: 94.5 FL (ref 79.6–97.8)
MM INDURATION POC: 0 MM (ref 0–5)
MONOCYTES # BLD: 0.3 K/UL (ref 0.1–1.3)
MONOCYTES NFR BLD: 9 % (ref 4–12)
NEUTS SEG # BLD: 1.9 K/UL (ref 1.7–8.2)
NEUTS SEG NFR BLD: 52 % (ref 43–78)
NRBC # BLD: 0 K/UL (ref 0–0.2)
PCR REFLEX: NORMAL
PLATELET # BLD AUTO: 136 K/UL (ref 150–450)
PMV BLD AUTO: 9.9 FL (ref 9.4–12.3)
POTASSIUM SERPL-SCNC: 3.4 MMOL/L (ref 3.5–5.1)
PPD POC: NEGATIVE NEGATIVE
RBC # BLD AUTO: 4 M/UL (ref 4.23–5.6)
SODIUM SERPL-SCNC: 144 MMOL/L (ref 136–145)
WBC # BLD AUTO: 3.6 K/UL (ref 4.3–11.1)

## 2020-04-20 PROCEDURE — 99218 HC RM OBSERVATION: CPT

## 2020-04-20 PROCEDURE — 97535 SELF CARE MNGMENT TRAINING: CPT

## 2020-04-20 PROCEDURE — 97530 THERAPEUTIC ACTIVITIES: CPT

## 2020-04-20 PROCEDURE — 82550 ASSAY OF CK (CPK): CPT

## 2020-04-20 PROCEDURE — 85025 COMPLETE CBC W/AUTO DIFF WBC: CPT

## 2020-04-20 PROCEDURE — 74011250637 HC RX REV CODE- 250/637: Performed by: INTERNAL MEDICINE

## 2020-04-20 PROCEDURE — 36415 COLL VENOUS BLD VENIPUNCTURE: CPT

## 2020-04-20 PROCEDURE — 99214 OFFICE O/P EST MOD 30 MIN: CPT | Performed by: INTERNAL MEDICINE

## 2020-04-20 PROCEDURE — 80048 BASIC METABOLIC PNL TOTAL CA: CPT

## 2020-04-20 RX ADMIN — DONEPEZIL HYDROCHLORIDE 10 MG: 5 TABLET, FILM COATED ORAL at 08:21

## 2020-04-20 RX ADMIN — MEMANTINE 10 MG: 5 TABLET ORAL at 08:21

## 2020-04-20 RX ADMIN — ATORVASTATIN CALCIUM 10 MG: 10 TABLET, FILM COATED ORAL at 21:57

## 2020-04-20 RX ADMIN — Medication 5 ML: at 21:59

## 2020-04-20 RX ADMIN — APIXABAN 2.5 MG: 2.5 TABLET, FILM COATED ORAL at 21:57

## 2020-04-20 RX ADMIN — Medication 10 ML: at 04:05

## 2020-04-20 RX ADMIN — LEVETIRACETAM 750 MG: 500 TABLET ORAL at 08:21

## 2020-04-20 RX ADMIN — SERTRALINE HYDROCHLORIDE 50 MG: 50 TABLET ORAL at 08:21

## 2020-04-20 RX ADMIN — APIXABAN 2.5 MG: 2.5 TABLET, FILM COATED ORAL at 08:21

## 2020-04-20 RX ADMIN — CLONAZEPAM 0.5 MG: 1 TABLET ORAL at 08:21

## 2020-04-20 RX ADMIN — CLONAZEPAM 0.5 MG: 1 TABLET ORAL at 18:40

## 2020-04-20 RX ADMIN — LEVETIRACETAM 750 MG: 500 TABLET ORAL at 18:40

## 2020-04-20 NOTE — PROGRESS NOTES
Problem: Self Care Deficits Care Plan (Adult) Goal: *Acute Goals and Plan of Care (Insert Text) Description: 1. Patient will perform grooming with supervision. 2. Patient will perform Upper body dressing with supervision 3. Patient will perform lower body dressing with minimal assist.  
4. Patient will perform upper and lower body bathing with contact guard assist. 
5. Patient will perform toilet transfers with minimal assist. 
6. Patient will perform shower transfer with minimal assist. 
7. Patient will participate in 30 + minutes of ADL/ therapeutic exercise/therapeutic activity with min rest breaks to increase activity tolerance for self care. 8. Patient will perform ADL functional mobility in room with CGA. Goals to be achieved in 7 days. Outcome: Progressing Towards Goal 
  
OCCUPATIONAL THERAPY: Daily Note 4/20/2020 OBSERVATION: OT Visit Days: 2 Payor: Josefalino Padilla / Plan: [address] [place] [address] HMO / Product Type: [employer] Care Medicare /  
  
NAME/AGE/GENDER: Bettie Iverson is a 82 y.o. male PRIMARY DIAGNOSIS:  Falls frequently [R29.6] Falls frequently Falls frequently ICD-10: Treatment Diagnosis:  
 · Generalized Muscle Weakness (M62.81) · Difficulty in walking, Not elsewhere classified (R26.2) Precautions/Allergies: 
   Patient has no known allergies. ASSESSMENT:  
 
Mr. Abi Crystal presents with decreased functional mobility, strength and self care. He lives at [facility] in independent living. He uses a rollator for gait. He has had frequent falls. Pt would benefit from skilled OT to increase his independence. 4/20/2020 CGA mobility and transfers during ADL used RW to get to bathroom. Stood for 10-15 minutes at sink during self cares. Some assist with LB dressing. Progressing well. Continue OT. This section established at most recent assessment PROBLEM LIST (Impairments causing functional limitations): 1. Decreased Strength 2. Decreased ADL/Functional Activities 3. Decreased Transfer Abilities 4. Decreased Ambulation Ability/Technique 5. Decreased Balance 6. Decreased Activity Tolerance INTERVENTIONS PLANNED: (Benefits and precautions of occupational therapy have been discussed with the patient.) 1. Activities of daily living training 2. Adaptive equipment training 3. Balance training 4. Clothing management 5. Therapeutic activity 6. Therapeutic exercise TREATMENT PLAN: Frequency/Duration: Follow patient 4x/week to address above goals. Rehabilitation Potential For Stated Goals: Good REHAB RECOMMENDATIONS (at time of discharge pending progress):   
Placement: It is my opinion, based on this patient's performance to date, that Mr. Chantal Boswell may benefit from intensive therapy at a [address] [place] [address] after discharge due to the functional deficits listed above that are likely to improve with skilled rehabilitation. Equipment:  
? None at this time OCCUPATIONAL PROFILE AND HISTORY:  
History of Present Injury/Illness (Reason for Referral): Falls frequently Past Medical History/Comorbidities:  
Mr. Chantal Boswell  has a past medical history of Acute encephalopathy (3/22/2015), Altered mental status (9/16/2014), Alzheimer disease ([address] Anemia (9/16/2014), Anxiety, Asymmetrical sensorineural hearing loss, Bipolar disorder ([address] Cancer ([address] Cataract (9/8/2016), Cortical hemorrhage ([address] (9/17/2014), Elevated AST (SGOT) (9/16/2014), GERD (gastroesophageal reflux disease), H/O seasonal allergies, History of TIA (transient ischemic attack) (9/16/2014), Hypomagnesemia (9/16/2014), Panic attack, Senile dementia ([address] (5/19/2017), Stroke ([address] Syncope and collapse (3/22/2015), Tinea corporis (9/16/2014), and Tinnitus. Mr. Chantal Boswell  has a past surgical history that includes hx appendectomy; hx other surgical (AGE 23); hx colonoscopy (MULTIPLE OVER THE YEARS); and hx vascular access. Social History/Living Environment:  
Home Environment: Independent living # Steps to Enter: 0 One/Two Story Residence: One story Living Alone: Yes Support Systems: Family member(s) Patient Expects to be Discharged to[de-identified] Rehabilitation facility Current DME Used/Available at Home: eric Barahona Prior Level of Function/Work/Activity: 
Independent with ADLs; ambulates with rollator; lives at [employer] [facility] [employer] Number of Personal Factors/Comorbidities that affect the Plan of Care: Brief history (0):  LOW COMPLEXITY ASSESSMENT OF OCCUPATIONAL PERFORMANCE[de-identified]  
Activities of Daily Living:  
Basic ADLs (From Assessment) Complex ADLs (From Assessment) Feeding: Setup Oral Facial Hygiene/Grooming: Setup Bathing: Contact guard assistance Upper Body Dressing: Contact guard assistance Lower Body Dressing: Minimum assistance Toileting: Contact guard assistance Grooming/Bathing/Dressing Activities of Daily Living Functional Transfers Bathroom Mobility: Contact guard assistance Toilet Transfer : Contact guard assistance Bed/Mat Mobility Supine to Sit: Contact guard assistance Sit to Supine: (NT) Sit to Stand: Contact guard assistance Stand to Sit: Contact guard assistance Bed to Chair: Contact guard assistance Scooting: Contact guard assistance Most Recent Physical Functioning:  
Gross Assessment: 
  
         
  
Posture: 
Posture (WDL): Exceptions to [facility] Posture Assessment: Rounded shoulders, Trunk flexion Balance: 
Sitting: Intact; Without support Standing: Impaired; With support(walker) Standing - Static: (fair to fair- with walker) Standing - Dynamic : (fair- with walker) Bed Mobility: 
Supine to Sit: Contact guard assistance Sit to Supine: (NT) Scooting: Contact guard assistance Wheelchair Mobility: 
  
Transfers: 
Sit to Stand: Contact guard assistance Stand to Sit: Contact guard assistance Bed to Chair: Contact guard assistance Duration: 30 Minutes(extra time to work through activity noted) No data found. Mental Status Neurologic State: Alert Orientation Level: Oriented X4 Cognition: Appropriate decision making, Appropriate safety awareness, Follows commands Perception: Appears intact Perseveration: No perseveration noted Safety/Judgement: Fall prevention LLE Assessment LLE Assessment (WDL): Exception to [facility] RLE Assessment RLE Assessment (WDL): Exceptions to [facility] Physical Skills Involved: 
1. Balance 2. Strength 3. Activity Tolerance Cognitive Skills Affected (resulting in the inability to perform in a timely and safe manner): 1. none  Psychosocial Skills Affected: 1. none Number of elements that affect the Plan of Care: 1-3:  LOW COMPLEXITY CLINICAL DECISION MAKIN[address] [place] [id number] AM-PAC 6 Clicks Daily Activity Inpatient Short Form How much help from another person does the patient currently need. .. Total A Lot A Little None 1. Putting on and taking off regular lower body clothing? [] 1   [x] 2   [] 3   [] 4  
2. Bathing (including washing, rinsing, drying)? [] 1   [x] 2   [] 3   [] 4  
3. Toileting, which includes using toilet, bedpan or urinal?   [] 1   [] 2   [x] 3   [] 4  
4. Putting on and taking off regular upper body clothing? [] 1   [] 2   [x] 3   [] 4  
5. Taking care of personal grooming such as brushing teeth? [] 1   [] 2   [x] 3   [] 4  
6. Eating meals? [] 1   [] 2   [] 3   [x] 4  
© , Trustees of [address] [place] [id number], under license to [employer]. All rights reserved Score:  Initial: 17 Most Recent: X (Date: -- ) Interpretation of Tool:  Represents activities that are increasingly more difficult (i.e. Bed mobility, Transfers, Gait). Medical Necessity:    
· Patient is expected to demonstrate progress in  
· strength, balance, coordination, and functional technique ·  to  
 · increase independence with self care and functional mobility · . Reason for Services/Other Comments: 
· Patient continues to require skilled intervention due to · Decrease self care and functional mobility · . Use of outcome tool(s) and clinical judgement create a POC that gives a: LOW COMPLEXITY  
 
 
 
TREATMENT:  
(In addition to Assessment/Re-Assessment sessions the following treatments were rendered) Pre-treatment Symptoms/Complaints:  tolerated well Pain: Initial:  
Pain Intensity 1: 0  Post Session:  0 Self Care: (30): Procedure(s) (per grid) utilized to improve and/or restore self-care/home management as related to dressing, bathing and grooming. Required minimal verbal and tactile cueing to facilitate activities of daily living skills. Braces/Orthotics/Lines/Etc:  
· ICU monitors · O2 Device: Room air Treatment/Session Assessment:   
· Response to Treatment:  tolerated well · Interdisciplinary Collaboration:  
o Physical Therapist 
o Registered Nurse · After treatment position/precautions:  
o Up in chair 
o Bed/Chair-wheels locked 
o Call light within reach 
o RN notified 
o LEs reclined · Compliance with Program/Exercises: Compliant all of the time. · Recommendations/Intent for next treatment session: \"Next visit will focus on advancements to more challenging activities and reduction in assistance provided\". Total Treatment Duration: OT Patient Time In/Time Out Time In: 1330 Time Out: 1400 Jovanna Eaton OT

## 2020-04-20 NOTE — PROGRESS NOTES
JUAN receives call from [facility] with [facility] who states pt is approved for admission to [facility]. Auth # is ( [id number] ) initial Latrell Horne is for 5 days. Pt receives a bed offer to [facility]. JUAN spoke with pt who accepts bed offer as he is familiar with facility having been there in the past.  JUAN initiated clinical with [facility] ( faxed to [phone] ).

## 2020-04-20 NOTE — PROGRESS NOTES
Progress Note Active Hospital Problems Diagnosis Date Noted  Seizure ([facility] 04/19/2020  Thrombocytopenia ([address] 04/19/2020  Falls frequently 04/18/2020  A-fib ([facility] 04/18/2020  Bradycardia 04/18/2020  Hypokalemia 11/19/2019  Multiple myeloma not having achieved remission ([address] 08/02/2017  Gastroesophageal reflux disease 08/02/2017  Bipolar disorder ([address] 03/22/2015 Currently treated with Lexapro and Xanax. Given pt's age, I would recommend continuing current regimen; refill rx. Follow up 3 months or prn. Pt is working on weaning to a lower dose of Xanax. Assessment Thrombocytopenia ([address] Thrombocytopenia which looks chronic follow up CBC stable Seizure ([address] Continue his home medication, seizure precautions Bradycardia Status post permanent pacemaker, continue telemetry monitoring A-fib ([facility] Atrial fibrillation currently rate controlled, patient on anticoagulation Hypokalemia Resolved with supplementation Gastroesophageal reflux disease Continue his home medication Multiple myeloma not having achieved remission ([address] As per patient request consulted oncology, further management as per oncology * Falls frequently Patient states that he had 2 episodes of fall at assisted living, he denies any loss of consciousness or any seizure-like episode, he thinks he is very weak and he need rehab. PT OT evaluation, discharge planner consult for possible rehab placement Discussed with the discharge planner, as per her patient need precertification from patient insurance, possible rehab tomorrow Diarrhea we are going to check stool studies including C. difficile Length of Stay: 0 Assessment  Subjective 82-year-old  male patient with past medical history significant for multiple comorbidities including atrial fibrillation, hypertension admitted to the medical floor for chief complaint of recurrent falls. Patient states that at baseline he lives at assisted living facility, he states that he had couple of falls in the last 1 week. Patient denies any loss of consciousness or any seizure-like episode. His only complaint was generalized weakness, also complaining of diarrhea which started from last night 4/20/2020 Today patient feels better no further episodes of loose stools Patient requesting for oncology consult, patient states that he received a call from his oncologist office this morning they want to see the patient while in the hospital 
As per patient request consulted oncology on-call Objective Review of Systems Constitution: Negative. HENT: Negative. Eyes: Negative. Cardiovascular: Negative. Endocrine: Negative. Hematologic/Lymphatic: Negative. Skin: Negative. Musculoskeletal: Positive for falls. Gastrointestinal: Negative. Genitourinary: Negative. Neurological: Positive for weakness. Psychiatric/Behavioral: Negative. Physical Exam 
Constitutional:   
   Appearance: He is well-developed. HENT:  
   Head: Normocephalic and atraumatic. Eyes:  
   Conjunctiva/sclera: Conjunctivae normal.  
   Pupils: Pupils are equal, round, and reactive to light. Neck: Musculoskeletal: Normal range of motion and neck supple. Cardiovascular:  
   Rate and Rhythm: Normal rate. Rhythm irregular. Heart sounds: Normal heart sounds. Pulmonary:  
   Effort: Pulmonary effort is normal.  
   Breath sounds: Normal breath sounds. Abdominal:  
   General: Bowel sounds are normal.  
   Palpations: Abdomen is soft. Musculoskeletal: Normal range of motion. Skin: 
   General: Skin is warm and dry. Findings: No erythema or rash. Neurological:  
   Mental Status: He is alert and oriented to person, place, and time. Cranial Nerves: No cranial nerve deficit. No intake or output data in the 24 hours ending 04/20/20 1025 Patient Vitals for the past 24 hrs: 
 Temp Pulse Resp BP  
04/20/20 0810 97.4 °F (36.3 °C) 60 16 108/44  
04/20/20 0400 98 °F (36.7 °C) 64 20 114/57  
04/19/20 2359 98.1 °F (36.7 °C) 66 20 (!) 90/38  
04/19/20 2040 97.7 °F (36.5 °C) 66 20 125/56  
04/19/20 1218 97.7 °F (36.5 °C) 65 18 111/54 Lab Results Component Value Date/Time WBC 3.6 (L) 04/20/2020 04:41 AM  
 RBC 4.00 (L) 04/20/2020 04:41 AM  
 HGB 11.7 (L) 04/20/2020 04:41 AM  
 HCT 37.8 (L) 04/20/2020 04:41 AM  
  (L) 04/20/2020 04:41 AM  
 CO2 28 04/20/2020 04:41 AM  
 BUN 13 04/20/2020 04:41 AM  
 
 
XR Results (maximum last 3): Results from Hospital Encounter encounter on 11/18/19 XR CHEST SNGL V Impression IMPRESSION:  No lobar pneumonia. Mild interstitial prominence. XR CHEST PA LAT Impression IMPRESSION: No acute findings in the chest 
  
XR SPINE LUMB 2 OR 3 V Impression IMPRESSION: No evidence of fracture or other acute abnormality in the lumbar 
spine. CT Results (maximum last 3): Results from [facility] Encounter encounter on 04/18/20 CT HEAD WO CONT Impression IMPRESSION: No acute process. Results from Hospital Encounter encounter on 11/18/19 CT HEAD WO CONT Impression IMPRESSION: No CT evidence of acute intracranial abnormality. Results from [facility] Encounter encounter on 08/02/17 CT BX BONE MARROW NDL/TROCAR Impression Impression: Uncomplicated CT guided right iliac bone marrow aspiration and core 
biopsy. Plan:  Bedrest observation for one hour. MRI Results (maximum last 3): Results from Hospital Encounter encounter on 09/16/14 MRI BRAIN WO CONT Impression IMPRESSION: 
 
1. No acute infarction. 2. Punctate signal abnormality in the region of the left precentral gyrus 
measuring approximately 5 mm in diameter. This may be a very small cortical 
hemorrhage. There is no associated mass effect on the brain. Consider a short followup head CT in several weeks to confirm resolution of this finding, unless 
earlier evaluation is indicated clinically. [place] The critical results contained in this report were communicated to the 
hospitalist, Dr. Bari Strauss by me on 9/17/2014 at 9:35 AM. Nuclear Medicine Results (maximum last 3): No results found for this or any previous visit. US Results (maximum last 3): No results found for this or any previous visit. Scheduled Meds: 
Current Facility-Administered Medications Medication Dose Route Frequency  albuterol (PROVENTIL VENTOLIN) nebulizer solution 2.5 mg  2.5 mg Nebulization Q6H PRN  
 donepeziL (ARICEPT) tablet 10 mg  10 mg Oral DAILY  apixaban (ELIQUIS) tablet 2.5 mg  2.5 mg Oral BID  levETIRAcetam (KEPPRA) tablet 750 mg  750 mg Oral BID  atorvastatin (LIPITOR) tablet 10 mg  10 mg Oral QHS  memantine (NAMENDA) tablet 10 mg  10 mg Oral DAILY  sertraline (ZOLOFT) tablet 50 mg  50 mg Oral DAILY  sodium chloride (NS) flush 5-40 mL  5-40 mL IntraVENous Q8H  
 sodium chloride (NS) flush 5-40 mL  5-40 mL IntraVENous PRN  
 acetaminophen (TYLENOL) tablet 650 mg  650 mg Oral Q4H PRN  
 HYDROcodone-acetaminophen (NORCO) 5-325 mg per tablet 1 Tab  1 Tab Oral Q4H PRN  
 naloxone (NARCAN) injection 0.4 mg  0.4 mg IntraVENous PRN  
 diphenhydrAMINE (BENADRYL) capsule 25 mg  25 mg Oral Q4H PRN  
 ondansetron (ZOFRAN) injection 4 mg  4 mg IntraVENous Q4H PRN  
 senna-docusate (PERICOLACE) 8.6-50 mg per tablet 2 Tab  2 Tab Oral DAILY PRN  
 clonazePAM (KlonoPIN) tablet 0.5 mg  0.5 mg Oral BID

## 2020-04-20 NOTE — CONSULTS
[facility] Hematology & Oncology Inpatient Hematology / Oncology Consult Note Reason for Consult:  Falls frequently [R29.6] Referring Physician:  Stephy Washington MD 
 
History of Present Illness: Mr. Bill Leigh is a 82 y.o. male that presented to ER 4/19/2020 due to falls at assisted living 3 days straight in a row. He states that the first fall was due to  coming in without his knowledge and mopping the floor while he was in the shower causing fall #1 at which time her was down on the floor for about 3 hours. The next day fall was related to his walker giving away and breaking into. Day 3 was due to his legs giving away after walking. He also admits to episodes of incontinence of stool. He states that he has not been allowed to take his imodium as needed due to facility managing his medications. He says that when he stools that he also urinates and that this has been going on for about 3 months. He has PMH of Afib, Pacemaker, MM and seizures after a fall from [address] on [address]. He is a known patient of Dr. Allen Fajardo with MM currently on Revilimid 21/28 days. He was last seen by Dr. Allen Fajardo March 12, 2020. Due to some difficulties with scheduling he has missed his April appointment. He has been approved to rehab at [facility]. We are consulted at the patient's request since he missed his last appointment. Review of Systems: 
Constitutional Denies fever, chills, weight loss, appetite changes, fatigue HEENT Denies trauma, blurry vision, hearing loss, ear pain, nosebleeds, sore throat, neck pain Skin Denies lesions or rashes. Lungs Denies dyspnea, cough, sputum production or hemoptysis. Cardiovascular Denies chest pain, palpitations, or lower extremity edema. Gastrointestinal Denies nausea, vomiting,  abdominal pain. Intermittent diarrhea. Neuro Denies headaches, visual changes or ataxia.  Denies dizziness, tingling, tremors, sensory change, speech change, focal weakness or headaches. MSK LE weakness. Frequent falls Psychiatric/Behavioral The patient is not nervous/anxious. No Known Allergies Past Medical History:  
Diagnosis Date  Acute encephalopathy 3/22/2015  Altered mental status 9/16/2014  Alzheimer disease ([facility]  Anemia 9/16/2014 MILD  Anxiety  Asymmetrical sensorineural hearing loss  Bipolar disorder ([address] NOT TREATED, DIAGNOSED MANY YEARS AGO  Cancer ([facility] multiple myeloma  Cataract 9/8/2016  Cortical hemorrhage ([address] 9/17/2014  Elevated AST (SGOT) 9/16/2014  GERD (gastroesophageal reflux disease)   
 not Tx  
 H/O seasonal allergies  History of TIA (transient ischemic attack) 9/16/2014  Hypomagnesemia 9/16/2014 MILD  Panic attack  Senile dementia ([address] 5/19/2017  Stroke ([facility] ? TIA, PUT ON PLAVIX, TOOK SELF OFF  Syncope and collapse 3/22/2015  Tinea corporis 9/16/2014  Tinnitus Past Surgical History:  
Procedure Laterality Date  HX APPENDECTOMY  HX COLONOSCOPY  MULTIPLE OVER THE YEARS  
 NO CANCER  
 HX OTHER SURGICAL  AGE 23  
 COLONIC POLYP REMOVAL  
 HX VASCULAR ACCESS Family History Problem Relation Age of Onset  Diabetes Mother COMPLICATIONS OF DM  
 Cancer Mother  Heart Disease Mother  Lung Disease Father BLACK LUNG  Dementia Sister  Heart Disease Brother \"OLD AGE\"  Other Son ESTRANGED, [place]  
 Other Daughter   
     1 ESTRANGED, [address] [place] [address], [address] [place] Social History Socioeconomic History  Marital status:  Spouse name: Not on file  Number of children: Not on file  Years of education: Not on file  Highest education level: Not on file Occupational History  Not on file Social Needs  Financial resource strain: Not on file  Food insecurity Worry: Not on file Inability: Not on file  Transportation needs Medical: Not on file Non-medical: Not on file Tobacco Use  Smoking status: Former Smoker Packs/day: 2.00 Years: 16.00 Pack years: 32.00 Types: Cigarettes  Smokeless tobacco: Never Used  Tobacco comment: QUIT AGE 37 Substance and Sexual Activity  Alcohol use: No  
 Drug use: No  
 Sexual activity: Not Currently Partners: Female Birth control/protection: None Lifestyle  Physical activity Days per week: Not on file Minutes per session: Not on file  Stress: Not on file Relationships  Social connections Talks on phone: Not on file Gets together: Not on file Attends Rastafarian service: Not on file Active member of club or organization: Not on file Attends meetings of clubs or organizations: Not on file Relationship status: Not on file  Intimate partner violence Fear of current or ex partner: Not on file Emotionally abused: Not on file Physically abused: Not on file Forced sexual activity: Not on file Other Topics Concern  Not on file Social History Narrative 9/16/14:  PATIENT IS , LIVES WITH CATARINA MAYA. 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 HE MOVED TO [facility] FOR 5 YEARS, HAS BEEN HERE (?) FOR ABOUT 10 YEARS. HAS A DAUGHTER IN [place] THAT IS NOT SPEAKING TO HIM, A SON IN [place] WHO IS NOT SPEAKING TO HIM, AND A DAUGHTER IN [facility]. BROTHER AND SISTER ARE DEAD. ONLY FRIEND IS SERA COELLOASIA, ([facility]: [phone]), A  WHO LOOKS AFTER THE PATIENT. CALL HIM ON DISCHARGE AS HE HAS PATIENT'S DOG AND HOUSE KEYS. Current Facility-Administered Medications Medication Dose Route Frequency Provider Last Rate Last Dose  albuterol (PROVENTIL VENTOLIN) nebulizer solution 2.5 mg  2.5 mg Nebulization Q6H PRN Monica Contreras MD      
 donepeziL (ARICEPT) tablet 10 mg  10 mg Oral DAILY Monica Contreras MD   10 mg at 04/20/20 7947  apixaban (ELIQUIS) tablet 2.5 mg  2.5 mg Oral BID Jos Parker MD   2.5 mg at 20 2355  levETIRAcetam (KEPPRA) tablet 750 mg  750 mg Oral BID Jos Parker MD   750 mg at 20 4413  
 atorvastatin (LIPITOR) tablet 10 mg  10 mg Oral Kiko Ulrich MD   10 mg at 20 2211  memantine (NAMENDA) tablet 10 mg  10 mg Oral DAILY Jos Parker MD   10 mg at 20 4673  sertraline (ZOLOFT) tablet 50 mg  50 mg Oral DAILY Jos Parker MD   50 mg at 20 5319  
 sodium chloride (NS) flush 5-40 mL  5-40 mL IntraVENous Q8H Jos Parker MD   10 mL at 20 0405  sodium chloride (NS) flush 5-40 mL  5-40 mL IntraVENous PRN Jos Parker MD   5 mL at 20 2047  acetaminophen (TYLENOL) tablet 650 mg  650 mg Oral Q4H PRN Jos Parker MD   650 mg at 20 0107  
 HYDROcodone-acetaminophen (NORCO) 5-325 mg per tablet 1 Tab  1 Tab Oral Q4H PRN Jos Parker MD      
 naloxone [facility]) injection 0.4 mg  0.4 mg IntraVENous PRN Jos Parker MD      
 diphenhydrAMINE (BENADRYL) capsule 25 mg  25 mg Oral Q4H PRN Jos Parker MD   25 mg at 20 221  
 ondansetron (ZOFRAN) injection 4 mg  4 mg IntraVENous Q4H PRN Jos Parker MD      
 senna-docusate (PERICOLACE) 8.6-50 mg per tablet 2 Tab  2 Tab Oral DAILY PRN Jos Parker MD      
 clonazePAM (KlonoPIN) tablet 0.5 mg  0.5 mg Oral BID Jos Parker MD   0.5 mg at 20 5841 OBJECTIVE: 
Patient Vitals for the past 8 hrs: 
 BP Temp Pulse Resp  
20 0810 108/44 97.4 °F (36.3 °C) 60 16 Temp (24hrs), Av.8 °F (36.6 °C), Min:97.4 °F (36.3 °C), Max:98.1 °F (36.7 °C) No intake/output data recorded. Physical Exam: 
Constitutional: Well developed, well nourished male in no acute distress, sitting comfortably in the hospital bedside chair. HEENT: Normocephalic and atraumatic. Oropharynx is clear, mucous membranes are moist.  Pupils are equal, round, and reactive to light. Extraocular muscles are intact. Sclerae anicteric. Skin Warm and dry. No bruising and no rash noted. No erythema. No pallor. Respiratory Lungs are clear to auscultation bilaterally without wheezes, rales or rhonchi, normal air exchange without accessory muscle use. CVS Normal rate, regular rhythm and normal S1 and S2. No murmurs, gallops, or rubs. Abdomen Soft, nontender and nondistended, normoactive bowel sounds. Neuro Grossly nonfocal with no obvious sensory or motor deficits. MSK Normal range of motion in general.  No edema and no tenderness. Psych Appropriate mood and affect. Labs:   
Recent Results (from the past 24 hour(s)) PLEASE READ & DOCUMENT PPD TEST IN 24 HRS Collection Time: 04/20/20  1:20 AM  
Result Value Ref Range PPD Negative Negative  
 mm Induration 0 0 - 5 mm METABOLIC PANEL, BASIC Collection Time: 04/20/20  4:41 AM  
Result Value Ref Range Sodium 144 136 - 145 mmol/L Potassium 3.4 (L) 3.5 - 5.1 mmol/L Chloride 111 (H) 98 - 107 mmol/L  
 CO2 28 21 - 32 mmol/L Anion gap 5 (L) 7 - 16 mmol/L Glucose 103 (H) 65 - 100 mg/dL BUN 13 8 - 23 MG/DL Creatinine 0.94 0.8 - 1.5 MG/DL  
 GFR est AA >60 >60 ml/min/1.73m2 GFR est non-AA >60 >60 ml/min/1.73m2 Calcium 8.2 (L) 8.3 - 10.4 MG/DL  
CBC WITH AUTOMATED DIFF Collection Time: 04/20/20  4:41 AM  
Result Value Ref Range WBC 3.6 (L) 4.3 - 11.1 K/uL  
 RBC 4.00 (L) 4.23 - 5.6 M/uL  
 HGB 11.7 (L) 13.6 - 17.2 g/dL HCT 37.8 (L) 41.1 - 50.3 % MCV 94.5 79.6 - 97.8 FL  
 MCH 29.3 26.1 - 32.9 PG  
 MCHC 31.0 (L) 31.4 - 35.0 g/dL  
 RDW 16.8 (H) 11.9 - 14.6 % PLATELET 442 (L) 114 - 450 K/uL MPV 9.9 9.4 - 12.3 FL ABSOLUTE NRBC 0.00 0.0 - 0.2 K/uL  
 DF AUTOMATED NEUTROPHILS 52 43 - 78 % LYMPHOCYTES 30 13 - 44 % MONOCYTES 9 4.0 - 12.0 % EOSINOPHILS 7 0.5 - 7.8 % BASOPHILS 1 0.0 - 2.0 % IMMATURE GRANULOCYTES 0 0.0 - 5.0 %  
 ABS. NEUTROPHILS 1.9 1.7 - 8.2 K/UL  
 ABS. LYMPHOCYTES 1.1 0.5 - 4.6 K/UL  
 ABS. MONOCYTES 0.3 0.1 - 1.3 K/UL  
 ABS. EOSINOPHILS 0.3 0.0 - 0.8 K/UL  
 ABS. BASOPHILS 0.1 0.0 - 0.2 K/UL  
 ABS. IMM. GRANS. 0.0 0.0 - 0.5 K/UL Imaging: 
[unfilled] ASSESSMENT: 
Problem List  Date Reviewed: 8/8/2018 Codes Class Noted Seizure ([facility] (Chronic) ICD-10-CM: R56.9 ICD-9-CM: 780.39  4/19/2020 Thrombocytopenia ([facility] ICD-10-CM: D69.6 ICD-9-CM: 287.5  4/19/2020 * (Principal) Falls frequently ICD-10-CM: R29.6 ICD-9-CM: V15.88  4/18/2020 A-fib ([facility]) (Chronic) ICD-10-CM: I48.91 
ICD-9-CM: 427.31  4/18/2020 Bradycardia (Chronic) ICD-10-CM: R00.1 ICD-9-CM: 427.89  4/18/2020 CKD (chronic kidney disease) ICD-10-CM: N18.9 ICD-9-CM: 585.9  11/19/2019 Hypokalemia ICD-10-CM: E87.6 ICD-9-CM: 276.8  11/19/2019 Urinary frequency ICD-10-CM: R35.0 ICD-9-CM: 788.41  8/8/2018 S/P placement of cardiac pacemaker ICD-10-CM: Z95.0 ICD-9-CM: V45.01  8/2/2018 Severe obesity (BMI 35.0-39. 9) with comorbidity ([facility] ICD-10-CM: E66.01 
ICD-9-CM: 278.01  4/12/2018 Multiple myeloma not having achieved remission ([facility] ICD-10-CM: C90.00 ICD-9-CM: 203.00  8/2/2017 Gastroesophageal reflux disease ICD-10-CM: K21.9 ICD-9-CM: 530.81  8/2/2017 Bilateral ankle pain ICD-10-CM: M25.571, M25.572 ICD-9-CM: 719.47  7/31/2017 Pollen allergies ICD-10-CM: Z91.09 
ICD-9-CM: V15.09  7/31/2017 Overview Signed 7/31/2017  2:27 PM by Krishna Gracia MD  
  Pt would like to consider allergy shots. Will refer to Allergy Vitamin B12 deficiency ICD-10-CM: E53.8 ICD-9-CM: 266.2  7/18/2017 Overview Signed 7/18/2017  2:06 PM by Krishna Gracia MD  
  Pt just started taking Vitamin B 12 supplement. Abnormal laboratory test ICD-10-CM: R89.9 ICD-9-CM: 796.4  7/18/2017 Overview Addendum 7/31/2017  2:26 PM by Constantine Guerin MD  
  UPEP results reviewed with pt. Pt has referral to Oncology to further evaluate. Mixed hyperlipidemia ICD-10-CM: E78.2 ICD-9-CM: 272.2  6/26/2017 OA (osteoarthritis) ICD-10-CM: M19.90 ICD-9-CM: 715.90  6/26/2017 Senile dementia ([facility] ICD-10-CM: F03.90 ICD-9-CM: 290.0  5/19/2017 Cataract ICD-10-CM: H26.9 ICD-9-CM: 366.9  9/8/2016 Asymmetrical sensorineural hearing loss ICD-10-CM: H90.5 ICD-9-CM: 389.16  Unknown Bipolar disorder ([facility] ICD-10-CM: F31.9 ICD-9-CM: 296.80  3/22/2015 Overview Addendum 7/6/2017  5:01 PM by Constantine Guerin MD  
  Currently treated with Lexapro and Xanax. Given pt's age, I would recommend continuing current regimen; refill rx. Follow up 3 months or prn. Pt is working on weaning to a lower dose of Xanax. Syncope and collapse ICD-10-CM: R55 
ICD-9-CM: 780.2  3/22/2015 Acute encephalopathy ICD-10-CM: G93.40 ICD-9-CM: 348.30  3/22/2015 Cortical hemorrhage (HCC) ICD-10-CM: I61.1 ICD-9-CM: 31 62 12  9/17/2014 Syncope ICD-10-CM: R55 
ICD-9-CM: 780.2  9/16/2014 Overview Signed 7/6/2017  5:00 PM by Constantine Guerin MD  
  Refer to Cardiology to evaluated. Concern for symptomatic bradycardia. History of TIA (transient ischemic attack) ICD-10-CM: Z86.73 
ICD-9-CM: V12.54  9/16/2014 Altered mental status ICD-10-CM: R41.82 
ICD-9-CM: 780.97  9/16/2014 Anemia ICD-10-CM: D64.9 ICD-9-CM: 285.9  9/16/2014 Overview Addendum 7/6/2017  5:01 PM by Constantine Guerin MD  
  Recheck CBC. Tinea corporis ICD-10-CM: B35.4 ICD-9-CM: 110.5  9/16/2014 Hypomagnesemia ICD-10-CM: G90.15 
ICD-9-CM: 275.2  9/16/2014 Overview Signed 9/16/2014 10:14 PM by Aileen King NP  
  MILD Elevated AST (SGOT) ICD-10-CM: R74.0 ICD-9-CM: 790.4  9/16/2014 RECOMMENDATIONS: 
Diarrhea 
-C diff negative 
-Ova and parasite pending HO seizures on [place] at home 
-Reports being down for 3 hours-check CPK 
-approved for rehab [facility] MM sp 4 cycles CyBorD with TN currently on Revlimid 
-On Revlimid (10 mg for 21/28 days). Last seen in clinic with Dr. Sherrie Mitchell 3/12/2020. Lab studies and imaging studies were personally reviewed. Pertinent old records were reviewed. Thank you for allowing us to participate in the care of Mr. Aleksandra Cheng. We will arrange for his to follow up with Dr. Sherrie Mitchell once discharged. Kamila Mendoza NP [facility] Hematology & Oncology [address] [place] [address] Office : [phone] Fax : [phone]

## 2020-04-20 NOTE — PROGRESS NOTES
Problem: Mobility Impaired (Adult and Pediatric) Goal: *Acute Goals and Plan of Care (Insert Text) Description:   
GOALS MODIFIED 4/20/20 : 
(1.)Mr. Nishant Santos will move from supine to sit and sit to supine  in bed with SBA (consistently) . (2.)Mr. Nishant Santos will transfer from bed to chair and chair to bed with CONTACT GUARD ASSIST (consistently) using a walker. (3.)Mr. Nishant Santos will ambulate with CONTACT GUARD ASSIST for  feet with a rolling walker. (4)Mr. Nishant Santos will perform HEP safely with written guidelines. ________________________________________________________________________________________________ Outcome: Progressing Towards Goal 
  
PHYSICAL THERAPY: Daily Note and AM 4/20/2020 OBSERVATION: PT Visit Days : 2 Payor: Lissette Avila / Plan: [address] [place] [address] HMO / Product Type: Managed Care Medicare /   
  
NAME/AGE/GENDER: Virgie Todd is a 82 y.o. male PRIMARY DIAGNOSIS: Falls frequently [R29.6] Falls frequently Falls frequently ICD-10: Treatment Diagnosis:  
 · Generalized Muscle Weakness (M62.81) · Other abnormalities of gait and mobility (R26.89) · History of falling (Z91.81) Precaution/Allergies: 
Patient has no known allergies. ASSESSMENT:  
 
Mr. Nishant Santos showed significant improvement since eval yesterday. This pt needs to perform at his improved level consistently, goals modified to reflect this. This pt lives alone therefore will need SNF to become safe enough to return home alone. This section established at most recent assessment PROBLEM LIST (Impairments causing functional limitations): 1. Decreased Strength 2. Decreased ADL/Functional Activities 3. Decreased Transfer Abilities 4. Decreased Ambulation Ability/Technique 5. Decreased Balance 6. Decreased Activity Tolerance 7. Increased Fatigue 8. Decreased Flexibility/Joint Mobility 9. Decreased Knowledge of Precautions 10.  Decreased [place] with Home Exercise Program 
 INTERVENTIONS PLANNED: (Benefits and precautions of physical therapy have been discussed with the patient.) 1. Balance Exercise 2. Bed Mobility 3. Family Education 4. Gait Training 5. Home Exercise Program (HEP) 6. Range of Motion (ROM) 7. Therapeutic Activites 8. Therapeutic Exercise/Strengthening 9. Transfer Training TREATMENT PLAN: Frequency/Duration: daily for duration of hospital stay Rehabilitation Potential For Stated Goals: Fair REHAB RECOMMENDATIONS (at time of discharge pending progress):   
Placement: It is my opinion, based on this patient's performance to date, that Mr. Aleksandra Cheng may benefit from intensive therapy at a [address] [place] [address] after discharge due to the functional deficits listed above that are likely to improve with skilled rehabilitation and concerns that he/she may be unsafe to be unsupervised at home due to falling. Equipment:  
? None at this time HISTORY:  
History of Present Injury/Illness (Reason for Referral): Mr. Aleksandra Cheng (pronounced k-goes-e) is an 82-year-old gentleman presenting from assisted living with frequent falls. Information obtained from patient as he was a reliable source. For the past 3 days he has fallen each day, requiring the fire department or EMS to pick him up off the ground. On the second fall his nurse practitioner recommended come to ER for evaluation but he refused. On the third fall he realizes he is falling a lot and wanted to come to the hospital for further evaluation. He denies any loss of consciousness during these falls. He says that he would walk approximately 3 feet away from his bed using his rolling walker, his legs would get weak and he would fall. He is unable to bring himself up to stand. He does admit to poor oral intake for the past few days secondary to bowel and bladder incontinence.   However he states he has been having the bowel and bladder incontinence issues for more than 6 months. He has no recent medication changes as far as he knows and no recent antibiotic use. He does have history of multiple myeloma and has been treated with lenalidomide. 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 ER consult to hospitalist for observation for his frequent falls. Past Medical History/Comorbidities:  
Mr. Parvez Contreras  has a past medical history of Acute encephalopathy (3/22/2015), Altered mental status (9/16/2014), Alzheimer disease ([address] Anemia (9/16/2014), Anxiety, Asymmetrical sensorineural hearing loss, Bipolar disorder ([address] Cancer ([address] Cataract (9/8/2016), Cortical hemorrhage ([address] (9/17/2014), Elevated AST (SGOT) (9/16/2014), GERD (gastroesophageal reflux disease), H/O seasonal allergies, History of TIA (transient ischemic attack) (9/16/2014), Hypomagnesemia (9/16/2014), Panic attack, Senile dementia ([address] (5/19/2017), Stroke ([address] Syncope and collapse (3/22/2015), Tinea corporis (9/16/2014), and Tinnitus. Mr. Parvez Contreras  has a past surgical history that includes hx appendectomy; hx other surgical (AGE 23); hx colonoscopy (MULTIPLE OVER THE YEARS); and hx vascular access. Social History/Living Environment:  
Home Environment: Independent living # Steps to Enter: 0 One/Two Story Residence: One story Living Alone: Yes Support Systems: Family member(s) Patient Expects to be Discharged to[de-identified] Independent living facility Current DME Used/Available at Home: Si Ripa, rollator Prior Level of Function/Work/Activity: 
Falling, independent, rollator Number of Personal Factors/Comorbidities that affect the Plan of Care: 3+: HIGH COMPLEXITY EXAMINATION:  
Most Recent Physical Functioning:  
Gross Assessment: 3 to 3-/5 through out Balance: 
Sitting: Intact; Without support Standing: Impaired; With support(walker) Standing - Static: (fair to fair- with walker) Standing - Dynamic : (fair- with walker) Bed Mobility: 
Supine to Sit: Contact guard assistance Sit to Supine: (NT) Scooting: Contact guard assistance Transfers: 
Sit to Stand: Contact guard assistance Stand to Sit: Contact guard assistance Bed to Chair: Contact guard assistance(with walker) Duration: 30 Minutes(extra time to work through activity noted) Gait: 
  
Speed/Connie: Delayed Step Length: Left shortened;Right shortened Gait Abnormalities: Decreased step clearance Distance (ft): 60 Feet (ft) Assistive Device: Walker, rolling Ambulation - Level of Assistance: Contact guard assistance Interventions: Safety awareness training;Verbal cues Body Structures Involved: 1. Bones 2. Joints 3. Muscles 4. Ligaments Body Functions Affected: 1. Movement Related Activities and Participation Affected: 1. Mobility Number of elements that affect the Plan of Care: 3: MODERATE COMPLEXITY CLINICAL PRESENTATION:  
Presentation: Stable and uncomplicated: LOW COMPLEXITY CLINICAL DECISION MAKIN [facility] [address] AM-PAC 6 Clicks Basic Mobility Inpatient Short Form How much difficulty does the patient currently have. .. Unable A Lot A Little None 1. Turning over in bed (including adjusting bedclothes, sheets and blankets)? [] 1   [x] 2   [] 3   [] 4  
2. Sitting down on and standing up from a chair with arms ( e.g., wheelchair, bedside commode, etc.)   [] 1   [] 2   [x] 3   [] 4  
3. Moving from lying on back to sitting on the side of the bed? [] 1   [x] 2   [] 3   [] 4 How much help from another person does the patient currently need. .. Total A Lot A Little None 4. Moving to and from a bed to a chair (including a wheelchair)? [] 1   [] 2   [x] 3   [] 4  
5. Need to walk in hospital room?    [] 1   [] 2   [x] 3   [] 4  
 6.  Climbing 3-5 steps with a railing? [] 1   [x] 2   [] 3   [] 4  
© 2007, Trustees of [address] [place] [address] [address] [address], under license to [employer]. All rights reserved Score:  Initial: 15 Most Recent: X (Date: -- ) Interpretation of Tool:  Represents activities that are increasingly more difficult (i.e. Bed mobility, Transfers, Gait). Medical Necessity:    
· Patient is expected to demonstrate progress in  
· strength, range of motion, balance, coordination, and functional technique ·  to  
· decrease assistance required with exercises and functional mobility · . Reason for Services/Other Comments: 
· Patient · continues to require present interventions due to patient's inability to perform exercises and functional mobility independently · . Use of outcome tool(s) and clinical judgement create a POC that gives a: Questionable prediction of patient's progress: MODERATE COMPLEXITY  
  
 
 
 
TREATMENT:  
(In addition to Assessment/Re-Assessment sessions the following treatments were rendered) Pre-treatment Symptoms/Complaints:  : \" I am shaky \" 
Pain: Initial: numeric scale Pain Intensity 1: 0  Post Session:  0/10 Therapeutic Activity: (  30 Minutes(extra time to work through activity noted) ):  Therapeutic activities including bed mobility, transfers, progressive gait followed by LE & UE AROM exercise along with quad isometrics to improve mobility, strength, and balance. Required minimal Safety awareness training;Verbal cues to promote static and dynamic balance in standing. Braces/Orthotics/Lines/Etc:  
· IV 
· Icu lines · O2 Device: Room air Treatment/Session Assessment:   
· Response to Treatment:  Pt is more steady today than prior encounter. · Interdisciplinary Collaboration:  
o Registered Nurse · After treatment position/precautions:  
o Up in chair 
o Bed/Chair-wheels locked 
o Call light within reach 
o RN notified · Compliance with Program/Exercises: Will assess as treatment progresses · Recommendations/Intent for next treatment session: \"Next visit will focus on reduction in assistance provided\". Total Treatment Duration: PT Patient Time In/Time Out Time In: 8157 Time Out: 6911 Yesenia Vigil, PT

## 2020-04-20 NOTE — PROGRESS NOTES
Problem: Falls - Risk of 
Goal: *Absence of Falls Description: Document Annmarie Solo Fall Risk and appropriate interventions in the flowsheet. Outcome: Progressing Towards Goal 
Note: Fall Risk Interventions: 
Mobility Interventions: Bed/chair exit alarm, Communicate number of staff needed for ambulation/transfer, OT consult for ADLs, Patient to call before getting OOB, PT Consult for mobility concerns Medication Interventions: Bed/chair exit alarm, Patient to call before getting OOB Elimination Interventions: Bed/chair exit alarm, Call light in reach, Patient to call for help with toileting needs, Toileting schedule/hourly rounds History of Falls Interventions: Bed/chair exit alarm, Room close to nurse's station, Investigate reason for fall, Utilize gait belt for transfer/ambulation Problem: Pressure Injury - Risk of 
Goal: *Prevention of pressure injury Description: Document Blayne Scale and appropriate interventions in the flowsheet. Outcome: Progressing Towards Goal 
Note: Pressure Injury Interventions: 
Sensory Interventions: Assess need for specialty bed, Keep linens dry and wrinkle-free Moisture Interventions: Moisture barrier Activity Interventions: Pressure redistribution bed/mattress(bed type) Mobility Interventions: Pressure redistribution bed/mattress (bed type), PT/OT evaluation Nutrition Interventions: Discuss nutritional consult with provider, Offer support with meals,snacks and hydration Friction and Shear Interventions: HOB 30 degrees or less Problem: Patient Education: Go to Patient Education Activity Goal: Patient/Family Education Outcome: Progressing Towards Goal 
  
Problem: Patient Education: Go to Patient Education Activity Goal: Patient/Family Education Outcome: Progressing Towards Goal 
  
Problem: Pressure Injury - Risk of 
Goal: *Prevention of pressure injury Description: Document Blayne Scale and appropriate interventions in the flowsheet. Outcome: Progressing Towards Goal 
Note: Pressure Injury Interventions: 
Sensory Interventions: Assess need for specialty bed, Keep linens dry and wrinkle-free Moisture Interventions: Moisture barrier Activity Interventions: Pressure redistribution bed/mattress(bed type) Mobility Interventions: Pressure redistribution bed/mattress (bed type), PT/OT evaluation Nutrition Interventions: Discuss nutritional consult with provider, Offer support with meals,snacks and hydration Friction and Shear Interventions: HOB 30 degrees or less

## 2020-04-20 NOTE — PROGRESS NOTES
Interdisciplinary team rounds were held 4/20/2020 with the following team members:Care Management, Nursing, Nutrition, Pharmacy, Physical Therapy and Physician. Plan of care discussed. See clinical pathway and/or care plan for interventions and desired outcomes.

## 2020-04-20 NOTE — PROGRESS NOTES
Resting quietly, awake, resp even, unlab, skin warm, dry. AP irreg, lungs sounds clear. RLE with scabbed lacerations, posterior, anterior. LLE with scabbed laceration anteriorly. Incontinent of urine with smear of yellow stool noted, care given, repositioned. Aware not to be out of be without asst. Assessment noted. No c/o. No distress.

## 2020-04-21 VITALS
RESPIRATION RATE: 18 BRPM | TEMPERATURE: 97.2 F | DIASTOLIC BLOOD PRESSURE: 67 MMHG | BODY MASS INDEX: 35.9 KG/M2 | HEIGHT: 70 IN | OXYGEN SATURATION: 94 % | HEART RATE: 60 BPM | WEIGHT: 250.8 LBS | SYSTOLIC BLOOD PRESSURE: 126 MMHG

## 2020-04-21 LAB
LEVETIRACETAM SERPL-MCNC: 11.7 UG/ML (ref 10–40)
MM INDURATION POC: 0 MM (ref 0–5)
PPD POC: NEGATIVE NEGATIVE

## 2020-04-21 PROCEDURE — 97530 THERAPEUTIC ACTIVITIES: CPT

## 2020-04-21 PROCEDURE — 84156 ASSAY OF PROTEIN URINE: CPT

## 2020-04-21 PROCEDURE — 82784 ASSAY IGA/IGD/IGG/IGM EACH: CPT

## 2020-04-21 PROCEDURE — 99218 HC RM OBSERVATION: CPT

## 2020-04-21 PROCEDURE — 74011250637 HC RX REV CODE- 250/637: Performed by: INTERNAL MEDICINE

## 2020-04-21 PROCEDURE — 83883 ASSAY NEPHELOMETRY NOT SPEC: CPT

## 2020-04-21 PROCEDURE — 36415 COLL VENOUS BLD VENIPUNCTURE: CPT

## 2020-04-21 PROCEDURE — 97535 SELF CARE MNGMENT TRAINING: CPT

## 2020-04-21 RX ORDER — CLONAZEPAM 0.5 MG/1
0.5 TABLET ORAL 2 TIMES DAILY
Qty: 4 TAB | Refills: 0 | Status: SHIPPED | OUTPATIENT
Start: 2020-04-21 | End: 2022-01-01

## 2020-04-21 RX ORDER — SERTRALINE HYDROCHLORIDE 50 MG/1
50 TABLET, FILM COATED ORAL DAILY
Qty: 30 TAB | Refills: 0 | Status: SHIPPED
Start: 2020-04-22 | End: 2022-01-01

## 2020-04-21 RX ORDER — LOPERAMIDE HCL 2 MG
TABLET ORAL
Qty: 30 TAB | Refills: 0 | Status: SHIPPED
Start: 2020-04-21 | End: 2022-01-01

## 2020-04-21 RX ORDER — ACETAMINOPHEN 325 MG/1
650 TABLET ORAL
Qty: 30 TAB | Refills: 0 | Status: SHIPPED
Start: 2020-04-21 | End: 2022-01-01

## 2020-04-21 RX ORDER — TRAMADOL HYDROCHLORIDE 50 MG/1
50 TABLET ORAL
Qty: 9 TAB | Refills: 0 | Status: SHIPPED | OUTPATIENT
Start: 2020-04-21 | End: 2020-04-24

## 2020-04-21 RX ORDER — MEMANTINE HYDROCHLORIDE 10 MG/1
10 TABLET ORAL DAILY
Qty: 30 TAB | Refills: 0 | Status: SHIPPED
Start: 2020-04-22 | End: 2020-12-30 | Stop reason: SDUPTHER

## 2020-04-21 RX ORDER — QUETIAPINE FUMARATE 25 MG/1
25 TABLET, FILM COATED ORAL
Qty: 30 TAB | Refills: 0 | Status: SHIPPED | OUTPATIENT
Start: 2020-04-21 | End: 2022-01-01

## 2020-04-21 RX ORDER — LEVETIRACETAM 750 MG/1
750 TABLET ORAL 2 TIMES DAILY
Qty: 60 TAB | Refills: 0 | Status: SHIPPED
Start: 2020-04-21 | End: 2022-01-01

## 2020-04-21 RX ADMIN — Medication 10 ML: at 06:18

## 2020-04-21 RX ADMIN — CLONAZEPAM 0.5 MG: 1 TABLET ORAL at 09:11

## 2020-04-21 RX ADMIN — DONEPEZIL HYDROCHLORIDE 10 MG: 5 TABLET, FILM COATED ORAL at 09:11

## 2020-04-21 RX ADMIN — SERTRALINE HYDROCHLORIDE 50 MG: 50 TABLET ORAL at 09:11

## 2020-04-21 RX ADMIN — LEVETIRACETAM 750 MG: 500 TABLET ORAL at 09:11

## 2020-04-21 RX ADMIN — MEMANTINE 10 MG: 5 TABLET ORAL at 09:11

## 2020-04-21 RX ADMIN — APIXABAN 2.5 MG: 2.5 TABLET, FILM COATED ORAL at 09:11

## 2020-04-21 NOTE — PROGRESS NOTES
Problem: Mobility Impaired (Adult and Pediatric) Goal: *Acute Goals and Plan of Care (Insert Text) Description:   
GOALS MODIFIED 4/20/20 : 
(1.)Mr. Bill Leigh will move from supine to sit and sit to supine  in bed with SBA (consistently) . (2.)Mr. Bill Leigh will transfer from bed to chair and chair to bed with CONTACT GUARD ASSIST (consistently) using a walker. (3.)Mr. Bill Leigh will ambulate with CONTACT GUARD ASSIST for  feet with a rolling walker. (4)Mr. Bill Leigh will perform HEP safely with written guidelines. ________________________________________________________________________________________________ Outcome: Progressing Towards Goal 
  
PHYSICAL THERAPY: Daily Note and AM 4/21/2020 OBSERVATION: PT Visit Days : 3 Payor: Sonia Mendoza / Plan: [address] [place] [address] HMO / Product Type: Managed Care Medicare /   
  
NAME/AGE/GENDER: Ramona Funk is a 82 y.o. male PRIMARY DIAGNOSIS: Falls frequently [R29.6] Falls frequently Falls frequently ICD-10: Treatment Diagnosis:  
 · Generalized Muscle Weakness (M62.81) · Other abnormalities of gait and mobility (R26.89) · History of falling (Z91.81) Precaution/Allergies: 
Patient has no known allergies. ASSESSMENT:  
 
Mr. Bill Leigh continues to make progress. He plans on rehab today. He ambulated in the hess with RW CGA an increased distance this am.  He needed more assistance to go sit to supine than with other transfers. He did better today. No questions. This section established at most recent assessment PROBLEM LIST (Impairments causing functional limitations): 1. Decreased Strength 2. Decreased ADL/Functional Activities 3. Decreased Transfer Abilities 4. Decreased Ambulation Ability/Technique 5. Decreased Balance 6. Decreased Activity Tolerance 7. Increased Fatigue 8. Decreased Flexibility/Joint Mobility 9. Decreased Knowledge of Precautions 10.  Decreased [place] with Home Exercise Program 
 INTERVENTIONS PLANNED: (Benefits and precautions of physical therapy have been discussed with the patient.) 1. Balance Exercise 2. Bed Mobility 3. Family Education 4. Gait Training 5. Home Exercise Program (HEP) 6. Range of Motion (ROM) 7. Therapeutic Activites 8. Therapeutic Exercise/Strengthening 9. Transfer Training TREATMENT PLAN: Frequency/Duration: daily for duration of hospital stay Rehabilitation Potential For Stated Goals: Fair REHAB RECOMMENDATIONS (at time of discharge pending progress):   
Placement: It is my opinion, based on this patient's performance to date, that Mr. Ko Coto may benefit from intensive therapy at a [address] [place] [address] after discharge due to the functional deficits listed above that are likely to improve with skilled rehabilitation and concerns that he/she may be unsafe to be unsupervised at home due to falling. Equipment:  
? None at this time HISTORY:  
History of Present Injury/Illness (Reason for Referral): Mr. Ko Coto (pronounced k-goes-e) is an 57-year-old gentleman presenting from assisted living with frequent falls. Information obtained from patient as he was a reliable source. For the past 3 days he has fallen each day, requiring the fire department or EMS to pick him up off the ground. On the second fall his nurse practitioner recommended come to ER for evaluation but he refused. On the third fall he realizes he is falling a lot and wanted to come to the hospital for further evaluation. He denies any loss of consciousness during these falls. He says that he would walk approximately 3 feet away from his bed using his rolling walker, his legs would get weak and he would fall. He is unable to bring himself up to stand. He does admit to poor oral intake for the past few days secondary to bowel and bladder incontinence.   However he states he has been having the bowel and bladder incontinence issues for more than 6 months. He has no recent medication changes as far as he knows and no recent antibiotic use. He does have history of multiple myeloma and has been treated with lenalidomide. 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 ER consult to hospitalist for observation for his frequent falls. Past Medical History/Comorbidities:  
Mr. Artie Hampton  has a past medical history of Acute encephalopathy (3/22/2015), Altered mental status (9/16/2014), Alzheimer disease ([facility] Anemia (9/16/2014), Anxiety, Asymmetrical sensorineural hearing loss, Bipolar disorder ([address] Cancer ([facility] Cataract (9/8/2016), Cortical hemorrhage ([address] (9/17/2014), Elevated AST (SGOT) (9/16/2014), GERD (gastroesophageal reflux disease), H/O seasonal allergies, History of TIA (transient ischemic attack) (9/16/2014), Hypomagnesemia (9/16/2014), Panic attack, Senile dementia ([address] (5/19/2017), Stroke ([facility] Syncope and collapse (3/22/2015), Tinea corporis (9/16/2014), and Tinnitus. Mr. Artie Hampton  has a past surgical history that includes hx appendectomy; hx other surgical (AGE 23); hx colonoscopy (MULTIPLE OVER THE YEARS); and hx vascular access. Social History/Living Environment:  
Home Environment: Independent living # Steps to Enter: 0 One/Two Story Residence: One story Living Alone: Yes Support Systems: Family member(s) Patient Expects to be Discharged to[de-identified] Rehabilitation facility Current DME Used/Available at Home: eric Barahona Prior Level of Function/Work/Activity: 
Falling, independent, rollator Number of Personal Factors/Comorbidities that affect the Plan of Care: 3+: HIGH COMPLEXITY EXAMINATION:  
Most Recent Physical Functioning:  
Gross Assessment: 3 to 3-/5 through out AROM: Generally decreased, functional(Le's) Strength: Generally decreased, functional(Le's) Sensation: Intact Posture (WDL): Exceptions to [facility] Posture Assessment: Rounded shoulders, Trunk flexion Balance: 
Sitting: Intact; High guard Standing: With support Bed Mobility: 
Supine to Sit: Minimum assistance Sit to Supine: Moderate assistance Transfers: 
Sit to Stand: Contact guard assistance Stand to Sit: Contact guard assistance Duration: 25 Minutes Gait: 
  
Speed/Connie: Delayed; Shuffled Step Length: Left shortened;Right shortened Gait Abnormalities: Decreased step clearance Distance (ft): 100 Feet (ft) Assistive Device: Walker, rolling Ambulation - Level of Assistance: Contact guard assistance Interventions: Safety awareness training;Verbal cues Body Structures Involved: 1. Bones 2. Joints 3. Muscles 4. Ligaments Body Functions Affected: 1. Movement Related Activities and Participation Affected: 1. Mobility Number of elements that affect the Plan of Care: 3: MODERATE COMPLEXITY CLINICAL PRESENTATION:  
Presentation: Stable and uncomplicated: LOW COMPLEXITY CLINICAL DECISION MAKIN [facility] [address] [address] AM-PAC 6 Clicks Basic Mobility Inpatient Short Form How much difficulty does the patient currently have. .. Unable A Lot A Little None 1. Turning over in bed (including adjusting bedclothes, sheets and blankets)? [] 1   [x] 2   [] 3   [] 4  
2. Sitting down on and standing up from a chair with arms ( e.g., wheelchair, bedside commode, etc.)   [] 1   [] 2   [x] 3   [] 4  
3. Moving from lying on back to sitting on the side of the bed? [] 1   [x] 2   [] 3   [] 4 How much help from another person does the patient currently need. .. Total A Lot A Little None 4. Moving to and from a bed to a chair (including a wheelchair)? [] 1   [] 2   [x] 3   [] 4  
5. Need to walk in hospital room? [] 1   [] 2   [x] 3   [] 4  
6. Climbing 3-5 steps with a railing?    [] 1   [x] 2   [] 3   [] 4  
 © 2007, Trustees of [address] [place] [address] [address] [address], under license to [employer]. All rights reserved Score:  Initial: 15 Most Recent: X (Date: -- ) Interpretation of Tool:  Represents activities that are increasingly more difficult (i.e. Bed mobility, Transfers, Gait). Medical Necessity:    
· Patient is expected to demonstrate progress in  
· strength, range of motion, balance, coordination, and functional technique ·  to  
· decrease assistance required with exercises and functional mobility · . Reason for Services/Other Comments: 
· Patient · continues to require present interventions due to patient's inability to perform exercises and functional mobility independently · . Use of outcome tool(s) and clinical judgement create a POC that gives a: Questionable prediction of patient's progress: MODERATE COMPLEXITY  
  
 
 
 
TREATMENT:  
(In addition to Assessment/Re-Assessment sessions the following treatments were rendered) Pre-treatment Symptoms/Complaints:  : \" I feel stronger\" Pain: Initial: numeric scale Post Session:  0/10 Therapeutic Activity: (  25 Minutes ):  Therapeutic activities including bed mobility, transfers, progressive gait followed by LE AROM exercise along with ankle pumps, quad isometrics, glut sets, hip abduction  to improve mobility, strength, and balance. Required minimal Safety awareness training;Verbal cues to promote static and dynamic balance in standing. Braces/Orthotics/Lines/Etc:  
· O2 Device: Room air Treatment/Session Assessment:   
· Response to Treatment:  Pt making progress · Interdisciplinary Collaboration:  
o Registered Nurse · After treatment position/precautions:  
o Supine in bed 
o Bed alarm/tab alert on 
o Bed/Chair-wheels locked 
o Bed in low position 
o Call light within reach 
o RN notified · Compliance with Program/Exercises: Will assess as treatment progresses · Recommendations/Intent for next treatment session:   \"Next visit will focus on reduction in assistance provided\". Total Treatment Duration: PT Patient Time In/Time Out Time In: 1110 Time Out: 1135 Katiuska Cummings, PT

## 2020-04-21 NOTE — PROGRESS NOTES
Problem: Self Care Deficits Care Plan (Adult) Goal: *Acute Goals and Plan of Care (Insert Text) Description: 1. Patient will perform grooming with supervision. 2. Patient will perform Upper body dressing with supervision 3. Patient will perform lower body dressing with minimal assist.  
4. Patient will perform upper and lower body bathing with contact guard assist. 
5. Patient will perform toilet transfers with minimal assist. 
6. Patient will perform shower transfer with minimal assist. 
7. Patient will participate in 30 + minutes of ADL/ therapeutic exercise/therapeutic activity with min rest breaks to increase activity tolerance for self care. 8. Patient will perform ADL functional mobility in room with CGA. Goals to be achieved in 7 days. Outcome: Progressing Towards Goal 
  
OCCUPATIONAL THERAPY: Daily Note 4/21/2020 OBSERVATION: OT Visit Days: 2 Payor: Marian Barros / Plan: [address] [place] [address] HMO / Product Type: [employer] Medicare /  
  
NAME/AGE/GENDER: Andrea Contreras is a 82 y.o. male PRIMARY DIAGNOSIS:  Falls frequently [R29.6] Falls frequently Falls frequently ICD-10: Treatment Diagnosis:  
 · Generalized Muscle Weakness (M62.81) · Difficulty in walking, Not elsewhere classified (R26.2) Precautions/Allergies: 
   Patient has no known allergies. ASSESSMENT:  
 
Mr. Ko Coto presents with decreased functional mobility, strength and self care. He lives at [facility] in independent living. He uses a rollator for gait. He has had frequent falls. Pt would benefit from skilled OT to increase his independence. 4/21/2020 Able to shower this date. Tub/shower transfer using a tub bench. No clothes to change into besides hospital gown and  socks. Patient has improved a lot and will do well at rehab. Continue OT This section established at most recent assessment PROBLEM LIST (Impairments causing functional limitations): 1. Decreased Strength 2. Decreased ADL/Functional Activities 3. Decreased Transfer Abilities 4. Decreased Ambulation Ability/Technique 5. Decreased Balance 6. Decreased Activity Tolerance INTERVENTIONS PLANNED: (Benefits and precautions of occupational therapy have been discussed with the patient.) 1. Activities of daily living training 2. Adaptive equipment training 3. Balance training 4. Clothing management 5. Therapeutic activity 6. Therapeutic exercise TREATMENT PLAN: Frequency/Duration: Follow patient 4x/week to address above goals. Rehabilitation Potential For Stated Goals: Good REHAB RECOMMENDATIONS (at time of discharge pending progress):   
Placement: It is my opinion, based on this patient's performance to date, that Mr. Artie Hampton may benefit from intensive therapy at a [address] [place] [address] after discharge due to the functional deficits listed above that are likely to improve with skilled rehabilitation. Equipment:  
? None at this time OCCUPATIONAL PROFILE AND HISTORY:  
History of Present Injury/Illness (Reason for Referral): Falls frequently Past Medical History/Comorbidities:  
Mr. Artie Hampton  has a past medical history of Acute encephalopathy (3/22/2015), Altered mental status (9/16/2014), Alzheimer disease ([address] Anemia (9/16/2014), Anxiety, Asymmetrical sensorineural hearing loss, Bipolar disorder ([address] Cancer ([address] Cataract (9/8/2016), Cortical hemorrhage ([address] (9/17/2014), Elevated AST (SGOT) (9/16/2014), GERD (gastroesophageal reflux disease), H/O seasonal allergies, History of TIA (transient ischemic attack) (9/16/2014), Hypomagnesemia (9/16/2014), Panic attack, Senile dementia ([address] (5/19/2017), Stroke ([address] Syncope and collapse (3/22/2015), Tinea corporis (9/16/2014), and Tinnitus. Mr. Artie Hampton  has a past surgical history that includes hx appendectomy; hx other surgical (AGE 23); hx colonoscopy (MULTIPLE OVER THE YEARS); and hx vascular access. Social History/Living Environment:  
Home Environment: Private residence # Steps to Enter: 0 One/Two Story Residence: One story Living Alone: Yes Support Systems: Family member(s) Patient Expects to be Discharged to[de-identified] Rehabilitation facility Current DME Used/Available at Home: eric Webber Prior Level of Function/Work/Activity: 
Independent with ADLs; ambulates with rollator; lives at [facility] Number of Personal Factors/Comorbidities that affect the Plan of Care: Brief history (0):  LOW COMPLEXITY ASSESSMENT OF OCCUPATIONAL PERFORMANCE[de-identified]  
Activities of Daily Living:  
Basic ADLs (From Assessment) Complex ADLs (From Assessment) Feeding: Independent Oral Facial Hygiene/Grooming: Setup Bathing: Minimum assistance Upper Body Dressing: Setup Lower Body Dressing: Minimum assistance Toileting: Contact guard assistance Grooming/Bathing/Dressing Activities of Daily Living Functional Transfers Bathroom Mobility: Minimum assistance Shower Transfer: Minimum assistance Bed/Mat Mobility Supine to Sit: Minimum assistance Sit to Supine: Moderate assistance Sit to Stand: Contact guard assistance Stand to Sit: Contact guard assistance Bed to Chair: Contact guard assistance Scooting: Contact guard assistance Most Recent Physical Functioning:  
Gross Assessment: 
  
         
  
Posture: 
Posture (WDL): Exceptions to [facility] Posture Assessment: Rounded shoulders, Trunk flexion Balance: 
Sitting: Intact; High guard Standing: With support Standing - Static: Good Bed Mobility: 
Supine to Sit: Minimum assistance Sit to Supine: Moderate assistance Scooting: Contact guard assistance Wheelchair Mobility: 
  
Transfers: 
Sit to Stand: Contact guard assistance Stand to Sit: Contact guard assistance Bed to Chair: Contact guard assistance Duration: 25 Minutes Patient Vitals for the past 6 hrs: 
 BP BP Patient Position SpO2 Pulse 20 0856 126/67 At rest 94 % 60 Mental Status Neurologic State: Alert Orientation Level: Oriented X4 Cognition: Appropriate decision making, Appropriate safety awareness, Follows commands Perception: Appears intact Perseveration: No perseveration noted Safety/Judgement: Fall prevention Physical Skills Involved: 
1. Balance 2. Strength 3. Activity Tolerance Cognitive Skills Affected (resulting in the inability to perform in a timely and safe manner): 1. none  Psychosocial Skills Affected: 1. none Number of elements that affect the Plan of Care: 1-3:  LOW COMPLEXITY CLINICAL DECISION MAKIN[address] [place] [address] AM-PAC 6 Clicks Daily Activity Inpatient Short Form How much help from another person does the patient currently need. .. Total A Lot A Little None 1. Putting on and taking off regular lower body clothing? [] 1   [x] 2   [] 3   [] 4  
2. Bathing (including washing, rinsing, drying)? [] 1   [x] 2   [] 3   [] 4  
3. Toileting, which includes using toilet, bedpan or urinal?   [] 1   [] 2   [x] 3   [] 4  
4. Putting on and taking off regular upper body clothing? [] 1   [] 2   [x] 3   [] 4  
5. Taking care of personal grooming such as brushing teeth? [] 1   [] 2   [x] 3   [] 4  
6. Eating meals? [] 1   [] 2   [] 3   [x] 4  
© , Trustees of [address] [place] [address], under license to [employer]. All rights reserved Score:  Initial: 17 Most Recent: X (Date: -- ) Interpretation of Tool:  Represents activities that are increasingly more difficult (i.e. Bed mobility, Transfers, Gait). Medical Necessity:    
· Patient is expected to demonstrate progress in  
· strength, balance, coordination, and functional technique ·  to  
· increase independence with self care and functional mobility · . Reason for Services/Other Comments: 
· Patient continues to require skilled intervention due to · Decrease self care and functional mobility · .  
Use of outcome tool(s) and clinical judgement create a POC that gives a: LOW COMPLEXITY  
 
 
 
TREATMENT:  
(In addition to Assessment/Re-Assessment sessions the following treatments were rendered) Pre-treatment Symptoms/Complaints:  tolerated well Pain: Initial:  
Pain Intensity 1: 0  Post Session:  0 Self Care: (45): Procedure(s) (per grid) utilized to improve and/or restore self-care/home management as related to dressing, bathing and grooming. Required minimal verbal and tactile cueing to facilitate activities of daily living skills. Braces/Orthotics/Lines/Etc:  
· ICU monitors · O2 Device: Room air Treatment/Session Assessment:   
· Response to Treatment:  tolerated well · Interdisciplinary Collaboration:  
o Physical Therapist 
o Registered Nurse · After treatment position/precautions:  
o Up in chair 
o Bed/Chair-wheels locked 
o Call light within reach 
o RN notified 
o LEs reclined · Compliance with Program/Exercises: Compliant all of the time. · Recommendations/Intent for next treatment session: \"Next visit will focus on advancements to more challenging activities and reduction in assistance provided\". Total Treatment Duration: OT Patient Time In/Time Out Time In: 4801 Time Out: 1100 Josefina Kong OT

## 2020-04-21 NOTE — PROGRESS NOTES
Patient has cell phone and wallet was returned from security. Says he does not have any other belongings. Ready to go to [facility] at 1300.

## 2020-04-21 NOTE — PROGRESS NOTES
Care Management Interventions PCP Verified by CM: Yes Mode of Transport at Discharge: BLS Transition of Care Consult (CM Consult): SNF Physical Therapy Consult: Yes Occupational Therapy Consult: Yes Current Support Network: Own Home(Independent Living at [employer]) Confirm Follow Up Transport: Other (see comment) The Plan for Transition of Care is Related to the Following Treatment Goals : Increase strength, balance, and independence The Patient and/or Patient Representative was Provided with a Choice of Provider and Agrees with the Discharge Plan?: Yes Name of the Patient Representative Who was Provided with a Choice of Provider and Agrees with the Discharge Plan: Daniel Contreras Champion of Choice List was Provided with Basic Dialogue that Supports the Patient's Individualized Plan of Care/Goals, Treatment Preferences and Shares the Quality Data Associated with the Providers?: Yes Discharge Location Discharge Placement: Skilled nursing facility Per MD pt stable for d/c.  SW spoke with pt who is aware of bed at . [facility] approved 5 days. Chart copied, sesar called report, transport arranged to [employer] [employer].

## 2020-04-21 NOTE — DISCHARGE SUMMARY
Discharge Summary Patient: Nava Denny MRN: [id number]  SSN: [id number] YOB: 1935  Age: 82 y.o. Sex: male Admit Date: 4/18/2020 Discharge Date: 4/21/2020 Admission Diagnoses: Falls frequently [R29.6] Discharge Diagnoses:  
Problem List as of 4/21/2020 Date Reviewed: 8/8/2018 Codes Class Noted - Resolved Seizure ([facility] (Chronic) ICD-10-CM: R56.9 ICD-9-CM: 780.39  4/19/2020 - Present Thrombocytopenia ([facility] ICD-10-CM: D69.6 ICD-9-CM: 287.5  4/19/2020 - Present * (Principal) Falls frequently ICD-10-CM: R29.6 ICD-9-CM: V15.88  4/18/2020 - Present [facility]) (Chronic) ICD-10-CM: I48.91 
ICD-9-CM: 427.31  4/18/2020 - Present Bradycardia (Chronic) ICD-10-CM: R00.1 ICD-9-CM: 427.89  4/18/2020 - Present CKD (chronic kidney disease) ICD-10-CM: N18.9 ICD-9-CM: 585.9  11/19/2019 - Present Hypokalemia ICD-10-CM: E87.6 ICD-9-CM: 276.8  11/19/2019 - Present Urinary frequency ICD-10-CM: R35.0 ICD-9-CM: 788.41  8/8/2018 - Present S/P placement of cardiac pacemaker ICD-10-CM: Z95.0 ICD-9-CM: V45.01  8/2/2018 - Present Severe obesity (BMI 35.0-39. 9) with comorbidity ([facility] ICD-10-CM: E66.01 
ICD-9-CM: 278.01  4/12/2018 - Present Multiple myeloma not having achieved remission ([facility] ICD-10-CM: C90.00 ICD-9-CM: 203.00  8/2/2017 - Present Gastroesophageal reflux disease ICD-10-CM: K21.9 ICD-9-CM: 530.81  8/2/2017 - Present Bilateral ankle pain ICD-10-CM: M25.571, M25.572 ICD-9-CM: 719.47  7/31/2017 - Present Pollen allergies ICD-10-CM: Z91.09 
ICD-9-CM: V15.09  7/31/2017 - Present Overview Signed 7/31/2017  2:27 PM by Hilario Rivera MD  
  Pt would like to consider allergy shots. Will refer to Allergy Vitamin B12 deficiency ICD-10-CM: E53.8 ICD-9-CM: 266.2  7/18/2017 - Present  Overview Signed 7/18/2017  2:06 PM by Hilario Rivera MD  
 Pt just started taking Vitamin B 12 supplement. Abnormal laboratory test ICD-10-CM: R89.9 ICD-9-CM: 796.4  7/18/2017 - Present Overview Addendum 7/31/2017  2:26 PM by Krishna Gracia MD  
  UPEP results reviewed with pt. Pt has referral to Oncology to further evaluate. Mixed hyperlipidemia ICD-10-CM: E78.2 ICD-9-CM: 272.2  6/26/2017 - Present OA (osteoarthritis) ICD-10-CM: M19.90 ICD-9-CM: 715.90  6/26/2017 - Present Senile dementia ([facility] ICD-10-CM: F03.90 ICD-9-CM: 290.0  5/19/2017 - Present Cataract ICD-10-CM: H26.9 ICD-9-CM: 366.9  9/8/2016 - Present Asymmetrical sensorineural hearing loss ICD-10-CM: H90.5 ICD-9-CM: 389.16  Unknown - Present Bipolar disorder ([facility] ICD-10-CM: F31.9 ICD-9-CM: 296.80  3/22/2015 - Present Overview Addendum 7/6/2017  5:01 PM by Krishna Gracia MD  
  Currently treated with Lexapro and Xanax. Given pt's age, I would recommend continuing current regimen; refill rx. Follow up 3 months or prn. Pt is working on weaning to a lower dose of Xanax. Syncope and collapse ICD-10-CM: R55 
ICD-9-CM: 780.2  3/22/2015 - Present Acute encephalopathy ICD-10-CM: G93.40 ICD-9-CM: 348.30  3/22/2015 - Present Cortical hemorrhage (HCC) ICD-10-CM: I61.1 ICD-9-CM: 899  9/17/2014 - Present Syncope ICD-10-CM: R55 
ICD-9-CM: 780.2  9/16/2014 - Present Overview Signed 7/6/2017  5:00 PM by Krishna Gracia MD  
  Refer to Cardiology to evaluated. Concern for symptomatic bradycardia. History of TIA (transient ischemic attack) ICD-10-CM: Z86.73 
ICD-9-CM: V12.54  9/16/2014 - Present Altered mental status ICD-10-CM: R41.82 
ICD-9-CM: 780.97  9/16/2014 - Present Anemia ICD-10-CM: D64.9 ICD-9-CM: 285.9  9/16/2014 - Present Overview Addendum 7/6/2017  5:01 PM by Krishna Gracia MD  
  Recheck CBC. Tinea corporis ICD-10-CM: B35.4 ICD-9-CM: 110.5  9/16/2014 - Present Hypomagnesemia ICD-10-CM: B34.65 
ICD-9-CM: 275.2  9/16/2014 - Present Overview Signed 9/16/2014 10:14 PM by Michele Kim NP  
  MILD Elevated AST (SGOT) ICD-10-CM: R74.0 ICD-9-CM: 790.4  9/16/2014 - Present RESOLVED: Acute bronchitis ICD-10-CM: J20.9 ICD-9-CM: 466.0  11/22/2019 - 11/23/2019 RESOLVED: Fever ICD-10-CM: R50.9 ICD-9-CM: 780.60  11/21/2019 - 11/23/2019 RESOLVED: Traumatic rhabdomyolysis ([facility] ICD-10-CM: T79. Kenwood Cancel ICD-9-CM: 958.6  11/18/2019 - 11/23/2019 RESOLVED: Rhabdomyolysis ICD-10-CM: U95.26 ICD-9-CM: 728.88  11/18/2019 - 11/23/2019 RESOLVED: Leukopenia ICD-10-CM: Q43.853 ICD-9-CM: 288.50  8/2/2017 - 8/24/2017 RESOLVED: Fall ICD-10-CM: W19. Abelardo Connors ICD-9-CM: K911.4  7/18/2017 - 11/23/2019 Overview Signed 7/18/2017  2:03 PM by Hilario Rivera MD  
  Pt had another fall, in his yard; he was chasing after his cat and lost his balance. No injuries sustained; fall witnessed by neighbor. No LOC. Discharge Condition: [facility] Course: This is an 82-year-old  male patient with past medical history significant for multiple comorbidities including atrial fibrillation on anticoagulation with ELIQUIS, hypertension, mild dementia, multiple myeloma NOT ON remission on oral chemotherapy with REVLIMID, he was admitted to the medical floor on 4/18  for chief complaint of recurrent falls. At baseline he lives in an assisted living facility, he reported that he had a couple of falls in week previous to admission. Patient denied any loss of consciousness or any seizure-like episode. His only complaint was generalized weakness, also complaining of diarrhea , but he has a previous history and uses loperamide at home.  His blood workup was NOT suggestive of infection, his renal and liver function were within acceptable limits. His stool was negative for Cdiff and his stool culture is still in progress but his diarrhea has improved. He was seen by oncology. The patient has NO more Revlimid left and he missed his last appointment. He is being discharged to rehab today and he will need to follow up with Dr Soledad Livingston ( his regular hematologist) within one week after being discharged. PE: 
 
General:          Well nourished. Eyes:               Normal sclera. Extraocular movements intact. ENT:                Normocephalic, atraumatic. Moist mucous membranes CV:                  RRR. Systolic murmur. Peripheral pulses present. Lungs:             CTAB. No wheezing, rhonchi, or rales. Abdomen:        Soft, nontender, nondistended. Bowel sounds normal.  
Extremities:     Warm and dry. No cyanosis or edema. Neurologic:      CN II-XII grossly intact. Sensation intact. Skin:                No rashes or jaundice. Normal coloration. Small bruises noted on knees and left great toe Psych:             Normal mood and affect. Consults: Hematology/Oncology Significant Diagnostic Studies: see hospital course Disposition: rehab Discharge Medications:  
Current Discharge Medication List  
  
START taking these medications Details  
acetaminophen (TYLENOL) 325 mg tablet Take 2 Tabs by mouth every six (6) hours as needed for Pain or Fever. Qty: 30 Tab, Refills: 0  
  
loperamide (IMMODIUM) 2 mg tablet Take 4 mg by mouth after first loose stool- then take 2 mg after each additional loose BM Maximum 8 tablets in 24 hours Qty: 30 Tab, Refills: 0  
  
traMADoL (ULTRAM) 50 mg tablet Take 1 Tab by mouth every eight (8) hours as needed for Pain for up to 3 days. Max Daily Amount: 150 mg. 
Qty: 9 Tab, Refills: 0 Associated Diagnoses: Multiple myeloma not having achieved remission ([facility] CONTINUE these medications which have CHANGED Details !! clonazePAM (KlonoPIN) 0.5 mg tablet Take 1 Tab by mouth two (2) times a day. Max Daily Amount: 1 mg. Qty: 4 Tab, Refills: 0 Associated Diagnoses: Bipolar affective disorder, current episode mixed, current episode severity unspecified ([facility] ! ! apixaban (Eliquis) 2.5 mg tablet Take 1 Tab by mouth two (2) times a day. Qty: 60 Tab, Refills: 0  
  
!! levETIRAcetam (KEPPRA) 750 mg tablet Take 1 Tab by mouth two (2) times a day. Qty: 60 Tab, Refills: 0  
  
!! memantine (NAMENDA) 10 mg tablet Take 1 Tab by mouth daily. Qty: 30 Tab, Refills: 0  
  
!! sertraline (ZOLOFT) 50 mg tablet Take 1 Tab by mouth daily. Qty: 30 Tab, Refills: 0 QUEtiapine (SEROquel) 25 mg tablet Take 1 Tab by mouth nightly as needed for Other (confusion/ sleep aid). Qty: 30 Tab, Refills: 0  
  
 !! - Potential duplicate medications found. Please discuss with provider. CONTINUE these medications which have NOT CHANGED Details  
!! memantine (NAMENDA) 10 mg tablet   
  
albuterol (PROVENTIL VENTOLIN) 2.5 mg /3 mL (0.083 %) nebu 3 mL by Nebulization route every four to six (4-6) hours as needed for Other (Cough, SOB, wheezing). Qty: 30 Nebule, Refills: 0  
  
!! ELIQUIS 2.5 mg tablet   
  
!! clonazePAM (KLONOPIN) 0.5 mg tablet   
  
!! levETIRAcetam (KEPPRA) 750 mg tablet   
  
!! sertraline (ZOLOFT) 50 mg tablet   
  
lovastatin (MEVACOR) 40 mg tablet Take 1 Tab by mouth nightly. Qty: 90 Tab, Refills: 3  
  
donepezil (ARICEPT) 10 mg tablet Take 1 Tab by mouth daily. Qty: 90 Tab, Refills: 3  
  
 !! - Potential duplicate medications found. Please discuss with provider. STOP taking these medications  
  
 lenalidomide (Revlimid) 10 mg cap Comments:  
Reason for Stopping:   
   
 gabapentin (NEURONTIN) 300 mg capsule Comments:  
Reason for Stopping:   
   
 potassium chloride (K-DUR, KLOR-CON) 20 mEq tablet Comments:  
Reason for Stopping:   
   
 guaiFENesin ER (MUCINEX) 1,200 mg Ta12 ER tablet Comments: Reason for Stopping:   
   
  
 
 
Activity: Activity as tolerated Diet: Cardiac Diet Wound Care: None needed Follow-up Appointments Procedures  FOLLOW UP VISIT Appointment in: 3 - 5 Days Please arrange for follow up with Dr. Ian De Santiago once discharged. Please arrange for follow up with Dr. Ian De Santiago once discharged. Standing Status:   Standing Number of Occurrences:   1 Order Specific Question:   Appointment in Answer:   3 - 5 Days  FOLLOW UP VISIT Appointment in: One Week PCP  
  PCP Standing Status:   Standing Number of Occurrences:   1 Standing Expiration Date:   4/22/2020 Order Specific Question:   Appointment in Answer: One Week Signed By: Elysia Nelson MD   
 April 21, 2020

## 2020-04-21 NOTE — PROGRESS NOTES
Pt assessment completed. Pt in bed. NAD noted. Call light and essentials within reach. Pt aware to call with needs. Pt requests that if he is sleeping at time of 0300 VS, to please not disturb him and let him rest. Will monitor.

## 2020-04-22 LAB
ALBUMIN 24H MFR UR ELPH: 14.1 %
ALBUMIN SERPL ELPH-MCNC: 2.7 G/DL (ref 2.9–4.4)
ALBUMIN/GLOB SERPL: 1 {RATIO} (ref 0.7–1.7)
ALPHA1 GLOB 24H MFR UR ELPH: 4 %
ALPHA1 GLOB SERPL ELPH-MCNC: 0.3 G/DL (ref 0–0.4)
ALPHA2 GLOB 24H MFR UR ELPH: 17.6 %
ALPHA2 GLOB SERPL ELPH-MCNC: 0.7 G/DL (ref 0.4–1)
B-GLOBULIN MFR UR ELPH: 38.7 %
B-GLOBULIN SERPL ELPH-MCNC: 0.9 G/DL (ref 0.7–1.3)
GAMMA GLOB 24H MFR UR ELPH: 25.6 %
GAMMA GLOB SERPL ELPH-MCNC: 1 G/DL (ref 0.4–1.8)
GLOBULIN SER-MCNC: 2.9 G/DL (ref 2.2–3.9)
IGA SERPL-MCNC: 310 MG/DL (ref 61–437)
IGG SERPL-MCNC: 1089 MG/DL (ref 603–1613)
IGM SERPL-MCNC: 15 MG/DL (ref 15–143)
INTERPRETATION SERPL IEP-IMP: ABNORMAL
INTERPRETATION UR IFE-IMP: NORMAL
KAPPA LC FREE SER-MCNC: 35.9 MG/L (ref 3.3–19.4)
KAPPA LC FREE/LAMBDA FREE SER: 0.96 {RATIO} (ref 0.26–1.65)
LAMBDA LC FREE SERPL-MCNC: 37.3 MG/L (ref 5.7–26.3)
M PROTEIN 24H MFR UR ELPH: NORMAL %
M PROTEIN SERPL ELPH-MCNC: ABNORMAL G/DL
NOTE, 149533: NORMAL
O+P SPEC MICRO: NORMAL
O+P STL CONC: NORMAL
PROT SERPL-MCNC: 5.6 G/DL (ref 6–8.5)
PROT UR-MCNC: 12.3 MG/DL
SPECIMEN SOURCE: NORMAL

## 2020-04-25 LAB
BACTERIA ISLT: NORMAL
RESULT 1, 080154: ABNORMAL
SPECIMEN SOURCE: NORMAL

## 2020-04-27 LAB
BACTERIA SPEC CULT: NORMAL
BACTERIA SPEC CULT: NORMAL
SERVICE CMNT-IMP: NORMAL

## 2020-07-25 ENCOUNTER — HOSPITAL ENCOUNTER (EMERGENCY)
Age: 85
Discharge: HOME | End: 2020-07-25
Attending: EMERGENCY MEDICINE
Payer: MEDICARE

## 2020-07-25 ENCOUNTER — APPOINTMENT (OUTPATIENT)
Dept: CT IMAGING | Age: 85
End: 2020-07-25
Attending: EMERGENCY MEDICINE
Payer: MEDICARE

## 2020-07-25 VITALS
DIASTOLIC BLOOD PRESSURE: 72 MMHG | WEIGHT: 240 LBS | OXYGEN SATURATION: 99 % | RESPIRATION RATE: 16 BRPM | HEIGHT: 70 IN | TEMPERATURE: 98.1 F | SYSTOLIC BLOOD PRESSURE: 136 MMHG | BODY MASS INDEX: 34.36 KG/M2 | HEART RATE: 62 BPM

## 2020-07-25 DIAGNOSIS — S01.81XA FACIAL LACERATION, INITIAL ENCOUNTER: Primary | ICD-10-CM
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
## 2020-07-25 PROCEDURE — 99284 EMERGENCY DEPT VISIT MOD MDM: CPT

## 2020-07-25 PROCEDURE — 70450 CT HEAD/BRAIN W/O DYE: CPT

## 2020-07-25 PROCEDURE — 75810000293 HC SIMP/SUPERF WND  RPR

## 2020-07-25 NOTE — ED TRIAGE NOTES
Pt arrived to ED via EMS from [facility] (pt lives at [facility]), alert and oriented x4 per EMS. Per EMS pt slipped in a puddle of water and fell face forward. Pt with abrasion to top of nose, bilateral knee abrasions, abrasion to left elbow, pain to right elbow, approximately 1in laceration above right eyebrow and mall knot on forehead. Pt in wheelchair in triage. Pt is currently on Eliquis. Pt masked upon arrival to the ED.

## 2020-07-25 NOTE — ED NOTES
I have reviewed discharge instructions with the patient. The patient verbalized understanding. Patient left ED via Discharge Method: stretcher to SNF with [facility]. Opportunity for questions and clarification provided. Patient given 0 scripts. To continue your aftercare when you leave the hospital, you may receive an automated call from our care team to check in on how you are doing. This is a free service and part of our promise to provide the best care and service to meet your aftercare needs.  If you have questions, or wish to unsubscribe from this service please call [phone]. Thank you for Choosing our [facility] Emergency Department.

## 2020-07-25 NOTE — ED PROVIDER NOTES
28-year-old male presenting for facial wound after falling. He states he was walking through a parking lot at the mall when he slipped in some water and fell forward. He struck his head on the ground. He did not lose consciousness and does not have any neck pain or headache. He is on blood thinners though. The history is provided by the patient. Fall The accident occurred less than 1 hour ago. The fall occurred while walking. He fell from a height of ground level. He landed on concrete. The volume of blood lost was moderate. The point of impact was the head. The pain is present in the head. The pain is at a severity of 2/10. The pain is mild. He was ambulatory at the scene. There was no entrapment after the fall. There was no drug use involved in the accident. There was no alcohol use involved in the accident. Associated symptoms include laceration. Pertinent negatives include no visual change, no fever, no numbness, no abdominal pain, no bowel incontinence, no nausea, no vomiting, no hematuria, no headaches, no extremity weakness, no hearing loss, no loss of consciousness and no tingling. The risk factors include being elderly. The symptoms are aggravated by pressure on injury. He has tried nothing for the symptoms. The treatment provided significant relief. The patient's last tetanus shot was less than 5 years ago. Past Medical History:  
Diagnosis Date  Acute encephalopathy 3/22/2015  Altered mental status 9/16/2014  Alzheimer disease ([address]  Anemia 9/16/2014 MILD  Anxiety  Asymmetrical sensorineural hearing loss  Bipolar disorder ([address] NOT TREATED, DIAGNOSED MANY YEARS AGO  Cancer ([address] multiple myeloma  Cataract 9/8/2016  Cortical hemorrhage ([address] 9/17/2014  Elevated AST (SGOT) 9/16/2014  GERD (gastroesophageal reflux disease)   
 not Tx  
 H/O seasonal allergies  History of TIA (transient ischemic attack) 9/16/2014  Hypomagnesemia 9/16/2014 MILD  Panic attack  Senile dementia ([facility] 5/19/2017  Stroke ([facility] ? TIA, PUT ON PLAVIX, TOOK SELF OFF  Syncope and collapse 3/22/2015  Tinea corporis 9/16/2014  Tinnitus Past Surgical History:  
Procedure Laterality Date  HX APPENDECTOMY  HX COLONOSCOPY  MULTIPLE OVER THE YEARS  
 NO CANCER  
 HX OTHER SURGICAL  AGE 23  
 COLONIC POLYP REMOVAL  
 HX VASCULAR ACCESS Family History:  
Problem Relation Age of Onset  Diabetes Mother COMPLICATIONS OF DM  
 Cancer Mother  Heart Disease Mother  Lung Disease Father BLACK LUNG  Dementia Sister  Heart Disease Brother \"OLD AGE\"  Other Son ESTRANGED, IN [place]  
 Other Daughter   
     1 ESTRANGED, [address] [place] [address], [address] [place] Social History Socioeconomic History  Marital status:  Spouse name: Not on file  Number of children: Not on file  Years of education: Not on file  Highest education level: Not on file Occupational History  Not on file Social Needs  Financial resource strain: Not on file  Food insecurity Worry: Not on file Inability: Not on file  Transportation needs Medical: Not on file Non-medical: Not on file Tobacco Use  Smoking status: Former Smoker Packs/day: 2.00 Years: 16.00 Pack years: 32.00 Types: Cigarettes  Smokeless tobacco: Never Used  Tobacco comment: QUIT AGE 10[address]  Substance and Sexual Activity  Alcohol use: No  
 Drug use: No  
 Sexual activity: Not Currently Partners: Female Birth control/protection: None Lifestyle  Physical activity Days per week: Not on file Minutes per session: Not on file  Stress: Not on file Relationships  Social connections Talks on phone: Not on file Gets together: Not on file Attends Scientologist service: Not on file Active member of club or organization: Not on file Attends meetings of clubs or organizations: Not on file Relationship status: Not on file  Intimate partner violence Fear of current or ex partner: Not on file Emotionally abused: Not on file Physically abused: Not on file Forced sexual activity: Not on file Other Topics Concern  Not on file Social History Narrative 9/16/14:  PATIENT IS , LIVES WITH CATARINA MAYA. 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 HE MOVED TO [facility] FOR 5 YEARS, HAS BEEN HERE (?) FOR ABOUT 10 YEARS. HAS A DAUGHTER IN [place] THAT IS NOT SPEAKING TO HIM, A SON IN [place] WHO IS NOT SPEAKING TO HIM, AND A DAUGHTER IN [facility]. BROTHER AND SISTER ARE DEAD. ONLY FRIEND IS SERA COELLOASIA, ([facility]: [phone]), A  WHO LOOKS AFTER THE PATIENT. CALL HIM ON DISCHARGE AS HE HAS PATIENT'S DOG AND HOUSE KEYS. ALLERGIES: Patient has no known allergies. Review of Systems Constitutional: Negative for fever. Gastrointestinal: Negative for abdominal pain, bowel incontinence, nausea and vomiting. Genitourinary: Negative for hematuria. Musculoskeletal: Negative for extremity weakness. Skin: Positive for wound. Neurological: Negative for tingling, loss of consciousness, numbness and headaches. All other systems reviewed and are negative. Vitals:  
 07/25/20 1708 BP: 140/69 Pulse: 60 Resp: 16 Temp: 98.3 °F (36.8 °C) SpO2: 98% Weight: 108.9 kg (240 lb) Height: 5' 10\" (1.778 m) Physical Exam 
Vitals signs and nursing note reviewed. Constitutional:   
   Appearance: He is well-developed. HENT:  
   Head: Normocephalic and atraumatic. Eyes:  
   Conjunctiva/sclera: Conjunctivae normal.  
   Pupils: Pupils are equal, round, and reactive to light. Neck: Musculoskeletal: Normal range of motion and neck supple. Cardiovascular:  
   Rate and Rhythm: Normal rate and regular rhythm. Heart sounds: Normal heart sounds. Pulmonary: Effort: Pulmonary effort is normal.  
   Breath sounds: Normal breath sounds. Abdominal:  
   General: Bowel sounds are normal.  
   Palpations: Abdomen is soft. Musculoskeletal: Normal range of motion. General: No deformity. Skin: 
   General: Skin is warm and dry. Findings: Laceration present. Comments: 2.5 cm laceration over the right eyebrow Neurological:  
   Mental Status: He is alert and oriented to person, place, and time. Cranial Nerves: No cranial nerve deficit. Psychiatric:     
   Behavior: Behavior normal.  
 
  
 
MDM Number of Diagnoses or Management Options Diagnosis management comments: Patient fails [place] head CT rules secondary to age Amount and/or Complexity of Data Reviewed Tests in the radiology section of CPT®: ordered and reviewed (Ct Head Wo Cont Result Date: 7/25/2020 CT HEAD WITHOUT CONTRAST. INDICATION: Head injury sustained in fall. COMPARISON: None. TECHNIQUE:   5 mm axial scans from the skull base to the vertex. Our CT scanners use one or more of the following:  Automated exposure control, adjustment of the mA and or kV according to patient size, iterative reconstruction. FINDINGS:  No acute intraparenchymal hemorrhage or abnormal extra-axial fluid collection. The ventricles are normal size. No mass effect. Mild bilateral white matter low attenuation is present, nonspecific, likely chronic small vessel disease. Included portion of the paranasal sinuses and orbits grossly unremarkable. No skull fracture. Superficial soft tissue swelling frontal scalp. IMPRESSION:  Negative for acute intracranial abnormality. Chronic changes. ) Risk of Complications, Morbidity, and/or Mortality Presenting problems: moderate Diagnostic procedures: moderate Management options: moderate General comments: I personally reviewed the patient's vital signs, laboratory tests, and/or radiological findings.   I discussed these findings with the patient and their significance. I answered all questions and gave the patient clear return precautions. The patient was discharged from the emergency department in stable condition Patient Progress Patient progress: improved Wound Closure by Adhesive Date/Time: 7/25/2020 6:25 PM 
Performed by: Catalina Chávez MD 
Authorized by: Catalina Chávez MD  
 
Consent:  
  Consent obtained:  Verbal 
  Consent given by:  Patient Risks discussed:  Infection Anesthesia (see MAR for exact dosages): Anesthesia method:  Local infiltration Local anesthetic:  Lidocaine 1% w/o epi Laceration details: Location:  Face Face location:  Forehead Length (cm):  2.5 Depth (mm):  5 Pre-procedure details: Preparation:  Patient was prepped and draped in usual sterile fashion Exploration:  
  Hemostasis achieved with:  Direct pressure Wound exploration: wound explored through full range of motion Wound extent: areolar tissue violated Contaminated: no   
Treatment:  
  Area cleansed with:  Betadine Amount of cleaning:  Standard Irrigation solution:  Sterile saline Skin repair:  
  Repair method:  Sutures Suture size:  3-0 Suture material:  Nylon Suture technique:  Simple interrupted Number of sutures:  3 Approximation:  
  Approximation:  Close Post-procedure details:  
  Dressing:  Open (no dressing) Patient tolerance of procedure: Tolerated well, no immediate complications

## 2020-11-04 ENCOUNTER — HOSPITAL ENCOUNTER (OUTPATIENT)
Dept: LAB | Age: 85
Discharge: HOME | End: 2020-11-04
Payer: MEDICARE

## 2020-11-04 DIAGNOSIS — C90.00 MULTIPLE MYELOMA NOT HAVING ACHIEVED REMISSION (HCC): ICD-10-CM

## 2020-11-04 LAB
25(OH)D3 SERPL-MCNC: 27.6 NG/ML (ref 30–100)
ALBUMIN SERPL-MCNC: 3.5 G/DL (ref 3.2–4.6)
ALBUMIN/GLOB SERPL: 1 {RATIO} (ref 1.2–3.5)
ALP SERPL-CCNC: 104 U/L (ref 50–136)
ALT SERPL-CCNC: 21 U/L (ref 12–65)
ANION GAP SERPL CALC-SCNC: 4 MMOL/L (ref 7–16)
AST SERPL-CCNC: 16 U/L (ref 15–37)
BASOPHILS # BLD: 0 K/UL (ref 0–0.2)
BASOPHILS NFR BLD: 1 % (ref 0–2)
BILIRUB SERPL-MCNC: 0.4 MG/DL (ref 0.2–1.1)
BUN SERPL-MCNC: 23 MG/DL (ref 8–23)
CALCIUM SERPL-MCNC: 8.6 MG/DL (ref 8.3–10.4)
CHLORIDE SERPL-SCNC: 109 MMOL/L (ref 98–107)
CO2 SERPL-SCNC: 29 MMOL/L (ref 21–32)
CREAT SERPL-MCNC: 1.08 MG/DL (ref 0.8–1.5)
DIFFERENTIAL METHOD BLD: ABNORMAL
EOSINOPHIL # BLD: 0.2 K/UL (ref 0–0.8)
EOSINOPHIL NFR BLD: 3 % (ref 0.5–7.8)
ERYTHROCYTE [DISTWIDTH] IN BLOOD BY AUTOMATED COUNT: 14.6 % (ref 11.9–14.6)
GLOBULIN SER CALC-MCNC: 3.6 G/DL (ref 2.3–3.5)
GLUCOSE SERPL-MCNC: 99 MG/DL (ref 65–100)
HCT VFR BLD AUTO: 36.9 % (ref 41.1–50.3)
HGB BLD-MCNC: 11.7 G/DL (ref 13.6–17.2)
IMM GRANULOCYTES # BLD AUTO: 0 K/UL (ref 0–0.5)
IMM GRANULOCYTES NFR BLD AUTO: 0 % (ref 0–5)
LYMPHOCYTES # BLD: 1.5 K/UL (ref 0.5–4.6)
LYMPHOCYTES NFR BLD: 28 % (ref 13–44)
MCH RBC QN AUTO: 30.5 PG (ref 26.1–32.9)
MCHC RBC AUTO-ENTMCNC: 31.7 G/DL (ref 31.4–35)
MCV RBC AUTO: 96.3 FL (ref 79.6–97.8)
MONOCYTES # BLD: 0.6 K/UL (ref 0.1–1.3)
MONOCYTES NFR BLD: 12 % (ref 4–12)
NEUTS SEG # BLD: 2.9 K/UL (ref 1.7–8.2)
NEUTS SEG NFR BLD: 56 % (ref 43–78)
NRBC # BLD: 0 K/UL (ref 0–0.2)
PLATELET # BLD AUTO: 156 K/UL (ref 150–450)
PMV BLD AUTO: 9.2 FL (ref 9.4–12.3)
POTASSIUM SERPL-SCNC: 4.4 MMOL/L (ref 3.5–5.1)
PROT SERPL-MCNC: 7.1 G/DL (ref 6.3–8.2)
RBC # BLD AUTO: 3.83 M/UL (ref 4.23–5.67)
SODIUM SERPL-SCNC: 142 MMOL/L (ref 136–145)
WBC # BLD AUTO: 5.2 K/UL (ref 4.3–11.1)

## 2020-11-04 PROCEDURE — 80053 COMPREHEN METABOLIC PANEL: CPT

## 2020-11-04 PROCEDURE — 85025 COMPLETE CBC W/AUTO DIFF WBC: CPT

## 2020-11-04 PROCEDURE — 82306 VITAMIN D 25 HYDROXY: CPT

## 2020-11-04 PROCEDURE — 83883 ASSAY NEPHELOMETRY NOT SPEC: CPT

## 2020-11-04 PROCEDURE — 86334 IMMUNOFIX E-PHORESIS SERUM: CPT

## 2020-11-04 PROCEDURE — 84165 PROTEIN E-PHORESIS SERUM: CPT

## 2020-11-04 PROCEDURE — 36415 COLL VENOUS BLD VENIPUNCTURE: CPT

## 2020-11-05 LAB
KAPPA LC FREE SER-MCNC: 25.39 MG/L (ref 3.3–19.4)
KAPPA LC FREE/LAMBDA FREE SER: 0.97 {RATIO} (ref 0.26–1.65)
LAMBDA LC FREE SERPL-MCNC: 26.09 MG/L (ref 5.71–26.3)

## 2020-11-09 LAB
ALBUMIN SERPL ELPH-MCNC: 3.7 G/DL (ref 3.2–5.6)
ALBUMIN/GLOB SERPL: 1.2 {RATIO}
ALPHA1 GLOB SERPL ELPH-MCNC: 0.21 G/DL (ref 0.1–0.4)
ALPHA2 GLOB SERPL ELPH-MCNC: 0.6 G/DL (ref 0.4–1.2)
B-GLOBULIN SERPL QL ELPH: 1.05 G/DL (ref 0.6–1.3)
GAMMA GLOB MFR SERPL ELPH: 1.24 G/DL (ref 0.5–1.6)
IGA SERPL-MCNC: 248 MG/DL (ref 85–499)
IGG SERPL-MCNC: 1165 MG/DL (ref 610–1616)
IGM SERPL-MCNC: 22 MG/DL (ref 35–242)
M PROTEIN SERPL ELPH-MCNC: ABNORMAL G/DL
PROT PATTERN SERPL ELPH-IMP: ABNORMAL
PROT PATTERN SPEC IFE-IMP: ABNORMAL
PROT SERPL-MCNC: 6.8 G/DL (ref 6.3–8.2)

## 2020-12-02 ENCOUNTER — HOSPITAL ENCOUNTER (OUTPATIENT)
Dept: LAB | Age: 85
Discharge: HOME | End: 2020-12-02
Payer: MEDICARE

## 2020-12-02 DIAGNOSIS — E55.9 VITAMIN D DEFICIENCY: ICD-10-CM

## 2020-12-02 DIAGNOSIS — C90.00 MULTIPLE MYELOMA NOT HAVING ACHIEVED REMISSION (HCC): ICD-10-CM

## 2020-12-02 LAB
ALBUMIN SERPL-MCNC: 3.3 G/DL (ref 3.2–4.6)
ALBUMIN/GLOB SERPL: 0.9 {RATIO} (ref 1.2–3.5)
ALP SERPL-CCNC: 104 U/L (ref 50–136)
ALT SERPL-CCNC: 18 U/L (ref 12–65)
ANION GAP SERPL CALC-SCNC: 5 MMOL/L (ref 7–16)
AST SERPL-CCNC: 15 U/L (ref 15–37)
BASOPHILS # BLD: 0 K/UL (ref 0–0.2)
BASOPHILS NFR BLD: 0 % (ref 0–2)
BILIRUB SERPL-MCNC: 0.4 MG/DL (ref 0.2–1.1)
BUN SERPL-MCNC: 19 MG/DL (ref 8–23)
CALCIUM SERPL-MCNC: 8.6 MG/DL (ref 8.3–10.4)
CHLORIDE SERPL-SCNC: 108 MMOL/L (ref 98–107)
CO2 SERPL-SCNC: 30 MMOL/L (ref 21–32)
CREAT SERPL-MCNC: 1.1 MG/DL (ref 0.8–1.5)
DIFFERENTIAL METHOD BLD: ABNORMAL
EOSINOPHIL # BLD: 0.2 K/UL (ref 0–0.8)
EOSINOPHIL NFR BLD: 3 % (ref 0.5–7.8)
ERYTHROCYTE [DISTWIDTH] IN BLOOD BY AUTOMATED COUNT: 14.4 % (ref 11.9–14.6)
GLOBULIN SER CALC-MCNC: 3.7 G/DL (ref 2.3–3.5)
GLUCOSE SERPL-MCNC: 94 MG/DL (ref 65–100)
HCT VFR BLD AUTO: 38 % (ref 41.1–50.3)
HGB BLD-MCNC: 12.1 G/DL (ref 13.6–17.2)
IMM GRANULOCYTES # BLD AUTO: 0 K/UL (ref 0–0.5)
IMM GRANULOCYTES NFR BLD AUTO: 1 % (ref 0–5)
LYMPHOCYTES # BLD: 1.2 K/UL (ref 0.5–4.6)
LYMPHOCYTES NFR BLD: 22 % (ref 13–44)
MCH RBC QN AUTO: 30.4 PG (ref 26.1–32.9)
MCHC RBC AUTO-ENTMCNC: 31.8 G/DL (ref 31.4–35)
MCV RBC AUTO: 95.5 FL (ref 79.6–97.8)
MONOCYTES # BLD: 0.4 K/UL (ref 0.1–1.3)
MONOCYTES NFR BLD: 7 % (ref 4–12)
NEUTS SEG # BLD: 3.6 K/UL (ref 1.7–8.2)
NEUTS SEG NFR BLD: 67 % (ref 43–78)
NRBC # BLD: 0 K/UL (ref 0–0.2)
PLATELET # BLD AUTO: 161 K/UL (ref 150–450)
PMV BLD AUTO: 9.5 FL (ref 9.4–12.3)
POTASSIUM SERPL-SCNC: 4.4 MMOL/L (ref 3.5–5.1)
PROT SERPL-MCNC: 7 G/DL (ref 6.3–8.2)
RBC # BLD AUTO: 3.98 M/UL (ref 4.23–5.67)
SODIUM SERPL-SCNC: 143 MMOL/L (ref 136–145)
WBC # BLD AUTO: 5.4 K/UL (ref 4.3–11.1)

## 2020-12-02 PROCEDURE — 84165 PROTEIN E-PHORESIS SERUM: CPT

## 2020-12-02 PROCEDURE — 80053 COMPREHEN METABOLIC PANEL: CPT

## 2020-12-02 PROCEDURE — 36415 COLL VENOUS BLD VENIPUNCTURE: CPT

## 2020-12-02 PROCEDURE — 82306 VITAMIN D 25 HYDROXY: CPT

## 2020-12-02 PROCEDURE — 85025 COMPLETE CBC W/AUTO DIFF WBC: CPT

## 2020-12-02 PROCEDURE — 83883 ASSAY NEPHELOMETRY NOT SPEC: CPT

## 2020-12-02 PROCEDURE — 86334 IMMUNOFIX E-PHORESIS SERUM: CPT

## 2020-12-03 LAB
25(OH)D3+25(OH)D2 SERPL-MCNC: 19.9 NG/ML (ref 30–100)
ALBUMIN SERPL ELPH-MCNC: 3.47 G/DL (ref 3.2–5.6)
ALBUMIN/GLOB SERPL: 1.1 {RATIO} (ref 1.2–3.5)
ALPHA1 GLOB SERPL ELPH-MCNC: 0.25 G/DL (ref 0.1–0.4)
ALPHA2 GLOB SERPL ELPH-MCNC: 0.67 G/DL (ref 0.4–1.2)
B-GLOBULIN SERPL QL ELPH: 1.05 G/DL (ref 0.6–1.3)
GAMMA GLOB MFR SERPL ELPH: 1.27 G/DL (ref 0.5–1.6)
IGA SERPL-MCNC: 261 MG/DL (ref 85–499)
IGG SERPL-MCNC: 1217 MG/DL (ref 610–1616)
IGM SERPL-MCNC: 28 MG/DL (ref 35–242)
KAPPA LC FREE SER-MCNC: 37.9 MG/L (ref 3.3–19.4)
KAPPA LC FREE/LAMBDA FREE SER: 1.05 {RATIO} (ref 0.26–1.65)
LAMBDA LC FREE SERPL-MCNC: 36.24 MG/L (ref 5.71–26.3)
M PROTEIN SERPL ELPH-MCNC: ABNORMAL G/DL
PROT PATTERN SERPL ELPH-IMP: ABNORMAL
PROT PATTERN SPEC IFE-IMP: ABNORMAL
PROT SERPL-MCNC: 6.7 G/DL (ref 6.3–8.2)

## 2020-12-17 ENCOUNTER — TELEPHONE (OUTPATIENT)
Dept: INFUSION THERAPY | Age: 85
End: 2020-12-17

## 2020-12-17 NOTE — TELEPHONE ENCOUNTER
Called pt and LVM letting him refill for Revlimid has been sent. Pt to call back with any questions.

## 2020-12-17 NOTE — TELEPHONE ENCOUNTER
Pt called and stated that he needs a reference number to get a refill  for revlimid 10mg and its a life threatening  medication and he is completley out

## 2020-12-30 ENCOUNTER — HOSPITAL ENCOUNTER (OUTPATIENT)
Dept: LAB | Age: 85
Discharge: HOME | End: 2020-12-30
Payer: MEDICARE

## 2020-12-30 DIAGNOSIS — C90.00 MULTIPLE MYELOMA NOT HAVING ACHIEVED REMISSION (HCC): ICD-10-CM

## 2020-12-30 LAB
25(OH)D3 SERPL-MCNC: 31.9 NG/ML (ref 30–100)
ALBUMIN SERPL-MCNC: 3.4 G/DL (ref 3.2–4.6)
ALBUMIN/GLOB SERPL: 0.9 {RATIO} (ref 1.2–3.5)
ALP SERPL-CCNC: 94 U/L (ref 50–136)
ALT SERPL-CCNC: 21 U/L (ref 12–65)
ANION GAP SERPL CALC-SCNC: 6 MMOL/L (ref 7–16)
AST SERPL-CCNC: 18 U/L (ref 15–37)
BASOPHILS # BLD: 0 K/UL (ref 0–0.2)
BASOPHILS NFR BLD: 1 % (ref 0–2)
BILIRUB SERPL-MCNC: 0.5 MG/DL (ref 0.2–1.1)
BUN SERPL-MCNC: 21 MG/DL (ref 8–23)
CALCIUM SERPL-MCNC: 8.5 MG/DL (ref 8.3–10.4)
CHLORIDE SERPL-SCNC: 109 MMOL/L (ref 98–107)
CO2 SERPL-SCNC: 26 MMOL/L (ref 21–32)
CREAT SERPL-MCNC: 1.3 MG/DL (ref 0.8–1.5)
DIFFERENTIAL METHOD BLD: ABNORMAL
EOSINOPHIL # BLD: 0.2 K/UL (ref 0–0.8)
EOSINOPHIL NFR BLD: 5 % (ref 0.5–7.8)
ERYTHROCYTE [DISTWIDTH] IN BLOOD BY AUTOMATED COUNT: 14 % (ref 11.9–14.6)
GLOBULIN SER CALC-MCNC: 3.8 G/DL (ref 2.3–3.5)
GLUCOSE SERPL-MCNC: 101 MG/DL (ref 65–100)
HCT VFR BLD AUTO: 40.2 % (ref 41.1–50.3)
HGB BLD-MCNC: 12.7 G/DL (ref 13.6–17.2)
IMM GRANULOCYTES # BLD AUTO: 0 K/UL (ref 0–0.5)
IMM GRANULOCYTES NFR BLD AUTO: 0 % (ref 0–5)
LYMPHOCYTES # BLD: 1.3 K/UL (ref 0.5–4.6)
LYMPHOCYTES NFR BLD: 35 % (ref 13–44)
MCH RBC QN AUTO: 28.5 PG (ref 26.1–32.9)
MCHC RBC AUTO-ENTMCNC: 31.6 G/DL (ref 31.4–35)
MCV RBC AUTO: 90.1 FL (ref 79.6–97.8)
MONOCYTES # BLD: 0.5 K/UL (ref 0.1–1.3)
MONOCYTES NFR BLD: 12 % (ref 4–12)
NEUTS SEG # BLD: 1.7 K/UL (ref 1.7–8.2)
NEUTS SEG NFR BLD: 46 % (ref 43–78)
NRBC # BLD: 0 K/UL (ref 0–0.2)
PLATELET # BLD AUTO: 154 K/UL (ref 150–450)
PMV BLD AUTO: 9.8 FL (ref 9.4–12.3)
POTASSIUM SERPL-SCNC: 4 MMOL/L (ref 3.5–5.1)
PROT SERPL-MCNC: 7.2 G/DL (ref 6.3–8.2)
RBC # BLD AUTO: 4.46 M/UL (ref 4.23–5.67)
SODIUM SERPL-SCNC: 141 MMOL/L (ref 136–145)
WBC # BLD AUTO: 3.7 K/UL (ref 4.3–11.1)

## 2020-12-30 PROCEDURE — 80053 COMPREHEN METABOLIC PANEL: CPT

## 2020-12-30 PROCEDURE — 82784 ASSAY IGA/IGD/IGG/IGM EACH: CPT

## 2020-12-30 PROCEDURE — 36415 COLL VENOUS BLD VENIPUNCTURE: CPT

## 2020-12-30 PROCEDURE — 85025 COMPLETE CBC W/AUTO DIFF WBC: CPT

## 2020-12-30 PROCEDURE — 82306 VITAMIN D 25 HYDROXY: CPT

## 2020-12-30 PROCEDURE — 83883 ASSAY NEPHELOMETRY NOT SPEC: CPT

## 2020-12-30 PROCEDURE — 86334 IMMUNOFIX E-PHORESIS SERUM: CPT

## 2021-01-01 ENCOUNTER — HOSPITAL ENCOUNTER (OUTPATIENT)
Dept: LAB | Age: 86
Discharge: HOME | End: 2021-11-04
Payer: MEDICARE

## 2021-01-01 ENCOUNTER — HOSPITAL ENCOUNTER (OUTPATIENT)
Dept: LAB | Age: 86
Discharge: HOME | End: 2021-06-03
Payer: MEDICARE

## 2021-01-01 ENCOUNTER — HOSPITAL ENCOUNTER (OUTPATIENT)
Dept: LAB | Age: 86
Discharge: HOME | End: 2021-08-12
Payer: MEDICARE

## 2021-01-01 ENCOUNTER — HOSPITAL ENCOUNTER (OUTPATIENT)
Dept: LAB | Age: 86
Discharge: HOME | End: 2021-04-22
Payer: MEDICARE

## 2021-01-01 ENCOUNTER — TELEPHONE (OUTPATIENT)
Dept: ONCOLOGY | Age: 86
End: 2021-01-01

## 2021-01-01 ENCOUNTER — HOSPITAL ENCOUNTER (OUTPATIENT)
Dept: LAB | Age: 86
Discharge: HOME | End: 2021-07-08
Payer: MEDICARE

## 2021-01-01 ENCOUNTER — HOSPITAL ENCOUNTER (OUTPATIENT)
Dept: LAB | Age: 86
Discharge: HOME | End: 2021-12-09
Payer: MEDICARE

## 2021-01-01 DIAGNOSIS — C90.00 MULTIPLE MYELOMA NOT HAVING ACHIEVED REMISSION (HCC): ICD-10-CM

## 2021-01-01 DIAGNOSIS — C90.01 MULTIPLE MYELOMA IN REMISSION (HCC): ICD-10-CM

## 2021-01-01 DIAGNOSIS — E55.9 VITAMIN D DEFICIENCY: ICD-10-CM

## 2021-01-01 DIAGNOSIS — C90.00 MULTIPLE MYELOMA, REMISSION STATUS UNSPECIFIED (HCC): ICD-10-CM

## 2021-01-01 LAB
25(OH)D3 SERPL-MCNC: 35.7 NG/ML (ref 30–100)
25(OH)D3 SERPL-MCNC: 40 NG/ML (ref 30–100)
25(OH)D3 SERPL-MCNC: 40.4 NG/ML (ref 30–100)
25(OH)D3 SERPL-MCNC: 44.3 NG/ML (ref 30–100)
25(OH)D3 SERPL-MCNC: 45.2 NG/ML (ref 30–100)
25(OH)D3 SERPL-MCNC: 51 NG/ML (ref 30–100)
ALBUMIN SERPL ELPH-MCNC: 3.17 G/DL (ref 3.2–5.6)
ALBUMIN SERPL ELPH-MCNC: 3.22 G/DL (ref 3.2–5.6)
ALBUMIN SERPL ELPH-MCNC: 3.28 G/DL (ref 3.2–5.6)
ALBUMIN SERPL ELPH-MCNC: 3.45 G/DL (ref 3.2–5.6)
ALBUMIN SERPL ELPH-MCNC: 3.47 G/DL (ref 3.2–5.6)
ALBUMIN SERPL ELPH-MCNC: 3.62 G/DL (ref 3.2–5.6)
ALBUMIN SERPL-MCNC: 2.9 G/DL (ref 3.2–4.6)
ALBUMIN SERPL-MCNC: 3 G/DL (ref 3.2–4.6)
ALBUMIN SERPL-MCNC: 3.1 G/DL (ref 3.2–4.6)
ALBUMIN SERPL-MCNC: 3.2 G/DL (ref 3.2–4.6)
ALBUMIN SERPL-MCNC: 3.6 G/DL (ref 3.2–4.6)
ALBUMIN SERPL-MCNC: 3.6 G/DL (ref 3.2–4.6)
ALBUMIN/GLOB SERPL: 0.7 {RATIO} (ref 1.2–3.5)
ALBUMIN/GLOB SERPL: 0.8 {RATIO} (ref 1.2–3.5)
ALBUMIN/GLOB SERPL: 0.8 {RATIO} (ref 1.2–3.5)
ALBUMIN/GLOB SERPL: 0.9 {RATIO} (ref 1.2–3.5)
ALBUMIN/GLOB SERPL: 1 {RATIO}
ALBUMIN/GLOB SERPL: 1 {RATIO}
ALBUMIN/GLOB SERPL: 1 {RATIO} (ref 1.2–3.5)
ALBUMIN/GLOB SERPL: 1 {RATIO} (ref 1.2–3.5)
ALBUMIN/GLOB SERPL: 1.1 {RATIO}
ALBUMIN/GLOB SERPL: 1.1 {RATIO}
ALBUMIN/GLOB SERPL: 1.1 {RATIO} (ref 1.2–3.5)
ALBUMIN/GLOB SERPL: 1.2 {RATIO} (ref 1.2–3.5)
ALP SERPL-CCNC: 103 U/L (ref 50–136)
ALP SERPL-CCNC: 107 U/L (ref 50–136)
ALP SERPL-CCNC: 109 U/L (ref 50–136)
ALP SERPL-CCNC: 125 U/L (ref 50–136)
ALP SERPL-CCNC: 93 U/L (ref 50–136)
ALP SERPL-CCNC: 95 U/L (ref 50–136)
ALPHA1 GLOB SERPL ELPH-MCNC: 0.19 G/DL (ref 0.1–0.4)
ALPHA1 GLOB SERPL ELPH-MCNC: 0.21 G/DL (ref 0.1–0.4)
ALPHA1 GLOB SERPL ELPH-MCNC: 0.23 G/DL (ref 0.1–0.4)
ALPHA1 GLOB SERPL ELPH-MCNC: 0.26 G/DL (ref 0.1–0.4)
ALPHA1 GLOB SERPL ELPH-MCNC: 0.26 G/DL (ref 0.1–0.4)
ALPHA1 GLOB SERPL ELPH-MCNC: 0.29 G/DL (ref 0.1–0.4)
ALPHA2 GLOB SERPL ELPH-MCNC: 0.56 G/DL (ref 0.4–1.2)
ALPHA2 GLOB SERPL ELPH-MCNC: 0.57 G/DL (ref 0.4–1.2)
ALPHA2 GLOB SERPL ELPH-MCNC: 0.62 G/DL (ref 0.4–1.2)
ALPHA2 GLOB SERPL ELPH-MCNC: 0.63 G/DL (ref 0.4–1.2)
ALPHA2 GLOB SERPL ELPH-MCNC: 0.67 G/DL (ref 0.4–1.2)
ALPHA2 GLOB SERPL ELPH-MCNC: 0.69 G/DL (ref 0.4–1.2)
ALT SERPL-CCNC: 19 U/L (ref 12–65)
ALT SERPL-CCNC: 22 U/L (ref 12–65)
ALT SERPL-CCNC: 23 U/L (ref 12–65)
ALT SERPL-CCNC: 24 U/L (ref 12–65)
ALT SERPL-CCNC: 28 U/L (ref 12–65)
ALT SERPL-CCNC: 32 U/L (ref 12–65)
ANION GAP SERPL CALC-SCNC: 3 MMOL/L (ref 7–16)
ANION GAP SERPL CALC-SCNC: 4 MMOL/L (ref 7–16)
ANION GAP SERPL CALC-SCNC: 5 MMOL/L (ref 7–16)
ANION GAP SERPL CALC-SCNC: 6 MMOL/L (ref 7–16)
ANION GAP SERPL CALC-SCNC: 6 MMOL/L (ref 7–16)
ANION GAP SERPL CALC-SCNC: 7 MMOL/L (ref 7–16)
AST SERPL-CCNC: 18 U/L (ref 15–37)
AST SERPL-CCNC: 18 U/L (ref 15–37)
AST SERPL-CCNC: 19 U/L (ref 15–37)
AST SERPL-CCNC: 20 U/L (ref 15–37)
AST SERPL-CCNC: 22 U/L (ref 15–37)
AST SERPL-CCNC: 25 U/L (ref 15–37)
B-GLOBULIN SERPL QL ELPH: 0.94 G/DL (ref 0.6–1.3)
B-GLOBULIN SERPL QL ELPH: 1.02 G/DL (ref 0.6–1.3)
B-GLOBULIN SERPL QL ELPH: 1.04 G/DL (ref 0.6–1.3)
B-GLOBULIN SERPL QL ELPH: 1.05 G/DL (ref 0.6–1.3)
B-GLOBULIN SERPL QL ELPH: 1.05 G/DL (ref 0.6–1.3)
B-GLOBULIN SERPL QL ELPH: 1.07 G/DL (ref 0.6–1.3)
BASOPHILS # BLD: 0 K/UL (ref 0–0.2)
BASOPHILS # BLD: 0.1 K/UL (ref 0–0.2)
BASOPHILS NFR BLD: 0 % (ref 0–2)
BASOPHILS NFR BLD: 1 % (ref 0–2)
BASOPHILS NFR BLD: 2 % (ref 0–2)
BILIRUB SERPL-MCNC: 0.3 MG/DL (ref 0.2–1.1)
BILIRUB SERPL-MCNC: 0.4 MG/DL (ref 0.2–1.1)
BILIRUB SERPL-MCNC: 0.4 MG/DL (ref 0.2–1.1)
BILIRUB SERPL-MCNC: 0.5 MG/DL (ref 0.2–1.1)
BILIRUB SERPL-MCNC: 0.5 MG/DL (ref 0.2–1.1)
BILIRUB SERPL-MCNC: 0.6 MG/DL (ref 0.2–1.1)
BUN SERPL-MCNC: 14 MG/DL (ref 8–23)
BUN SERPL-MCNC: 15 MG/DL (ref 8–23)
BUN SERPL-MCNC: 16 MG/DL (ref 8–23)
BUN SERPL-MCNC: 16 MG/DL (ref 8–23)
BUN SERPL-MCNC: 17 MG/DL (ref 8–23)
BUN SERPL-MCNC: 18 MG/DL (ref 8–23)
CALCIUM SERPL-MCNC: 8.2 MG/DL (ref 8.3–10.4)
CALCIUM SERPL-MCNC: 8.6 MG/DL (ref 8.3–10.4)
CHLORIDE SERPL-SCNC: 106 MMOL/L (ref 98–107)
CHLORIDE SERPL-SCNC: 107 MMOL/L (ref 98–107)
CHLORIDE SERPL-SCNC: 109 MMOL/L (ref 98–107)
CHLORIDE SERPL-SCNC: 110 MMOL/L (ref 98–107)
CHLORIDE SERPL-SCNC: 110 MMOL/L (ref 98–107)
CHLORIDE SERPL-SCNC: 111 MMOL/L (ref 98–107)
CO2 SERPL-SCNC: 25 MMOL/L (ref 21–32)
CO2 SERPL-SCNC: 26 MMOL/L (ref 21–32)
CO2 SERPL-SCNC: 26 MMOL/L (ref 21–32)
CO2 SERPL-SCNC: 28 MMOL/L (ref 21–32)
CO2 SERPL-SCNC: 30 MMOL/L (ref 21–32)
CO2 SERPL-SCNC: 33 MMOL/L (ref 21–32)
CREAT SERPL-MCNC: 1.1 MG/DL (ref 0.8–1.5)
CREAT SERPL-MCNC: 1.2 MG/DL (ref 0.8–1.5)
CREAT SERPL-MCNC: 1.3 MG/DL (ref 0.8–1.5)
DIFFERENTIAL METHOD BLD: ABNORMAL
EOSINOPHIL # BLD: 0.1 K/UL (ref 0–0.8)
EOSINOPHIL # BLD: 0.2 K/UL (ref 0–0.8)
EOSINOPHIL # BLD: 0.3 K/UL (ref 0–0.8)
EOSINOPHIL NFR BLD: 2 % (ref 0.5–7.8)
EOSINOPHIL NFR BLD: 3 % (ref 0.5–7.8)
EOSINOPHIL NFR BLD: 3 % (ref 0.5–7.8)
EOSINOPHIL NFR BLD: 4 % (ref 0.5–7.8)
EOSINOPHIL NFR BLD: 6 % (ref 0.5–7.8)
EOSINOPHIL NFR BLD: 7 % (ref 0.5–7.8)
ERYTHROCYTE [DISTWIDTH] IN BLOOD BY AUTOMATED COUNT: 14.8 % (ref 11.9–14.6)
ERYTHROCYTE [DISTWIDTH] IN BLOOD BY AUTOMATED COUNT: 14.9 % (ref 11.9–14.6)
ERYTHROCYTE [DISTWIDTH] IN BLOOD BY AUTOMATED COUNT: 15.2 % (ref 11.9–14.6)
ERYTHROCYTE [DISTWIDTH] IN BLOOD BY AUTOMATED COUNT: 15.9 % (ref 11.9–14.6)
ERYTHROCYTE [DISTWIDTH] IN BLOOD BY AUTOMATED COUNT: 17.3 % (ref 11.9–14.6)
ERYTHROCYTE [DISTWIDTH] IN BLOOD BY AUTOMATED COUNT: 17.3 % (ref 11.9–14.6)
GAMMA GLOB MFR SERPL ELPH: 1.18 G/DL (ref 0.5–1.6)
GAMMA GLOB MFR SERPL ELPH: 1.19 G/DL (ref 0.5–1.6)
GAMMA GLOB MFR SERPL ELPH: 1.2 G/DL (ref 0.5–1.6)
GAMMA GLOB MFR SERPL ELPH: 1.25 G/DL (ref 0.5–1.6)
GAMMA GLOB MFR SERPL ELPH: 1.33 G/DL (ref 0.5–1.6)
GAMMA GLOB MFR SERPL ELPH: 1.38 G/DL (ref 0.5–1.6)
GLOBULIN SER CALC-MCNC: 3.4 G/DL (ref 2.3–3.5)
GLOBULIN SER CALC-MCNC: 3.4 G/DL (ref 2.3–3.5)
GLOBULIN SER CALC-MCNC: 3.5 G/DL (ref 2.3–3.5)
GLOBULIN SER CALC-MCNC: 3.7 G/DL (ref 2.3–3.5)
GLOBULIN SER CALC-MCNC: 3.9 G/DL (ref 2.3–3.5)
GLOBULIN SER CALC-MCNC: 4 G/DL (ref 2.3–3.5)
GLUCOSE SERPL-MCNC: 104 MG/DL (ref 65–100)
GLUCOSE SERPL-MCNC: 115 MG/DL (ref 65–100)
GLUCOSE SERPL-MCNC: 133 MG/DL (ref 65–100)
GLUCOSE SERPL-MCNC: 87 MG/DL (ref 65–100)
GLUCOSE SERPL-MCNC: 88 MG/DL (ref 65–100)
GLUCOSE SERPL-MCNC: 93 MG/DL (ref 65–100)
HCT VFR BLD AUTO: 36.8 %
HCT VFR BLD AUTO: 37.3 %
HCT VFR BLD AUTO: 39 %
HCT VFR BLD AUTO: 39.3 %
HCT VFR BLD AUTO: 41 %
HCT VFR BLD AUTO: 41.8 %
HGB BLD-MCNC: 11.9 G/DL (ref 13.6–17.2)
HGB BLD-MCNC: 12 G/DL (ref 13.6–17.2)
HGB BLD-MCNC: 12.3 G/DL (ref 13.6–17.2)
HGB BLD-MCNC: 12.6 G/DL (ref 13.6–17.2)
HGB BLD-MCNC: 13.1 G/DL (ref 13.6–17.2)
HGB BLD-MCNC: 13.2 G/DL (ref 13.6–17.2)
IGA SERPL-MCNC: 251 MG/DL (ref 85–499)
IGA SERPL-MCNC: 252 MG/DL (ref 85–499)
IGA SERPL-MCNC: 258 MG/DL (ref 85–499)
IGA SERPL-MCNC: 261 MG/DL (ref 85–499)
IGA SERPL-MCNC: 280 MG/DL (ref 85–499)
IGA SERPL-MCNC: 304 MG/DL (ref 85–499)
IGG SERPL-MCNC: 1143 MG/DL (ref 610–1616)
IGG SERPL-MCNC: 1160 MG/DL (ref 610–1616)
IGG SERPL-MCNC: 1191 MG/DL (ref 610–1616)
IGG SERPL-MCNC: 1199 MG/DL (ref 610–1616)
IGG SERPL-MCNC: 1224 MG/DL (ref 610–1616)
IGG SERPL-MCNC: 1255 MG/DL (ref 610–1616)
IGM SERPL-MCNC: 21 MG/DL (ref 35–242)
IGM SERPL-MCNC: 23 MG/DL (ref 35–242)
IGM SERPL-MCNC: 23 MG/DL (ref 35–242)
IGM SERPL-MCNC: 24 MG/DL (ref 35–242)
IGM SERPL-MCNC: 28 MG/DL (ref 35–242)
IGM SERPL-MCNC: 28 MG/DL (ref 35–242)
IMM GRANULOCYTES # BLD AUTO: 0 K/UL (ref 0–0.5)
IMM GRANULOCYTES # BLD AUTO: 0.1 K/UL (ref 0–0.5)
IMM GRANULOCYTES NFR BLD AUTO: 0 % (ref 0–5)
IMM GRANULOCYTES NFR BLD AUTO: 1 % (ref 0–5)
IMM GRANULOCYTES NFR BLD AUTO: 1 % (ref 0–5)
KAPPA LC FREE SER-MCNC: 24.78 MG/L (ref 3.3–19.4)
KAPPA LC FREE SER-MCNC: 27.56 MG/L (ref 3.3–19.4)
KAPPA LC FREE SER-MCNC: 31 MG/L (ref 3.3–19.4)
KAPPA LC FREE SER-MCNC: 36.8 MG/L (ref 3.3–19.4)
KAPPA LC FREE SER-MCNC: 40.15 MG/L (ref 3.3–19.4)
KAPPA LC FREE SER-MCNC: 45.75 MG/L (ref 3.3–19.4)
KAPPA LC FREE/LAMBDA FREE SER: 0.85 {RATIO} (ref 0.26–1.65)
KAPPA LC FREE/LAMBDA FREE SER: 0.88 {RATIO} (ref 0.26–1.65)
KAPPA LC FREE/LAMBDA FREE SER: 0.95 {RATIO} (ref 0.26–1.65)
KAPPA LC FREE/LAMBDA FREE SER: 0.98 {RATIO} (ref 0.26–1.65)
KAPPA LC FREE/LAMBDA FREE SER: 0.99 {RATIO} (ref 0.26–1.65)
KAPPA LC FREE/LAMBDA FREE SER: 1 {RATIO} (ref 0.26–1.65)
LAMBDA LC FREE SERPL-MCNC: 28.1 MG/L (ref 5.71–26.3)
LAMBDA LC FREE SERPL-MCNC: 29.22 MG/L (ref 5.71–26.3)
LAMBDA LC FREE SERPL-MCNC: 31.25 MG/L (ref 5.71–26.3)
LAMBDA LC FREE SERPL-MCNC: 41.91 MG/L (ref 5.71–26.3)
LAMBDA LC FREE SERPL-MCNC: 42.39 MG/L (ref 5.71–26.3)
LAMBDA LC FREE SERPL-MCNC: 45.7 MG/L (ref 5.71–26.3)
LYMPHOCYTES # BLD: 1.1 K/UL (ref 0.5–4.6)
LYMPHOCYTES # BLD: 1.1 K/UL (ref 0.5–4.6)
LYMPHOCYTES # BLD: 1.3 K/UL (ref 0.5–4.6)
LYMPHOCYTES # BLD: 1.4 K/UL (ref 0.5–4.6)
LYMPHOCYTES NFR BLD: 19 % (ref 13–44)
LYMPHOCYTES NFR BLD: 20 % (ref 13–44)
LYMPHOCYTES NFR BLD: 26 % (ref 13–44)
LYMPHOCYTES NFR BLD: 26 % (ref 13–44)
LYMPHOCYTES NFR BLD: 28 % (ref 13–44)
LYMPHOCYTES NFR BLD: 35 % (ref 13–44)
M PROTEIN SERPL ELPH-MCNC: 0.43 G/DL
M PROTEIN SERPL ELPH-MCNC: ABNORMAL G/DL
MCH RBC QN AUTO: 28.6 PG (ref 26.1–32.9)
MCH RBC QN AUTO: 29.8 PG (ref 26.1–32.9)
MCH RBC QN AUTO: 30 PG (ref 26.1–32.9)
MCH RBC QN AUTO: 30.1 PG (ref 26.1–32.9)
MCH RBC QN AUTO: 30.3 PG (ref 26.1–32.9)
MCH RBC QN AUTO: 30.7 PG (ref 26.1–32.9)
MCHC RBC AUTO-ENTMCNC: 31.3 G/DL (ref 31.4–35)
MCHC RBC AUTO-ENTMCNC: 31.5 G/DL (ref 31.4–35)
MCHC RBC AUTO-ENTMCNC: 32.1 G/DL (ref 31.4–35)
MCHC RBC AUTO-ENTMCNC: 32.2 G/DL (ref 31.4–35)
MCHC RBC AUTO-ENTMCNC: 32.2 G/DL (ref 31.4–35)
MCHC RBC AUTO-ENTMCNC: 32.3 G/DL (ref 31.4–35)
MCV RBC AUTO: 91.3 FL (ref 79.6–97.8)
MCV RBC AUTO: 92 FL (ref 79.6–97.8)
MCV RBC AUTO: 93.6 FL (ref 79.6–97.8)
MCV RBC AUTO: 94 FL (ref 79.6–97.8)
MCV RBC AUTO: 95.4 FL (ref 79.6–97.8)
MCV RBC AUTO: 95.4 FL (ref 79.6–97.8)
MONOCYTES # BLD: 0.4 K/UL (ref 0.1–1.3)
MONOCYTES # BLD: 0.4 K/UL (ref 0.1–1.3)
MONOCYTES # BLD: 0.5 K/UL (ref 0.1–1.3)
MONOCYTES # BLD: 0.5 K/UL (ref 0.1–1.3)
MONOCYTES # BLD: 0.6 K/UL (ref 0.1–1.3)
MONOCYTES # BLD: 0.7 K/UL (ref 0.1–1.3)
MONOCYTES NFR BLD: 10 % (ref 4–12)
MONOCYTES NFR BLD: 10 % (ref 4–12)
MONOCYTES NFR BLD: 18 % (ref 4–12)
MONOCYTES NFR BLD: 9 % (ref 4–12)
NEUTS SEG # BLD: 1.2 K/UL (ref 1.7–8.2)
NEUTS SEG # BLD: 2.6 K/UL (ref 1.7–8.2)
NEUTS SEG # BLD: 3 K/UL (ref 1.7–8.2)
NEUTS SEG # BLD: 3.2 K/UL (ref 1.7–8.2)
NEUTS SEG # BLD: 3.4 K/UL (ref 1.7–8.2)
NEUTS SEG # BLD: 4.7 K/UL (ref 1.7–8.2)
NEUTS SEG NFR BLD: 39 % (ref 43–78)
NEUTS SEG NFR BLD: 57 % (ref 43–78)
NEUTS SEG NFR BLD: 60 % (ref 43–78)
NEUTS SEG NFR BLD: 62 % (ref 43–78)
NEUTS SEG NFR BLD: 64 % (ref 43–78)
NEUTS SEG NFR BLD: 69 % (ref 43–78)
NRBC # BLD: 0 K/UL (ref 0–0.2)
PLATELET # BLD AUTO: 124 K/UL (ref 150–450)
PLATELET # BLD AUTO: 142 K/UL (ref 150–450)
PLATELET # BLD AUTO: 145 K/UL (ref 150–450)
PLATELET # BLD AUTO: 148 K/UL (ref 150–450)
PLATELET # BLD AUTO: 165 K/UL (ref 150–450)
PLATELET # BLD AUTO: 167 K/UL (ref 150–450)
PMV BLD AUTO: 10 FL (ref 9.4–12.3)
PMV BLD AUTO: 10 FL (ref 9.4–12.3)
PMV BLD AUTO: 10.2 FL (ref 9.4–12.3)
PMV BLD AUTO: 10.5 FL (ref 9.4–12.3)
PMV BLD AUTO: 11 FL (ref 9.4–12.3)
PMV BLD AUTO: 9.8 FL (ref 9.4–12.3)
POTASSIUM SERPL-SCNC: 3.1 MMOL/L (ref 3.5–5.1)
POTASSIUM SERPL-SCNC: 3.5 MMOL/L (ref 3.5–5.1)
POTASSIUM SERPL-SCNC: 3.7 MMOL/L (ref 3.5–5.1)
POTASSIUM SERPL-SCNC: 3.8 MMOL/L (ref 3.5–5.1)
POTASSIUM SERPL-SCNC: 3.8 MMOL/L (ref 3.5–5.1)
POTASSIUM SERPL-SCNC: 4 MMOL/L (ref 3.5–5.1)
PROT PATTERN SERPL ELPH-IMP: ABNORMAL
PROT PATTERN SPEC IFE-IMP: ABNORMAL
PROT SERPL-MCNC: 6.2 G/DL (ref 6.3–8.2)
PROT SERPL-MCNC: 6.4 G/DL (ref 6.3–8.2)
PROT SERPL-MCNC: 6.5 G/DL (ref 6.3–8.2)
PROT SERPL-MCNC: 6.5 G/DL (ref 6.3–8.2)
PROT SERPL-MCNC: 6.6 G/DL (ref 6.3–8.2)
PROT SERPL-MCNC: 6.6 G/DL (ref 6.3–8.2)
PROT SERPL-MCNC: 6.7 G/DL (ref 6.3–8.2)
PROT SERPL-MCNC: 6.8 G/DL (ref 6.3–8.2)
PROT SERPL-MCNC: 6.8 G/DL (ref 6.3–8.2)
PROT SERPL-MCNC: 7 G/DL (ref 6.3–8.2)
PROT SERPL-MCNC: 7.1 G/DL (ref 6.3–8.2)
PROT SERPL-MCNC: 7.2 G/DL (ref 6.3–8.2)
RBC # BLD AUTO: 3.91 M/UL (ref 4.23–5.6)
RBC # BLD AUTO: 4 M/UL (ref 4.23–5.6)
RBC # BLD AUTO: 4.09 M/UL (ref 4.23–5.6)
RBC # BLD AUTO: 4.2 M/UL (ref 4.23–5.6)
RBC # BLD AUTO: 4.36 M/UL (ref 4.23–5.6)
RBC # BLD AUTO: 4.58 M/UL (ref 4.23–5.6)
SODIUM SERPL-SCNC: 139 MMOL/L (ref 136–145)
SODIUM SERPL-SCNC: 141 MMOL/L (ref 136–145)
SODIUM SERPL-SCNC: 142 MMOL/L (ref 136–145)
SODIUM SERPL-SCNC: 142 MMOL/L (ref 136–145)
SODIUM SERPL-SCNC: 143 MMOL/L (ref 136–145)
SODIUM SERPL-SCNC: 145 MMOL/L (ref 136–145)
WBC # BLD AUTO: 3.2 K/UL (ref 4.3–11.1)
WBC # BLD AUTO: 4.6 K/UL (ref 4.3–11.1)
WBC # BLD AUTO: 4.9 K/UL (ref 4.3–11.1)
WBC # BLD AUTO: 5.4 K/UL (ref 4.3–11.1)
WBC # BLD AUTO: 5.4 K/UL (ref 4.3–11.1)
WBC # BLD AUTO: 6.8 K/UL (ref 4.3–11.1)

## 2021-01-01 PROCEDURE — 82306 VITAMIN D 25 HYDROXY: CPT

## 2021-01-01 PROCEDURE — 85025 COMPLETE CBC W/AUTO DIFF WBC: CPT

## 2021-01-01 PROCEDURE — 83883 ASSAY NEPHELOMETRY NOT SPEC: CPT

## 2021-01-01 PROCEDURE — 82784 ASSAY IGA/IGD/IGG/IGM EACH: CPT

## 2021-01-01 PROCEDURE — 84165 PROTEIN E-PHORESIS SERUM: CPT

## 2021-01-01 PROCEDURE — 36415 COLL VENOUS BLD VENIPUNCTURE: CPT

## 2021-01-01 PROCEDURE — 86334 IMMUNOFIX E-PHORESIS SERUM: CPT

## 2021-01-01 PROCEDURE — 80053 COMPREHEN METABOLIC PANEL: CPT

## 2021-01-04 LAB
ALBUMIN SERPL ELPH-MCNC: 3.48 G/DL (ref 3.2–5.6)
ALBUMIN/GLOB SERPL: 1 {RATIO}
ALPHA1 GLOB SERPL ELPH-MCNC: 0.25 G/DL (ref 0.1–0.4)
ALPHA2 GLOB SERPL ELPH-MCNC: 0.66 G/DL (ref 0.4–1.2)
B-GLOBULIN SERPL QL ELPH: 1.14 G/DL (ref 0.6–1.3)
GAMMA GLOB MFR SERPL ELPH: 1.38 G/DL (ref 0.5–1.6)
IGA SERPL-MCNC: 283 MG/DL (ref 85–499)
IGG SERPL-MCNC: 1149 MG/DL (ref 610–1616)
IGM SERPL-MCNC: 29 MG/DL (ref 35–242)
KAPPA LC FREE SER-MCNC: 46.23 MG/L (ref 3.3–19.4)
KAPPA LC FREE/LAMBDA FREE SER: 1.04 {RATIO} (ref 0.26–1.65)
LAMBDA LC FREE SERPL-MCNC: 44.66 MG/L (ref 5.71–26.3)
M PROTEIN SERPL ELPH-MCNC: ABNORMAL G/DL
PROT PATTERN SERPL ELPH-IMP: ABNORMAL
PROT PATTERN SPEC IFE-IMP: ABNORMAL
PROT SERPL-MCNC: 6.9 G/DL (ref 6.3–8.2)

## 2021-01-27 ENCOUNTER — HOSPITAL ENCOUNTER (OUTPATIENT)
Dept: LAB | Age: 86
Discharge: HOME | End: 2021-01-27
Payer: MEDICARE

## 2021-01-27 DIAGNOSIS — C90.00 MULTIPLE MYELOMA NOT HAVING ACHIEVED REMISSION (HCC): ICD-10-CM

## 2021-01-27 LAB
25(OH)D3 SERPL-MCNC: 29.8 NG/ML (ref 30–100)
ALBUMIN SERPL-MCNC: 3.3 G/DL (ref 3.2–4.6)
ALBUMIN/GLOB SERPL: 0.9 {RATIO} (ref 1.2–3.5)
ALP SERPL-CCNC: 89 U/L (ref 50–136)
ALT SERPL-CCNC: 20 U/L (ref 12–65)
ANION GAP SERPL CALC-SCNC: 4 MMOL/L (ref 7–16)
AST SERPL-CCNC: 16 U/L (ref 15–37)
BASOPHILS # BLD: 0.1 K/UL (ref 0–0.2)
BASOPHILS NFR BLD: 2 % (ref 0–2)
BILIRUB SERPL-MCNC: 0.4 MG/DL (ref 0.2–1.1)
BUN SERPL-MCNC: 20 MG/DL (ref 8–23)
CALCIUM SERPL-MCNC: 8.5 MG/DL (ref 8.3–10.4)
CHLORIDE SERPL-SCNC: 111 MMOL/L (ref 98–107)
CO2 SERPL-SCNC: 27 MMOL/L (ref 21–32)
CREAT SERPL-MCNC: 1.2 MG/DL (ref 0.8–1.5)
DIFFERENTIAL METHOD BLD: ABNORMAL
EOSINOPHIL # BLD: 0.4 K/UL (ref 0–0.8)
EOSINOPHIL NFR BLD: 11 % (ref 0.5–7.8)
ERYTHROCYTE [DISTWIDTH] IN BLOOD BY AUTOMATED COUNT: 15 % (ref 11.9–14.6)
GLOBULIN SER CALC-MCNC: 3.7 G/DL (ref 2.3–3.5)
GLUCOSE SERPL-MCNC: 115 MG/DL (ref 65–100)
HCT VFR BLD AUTO: 40.4 % (ref 41.1–50.3)
HGB BLD-MCNC: 12.5 G/DL (ref 13.6–17.2)
IMM GRANULOCYTES # BLD AUTO: 0 K/UL (ref 0–0.5)
IMM GRANULOCYTES NFR BLD AUTO: 1 % (ref 0–5)
LYMPHOCYTES # BLD: 1.4 K/UL (ref 0.5–4.6)
LYMPHOCYTES NFR BLD: 37 % (ref 13–44)
MCH RBC QN AUTO: 27.7 PG (ref 26.1–32.9)
MCHC RBC AUTO-ENTMCNC: 30.9 G/DL (ref 31.4–35)
MCV RBC AUTO: 89.4 FL (ref 79.6–97.8)
MONOCYTES # BLD: 0.6 K/UL (ref 0.1–1.3)
MONOCYTES NFR BLD: 16 % (ref 4–12)
NEUTS SEG # BLD: 1.3 K/UL (ref 1.7–8.2)
NEUTS SEG NFR BLD: 34 % (ref 43–78)
NRBC # BLD: 0 K/UL (ref 0–0.2)
PLATELET # BLD AUTO: 142 K/UL (ref 150–450)
PMV BLD AUTO: 9.3 FL (ref 9.4–12.3)
POTASSIUM SERPL-SCNC: 3.8 MMOL/L (ref 3.5–5.1)
PROT SERPL-MCNC: 7 G/DL (ref 6.3–8.2)
RBC # BLD AUTO: 4.52 M/UL (ref 4.23–5.67)
SODIUM SERPL-SCNC: 142 MMOL/L (ref 136–145)
WBC # BLD AUTO: 3.9 K/UL (ref 4.3–11.1)

## 2021-01-27 PROCEDURE — 85025 COMPLETE CBC W/AUTO DIFF WBC: CPT

## 2021-01-27 PROCEDURE — 36415 COLL VENOUS BLD VENIPUNCTURE: CPT

## 2021-01-27 PROCEDURE — 86334 IMMUNOFIX E-PHORESIS SERUM: CPT

## 2021-01-27 PROCEDURE — 80053 COMPREHEN METABOLIC PANEL: CPT

## 2021-01-27 PROCEDURE — 83883 ASSAY NEPHELOMETRY NOT SPEC: CPT

## 2021-01-27 PROCEDURE — 82306 VITAMIN D 25 HYDROXY: CPT

## 2021-01-27 PROCEDURE — 82784 ASSAY IGA/IGD/IGG/IGM EACH: CPT

## 2021-01-29 LAB
KAPPA LC FREE SER-MCNC: 41.69 MG/L (ref 3.3–19.4)
KAPPA LC FREE/LAMBDA FREE SER: 0.94 {RATIO} (ref 0.26–1.65)
LAMBDA LC FREE SERPL-MCNC: 44.15 MG/L (ref 5.71–26.3)

## 2021-02-01 LAB
ALBUMIN SERPL ELPH-MCNC: 3.57 G/DL (ref 3.2–5.6)
ALBUMIN/GLOB SERPL: 1.2 {RATIO}
ALPHA1 GLOB SERPL ELPH-MCNC: 0.19 G/DL (ref 0.1–0.4)
ALPHA2 GLOB SERPL ELPH-MCNC: 0.57 G/DL (ref 0.4–1.2)
B-GLOBULIN SERPL QL ELPH: 1.04 G/DL (ref 0.6–1.3)
GAMMA GLOB MFR SERPL ELPH: 1.24 G/DL (ref 0.5–1.6)
IGA SERPL-MCNC: 279 MG/DL (ref 85–499)
IGG SERPL-MCNC: 1167 MG/DL (ref 610–1616)
IGM SERPL-MCNC: 23 MG/DL (ref 35–242)
M PROTEIN SERPL ELPH-MCNC: ABNORMAL G/DL
PROT PATTERN SERPL ELPH-IMP: ABNORMAL
PROT PATTERN SPEC IFE-IMP: ABNORMAL
PROT SERPL-MCNC: 6.6 G/DL (ref 6.3–8.2)

## 2021-02-23 NOTE — TELEPHONE ENCOUNTER
JUAN noted pt still rescheduled his appt. SW attempted to reach pt to assess transportation needs for new appt. JUAN left voicemail on home number listed requesting return call. SW intends to remain available to assist PRN.

## 2021-03-17 NOTE — TELEPHONE ENCOUNTER
JUAN consulted with MD and ANG Lam regarding pt scheduling and spoke with pt who stated he's available to come in on 3/18 at 1 pm. JUAN scheduled Roundtrip for  at pt's residence at 1 pm.    JUAN attempted to coordinate with Treasure BAUTISTA, [phone] to attempt ask that they monitor pt's going to appt and entrance into arranged ride. JUAN was told by , Abdirashid Harris, that they cannot ensure that. JUAN intends to follow up with pt prior to [address] arrival to ensure pt take transport arranged. JUAN also left pt's listed daughter, Roxi Abebe, a voicemail stating transport is at 1 pm on 3/18.

## 2022-01-01 ENCOUNTER — APPOINTMENT (OUTPATIENT)
Dept: GENERAL RADIOLOGY | Age: 87
DRG: 177 | End: 2022-01-01
Attending: HOSPITALIST
Payer: MEDICARE

## 2022-01-01 ENCOUNTER — APPOINTMENT (OUTPATIENT)
Dept: GENERAL RADIOLOGY | Age: 87
DRG: 177 | End: 2022-01-01
Attending: INTERNAL MEDICINE
Payer: MEDICARE

## 2022-01-01 ENCOUNTER — APPOINTMENT (OUTPATIENT)
Dept: GENERAL RADIOLOGY | Age: 87
DRG: 177 | End: 2022-01-01
Attending: EMERGENCY MEDICINE
Payer: MEDICARE

## 2022-01-01 ENCOUNTER — HOSPITAL ENCOUNTER (INPATIENT)
Age: 87
LOS: 11 days | DRG: 177 | End: 2022-02-02
Attending: EMERGENCY MEDICINE | Admitting: HOSPITALIST
Payer: MEDICARE

## 2022-01-01 ENCOUNTER — HOSPITAL ENCOUNTER (EMERGENCY)
Age: 87
Discharge: HOME | DRG: 177 | End: 2022-01-19
Attending: EMERGENCY MEDICINE
Payer: MEDICARE

## 2022-01-01 ENCOUNTER — APPOINTMENT (OUTPATIENT)
Dept: ULTRASOUND IMAGING | Age: 87
DRG: 177 | End: 2022-01-01
Attending: HOSPITALIST
Payer: MEDICARE

## 2022-01-01 VITALS
RESPIRATION RATE: 20 BRPM | OXYGEN SATURATION: 95 % | WEIGHT: 230 LBS | TEMPERATURE: 98.3 F | HEIGHT: 70 IN | HEART RATE: 64 BPM | SYSTOLIC BLOOD PRESSURE: 110 MMHG | DIASTOLIC BLOOD PRESSURE: 58 MMHG | BODY MASS INDEX: 32.93 KG/M2

## 2022-01-01 VITALS
HEART RATE: 84 BPM | SYSTOLIC BLOOD PRESSURE: 110 MMHG | DIASTOLIC BLOOD PRESSURE: 61 MMHG | WEIGHT: 248 LBS | OXYGEN SATURATION: 93 % | RESPIRATION RATE: 20 BRPM | BODY MASS INDEX: 33.59 KG/M2 | TEMPERATURE: 98.1 F | HEIGHT: 72 IN

## 2022-01-01 DIAGNOSIS — J96.01 ACUTE RESPIRATORY FAILURE WITH HYPOXIA (HCC): ICD-10-CM

## 2022-01-01 DIAGNOSIS — R06.2 WHEEZING: ICD-10-CM

## 2022-01-01 DIAGNOSIS — U07.1 COVID-19: Primary | ICD-10-CM

## 2022-01-01 DIAGNOSIS — N18.9 CHRONIC KIDNEY DISEASE, UNSPECIFIED CKD STAGE: ICD-10-CM

## 2022-01-01 DIAGNOSIS — C90.01 MULTIPLE MYELOMA IN REMISSION (HCC): ICD-10-CM

## 2022-01-01 DIAGNOSIS — G93.40 ACUTE ENCEPHALOPATHY: ICD-10-CM

## 2022-01-01 DIAGNOSIS — U07.1 COVID-19: ICD-10-CM

## 2022-01-01 DIAGNOSIS — R89.9 ABNORMAL LABORATORY TEST: ICD-10-CM

## 2022-01-01 LAB
ALBUMIN SERPL-MCNC: 2.1 G/DL (ref 3.2–4.6)
ALBUMIN SERPL-MCNC: 2.2 G/DL (ref 3.2–4.6)
ALBUMIN SERPL-MCNC: 2.3 G/DL (ref 3.2–4.6)
ALBUMIN SERPL-MCNC: 2.4 G/DL (ref 3.2–4.6)
ALBUMIN SERPL-MCNC: 2.5 G/DL (ref 3.2–4.6)
ALBUMIN/GLOB SERPL: 0.4 {RATIO} (ref 1.2–3.5)
ALBUMIN/GLOB SERPL: 0.5 {RATIO} (ref 1.2–3.5)
ALBUMIN/GLOB SERPL: 0.6 {RATIO} (ref 1.2–3.5)
ALP SERPL-CCNC: 107 U/L (ref 50–136)
ALP SERPL-CCNC: 108 U/L (ref 50–136)
ALP SERPL-CCNC: 113 U/L (ref 50–136)
ALP SERPL-CCNC: 114 U/L (ref 50–136)
ALP SERPL-CCNC: 136 U/L (ref 50–136)
ALP SERPL-CCNC: 137 U/L (ref 50–136)
ALP SERPL-CCNC: 139 U/L (ref 50–136)
ALP SERPL-CCNC: 150 U/L (ref 50–136)
ALP SERPL-CCNC: 164 U/L (ref 50–136)
ALT SERPL-CCNC: 100 U/L (ref 12–65)
ALT SERPL-CCNC: 104 U/L (ref 12–65)
ALT SERPL-CCNC: 112 U/L (ref 12–65)
ALT SERPL-CCNC: 160 U/L (ref 12–65)
ALT SERPL-CCNC: 275 U/L (ref 12–65)
ALT SERPL-CCNC: 45 U/L (ref 12–65)
ALT SERPL-CCNC: 55 U/L (ref 12–65)
ALT SERPL-CCNC: 61 U/L (ref 12–65)
ALT SERPL-CCNC: 83 U/L (ref 12–65)
ANION GAP SERPL CALC-SCNC: 2 MMOL/L (ref 7–16)
ANION GAP SERPL CALC-SCNC: 4 MMOL/L (ref 7–16)
ANION GAP SERPL CALC-SCNC: 5 MMOL/L (ref 7–16)
ANION GAP SERPL CALC-SCNC: 6 MMOL/L (ref 7–16)
ANION GAP SERPL CALC-SCNC: 8 MMOL/L (ref 7–16)
ANION GAP SERPL CALC-SCNC: 8 MMOL/L (ref 7–16)
ANION GAP SERPL CALC-SCNC: 9 MMOL/L (ref 7–16)
ARTERIAL PATENCY WRIST A: POSITIVE
AST SERPL-CCNC: 119 U/L (ref 15–37)
AST SERPL-CCNC: 124 U/L (ref 15–37)
AST SERPL-CCNC: 192 U/L (ref 15–37)
AST SERPL-CCNC: 44 U/L (ref 15–37)
AST SERPL-CCNC: 61 U/L (ref 15–37)
AST SERPL-CCNC: 69 U/L (ref 15–37)
AST SERPL-CCNC: 73 U/L (ref 15–37)
AST SERPL-CCNC: 80 U/L (ref 15–37)
AST SERPL-CCNC: 82 U/L (ref 15–37)
ATRIAL RATE: 111 BPM
BACTERIA SPEC CULT: NORMAL
BACTERIA URNS QL MICRO: 0 /HPF
BASE DEFICIT BLD-SCNC: 0.5 MMOL/L
BASOPHILS # BLD: 0 K/UL (ref 0–0.2)
BASOPHILS NFR BLD: 0 % (ref 0–2)
BASOPHILS NFR BLD: 1 % (ref 0–2)
BASOPHILS NFR BLD: 1 % (ref 0–2)
BDY SITE: ABNORMAL
BILIRUB SERPL-MCNC: 0.3 MG/DL (ref 0.2–1.1)
BILIRUB SERPL-MCNC: 0.4 MG/DL (ref 0.2–1.1)
BILIRUB SERPL-MCNC: 0.5 MG/DL (ref 0.2–1.1)
BILIRUB SERPL-MCNC: 0.6 MG/DL (ref 0.2–1.1)
BILIRUB SERPL-MCNC: 0.7 MG/DL (ref 0.2–1.1)
BILIRUB SERPL-MCNC: 0.9 MG/DL (ref 0.2–1.1)
BILIRUB SERPL-MCNC: 1.3 MG/DL (ref 0.2–1.1)
BUN SERPL-MCNC: 13 MG/DL (ref 8–23)
BUN SERPL-MCNC: 15 MG/DL (ref 8–23)
BUN SERPL-MCNC: 23 MG/DL (ref 8–23)
BUN SERPL-MCNC: 25 MG/DL (ref 8–23)
BUN SERPL-MCNC: 25 MG/DL (ref 8–23)
BUN SERPL-MCNC: 26 MG/DL (ref 8–23)
BUN SERPL-MCNC: 28 MG/DL (ref 8–23)
BUN SERPL-MCNC: 29 MG/DL (ref 8–23)
BUN SERPL-MCNC: 31 MG/DL (ref 8–23)
CALCIUM SERPL-MCNC: 7.6 MG/DL (ref 8.3–10.4)
CALCIUM SERPL-MCNC: 8.2 MG/DL (ref 8.3–10.4)
CALCIUM SERPL-MCNC: 8.3 MG/DL (ref 8.3–10.4)
CALCIUM SERPL-MCNC: 8.5 MG/DL (ref 8.3–10.4)
CALCIUM SERPL-MCNC: 8.6 MG/DL (ref 8.3–10.4)
CALCIUM SERPL-MCNC: 8.7 MG/DL (ref 8.3–10.4)
CALCIUM SERPL-MCNC: 8.7 MG/DL (ref 8.3–10.4)
CALCULATED R AXIS, ECG10: -40 DEGREES
CALCULATED T AXIS, ECG11: 10 DEGREES
CASTS URNS QL MICRO: NORMAL /LPF
CHLORIDE SERPL-SCNC: 103 MMOL/L (ref 98–107)
CHLORIDE SERPL-SCNC: 104 MMOL/L (ref 98–107)
CHLORIDE SERPL-SCNC: 106 MMOL/L (ref 98–107)
CHLORIDE SERPL-SCNC: 107 MMOL/L (ref 98–107)
CHLORIDE SERPL-SCNC: 109 MMOL/L (ref 98–107)
CHLORIDE SERPL-SCNC: 111 MMOL/L (ref 98–107)
CHLORIDE SERPL-SCNC: 113 MMOL/L (ref 98–107)
CO2 SERPL-SCNC: 24 MMOL/L (ref 21–32)
CO2 SERPL-SCNC: 24 MMOL/L (ref 21–32)
CO2 SERPL-SCNC: 25 MMOL/L (ref 21–32)
CO2 SERPL-SCNC: 26 MMOL/L (ref 21–32)
CO2 SERPL-SCNC: 27 MMOL/L (ref 21–32)
CO2 SERPL-SCNC: 28 MMOL/L (ref 21–32)
CO2 SERPL-SCNC: 29 MMOL/L (ref 21–32)
CO2 SERPL-SCNC: 29 MMOL/L (ref 21–32)
CO2 SERPL-SCNC: 31 MMOL/L (ref 21–32)
COVID-19 RAPID TEST, COVR: DETECTED
CREAT SERPL-MCNC: 0.8 MG/DL (ref 0.8–1.5)
CREAT SERPL-MCNC: 0.86 MG/DL (ref 0.8–1.5)
CREAT SERPL-MCNC: 0.87 MG/DL (ref 0.8–1.5)
CREAT SERPL-MCNC: 0.92 MG/DL (ref 0.8–1.5)
CREAT SERPL-MCNC: 0.95 MG/DL (ref 0.8–1.5)
CREAT SERPL-MCNC: 1.01 MG/DL (ref 0.8–1.5)
CREAT SERPL-MCNC: 1.03 MG/DL (ref 0.8–1.5)
CREAT SERPL-MCNC: 1.04 MG/DL (ref 0.8–1.5)
CREAT SERPL-MCNC: 1.07 MG/DL (ref 0.8–1.5)
CRP SERPL-MCNC: 1.1 MG/DL (ref 0–0.9)
CRP SERPL-MCNC: 12 MG/DL (ref 0–0.9)
CRP SERPL-MCNC: 7.6 MG/DL (ref 0–0.9)
D DIMER PPP FEU-MCNC: 2.91 UG/ML(FEU)
DIAGNOSIS, 93000: NORMAL
DIFFERENTIAL METHOD BLD: ABNORMAL
EOSINOPHIL # BLD: 0 K/UL (ref 0–0.8)
EOSINOPHIL # BLD: 0.1 K/UL (ref 0–0.8)
EOSINOPHIL # BLD: 0.1 K/UL (ref 0–0.8)
EOSINOPHIL NFR BLD: 0 % (ref 0.5–7.8)
EOSINOPHIL NFR BLD: 3 % (ref 0.5–7.8)
EOSINOPHIL NFR BLD: 3 % (ref 0.5–7.8)
EPI CELLS #/AREA URNS HPF: NORMAL /HPF
ERYTHROCYTE [DISTWIDTH] IN BLOOD BY AUTOMATED COUNT: 14 % (ref 11.9–14.6)
ERYTHROCYTE [DISTWIDTH] IN BLOOD BY AUTOMATED COUNT: 14.1 % (ref 11.9–14.6)
ERYTHROCYTE [DISTWIDTH] IN BLOOD BY AUTOMATED COUNT: 14.2 % (ref 11.9–14.6)
ERYTHROCYTE [DISTWIDTH] IN BLOOD BY AUTOMATED COUNT: 14.3 % (ref 11.9–14.6)
ERYTHROCYTE [DISTWIDTH] IN BLOOD BY AUTOMATED COUNT: 14.3 % (ref 11.9–14.6)
ERYTHROCYTE [DISTWIDTH] IN BLOOD BY AUTOMATED COUNT: 14.4 % (ref 11.9–14.6)
FERRITIN SERPL-MCNC: 184 NG/ML (ref 8–388)
GAS FLOW.O2 O2 DELIVERY SYS: ABNORMAL L/MIN
GLOBULIN SER CALC-MCNC: 3.7 G/DL (ref 2.3–3.5)
GLOBULIN SER CALC-MCNC: 3.8 G/DL (ref 2.3–3.5)
GLOBULIN SER CALC-MCNC: 4 G/DL (ref 2.3–3.5)
GLOBULIN SER CALC-MCNC: 4.3 G/DL (ref 2.3–3.5)
GLOBULIN SER CALC-MCNC: 4.4 G/DL (ref 2.3–3.5)
GLOBULIN SER CALC-MCNC: 5.1 G/DL (ref 2.3–3.5)
GLUCOSE SERPL-MCNC: 117 MG/DL (ref 65–100)
GLUCOSE SERPL-MCNC: 119 MG/DL (ref 65–100)
GLUCOSE SERPL-MCNC: 127 MG/DL (ref 65–100)
GLUCOSE SERPL-MCNC: 127 MG/DL (ref 65–100)
GLUCOSE SERPL-MCNC: 128 MG/DL (ref 65–100)
GLUCOSE SERPL-MCNC: 154 MG/DL (ref 65–100)
GLUCOSE SERPL-MCNC: 84 MG/DL (ref 65–100)
GLUCOSE SERPL-MCNC: 92 MG/DL (ref 65–100)
GLUCOSE SERPL-MCNC: 99 MG/DL (ref 65–100)
HCO3 BLD-SCNC: 23.1 MMOL/L (ref 22–26)
HCT VFR BLD AUTO: 37.4 % (ref 41.1–50.3)
HCT VFR BLD AUTO: 37.8 % (ref 41.1–50.3)
HCT VFR BLD AUTO: 39.7 % (ref 41.1–50.3)
HCT VFR BLD AUTO: 40 % (ref 41.1–50.3)
HCT VFR BLD AUTO: 40.9 % (ref 41.1–50.3)
HCT VFR BLD AUTO: 41.6 % (ref 41.1–50.3)
HCT VFR BLD AUTO: 44.1 % (ref 41.1–50.3)
HCT VFR BLD AUTO: 44.6 % (ref 41.1–50.3)
HGB BLD-MCNC: 12.4 G/DL (ref 13.6–17.2)
HGB BLD-MCNC: 12.5 G/DL (ref 13.6–17.2)
HGB BLD-MCNC: 13.2 G/DL (ref 13.6–17.2)
HGB BLD-MCNC: 13.3 G/DL (ref 13.6–17.2)
HGB BLD-MCNC: 13.4 G/DL (ref 13.6–17.2)
HGB BLD-MCNC: 13.8 G/DL (ref 13.6–17.2)
HGB BLD-MCNC: 14 G/DL (ref 13.6–17.2)
HGB BLD-MCNC: 14.5 G/DL (ref 13.6–17.2)
IMM GRANULOCYTES # BLD AUTO: 0 K/UL (ref 0–0.5)
IMM GRANULOCYTES # BLD AUTO: 0.1 K/UL (ref 0–0.5)
IMM GRANULOCYTES NFR BLD AUTO: 1 % (ref 0–5)
IMM GRANULOCYTES NFR BLD AUTO: 2 % (ref 0–5)
IMM GRANULOCYTES NFR BLD AUTO: 2 % (ref 0–5)
IMM GRANULOCYTES NFR BLD AUTO: 3 % (ref 0–5)
LACTATE SERPL-SCNC: 1.1 MMOL/L (ref 0.4–2)
LACTATE SERPL-SCNC: 1.2 MMOL/L (ref 0.4–2)
LACTATE SERPL-SCNC: 2.2 MMOL/L (ref 0.4–2)
LYMPHOCYTES # BLD: 0.5 K/UL (ref 0.5–4.6)
LYMPHOCYTES # BLD: 0.6 K/UL (ref 0.5–4.6)
LYMPHOCYTES # BLD: 0.6 K/UL (ref 0.5–4.6)
LYMPHOCYTES # BLD: 0.7 K/UL (ref 0.5–4.6)
LYMPHOCYTES # BLD: 0.8 K/UL (ref 0.5–4.6)
LYMPHOCYTES NFR BLD: 10 % (ref 13–44)
LYMPHOCYTES NFR BLD: 11 % (ref 13–44)
LYMPHOCYTES NFR BLD: 11 % (ref 13–44)
LYMPHOCYTES NFR BLD: 12 % (ref 13–44)
LYMPHOCYTES NFR BLD: 15 % (ref 13–44)
LYMPHOCYTES NFR BLD: 20 % (ref 13–44)
LYMPHOCYTES NFR BLD: 31 % (ref 13–44)
LYMPHOCYTES NFR BLD: 32 % (ref 13–44)
MAGNESIUM SERPL-MCNC: 2.2 MG/DL (ref 1.8–2.4)
MAGNESIUM SERPL-MCNC: 2.3 MG/DL (ref 1.8–2.4)
MAGNESIUM SERPL-MCNC: 2.8 MG/DL (ref 1.8–2.4)
MCH RBC QN AUTO: 29.3 PG (ref 26.1–32.9)
MCH RBC QN AUTO: 29.5 PG (ref 26.1–32.9)
MCH RBC QN AUTO: 29.5 PG (ref 26.1–32.9)
MCH RBC QN AUTO: 29.6 PG (ref 26.1–32.9)
MCH RBC QN AUTO: 29.7 PG (ref 26.1–32.9)
MCH RBC QN AUTO: 30.1 PG (ref 26.1–32.9)
MCH RBC QN AUTO: 30.3 PG (ref 26.1–32.9)
MCH RBC QN AUTO: 30.4 PG (ref 26.1–32.9)
MCHC RBC AUTO-ENTMCNC: 31.7 G/DL (ref 31.4–35)
MCHC RBC AUTO-ENTMCNC: 32.5 G/DL (ref 31.4–35)
MCHC RBC AUTO-ENTMCNC: 32.5 G/DL (ref 31.4–35)
MCHC RBC AUTO-ENTMCNC: 32.8 G/DL (ref 31.4–35)
MCHC RBC AUTO-ENTMCNC: 33 G/DL (ref 31.4–35)
MCHC RBC AUTO-ENTMCNC: 33.2 G/DL (ref 31.4–35)
MCHC RBC AUTO-ENTMCNC: 33.4 G/DL (ref 31.4–35)
MCHC RBC AUTO-ENTMCNC: 33.8 G/DL (ref 31.4–35)
MCV RBC AUTO: 87.3 FL (ref 79.6–97.8)
MCV RBC AUTO: 89.3 FL (ref 79.6–97.8)
MCV RBC AUTO: 90 FL (ref 79.6–97.8)
MCV RBC AUTO: 90.6 FL (ref 79.6–97.8)
MCV RBC AUTO: 91.3 FL (ref 79.6–97.8)
MCV RBC AUTO: 91.4 FL (ref 79.6–97.8)
MCV RBC AUTO: 92.3 FL (ref 79.6–97.8)
MCV RBC AUTO: 93.4 FL (ref 79.6–97.8)
MM INDURATION POC: 0 MM (ref 0–5)
MM INDURATION POC: 0 MM (ref 0–5)
MONOCYTES # BLD: 0.2 K/UL (ref 0.1–1.3)
MONOCYTES # BLD: 0.2 K/UL (ref 0.1–1.3)
MONOCYTES # BLD: 0.3 K/UL (ref 0.1–1.3)
MONOCYTES # BLD: 0.4 K/UL (ref 0.1–1.3)
MONOCYTES NFR BLD: 11 % (ref 4–12)
MONOCYTES NFR BLD: 13 % (ref 4–12)
MONOCYTES NFR BLD: 4 % (ref 4–12)
MONOCYTES NFR BLD: 6 % (ref 4–12)
MONOCYTES NFR BLD: 6 % (ref 4–12)
MONOCYTES NFR BLD: 8 % (ref 4–12)
MONOCYTES NFR BLD: 8 % (ref 4–12)
MONOCYTES NFR BLD: 9 % (ref 4–12)
NEUTS SEG # BLD: 1.2 K/UL (ref 1.7–8.2)
NEUTS SEG # BLD: 1.5 K/UL (ref 1.7–8.2)
NEUTS SEG # BLD: 1.7 K/UL (ref 1.7–8.2)
NEUTS SEG # BLD: 2.8 K/UL (ref 1.7–8.2)
NEUTS SEG # BLD: 3.2 K/UL (ref 1.7–8.2)
NEUTS SEG # BLD: 4 K/UL (ref 1.7–8.2)
NEUTS SEG # BLD: 4.4 K/UL (ref 1.7–8.2)
NEUTS SEG # BLD: 4.5 K/UL (ref 1.7–8.2)
NEUTS SEG NFR BLD: 53 % (ref 43–78)
NEUTS SEG NFR BLD: 57 % (ref 43–78)
NEUTS SEG NFR BLD: 66 % (ref 43–78)
NEUTS SEG NFR BLD: 75 % (ref 43–78)
NEUTS SEG NFR BLD: 78 % (ref 43–78)
NEUTS SEG NFR BLD: 80 % (ref 43–78)
NEUTS SEG NFR BLD: 82 % (ref 43–78)
NEUTS SEG NFR BLD: 84 % (ref 43–78)
NRBC # BLD: 0 K/UL (ref 0–0.2)
O2/TOTAL GAS SETTING VFR VENT: 100 %
PCO2 BLD: 34.4 MMHG (ref 35–45)
PH BLD: 7.44 [PH] (ref 7.35–7.45)
PLATELET # BLD AUTO: 102 K/UL (ref 150–450)
PLATELET # BLD AUTO: 132 K/UL (ref 150–450)
PLATELET # BLD AUTO: 170 K/UL (ref 150–450)
PLATELET # BLD AUTO: 183 K/UL (ref 150–450)
PLATELET # BLD AUTO: 200 K/UL (ref 150–450)
PLATELET # BLD AUTO: 225 K/UL (ref 150–450)
PLATELET # BLD AUTO: 227 K/UL (ref 150–450)
PLATELET # BLD AUTO: 237 K/UL (ref 150–450)
PLATELET COMMENTS,PCOM: ADEQUATE
PLATELET COMMENTS,PCOM: SLIGHT
PMV BLD AUTO: 10.3 FL (ref 9.4–12.3)
PMV BLD AUTO: 10.4 FL (ref 9.4–12.3)
PMV BLD AUTO: 10.4 FL (ref 9.4–12.3)
PMV BLD AUTO: 10.5 FL (ref 9.4–12.3)
PMV BLD AUTO: 10.6 FL (ref 9.4–12.3)
PMV BLD AUTO: 11.2 FL (ref 9.4–12.3)
PMV BLD AUTO: 11.5 FL (ref 9.4–12.3)
PMV BLD AUTO: 11.8 FL (ref 9.4–12.3)
PO2 BLD: 72 MMHG (ref 75–100)
POTASSIUM SERPL-SCNC: 3.3 MMOL/L (ref 3.5–5.1)
POTASSIUM SERPL-SCNC: 3.3 MMOL/L (ref 3.5–5.1)
POTASSIUM SERPL-SCNC: 3.4 MMOL/L (ref 3.5–5.1)
POTASSIUM SERPL-SCNC: 3.6 MMOL/L (ref 3.5–5.1)
POTASSIUM SERPL-SCNC: 3.6 MMOL/L (ref 3.5–5.1)
POTASSIUM SERPL-SCNC: 3.8 MMOL/L (ref 3.5–5.1)
POTASSIUM SERPL-SCNC: 4.6 MMOL/L (ref 3.5–5.1)
POTASSIUM SERPL-SCNC: 4.8 MMOL/L (ref 3.5–5.1)
POTASSIUM SERPL-SCNC: 5 MMOL/L (ref 3.5–5.1)
PPD POC: NEGATIVE NEGATIVE
PPD POC: NEGATIVE NEGATIVE
PROCALCITONIN SERPL-MCNC: 0.05 NG/ML (ref 0–0.49)
PROCALCITONIN SERPL-MCNC: 0.05 NG/ML (ref 0–0.49)
PROCALCITONIN SERPL-MCNC: 0.07 NG/ML (ref 0–0.49)
PROT SERPL-MCNC: 6 G/DL (ref 6.3–8.2)
PROT SERPL-MCNC: 6.2 G/DL (ref 6.3–8.2)
PROT SERPL-MCNC: 6.4 G/DL (ref 6.3–8.2)
PROT SERPL-MCNC: 6.4 G/DL (ref 6.3–8.2)
PROT SERPL-MCNC: 6.5 G/DL (ref 6.3–8.2)
PROT SERPL-MCNC: 6.5 G/DL (ref 6.3–8.2)
PROT SERPL-MCNC: 6.7 G/DL (ref 6.3–8.2)
PROT SERPL-MCNC: 6.8 G/DL (ref 6.3–8.2)
PROT SERPL-MCNC: 7.2 G/DL (ref 6.3–8.2)
Q-T INTERVAL, ECG07: 366 MS
QRS DURATION, ECG06: 94 MS
QTC CALCULATION (BEZET), ECG08: 504 MS
RBC # BLD AUTO: 4.13 M/UL (ref 4.23–5.6)
RBC # BLD AUTO: 4.2 M/UL (ref 4.23–5.6)
RBC # BLD AUTO: 4.38 M/UL (ref 4.23–5.6)
RBC # BLD AUTO: 4.38 M/UL (ref 4.23–5.6)
RBC # BLD AUTO: 4.55 M/UL (ref 4.23–5.6)
RBC # BLD AUTO: 4.66 M/UL (ref 4.23–5.6)
RBC # BLD AUTO: 4.78 M/UL (ref 4.23–5.6)
RBC # BLD AUTO: 4.88 M/UL (ref 4.23–5.6)
RBC #/AREA URNS HPF: NORMAL /HPF
RBC MORPH BLD: ABNORMAL
SAO2 % BLD: 95 % (ref 95–98)
SERVICE CMNT-IMP: 13.3
SERVICE CMNT-IMP: ABNORMAL
SERVICE CMNT-IMP: NORMAL
SODIUM SERPL-SCNC: 136 MMOL/L (ref 138–145)
SODIUM SERPL-SCNC: 137 MMOL/L (ref 136–145)
SODIUM SERPL-SCNC: 137 MMOL/L (ref 136–145)
SODIUM SERPL-SCNC: 137 MMOL/L (ref 138–145)
SODIUM SERPL-SCNC: 139 MMOL/L (ref 136–145)
SODIUM SERPL-SCNC: 140 MMOL/L (ref 136–145)
SODIUM SERPL-SCNC: 141 MMOL/L (ref 136–145)
SODIUM SERPL-SCNC: 143 MMOL/L (ref 138–145)
SODIUM SERPL-SCNC: 144 MMOL/L (ref 136–145)
SOURCE, COVRS: ABNORMAL
SPECIMEN TYPE: ABNORMAL
VANCOMYCIN SERPL-MCNC: 13.1 UG/ML
VENTRICULAR RATE, ECG03: 114 BPM
WBC # BLD AUTO: 2.3 K/UL (ref 4.3–11.1)
WBC # BLD AUTO: 2.6 K/UL (ref 4.3–11.1)
WBC # BLD AUTO: 2.6 K/UL (ref 4.3–11.1)
WBC # BLD AUTO: 3.6 K/UL (ref 4.3–11.1)
WBC # BLD AUTO: 4.1 K/UL (ref 4.3–11.1)
WBC # BLD AUTO: 5 K/UL (ref 4.3–11.1)
WBC # BLD AUTO: 5.3 K/UL (ref 4.3–11.1)
WBC # BLD AUTO: 5.3 K/UL (ref 4.3–11.1)
WBC MORPH BLD: ABNORMAL
WBC MORPH BLD: ABNORMAL
WBC URNS QL MICRO: NORMAL /HPF

## 2022-01-01 PROCEDURE — 99223 1ST HOSP IP/OBS HIGH 75: CPT | Performed by: INTERNAL MEDICINE

## 2022-01-01 PROCEDURE — 97166 OT EVAL MOD COMPLEX 45 MIN: CPT

## 2022-01-01 PROCEDURE — 84145 PROCALCITONIN (PCT): CPT

## 2022-01-01 PROCEDURE — 86140 C-REACTIVE PROTEIN: CPT

## 2022-01-01 PROCEDURE — 96360 HYDRATION IV INFUSION INIT: CPT

## 2022-01-01 PROCEDURE — 94762 N-INVAS EAR/PLS OXIMTRY CONT: CPT

## 2022-01-01 PROCEDURE — 2709999900 HC NON-CHARGEABLE SUPPLY

## 2022-01-01 PROCEDURE — 36415 COLL VENOUS BLD VENIPUNCTURE: CPT

## 2022-01-01 PROCEDURE — 85025 COMPLETE CBC W/AUTO DIFF WBC: CPT

## 2022-01-01 PROCEDURE — 74011000250 HC RX REV CODE- 250: Performed by: HOSPITALIST

## 2022-01-01 PROCEDURE — 74011250636 HC RX REV CODE- 250/636: Performed by: INTERNAL MEDICINE

## 2022-01-01 PROCEDURE — 77010033711 HC HIGH FLOW OXYGEN

## 2022-01-01 PROCEDURE — 74011000258 HC RX REV CODE- 258: Performed by: INTERNAL MEDICINE

## 2022-01-01 PROCEDURE — 71045 X-RAY EXAM CHEST 1 VIEW: CPT

## 2022-01-01 PROCEDURE — 82728 ASSAY OF FERRITIN: CPT

## 2022-01-01 PROCEDURE — 94660 CPAP INITIATION&MGMT: CPT

## 2022-01-01 PROCEDURE — 80053 COMPREHEN METABOLIC PANEL: CPT

## 2022-01-01 PROCEDURE — 97530 THERAPEUTIC ACTIVITIES: CPT

## 2022-01-01 PROCEDURE — 99284 EMERGENCY DEPT VISIT MOD MDM: CPT

## 2022-01-01 PROCEDURE — 93005 ELECTROCARDIOGRAM TRACING: CPT | Performed by: EMERGENCY MEDICINE

## 2022-01-01 PROCEDURE — 74011000250 HC RX REV CODE- 250: Performed by: FAMILY MEDICINE

## 2022-01-01 PROCEDURE — 65270000029 HC RM PRIVATE

## 2022-01-01 PROCEDURE — 87635 SARS-COV-2 COVID-19 AMP PRB: CPT

## 2022-01-01 PROCEDURE — 81015 MICROSCOPIC EXAM OF URINE: CPT

## 2022-01-01 PROCEDURE — 65610000001 HC ROOM ICU GENERAL

## 2022-01-01 PROCEDURE — 97535 SELF CARE MNGMENT TRAINING: CPT

## 2022-01-01 PROCEDURE — 85379 FIBRIN DEGRADATION QUANT: CPT

## 2022-01-01 PROCEDURE — APPNB180 APP NON BILLABLE TIME > 60 MINS: Performed by: NURSE PRACTITIONER

## 2022-01-01 PROCEDURE — 94760 N-INVAS EAR/PLS OXIMETRY 1: CPT

## 2022-01-01 PROCEDURE — 74011000250 HC RX REV CODE- 250: Performed by: INTERNAL MEDICINE

## 2022-01-01 PROCEDURE — 74011250637 HC RX REV CODE- 250/637: Performed by: HOSPITALIST

## 2022-01-01 PROCEDURE — 83735 ASSAY OF MAGNESIUM: CPT

## 2022-01-01 PROCEDURE — 80202 ASSAY OF VANCOMYCIN: CPT

## 2022-01-01 PROCEDURE — 97112 NEUROMUSCULAR REEDUCATION: CPT

## 2022-01-01 PROCEDURE — 93970 EXTREMITY STUDY: CPT

## 2022-01-01 PROCEDURE — 74011250636 HC RX REV CODE- 250/636: Performed by: EMERGENCY MEDICINE

## 2022-01-01 PROCEDURE — 74011250636 HC RX REV CODE- 250/636: Performed by: HOSPITALIST

## 2022-01-01 PROCEDURE — 74011000250 HC RX REV CODE- 250: Performed by: EMERGENCY MEDICINE

## 2022-01-01 PROCEDURE — 77030041247 HC PROTECTOR HEEL HEELMEDIX MDII -B

## 2022-01-01 PROCEDURE — 74011250637 HC RX REV CODE- 250/637: Performed by: INTERNAL MEDICINE

## 2022-01-01 PROCEDURE — 74011250636 HC RX REV CODE- 250/636: Performed by: FAMILY MEDICINE

## 2022-01-01 PROCEDURE — 83605 ASSAY OF LACTIC ACID: CPT

## 2022-01-01 PROCEDURE — 87040 BLOOD CULTURE FOR BACTERIA: CPT

## 2022-01-01 PROCEDURE — 77010033678 HC OXYGEN DAILY

## 2022-01-01 PROCEDURE — 81003 URINALYSIS AUTO W/O SCOPE: CPT

## 2022-01-01 PROCEDURE — 76937 US GUIDE VASCULAR ACCESS: CPT

## 2022-01-01 PROCEDURE — 36600 WITHDRAWAL OF ARTERIAL BLOOD: CPT

## 2022-01-01 PROCEDURE — 87086 URINE CULTURE/COLONY COUNT: CPT

## 2022-01-01 PROCEDURE — 77030040922 HC BLNKT HYPOTHRM STRY -A

## 2022-01-01 PROCEDURE — 77030040830 HC CATH URETH FOL MDII -A

## 2022-01-01 PROCEDURE — 86580 TB INTRADERMAL TEST: CPT | Performed by: INTERNAL MEDICINE

## 2022-01-01 PROCEDURE — 74011250636 HC RX REV CODE- 250/636

## 2022-01-01 PROCEDURE — 97161 PT EVAL LOW COMPLEX 20 MIN: CPT

## 2022-01-01 PROCEDURE — 82803 BLOOD GASES ANY COMBINATION: CPT

## 2022-01-01 PROCEDURE — 99222 1ST HOSP IP/OBS MODERATE 55: CPT | Performed by: INTERNAL MEDICINE

## 2022-01-01 PROCEDURE — 71046 X-RAY EXAM CHEST 2 VIEWS: CPT

## 2022-01-01 RX ORDER — SODIUM CHLORIDE 0.9 % (FLUSH) 0.9 %
5-40 SYRINGE (ML) INJECTION AS NEEDED
Status: DISCONTINUED | OUTPATIENT
Start: 2022-01-01 | End: 2022-01-01

## 2022-01-01 RX ORDER — POTASSIUM CHLORIDE 20 MEQ/1
40 TABLET, EXTENDED RELEASE ORAL
Status: COMPLETED | OUTPATIENT
Start: 2022-01-01 | End: 2022-01-01

## 2022-01-01 RX ORDER — SODIUM CHLORIDE 0.9 % (FLUSH) 0.9 %
5-10 SYRINGE (ML) INJECTION EVERY 8 HOURS
Status: DISCONTINUED | OUTPATIENT
Start: 2022-01-01 | End: 2022-01-01

## 2022-01-01 RX ORDER — HALOPERIDOL 5 MG/ML
2 INJECTION INTRAMUSCULAR ONCE
Status: COMPLETED | OUTPATIENT
Start: 2022-01-01 | End: 2022-01-01

## 2022-01-01 RX ORDER — CLONAZEPAM 0.5 MG/1
0.5 TABLET ORAL 2 TIMES DAILY
Status: DISCONTINUED | OUTPATIENT
Start: 2022-01-01 | End: 2022-01-01

## 2022-01-01 RX ORDER — FUROSEMIDE 10 MG/ML
40 INJECTION INTRAMUSCULAR; INTRAVENOUS ONCE
Status: COMPLETED | OUTPATIENT
Start: 2022-01-01 | End: 2022-01-01

## 2022-01-01 RX ORDER — ASCORBIC ACID 500 MG
2000 TABLET ORAL DAILY
Status: DISCONTINUED | OUTPATIENT
Start: 2022-01-01 | End: 2022-01-01

## 2022-01-01 RX ORDER — MEMANTINE HYDROCHLORIDE 5 MG/1
5 TABLET ORAL DAILY
Status: DISCONTINUED | OUTPATIENT
Start: 2022-01-01 | End: 2022-01-01

## 2022-01-01 RX ORDER — LORAZEPAM 2 MG/ML
1 INJECTION INTRAMUSCULAR ONCE
Status: DISCONTINUED | OUTPATIENT
Start: 2022-01-01 | End: 2022-01-01

## 2022-01-01 RX ORDER — MORPHINE SULFATE 4 MG/ML
2 INJECTION INTRAVENOUS
Status: DISCONTINUED | OUTPATIENT
Start: 2022-01-01 | End: 2022-01-01

## 2022-01-01 RX ORDER — SENNOSIDES 8.6 MG/1
2 TABLET ORAL
Status: DISCONTINUED | OUTPATIENT
Start: 2022-01-01 | End: 2022-01-01

## 2022-01-01 RX ORDER — CLONIDINE HYDROCHLORIDE 0.2 MG/1
0.2 TABLET ORAL
Status: DISCONTINUED | OUTPATIENT
Start: 2022-01-01 | End: 2022-01-01

## 2022-01-01 RX ORDER — NOREPINEPHRINE BITARTRATE/D5W 4MG/250ML
.5-3 PLASTIC BAG, INJECTION (ML) INTRAVENOUS
Status: DISCONTINUED | OUTPATIENT
Start: 2022-01-01 | End: 2022-01-01

## 2022-01-01 RX ORDER — LORAZEPAM 2 MG/ML
INJECTION INTRAMUSCULAR
Status: COMPLETED
Start: 2022-01-01 | End: 2022-01-01

## 2022-01-01 RX ORDER — MORPHINE SULFATE 4 MG/ML
2 INJECTION INTRAVENOUS ONCE
Status: COMPLETED | OUTPATIENT
Start: 2022-01-01 | End: 2022-01-01

## 2022-01-01 RX ORDER — HALOPERIDOL 5 MG/ML
5 INJECTION INTRAMUSCULAR ONCE
Status: COMPLETED | OUTPATIENT
Start: 2022-01-01 | End: 2022-01-01

## 2022-01-01 RX ORDER — METOPROLOL TARTRATE 5 MG/5ML
5 INJECTION INTRAVENOUS
Status: DISCONTINUED | OUTPATIENT
Start: 2022-01-01 | End: 2022-01-01

## 2022-01-01 RX ORDER — BENZONATATE 100 MG/1
100 CAPSULE ORAL
Status: DISCONTINUED | OUTPATIENT
Start: 2022-01-01 | End: 2022-01-01

## 2022-01-01 RX ORDER — ACETAMINOPHEN 325 MG/1
650 TABLET ORAL
Status: DISCONTINUED | OUTPATIENT
Start: 2022-01-01 | End: 2022-01-01

## 2022-01-01 RX ORDER — MORPHINE SULFATE 4 MG/ML
2 INJECTION INTRAVENOUS
Status: DISCONTINUED | OUTPATIENT
Start: 2022-01-01 | End: 2022-01-01 | Stop reason: HOSPADM

## 2022-01-01 RX ORDER — MORPHINE SULFATE 4 MG/ML
1 INJECTION INTRAVENOUS 4 TIMES DAILY
Status: DISCONTINUED | OUTPATIENT
Start: 2022-01-01 | End: 2022-01-01 | Stop reason: HOSPADM

## 2022-01-01 RX ORDER — LORAZEPAM 2 MG/ML
1 INJECTION INTRAMUSCULAR
Status: DISCONTINUED | OUTPATIENT
Start: 2022-01-01 | End: 2022-01-01 | Stop reason: HOSPADM

## 2022-01-01 RX ORDER — LOPERAMIDE HYDROCHLORIDE 2 MG/1
4 CAPSULE ORAL
Status: DISCONTINUED | OUTPATIENT
Start: 2022-01-01 | End: 2022-01-01

## 2022-01-01 RX ORDER — SODIUM CHLORIDE 0.9 % (FLUSH) 0.9 %
5-10 SYRINGE (ML) INJECTION AS NEEDED
Status: DISCONTINUED | OUTPATIENT
Start: 2022-01-01 | End: 2022-01-01

## 2022-01-01 RX ORDER — ZINC SULFATE 50(220)MG
50 CAPSULE ORAL DAILY
Status: DISCONTINUED | OUTPATIENT
Start: 2022-01-01 | End: 2022-01-01

## 2022-01-01 RX ORDER — DEXAMETHASONE 4 MG/1
6 TABLET ORAL EVERY 24 HOURS
Status: DISCONTINUED | OUTPATIENT
Start: 2022-01-01 | End: 2022-01-01

## 2022-01-01 RX ORDER — MORPHINE SULFATE 4 MG/ML
INJECTION INTRAVENOUS
Status: COMPLETED
Start: 2022-01-01 | End: 2022-01-01

## 2022-01-01 RX ORDER — ONDANSETRON 2 MG/ML
4 INJECTION INTRAMUSCULAR; INTRAVENOUS
Status: DISCONTINUED | OUTPATIENT
Start: 2022-01-01 | End: 2022-01-01 | Stop reason: HOSPADM

## 2022-01-01 RX ORDER — DEXMEDETOMIDINE HYDROCHLORIDE 4 UG/ML
.1-1.5 INJECTION, SOLUTION INTRAVENOUS
Status: DISCONTINUED | OUTPATIENT
Start: 2022-01-01 | End: 2022-01-01 | Stop reason: HOSPADM

## 2022-01-01 RX ORDER — LENALIDOMIDE 10 MG/1
10 CAPSULE ORAL DAILY
Status: ACTIVE | OUTPATIENT
Start: 2022-01-01 | End: 2022-01-01

## 2022-01-01 RX ORDER — ENOXAPARIN SODIUM 100 MG/ML
40 INJECTION SUBCUTANEOUS EVERY 12 HOURS
Status: DISCONTINUED | OUTPATIENT
Start: 2022-01-01 | End: 2022-01-01

## 2022-01-01 RX ORDER — AMOXICILLIN 250 MG
2 CAPSULE ORAL
Status: DISCONTINUED | OUTPATIENT
Start: 2022-01-01 | End: 2022-01-01

## 2022-01-01 RX ORDER — DEXAMETHASONE SODIUM PHOSPHATE 100 MG/10ML
6 INJECTION INTRAMUSCULAR; INTRAVENOUS EVERY 24 HOURS
Status: DISCONTINUED | OUTPATIENT
Start: 2022-01-01 | End: 2022-01-01

## 2022-01-01 RX ORDER — IPRATROPIUM BROMIDE AND ALBUTEROL SULFATE 2.5; .5 MG/3ML; MG/3ML
3 SOLUTION RESPIRATORY (INHALATION)
Status: DISCONTINUED | OUTPATIENT
Start: 2022-01-01 | End: 2022-01-01

## 2022-01-01 RX ORDER — SERTRALINE HYDROCHLORIDE 50 MG/1
50 TABLET, FILM COATED ORAL DAILY
Status: DISCONTINUED | OUTPATIENT
Start: 2022-01-01 | End: 2022-01-01

## 2022-01-01 RX ORDER — MELATONIN
2000 DAILY
Status: DISCONTINUED | OUTPATIENT
Start: 2022-01-01 | End: 2022-01-01

## 2022-01-01 RX ORDER — LORAZEPAM 2 MG/ML
1 INJECTION INTRAMUSCULAR ONCE
Status: COMPLETED | OUTPATIENT
Start: 2022-01-01 | End: 2022-01-01

## 2022-01-01 RX ORDER — DEXMEDETOMIDINE HYDROCHLORIDE 4 UG/ML
.1-1.5 INJECTION, SOLUTION INTRAVENOUS
Status: DISCONTINUED | OUTPATIENT
Start: 2022-01-01 | End: 2022-01-01

## 2022-01-01 RX ORDER — HYDRALAZINE HYDROCHLORIDE 20 MG/ML
10 INJECTION INTRAMUSCULAR; INTRAVENOUS
Status: DISCONTINUED | OUTPATIENT
Start: 2022-01-01 | End: 2022-01-01

## 2022-01-01 RX ORDER — VANCOMYCIN/0.9 % SOD CHLORIDE 1.5G/250ML
1500 PLASTIC BAG, INJECTION (ML) INTRAVENOUS EVERY 24 HOURS
Status: DISCONTINUED | OUTPATIENT
Start: 2022-01-01 | End: 2022-01-01

## 2022-01-01 RX ORDER — ACETAMINOPHEN 650 MG/1
650 SUPPOSITORY RECTAL
Status: DISCONTINUED | OUTPATIENT
Start: 2022-01-01 | End: 2022-01-01

## 2022-01-01 RX ORDER — POTASSIUM CHLORIDE 20 MEQ/1
40 TABLET, EXTENDED RELEASE ORAL ONCE
Status: COMPLETED | OUTPATIENT
Start: 2022-01-01 | End: 2022-01-01

## 2022-01-01 RX ORDER — QUETIAPINE FUMARATE 25 MG/1
25 TABLET, FILM COATED ORAL
Status: DISCONTINUED | OUTPATIENT
Start: 2022-01-01 | End: 2022-01-01

## 2022-01-01 RX ORDER — SODIUM CHLORIDE 0.9 % (FLUSH) 0.9 %
5-40 SYRINGE (ML) INJECTION EVERY 8 HOURS
Status: DISCONTINUED | OUTPATIENT
Start: 2022-01-01 | End: 2022-01-01

## 2022-01-01 RX ORDER — GLYCOPYRROLATE 0.2 MG/ML
0.1 INJECTION INTRAMUSCULAR; INTRAVENOUS
Status: DISCONTINUED | OUTPATIENT
Start: 2022-01-01 | End: 2022-01-01 | Stop reason: HOSPADM

## 2022-01-01 RX ORDER — POTASSIUM CHLORIDE 14.9 MG/ML
20 INJECTION INTRAVENOUS
Status: COMPLETED | OUTPATIENT
Start: 2022-01-01 | End: 2022-01-01

## 2022-01-01 RX ORDER — DONEPEZIL HYDROCHLORIDE 5 MG/1
10 TABLET, FILM COATED ORAL DAILY
Status: DISCONTINUED | OUTPATIENT
Start: 2022-01-01 | End: 2022-01-01

## 2022-01-01 RX ADMIN — POTASSIUM CHLORIDE 40 MEQ: 20 TABLET, EXTENDED RELEASE ORAL at 12:16

## 2022-01-01 RX ADMIN — APIXABAN 2.5 MG: 2.5 TABLET, FILM COATED ORAL at 21:23

## 2022-01-01 RX ADMIN — POTASSIUM CHLORIDE 20 MEQ: 200 INJECTION, SOLUTION INTRAVENOUS at 12:00

## 2022-01-01 RX ADMIN — BENZONATATE 100 MG: 100 CAPSULE ORAL at 21:00

## 2022-01-01 RX ADMIN — DEXAMETHASONE SODIUM PHOSPHATE 6 MG: 10 INJECTION, SOLUTION INTRAMUSCULAR; INTRAVENOUS at 10:31

## 2022-01-01 RX ADMIN — SERTRALINE 50 MG: 50 TABLET, FILM COATED ORAL at 09:11

## 2022-01-01 RX ADMIN — APIXABAN 2.5 MG: 2.5 TABLET, FILM COATED ORAL at 09:11

## 2022-01-01 RX ADMIN — DEXAMETHASONE SODIUM PHOSPHATE 6 MG: 10 INJECTION, SOLUTION INTRAMUSCULAR; INTRAVENOUS at 11:16

## 2022-01-01 RX ADMIN — APIXABAN 2.5 MG: 2.5 TABLET, FILM COATED ORAL at 08:57

## 2022-01-01 RX ADMIN — GLYCOPYRROLATE 0.1 MG: 0.2 INJECTION INTRAMUSCULAR; INTRAVENOUS at 09:10

## 2022-01-01 RX ADMIN — APIXABAN 2.5 MG: 2.5 TABLET, FILM COATED ORAL at 21:14

## 2022-01-01 RX ADMIN — LORAZEPAM 1 MG: 2 INJECTION INTRAMUSCULAR; INTRAVENOUS at 07:21

## 2022-01-01 RX ADMIN — FAMOTIDINE 20 MG: 10 INJECTION INTRAVENOUS at 08:40

## 2022-01-01 RX ADMIN — PIPERACILLIN AND TAZOBACTAM 4.5 G: 4; .5 INJECTION, POWDER, FOR SOLUTION INTRAVENOUS at 17:30

## 2022-01-01 RX ADMIN — DEXAMETHASONE SODIUM PHOSPHATE 6 MG: 10 INJECTION INTRAMUSCULAR; INTRAVENOUS at 06:03

## 2022-01-01 RX ADMIN — SERTRALINE 50 MG: 50 TABLET, FILM COATED ORAL at 08:46

## 2022-01-01 RX ADMIN — ENOXAPARIN SODIUM 40 MG: 100 INJECTION SUBCUTANEOUS at 23:26

## 2022-01-01 RX ADMIN — ONDANSETRON 4 MG: 2 INJECTION INTRAMUSCULAR; INTRAVENOUS at 06:03

## 2022-01-01 RX ADMIN — PIPERACILLIN AND TAZOBACTAM 4.5 G: 4; .5 INJECTION, POWDER, FOR SOLUTION INTRAVENOUS at 16:45

## 2022-01-01 RX ADMIN — MORPHINE SULFATE 2 MG: 4 INJECTION INTRAVENOUS at 13:54

## 2022-01-01 RX ADMIN — VANCOMYCIN HYDROCHLORIDE 1500 MG: 10 INJECTION, POWDER, LYOPHILIZED, FOR SOLUTION INTRAVENOUS at 12:00

## 2022-01-01 RX ADMIN — LORAZEPAM 1 MG: 2 INJECTION INTRAMUSCULAR; INTRAVENOUS at 14:57

## 2022-01-01 RX ADMIN — DEXAMETHASONE SODIUM PHOSPHATE 6 MG: 10 INJECTION, SOLUTION INTRAMUSCULAR; INTRAVENOUS at 11:35

## 2022-01-01 RX ADMIN — WATER 10 MG: 1 INJECTION INTRAMUSCULAR; INTRAVENOUS; SUBCUTANEOUS at 00:17

## 2022-01-01 RX ADMIN — LORAZEPAM 1 MG: 2 INJECTION INTRAMUSCULAR; INTRAVENOUS at 07:55

## 2022-01-01 RX ADMIN — APIXABAN 2.5 MG: 2.5 TABLET, FILM COATED ORAL at 08:46

## 2022-01-01 RX ADMIN — SODIUM CHLORIDE, PRESERVATIVE FREE 10 ML: 5 INJECTION INTRAVENOUS at 23:34

## 2022-01-01 RX ADMIN — PIPERACILLIN AND TAZOBACTAM 4.5 G: 4; .5 INJECTION, POWDER, FOR SOLUTION INTRAVENOUS at 23:26

## 2022-01-01 RX ADMIN — DEXMEDETOMIDINE HYDROCHLORIDE 1.5 MCG/KG/HR: 400 INJECTION INTRAVENOUS at 05:07

## 2022-01-01 RX ADMIN — TOCILIZUMAB 800 MG: 20 INJECTION, SOLUTION, CONCENTRATE INTRAVENOUS at 15:10

## 2022-01-01 RX ADMIN — DEXMEDETOMIDINE HYDROCHLORIDE 1.4 MCG/KG/HR: 400 INJECTION INTRAVENOUS at 23:25

## 2022-01-01 RX ADMIN — Medication 50 MG: at 08:45

## 2022-01-01 RX ADMIN — SODIUM CHLORIDE, PRESERVATIVE FREE 10 ML: 5 INJECTION INTRAVENOUS at 05:19

## 2022-01-01 RX ADMIN — DEXAMETHASONE SODIUM PHOSPHATE 6 MG: 10 INJECTION, SOLUTION INTRAMUSCULAR; INTRAVENOUS at 11:58

## 2022-01-01 RX ADMIN — DEXMEDETOMIDINE HYDROCHLORIDE 1.5 MCG/KG/HR: 400 INJECTION INTRAVENOUS at 10:00

## 2022-01-01 RX ADMIN — DONEPEZIL HYDROCHLORIDE 10 MG: 5 TABLET, FILM COATED ORAL at 08:57

## 2022-01-01 RX ADMIN — HALOPERIDOL LACTATE 2 MG: 5 INJECTION, SOLUTION INTRAMUSCULAR at 06:47

## 2022-01-01 RX ADMIN — DONEPEZIL HYDROCHLORIDE 10 MG: 5 TABLET, FILM COATED ORAL at 09:11

## 2022-01-01 RX ADMIN — Medication 5 UNITS: at 18:23

## 2022-01-01 RX ADMIN — Medication 2000 MG: at 08:56

## 2022-01-01 RX ADMIN — LEVETIRACETAM 750 MG: 100 INJECTION, SOLUTION INTRAVENOUS at 14:10

## 2022-01-01 RX ADMIN — BENZONATATE 100 MG: 100 CAPSULE ORAL at 21:15

## 2022-01-01 RX ADMIN — PIPERACILLIN AND TAZOBACTAM 4.5 G: 4; .5 INJECTION, POWDER, FOR SOLUTION INTRAVENOUS at 23:13

## 2022-01-01 RX ADMIN — SODIUM CHLORIDE, PRESERVATIVE FREE 10 ML: 5 INJECTION INTRAVENOUS at 21:15

## 2022-01-01 RX ADMIN — APIXABAN 2.5 MG: 2.5 TABLET, FILM COATED ORAL at 21:00

## 2022-01-01 RX ADMIN — SODIUM CHLORIDE, PRESERVATIVE FREE 10 ML: 5 INJECTION INTRAVENOUS at 21:09

## 2022-01-01 RX ADMIN — LEVETIRACETAM 750 MG: 100 INJECTION, SOLUTION INTRAVENOUS at 22:55

## 2022-01-01 RX ADMIN — QUETIAPINE FUMARATE 25 MG: 25 TABLET ORAL at 21:00

## 2022-01-01 RX ADMIN — SERTRALINE 50 MG: 50 TABLET, FILM COATED ORAL at 09:56

## 2022-01-01 RX ADMIN — DONEPEZIL HYDROCHLORIDE 10 MG: 5 TABLET, FILM COATED ORAL at 08:46

## 2022-01-01 RX ADMIN — MEMANTINE HYDROCHLORIDE 5 MG: 5 TABLET ORAL at 09:56

## 2022-01-01 RX ADMIN — HALOPERIDOL LACTATE 5 MG: 5 INJECTION, SOLUTION INTRAMUSCULAR at 20:30

## 2022-01-01 RX ADMIN — PIPERACILLIN AND TAZOBACTAM 4.5 G: 4; .5 INJECTION, POWDER, FOR SOLUTION INTRAVENOUS at 16:49

## 2022-01-01 RX ADMIN — PIPERACILLIN AND TAZOBACTAM 4.5 G: 4; .5 INJECTION, POWDER, FOR SOLUTION INTRAVENOUS at 22:56

## 2022-01-01 RX ADMIN — DEXAMETHASONE 6 MG: 4 TABLET ORAL at 17:20

## 2022-01-01 RX ADMIN — VITAMIN D, TAB 1000IU (100/BT) 2000 UNITS: 25 TAB at 09:11

## 2022-01-01 RX ADMIN — PIPERACILLIN AND TAZOBACTAM 4.5 G: 4; .5 INJECTION, POWDER, FOR SOLUTION INTRAVENOUS at 13:45

## 2022-01-01 RX ADMIN — SODIUM CHLORIDE, PRESERVATIVE FREE 10 ML: 5 INJECTION INTRAVENOUS at 14:00

## 2022-01-01 RX ADMIN — DEXMEDETOMIDINE HYDROCHLORIDE 1.5 MCG/KG/HR: 400 INJECTION INTRAVENOUS at 07:30

## 2022-01-01 RX ADMIN — ENOXAPARIN SODIUM 40 MG: 100 INJECTION SUBCUTANEOUS at 10:31

## 2022-01-01 RX ADMIN — SODIUM CHLORIDE, PRESERVATIVE FREE 10 ML: 5 INJECTION INTRAVENOUS at 17:03

## 2022-01-01 RX ADMIN — LEVETIRACETAM 750 MG: 100 INJECTION, SOLUTION INTRAVENOUS at 14:57

## 2022-01-01 RX ADMIN — CLONAZEPAM 0.5 MG: 0.5 TABLET ORAL at 18:00

## 2022-01-01 RX ADMIN — SODIUM CHLORIDE, SODIUM LACTATE, POTASSIUM CHLORIDE, AND CALCIUM CHLORIDE 1000 ML: 600; 310; 30; 20 INJECTION, SOLUTION INTRAVENOUS at 19:04

## 2022-01-01 RX ADMIN — MORPHINE SULFATE 2 MG: 4 INJECTION INTRAVENOUS at 11:44

## 2022-01-01 RX ADMIN — MEMANTINE HYDROCHLORIDE 5 MG: 5 TABLET ORAL at 09:11

## 2022-01-01 RX ADMIN — MORPHINE SULFATE 2 MG: 4 INJECTION INTRAVENOUS at 16:20

## 2022-01-01 RX ADMIN — ENOXAPARIN SODIUM 40 MG: 100 INJECTION SUBCUTANEOUS at 23:00

## 2022-01-01 RX ADMIN — ENOXAPARIN SODIUM 40 MG: 100 INJECTION SUBCUTANEOUS at 22:55

## 2022-01-01 RX ADMIN — LORAZEPAM 1 MG: 2 INJECTION INTRAMUSCULAR; INTRAVENOUS at 11:44

## 2022-01-01 RX ADMIN — VANCOMYCIN HYDROCHLORIDE 2500 MG: 500 INJECTION, POWDER, LYOPHILIZED, FOR SOLUTION INTRAVENOUS at 10:38

## 2022-01-01 RX ADMIN — Medication 2000 MG: at 02:46

## 2022-01-01 RX ADMIN — PIPERACILLIN AND TAZOBACTAM 4.5 G: 4; .5 INJECTION, POWDER, FOR SOLUTION INTRAVENOUS at 15:06

## 2022-01-01 RX ADMIN — BENZONATATE 100 MG: 100 CAPSULE ORAL at 09:12

## 2022-01-01 RX ADMIN — DEXMEDETOMIDINE HYDROCHLORIDE 1.5 MCG/KG/HR: 400 INJECTION INTRAVENOUS at 02:04

## 2022-01-01 RX ADMIN — POTASSIUM CHLORIDE 20 MEQ: 200 INJECTION, SOLUTION INTRAVENOUS at 14:02

## 2022-01-01 RX ADMIN — PIPERACILLIN AND TAZOBACTAM 4.5 G: 4; .5 INJECTION, POWDER, FOR SOLUTION INTRAVENOUS at 08:25

## 2022-01-01 RX ADMIN — SODIUM CHLORIDE, PRESERVATIVE FREE 10 ML: 5 INJECTION INTRAVENOUS at 14:30

## 2022-01-01 RX ADMIN — SODIUM CHLORIDE, PRESERVATIVE FREE 40 ML: 5 INJECTION INTRAVENOUS at 23:00

## 2022-01-01 RX ADMIN — SODIUM CHLORIDE, PRESERVATIVE FREE 10 ML: 5 INJECTION INTRAVENOUS at 05:00

## 2022-01-01 RX ADMIN — DEXMEDETOMIDINE HYDROCHLORIDE 0.4 MCG/KG/HR: 400 INJECTION INTRAVENOUS at 18:23

## 2022-01-01 RX ADMIN — LEVETIRACETAM 750 MG: 100 INJECTION, SOLUTION INTRAVENOUS at 23:34

## 2022-01-01 RX ADMIN — DONEPEZIL HYDROCHLORIDE 10 MG: 5 TABLET, FILM COATED ORAL at 09:56

## 2022-01-01 RX ADMIN — LEVETIRACETAM 750 MG: 500 TABLET, FILM COATED ORAL at 09:56

## 2022-01-01 RX ADMIN — FUROSEMIDE 40 MG: 10 INJECTION, SOLUTION INTRAMUSCULAR; INTRAVENOUS at 12:27

## 2022-01-01 RX ADMIN — Medication 50 MG: at 02:47

## 2022-01-01 RX ADMIN — SODIUM CHLORIDE, PRESERVATIVE FREE 10 ML: 5 INJECTION INTRAVENOUS at 05:35

## 2022-01-01 RX ADMIN — MORPHINE SULFATE 2 MG: 4 INJECTION INTRAVENOUS at 09:42

## 2022-01-01 RX ADMIN — DEXMEDETOMIDINE HYDROCHLORIDE 1.5 MCG/KG/HR: 400 INJECTION INTRAVENOUS at 07:37

## 2022-01-01 RX ADMIN — ENOXAPARIN SODIUM 40 MG: 100 INJECTION SUBCUTANEOUS at 11:00

## 2022-01-01 RX ADMIN — LEVETIRACETAM 750 MG: 500 TABLET, FILM COATED ORAL at 17:48

## 2022-01-01 RX ADMIN — PIPERACILLIN AND TAZOBACTAM 4.5 G: 4; .5 INJECTION, POWDER, FOR SOLUTION INTRAVENOUS at 23:56

## 2022-01-01 RX ADMIN — POTASSIUM CHLORIDE 40 MEQ: 20 TABLET, EXTENDED RELEASE ORAL at 02:47

## 2022-01-01 RX ADMIN — DEXAMETHASONE SODIUM PHOSPHATE 6 MG: 10 INJECTION, SOLUTION INTRAMUSCULAR; INTRAVENOUS at 10:40

## 2022-01-01 RX ADMIN — LORAZEPAM 1 MG: 2 INJECTION INTRAMUSCULAR; INTRAVENOUS at 13:56

## 2022-01-01 RX ADMIN — CLONAZEPAM 0.5 MG: 0.5 TABLET ORAL at 09:56

## 2022-01-01 RX ADMIN — SODIUM CHLORIDE, PRESERVATIVE FREE 10 ML: 5 INJECTION INTRAVENOUS at 21:24

## 2022-01-01 RX ADMIN — SODIUM CHLORIDE, PRESERVATIVE FREE 10 ML: 5 INJECTION INTRAVENOUS at 05:15

## 2022-01-01 RX ADMIN — LEVETIRACETAM 750 MG: 100 INJECTION, SOLUTION INTRAVENOUS at 03:34

## 2022-01-01 RX ADMIN — DEXMEDETOMIDINE HYDROCHLORIDE 1 MCG/KG/HR: 400 INJECTION INTRAVENOUS at 23:28

## 2022-01-01 RX ADMIN — CLONAZEPAM 0.5 MG: 0.5 TABLET ORAL at 08:46

## 2022-01-01 RX ADMIN — Medication 2000 MG: at 09:11

## 2022-01-01 RX ADMIN — ENOXAPARIN SODIUM 40 MG: 100 INJECTION SUBCUTANEOUS at 22:51

## 2022-01-01 RX ADMIN — Medication 50 MG: at 08:57

## 2022-01-01 RX ADMIN — LEVETIRACETAM 750 MG: 500 TABLET, FILM COATED ORAL at 09:11

## 2022-01-01 RX ADMIN — SERTRALINE 50 MG: 50 TABLET, FILM COATED ORAL at 08:56

## 2022-01-01 RX ADMIN — GLYCOPYRROLATE 0.1 MG: 0.2 INJECTION INTRAMUSCULAR; INTRAVENOUS at 14:46

## 2022-01-01 RX ADMIN — SODIUM CHLORIDE, PRESERVATIVE FREE 10 ML: 5 INJECTION INTRAVENOUS at 13:04

## 2022-01-01 RX ADMIN — SODIUM CHLORIDE, PRESERVATIVE FREE 10 ML: 5 INJECTION INTRAVENOUS at 14:10

## 2022-01-01 RX ADMIN — SODIUM CHLORIDE, PRESERVATIVE FREE 10 ML: 5 INJECTION INTRAVENOUS at 04:44

## 2022-01-01 RX ADMIN — Medication 2000 MG: at 08:45

## 2022-01-01 RX ADMIN — MORPHINE SULFATE 2 MG: 4 INJECTION INTRAVENOUS at 14:08

## 2022-01-01 RX ADMIN — MORPHINE SULFATE 2 MG: 4 INJECTION INTRAVENOUS at 15:58

## 2022-01-01 RX ADMIN — APIXABAN 2.5 MG: 2.5 TABLET, FILM COATED ORAL at 21:45

## 2022-01-01 RX ADMIN — LORAZEPAM 1 MG: 2 INJECTION INTRAMUSCULAR; INTRAVENOUS at 15:58

## 2022-01-01 RX ADMIN — LEVETIRACETAM 750 MG: 500 TABLET, FILM COATED ORAL at 17:21

## 2022-01-01 RX ADMIN — DEXMEDETOMIDINE HYDROCHLORIDE 1.4 MCG/KG/HR: 400 INJECTION INTRAVENOUS at 20:25

## 2022-01-01 RX ADMIN — DEXAMETHASONE 6 MG: 4 TABLET ORAL at 16:10

## 2022-01-01 RX ADMIN — MEMANTINE HYDROCHLORIDE 5 MG: 5 TABLET ORAL at 08:46

## 2022-01-01 RX ADMIN — SODIUM CHLORIDE, PRESERVATIVE FREE 10 ML: 5 INJECTION INTRAVENOUS at 05:13

## 2022-01-01 RX ADMIN — SODIUM CHLORIDE, PRESERVATIVE FREE 10 ML: 5 INJECTION INTRAVENOUS at 13:02

## 2022-01-01 RX ADMIN — SODIUM CHLORIDE, PRESERVATIVE FREE 40 ML: 5 INJECTION INTRAVENOUS at 21:53

## 2022-01-01 RX ADMIN — VITAMIN D, TAB 1000IU (100/BT) 2000 UNITS: 25 TAB at 08:45

## 2022-01-01 RX ADMIN — LORAZEPAM 1 MG: 2 INJECTION INTRAMUSCULAR; INTRAVENOUS at 17:37

## 2022-01-01 RX ADMIN — SODIUM CHLORIDE, PRESERVATIVE FREE 10 ML: 5 INJECTION INTRAVENOUS at 05:56

## 2022-01-01 RX ADMIN — CLONAZEPAM 0.5 MG: 0.5 TABLET ORAL at 09:11

## 2022-01-01 RX ADMIN — PIPERACILLIN AND TAZOBACTAM 4.5 G: 4; .5 INJECTION, POWDER, FOR SOLUTION INTRAVENOUS at 23:25

## 2022-01-01 RX ADMIN — MORPHINE SULFATE 1 MG: 4 INJECTION INTRAVENOUS at 13:05

## 2022-01-01 RX ADMIN — SODIUM CHLORIDE, PRESERVATIVE FREE 10 ML: 5 INJECTION INTRAVENOUS at 22:51

## 2022-01-01 RX ADMIN — LEVETIRACETAM 750 MG: 500 TABLET, FILM COATED ORAL at 08:57

## 2022-01-01 RX ADMIN — CLONAZEPAM 0.5 MG: 0.5 TABLET ORAL at 17:20

## 2022-01-01 RX ADMIN — DEXMEDETOMIDINE HYDROCHLORIDE 1.1 MCG/KG/HR: 400 INJECTION INTRAVENOUS at 13:04

## 2022-01-01 RX ADMIN — LEVETIRACETAM 750 MG: 500 TABLET, FILM COATED ORAL at 17:20

## 2022-01-01 RX ADMIN — LORAZEPAM 1 MG: 2 INJECTION INTRAMUSCULAR; INTRAVENOUS at 06:58

## 2022-01-01 RX ADMIN — PIPERACILLIN AND TAZOBACTAM 4.5 G: 4; .5 INJECTION, POWDER, FOR SOLUTION INTRAVENOUS at 07:38

## 2022-01-01 RX ADMIN — LEVETIRACETAM 750 MG: 100 INJECTION, SOLUTION INTRAVENOUS at 04:10

## 2022-01-01 RX ADMIN — APIXABAN 2.5 MG: 2.5 TABLET, FILM COATED ORAL at 09:56

## 2022-01-01 RX ADMIN — SODIUM CHLORIDE, PRESERVATIVE FREE 10 ML: 5 INJECTION INTRAVENOUS at 21:46

## 2022-01-01 RX ADMIN — ENOXAPARIN SODIUM 40 MG: 100 INJECTION SUBCUTANEOUS at 10:38

## 2022-01-01 RX ADMIN — SODIUM CHLORIDE, PRESERVATIVE FREE 10 ML: 5 INJECTION INTRAVENOUS at 14:06

## 2022-01-01 RX ADMIN — LEVETIRACETAM 750 MG: 100 INJECTION, SOLUTION INTRAVENOUS at 11:57

## 2022-01-01 RX ADMIN — ACETAMINOPHEN 650 MG: 325 TABLET ORAL at 18:22

## 2022-01-01 RX ADMIN — PIPERACILLIN AND TAZOBACTAM 4.5 G: 4; .5 INJECTION, POWDER, FOR SOLUTION INTRAVENOUS at 08:32

## 2022-01-01 RX ADMIN — DEXMEDETOMIDINE HYDROCHLORIDE 1.4 MCG/KG/HR: 400 INJECTION INTRAVENOUS at 10:02

## 2022-01-01 RX ADMIN — VITAMIN D, TAB 1000IU (100/BT) 2000 UNITS: 25 TAB at 08:56

## 2022-01-01 RX ADMIN — HALOPERIDOL LACTATE 5 MG: 5 INJECTION, SOLUTION INTRAMUSCULAR at 20:36

## 2022-01-01 RX ADMIN — LEVETIRACETAM 750 MG: 100 INJECTION, SOLUTION INTRAVENOUS at 16:49

## 2022-01-01 RX ADMIN — ENOXAPARIN SODIUM 40 MG: 100 INJECTION SUBCUTANEOUS at 23:32

## 2022-01-01 RX ADMIN — CLONAZEPAM 0.5 MG: 0.5 TABLET ORAL at 08:56

## 2022-01-01 RX ADMIN — LEVETIRACETAM 750 MG: 100 INJECTION, SOLUTION INTRAVENOUS at 11:30

## 2022-01-01 RX ADMIN — LEVETIRACETAM 750 MG: 100 INJECTION, SOLUTION INTRAVENOUS at 03:35

## 2022-01-01 RX ADMIN — ENOXAPARIN SODIUM 40 MG: 100 INJECTION SUBCUTANEOUS at 11:35

## 2022-01-01 RX ADMIN — SODIUM CHLORIDE 500 ML: 900 INJECTION, SOLUTION INTRAVENOUS at 08:25

## 2022-01-01 RX ADMIN — SODIUM CHLORIDE, PRESERVATIVE FREE 10 ML: 5 INJECTION INTRAVENOUS at 21:00

## 2022-01-01 RX ADMIN — DEXAMETHASONE SODIUM PHOSPHATE 6 MG: 10 INJECTION, SOLUTION INTRAMUSCULAR; INTRAVENOUS at 10:59

## 2022-01-01 RX ADMIN — ENOXAPARIN SODIUM 40 MG: 100 INJECTION SUBCUTANEOUS at 11:16

## 2022-01-01 RX ADMIN — PIPERACILLIN AND TAZOBACTAM 4.5 G: 4; .5 INJECTION, POWDER, FOR SOLUTION INTRAVENOUS at 07:30

## 2022-01-01 RX ADMIN — WATER 5 MG: 1 INJECTION INTRAMUSCULAR; INTRAVENOUS; SUBCUTANEOUS at 04:09

## 2022-01-01 RX ADMIN — CLONAZEPAM 0.5 MG: 0.5 TABLET ORAL at 18:22

## 2022-01-01 RX ADMIN — LEVETIRACETAM 750 MG: 500 TABLET, FILM COATED ORAL at 08:45

## 2022-01-01 RX ADMIN — HYDRALAZINE HYDROCHLORIDE 10 MG: 20 INJECTION INTRAMUSCULAR; INTRAVENOUS at 23:50

## 2022-01-01 RX ADMIN — MORPHINE SULFATE 2 MG: 4 INJECTION INTRAVENOUS at 14:57

## 2022-01-01 RX ADMIN — NOREPINEPHRINE BITARTRATE 4 MCG/MIN: 1 INJECTION, SOLUTION, CONCENTRATE INTRAVENOUS at 09:07

## 2022-01-01 RX ADMIN — MORPHINE SULFATE 2 MG: 4 INJECTION INTRAVENOUS at 22:51

## 2022-01-01 RX ADMIN — Medication 50 MG: at 09:11

## 2022-01-01 RX ADMIN — VITAMIN D, TAB 1000IU (100/BT) 2000 UNITS: 25 TAB at 02:47

## 2022-01-01 RX ADMIN — MORPHINE SULFATE 1 MG: 4 INJECTION INTRAVENOUS at 08:55

## 2022-01-01 RX ADMIN — ENOXAPARIN SODIUM 40 MG: 100 INJECTION SUBCUTANEOUS at 10:40

## 2022-01-01 RX ADMIN — SODIUM CHLORIDE, PRESERVATIVE FREE 40 ML: 5 INJECTION INTRAVENOUS at 05:08

## 2022-01-01 RX ADMIN — MEMANTINE HYDROCHLORIDE 5 MG: 5 TABLET ORAL at 08:57

## 2022-01-01 RX ADMIN — DEXAMETHASONE SODIUM PHOSPHATE 6 MG: 10 INJECTION INTRAMUSCULAR; INTRAVENOUS at 21:23

## 2022-01-01 RX ADMIN — PIPERACILLIN AND TAZOBACTAM 4.5 G: 4; .5 INJECTION, POWDER, FOR SOLUTION INTRAVENOUS at 07:22

## 2022-01-01 RX ADMIN — DEXMEDETOMIDINE HYDROCHLORIDE 1.2 MCG/KG/HR: 400 INJECTION INTRAVENOUS at 18:00

## 2022-01-01 RX ADMIN — DEXAMETHASONE SODIUM PHOSPHATE 6 MG: 10 INJECTION INTRAMUSCULAR; INTRAVENOUS at 21:14

## 2022-01-01 RX ADMIN — CLONAZEPAM 0.5 MG: 0.5 TABLET ORAL at 17:21

## 2022-01-01 RX ADMIN — LEVETIRACETAM 750 MG: 500 TABLET, FILM COATED ORAL at 18:22

## 2022-01-01 RX ADMIN — DEXMEDETOMIDINE HYDROCHLORIDE 1.5 MCG/KG/HR: 400 INJECTION INTRAVENOUS at 04:54

## 2022-01-19 NOTE — DISCHARGE INSTRUCTIONS
Covid test was positive today. Stay home and quarantine for 5 days from the onset of symptoms. Drink plenty of fluids, rest, use over-the-counter ibuprofen and/or Tylenol as directed and return to the ED if worsening in any way. Use the nebulizer as directed if needed. Check O2 sats multiple times a day and if staying consistently below 90%, return to the ED for further evaluation.

## 2022-01-19 NOTE — ED TRIAGE NOTES
EMS was called by manager of [facility] patient lives in due to concerns for wheezing. The patient c/o nausea and he has a strong odor resembling urine on his clothes.

## 2022-01-19 NOTE — ED NOTES
Pt is unable to ambulate, he uses a wheelchair. 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

## 2022-01-20 NOTE — ED NOTES
I have reviewed discharge instructions with the patient. The patient verbalized understanding. Patient left ED via Discharge Method: stretcher to Home with [facility]. Opportunity for questions and clarification provided. Patient given 0 scripts. To continue your aftercare when you leave the hospital, you may receive an automated call from our care team to check in on how you are doing. This is a free service and part of our promise to provide the best care and service to meet your aftercare needs.  If you have questions, or wish to unsubscribe from this service please call [phone]. Thank you for Choosing our [facility].

## 2022-01-22 PROBLEM — U07.1 COVID-19: Status: ACTIVE | Noted: 2022-01-01

## 2022-01-22 NOTE — ED TRIAGE NOTES
Pt arrives via [facility] from [facility]. COVID + diagnosed on 1/19/22 here. SNF staff reports pt has had AMS, hasn't been feeding or caring for himself, and hasn't taken his meds in days. Pt found in bed only in depends brief with both fecal and urinary incontinence. EMS VS: 99.9 oral, HR  Afib, /77, 91% RA, . Pt takes Eliquis. EMS reports pt has meds in pill packets and it looked like he hasn't taken any of his medications since 1/17/22. EMS started 20 g left wrist and administered 1 L NS PTA.

## 2022-01-23 PROBLEM — C90.00 MULTIPLE MYELOMA (HCC): Status: ACTIVE | Noted: 2022-01-01

## 2022-01-23 PROBLEM — J96.01 ACUTE RESPIRATORY FAILURE WITH HYPOXIA (HCC): Status: ACTIVE | Noted: 2022-01-01

## 2022-01-23 PROBLEM — R56.9 SEIZURES (HCC): Chronic | Status: ACTIVE | Noted: 2022-01-01

## 2022-01-23 NOTE — ED NOTES
Patient asleep, resting comfortably. This RN woke pt up for vitals and given lunch tray. No further requests or needs at this time.

## 2022-01-23 NOTE — H&P
INTERNAL MEDICINE H&P/CONSULT    Subjective:     82years old male with history of Alzheimer dementia,TIA, pacemaker placement and bradycardia, bipolar dz, on eliquis, presents from [place] [address]. He was seen here on the 19th and at that point was found to be COVID-positive. He had return to his assisted living environment which seems as though would be challenging even at baseline. He has been somewhat confused since returning to his assisted living setting. Probable fever during this time as well. Patient is not known to be oxygen dependent by his history. Of note patient is severely hard of hearing has not brought his hearing aids and even with shouting it is hard to communicate. No report of vomiting. Does have some presacral skin breakdown. On further questioning, pt states he don't have his hearing aid and cannot communicate. I called daughter numbers and it is disconnected. Past Medical History:   Diagnosis Date    Acute encephalopathy 3/22/2015    Altered mental status 9/16/2014    Alzheimer disease ([address]     Anemia 9/16/2014    MILD      Anxiety     Asymmetrical sensorineural hearing loss     Bipolar disorder ([address]     NOT TREATED, DIAGNOSED MANY YEARS AGO    Cancer ([address]     multiple myeloma    Cataract 9/8/2016    Cortical hemorrhage ([address] 9/17/2014    Elevated AST (SGOT) 9/16/2014    GERD (gastroesophageal reflux disease)     not Tx    H/O seasonal allergies     History of TIA (transient ischemic attack) 9/16/2014    Hypomagnesemia 9/16/2014    MILD      Panic attack     Senile dementia ([address] 5/19/2017    Stroke ([address]     ?  TIA, PUT ON PLAVIX, TOOK SELF OFF    Syncope and collapse 3/22/2015    Tinea corporis 9/16/2014    Tinnitus       Past Surgical History:   Procedure Laterality Date    HX APPENDECTOMY      HX COLONOSCOPY  MULTIPLE OVER THE YEARS    NO CANCER    HX OTHER SURGICAL  AGE 23    COLONIC POLYP REMOVAL    HX VASCULAR ACCESS        Prior to Admission medications    Medication Sig Start Date End Date Taking? Authorizing Provider   Revlimid 10 mg cap TAKE 1 CAPSULE BY MOUTH  DAILY FOR 21 DAYS, THEN 7  DAYS OFF 1/5/22   Geraldo Holliday MD   ergocalciferol (ERGOCALCIFEROL) 1,250 mcg (50,000 unit) capsule Take 1 Cap by mouth every seven (7) days. 12/2/20   Geraldo Holliday MD   clonazePAM (KlonoPIN) 0.5 mg tablet Take 1 Tab by mouth two (2) times a day. Max Daily Amount: 1 mg. 4/21/20   Asael Vail MD   apixaban (Eliquis) 2.5 mg tablet Take 1 Tab by mouth two (2) times a day. 4/21/20   Asael Vail MD   levETIRAcetam (KEPPRA) 750 mg tablet Take 1 Tab by mouth two (2) times a day. 4/21/20   Asael Vail MD   sertraline (ZOLOFT) 50 mg tablet Take 1 Tab by mouth daily. 4/22/20   Asael Vail MD   acetaminophen (TYLENOL) 325 mg tablet Take 2 Tabs by mouth every six (6) hours as needed for Pain or Fever. 4/21/20   Asael Vail MD   loperamide (IMMODIUM) 2 mg tablet Take 4 mg by mouth after first loose stool- then take 2 mg after each additional loose BM  Maximum 8 tablets in 24 hours 4/21/20   Asael Vail MD   QUEtiapine (SEROquel) 25 mg tablet Take 1 Tab by mouth nightly as needed for Other (confusion/ sleep aid). 4/21/20   Asael Vail MD   memantine [facility]) 10 mg tablet  12/30/19   Provider, Historical   albuterol (PROVENTIL VENTOLIN) 2.5 mg /3 mL (0.083 %) nebu 3 mL by Nebulization route every four to six (4-6) hours as needed for Other (Cough, SOB, wheezing). 11/23/19   Yaniv Rodríguez MD   lovastatin (MEVACOR) 40 mg tablet Take 1 Tab by mouth nightly. Patient taking differently: Take 20 mg by mouth nightly. 6/26/17   Sonya Truong MD   donepezil (ARICEPT) 10 mg tablet Take 1 Tab by mouth daily.  6/26/17   Sonya Truong MD     No Known Allergies   Social History     Tobacco Use    Smoking status: Former Smoker     Packs/day: 2.00     Years: 16.00     Pack years: 32.00     Types: Cigarettes    Smokeless tobacco: Never Used    Tobacco comment: QUIT AGE 37   Substance Use Topics    Alcohol use: No        Family History:  HTN    Review of Systems   A comprehensive review of systems was negative except for that written in the HPI.unobtainable dt mental conditions    Intake / Output:  No intake/output data recorded. No intake/output data recorded. Physical Exam:  Visit Vitals  /77 (BP 1 Location: Left upper arm, BP Patient Position: At rest)   Pulse 94   Temp 99.1 °F (37.3 °C)   Resp 18   Ht 6' (1.829 m)   Wt 117.9 kg (260 lb)   SpO2 91%   BMI 35.26 kg/m²     General appearance: awake, alert, cooperative, moderate distress, appears stated age - Pale, No icterus, obese  Head: Normocephalic, without obvious abnormality, atraumatic  Back: symmetric, no curvature. ROM normal. No CVA tenderness. Lungs: Diminished bs bibasilar. Heart: regular rate and rhythm, S1, S2 normal, no murmur, click, rub or gallop. Abdomen: soft, no tenderness, no distension, normal bowel sound, no masses, no organomegaly  Extremities: atraumatic, no cyanosis - Bilateral lower limbs edema none. Skin: No rashes or ulceration.   Neurologic: Grossly intact     ECG: sinus rhythm     Data Review (Labs):   Recent Results (from the past 24 hour(s))   EKG, 12 LEAD, INITIAL    Collection Time: 01/22/22  6:25 PM   Result Value Ref Range    Ventricular Rate 114 BPM    Atrial Rate 111 BPM    QRS Duration 94 ms    Q-T Interval 366 ms    QTC Calculation (Bezet) 504 ms    Calculated R Axis -40 degrees    Calculated T Axis 10 degrees    Diagnosis       Atrial fibrillation  Inferior infarct, old  Prolonged QT interval     LACTIC ACID    Collection Time: 01/22/22  6:41 PM   Result Value Ref Range    Lactic acid 1.1 0.4 - 2.0 MMOL/L   CBC WITH AUTOMATED DIFF    Collection Time: 01/22/22  6:41 PM   Result Value Ref Range    WBC 3.6 (L) 4.3 - 11.1 K/uL    RBC 4.38 4.23 - 5.6 M/uL    HGB 13.2 (L) 13.6 - 17.2 g/dL    HCT 40.0 (L) 41.1 - 50.3 %    MCV 91.3 79.6 - 97.8 FL    MCH 30.1 26.1 - 32.9 PG    MCHC 33.0 31.4 - 35.0 g/dL    RDW 14.0 11.9 - 14.6 %    PLATELET 011 (L) 163 - 450 K/uL    MPV 11.8 9.4 - 12.3 FL    ABSOLUTE NRBC 0.00 0.0 - 0.2 K/uL    NEUTROPHILS 75 43 - 78 %    LYMPHOCYTES 15 13 - 44 %    MONOCYTES 9 4.0 - 12.0 %    EOSINOPHILS 0 (L) 0.5 - 7.8 %    BASOPHILS 0 0.0 - 2.0 %    IMMATURE GRANULOCYTES 1 0.0 - 5.0 %    ABS. NEUTROPHILS 2.8 1.7 - 8.2 K/UL    ABS. LYMPHOCYTES 0.5 0.5 - 4.6 K/UL    ABS. MONOCYTES 0.3 0.1 - 1.3 K/UL    ABS. EOSINOPHILS 0.0 0.0 - 0.8 K/UL    ABS. BASOPHILS 0.0 0.0 - 0.2 K/UL    ABS. IMM. GRANS. 0.0 0.0 - 0.5 K/UL    RBC COMMENTS SLIGHT  ANISOCYTOSIS + POIKILOCYTOSIS        RBC COMMENTS SLIGHT  POLYCHROMASIA        WBC COMMENTS Result Confirmed By Smear      PLATELET COMMENTS SLIGHT      DF AUTOMATED     METABOLIC PANEL, COMPREHENSIVE    Collection Time: 01/22/22  6:41 PM   Result Value Ref Range    Sodium 137 136 - 145 mmol/L    Potassium 3.3 (L) 3.5 - 5.1 mmol/L    Chloride 103 98 - 107 mmol/L    CO2 26 21 - 32 mmol/L    Anion gap 8 7 - 16 mmol/L    Glucose 119 (H) 65 - 100 mg/dL    BUN 13 8 - 23 MG/DL    Creatinine 0.86 0.8 - 1.5 MG/DL    GFR est AA >60 >60 ml/min/1.73m2    GFR est non-AA >60 >60 ml/min/1.73m2    Calcium 7.6 (L) 8.3 - 10.4 MG/DL    Bilirubin, total 0.9 0.2 - 1.1 MG/DL    ALT (SGPT) 45 12 - 65 U/L    AST (SGOT) 44 (H) 15 - 37 U/L    Alk.  phosphatase 113 50 - 136 U/L    Protein, total 6.5 6.3 - 8.2 g/dL    Albumin 2.5 (L) 3.2 - 4.6 g/dL    Globulin 4.0 (H) 2.3 - 3.5 g/dL    A-G Ratio 0.6 (L) 1.2 - 3.5     URINE MICROSCOPIC    Collection Time: 01/22/22  6:41 PM   Result Value Ref Range    WBC 0-3 0 /hpf    RBC 10-20 0 /hpf    Epithelial cells 0-3 0 /hpf    Bacteria 0 0 /hpf    Casts 0-3 0 /lpf   PROCALCITONIN    Collection Time: 01/22/22  6:41 PM   Result Value Ref Range    Procalcitonin 0.05 0.00 - 0.49 ng/mL       Assessment:     1- COVID-19 pneumonia  2- Acute hypoxic respiratory failure, due to #1, on 2-3L  3- History of Alzheimer dementia,TIA, pacemaker placement and bradycardia, bipolar dz, on eliquis. Stable. 4- Hypokalemia, replaced    Plan:     Decadron iv  Vitamins C/D, Zinc  Baricitinib  Oxygen and bronchodilators as needed  Blood pressure control  AM lab as needed  Mg pending  Continue essential home medications. Further management depends on patient progress. Thank you for the oppourtinity to contribute in the care of your patient.     Signed By: Farnaz Roman MD     January 22, 2022

## 2022-01-23 NOTE — ED NOTES
Called to pt's room. Pt had removed his leads and pulled out his IV. Attempted to reassure patient that he was in the hospital and that he is being taken care of. Bed changed and pt placed in clean gown.

## 2022-01-23 NOTE — ED NOTES
Patient brief changed sore noted on scrotum, wound is scabbed over. helped to sit up in bed and breakfast tray given. Patient calm and without complaints at this time. Bed in low position and locked.  Call bell in reach

## 2022-01-23 NOTE — PROGRESS NOTES
Hospitalist Progress Note   Admit Date:  2022  5:56 PM   Name:  Caridad Rome   Age:  82 y.o. Sex:  male  :  1935   MRN:  [id number]   Room:  [facility]    Presenting Complaint: No chief complaint on file. Reason(s) for Admission: COVID-19 [U07.1]     Hospital Course & Interval History:     Mr. 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 Hearing loss limits communication. On   4 L NC. CXR negative. CRP elevated 12.0. On decadron. Discharge plans pending. Subjective (22): Limited per mentation, says has headache, ate breakfast        Assessment & Plan:     Principal Problem:    COVID-19 (2022)    Acute hypoxic respiratory failure:  · On 4 L NC  · CRP elevated though takes revlimid for multiple myeloma   · D1 decadron       thrombocytopenia  Active Problems:    Anemia   · Trend CBC      Multiple myeloma:  · Consult oncology and hold revlimid until evaluated       dementia    Acute encephalopathy   ·   followup mentation   · Continued aricept , namenda , zoloft         A-fib ([facility])   · Currently on eliquis       Hypokalemia:  · followup lab      Seizures:  · Continued keppra      Dispo/Discharge Planning:      Pending     Diet:  ADULT DIET Regular  DVT PPx: eliquis   Code status: Full Code    Hospital Problems as of 2022 Date Reviewed: 2018          Codes Class Noted - Resolved POA    * (Principal) COVID-19 ICD-10-CM: U07.1  ICD-9-CM: 079.89  2022 - Present Unknown        A-fib ([facility] [address] (Chronic) ICD-10-CM: I48.91  ICD-9-CM: 427.31  2020 - Present Yes        Acute encephalopathy ICD-10-CM: G93.40  ICD-9-CM: 348.30  3/22/2015 - Present Yes        Anemia ICD-10-CM: D64.9  ICD-9-CM: 285.9  2014 - Present Yes    Overview Addendum 2017  5:01 PM by John Molina MD     Recheck CBC.                    Objective:     Patient Vitals for the past 24 hrs:   Temp Pulse Resp BP SpO2   01/23/22 1248 98 °F (36.7 °C) 66 17 113/64 94 %   01/23/22 1000 99.2 °F (37.3 °C) 76 11 127/70 94 %   01/23/22 0400  77 28 103/87    01/23/22 0300  77 29 117/73    01/23/22 0245  76  113/60 93 %   01/22/22 2300  83 21 110/74    01/22/22 2230  96 21 (!) 140/71    01/22/22 2200  90 25 129/69 94 %   01/22/22 2130  85 18 120/74 (!) 85 %   01/22/22 2100  88 25 128/75 94 %   01/22/22 2030  77 22 126/65 (!) 89 %   01/22/22 2000  94 25 (!) 129/109 91 %   01/22/22 1930  97 23 129/60 94 %   01/22/22 1811     91 %   01/22/22 1803 99.1 °F (37.3 °C) 94 18 126/77 91 %     Oxygen Therapy  O2 Sat (%): 94 % (01/23/22 1248)  O2 Device: Nasal cannula (01/23/22 1248)  O2 Flow Rate (L/min): 4 l/min (01/23/22 1248)    Estimated body mass index is 35.26 kg/m² as calculated from the following:    Height as of this encounter: 6' (1.829 m). Weight as of this encounter: 117.9 kg (260 lb). No intake or output data in the 24 hours ending 01/23/22 1611      Physical Exam:     Blood pressure 113/64, pulse 66, temperature 98 °F (36.7 °C), resp. rate 17, height 6' (1.829 m), weight 117.9 kg (260 lb), SpO2 94 %. General:    Well nourished. No overt distress, elderly, hard of hearing   CV:   RRR. No m/r/g. No jugular venous distension. No edema   Lungs:   CTAB. No wheezing, rhonchi, or rales. Respirations even, unlabored, anterior   Abdomen: Bowel sounds present. Soft, nontender, nondistended. Extremities: No cyanosis or clubbing. No edema  Skin:     No rashes and normal coloration. Neuro:  grossly intact.     Psych:  Confused      I have reviewed ordered lab tests and independently visualized imaging below:    Recent Labs:  Recent Results (from the past 48 hour(s))   EKG, 12 LEAD, INITIAL    Collection Time: 01/22/22  6:25 PM   Result Value Ref Range    Ventricular Rate 114 BPM    Atrial Rate 111 BPM    QRS Duration 94 ms    Q-T Interval 366 ms    QTC Calculation (Bezet) 504 ms    Calculated R Axis -40 degrees    Calculated T Axis 10 degrees    Diagnosis       Atrial fibrillation  Inferior infarct, old  Prolonged QT interval     LACTIC ACID    Collection Time: 01/22/22  6:41 PM   Result Value Ref Range    Lactic acid 1.1 0.4 - 2.0 MMOL/L   CBC WITH AUTOMATED DIFF    Collection Time: 01/22/22  6:41 PM   Result Value Ref Range    WBC 3.6 (L) 4.3 - 11.1 K/uL    RBC 4.38 4.23 - 5.6 M/uL    HGB 13.2 (L) 13.6 - 17.2 g/dL    HCT 40.0 (L) 41.1 - 50.3 %    MCV 91.3 79.6 - 97.8 FL    MCH 30.1 26.1 - 32.9 PG    MCHC 33.0 31.4 - 35.0 g/dL    RDW 14.0 11.9 - 14.6 %    PLATELET 347 (L) 541 - 450 K/uL    MPV 11.8 9.4 - 12.3 FL    ABSOLUTE NRBC 0.00 0.0 - 0.2 K/uL    NEUTROPHILS 75 43 - 78 %    LYMPHOCYTES 15 13 - 44 %    MONOCYTES 9 4.0 - 12.0 %    EOSINOPHILS 0 (L) 0.5 - 7.8 %    BASOPHILS 0 0.0 - 2.0 %    IMMATURE GRANULOCYTES 1 0.0 - 5.0 %    ABS. NEUTROPHILS 2.8 1.7 - 8.2 K/UL    ABS. LYMPHOCYTES 0.5 0.5 - 4.6 K/UL    ABS. MONOCYTES 0.3 0.1 - 1.3 K/UL    ABS. EOSINOPHILS 0.0 0.0 - 0.8 K/UL    ABS. BASOPHILS 0.0 0.0 - 0.2 K/UL    ABS. IMM. GRANS. 0.0 0.0 - 0.5 K/UL    RBC COMMENTS SLIGHT  ANISOCYTOSIS + POIKILOCYTOSIS        RBC COMMENTS SLIGHT  POLYCHROMASIA        WBC COMMENTS Result Confirmed By Smear      PLATELET COMMENTS SLIGHT      DF AUTOMATED     METABOLIC PANEL, COMPREHENSIVE    Collection Time: 01/22/22  6:41 PM   Result Value Ref Range    Sodium 137 136 - 145 mmol/L    Potassium 3.3 (L) 3.5 - 5.1 mmol/L    Chloride 103 98 - 107 mmol/L    CO2 26 21 - 32 mmol/L    Anion gap 8 7 - 16 mmol/L    Glucose 119 (H) 65 - 100 mg/dL    BUN 13 8 - 23 MG/DL    Creatinine 0.86 0.8 - 1.5 MG/DL    GFR est AA >60 >60 ml/min/1.73m2    GFR est non-AA >60 >60 ml/min/1.73m2    Calcium 7.6 (L) 8.3 - 10.4 MG/DL    Bilirubin, total 0.9 0.2 - 1.1 MG/DL    ALT (SGPT) 45 12 - 65 U/L    AST (SGOT) 44 (H) 15 - 37 U/L    Alk.  phosphatase 113 50 - 136 U/L    Protein, total 6.5 6.3 - 8.2 g/dL    Albumin 2.5 (L) 3.2 - 4.6 g/dL    Globulin 4.0 (H) 2.3 - 3.5 g/dL    A-G Ratio 0.6 (L) 1.2 - 3.5     URINE MICROSCOPIC    Collection Time: 01/22/22  6:41 PM   Result Value Ref Range    WBC 0-3 0 /hpf    RBC 10-20 0 /hpf    Epithelial cells 0-3 0 /hpf    Bacteria 0 0 /hpf    Casts 0-3 0 /lpf   CULTURE, URINE    Collection Time: 01/22/22  6:41 PM    Specimen: Cath Urine    URINE   Result Value Ref Range    Special Requests: NO SPECIAL REQUESTS      Culture result:        NO GROWTH AFTER SHORT PERIOD OF INCUBATION. FURTHER RESULTS TO FOLLOW AFTER OVERNIGHT INCUBATION. PROCALCITONIN    Collection Time: 01/22/22  6:41 PM   Result Value Ref Range    Procalcitonin 0.05 0.00 - 0.49 ng/mL   C REACTIVE PROTEIN, QT    Collection Time: 01/22/22  6:41 PM   Result Value Ref Range    C-Reactive protein 12.0 (H) 0.0 - 0.9 mg/dL   FERRITIN    Collection Time: 01/22/22  6:41 PM   Result Value Ref Range    Ferritin 184 8 - 388 NG/ML   MAGNESIUM    Collection Time: 01/22/22  6:41 PM   Result Value Ref Range    Magnesium 2.2 1.8 - 2.4 mg/dL   CULTURE, BLOOD    Collection Time: 01/22/22  7:19 PM    Specimen: Blood   Result Value Ref Range    Special Requests: NO SPECIAL REQUESTS  RIGHT  Antecubital        Culture result: NO GROWTH AFTER 13 HOURS     LACTIC ACID    Collection Time: 01/23/22 12:26 AM   Result Value Ref Range    Lactic acid 2.2 (H) 0.4 - 2.0 MMOL/L       All Micro Results     Procedure Component Value Units Date/Time    BLOOD CULTURE [[id number]] Collected: 01/22/22 1919    Order Status: Completed Specimen: Blood Updated: 01/23/22 0939     Special Requests: --        NO SPECIAL REQUESTS  RIGHT  Antecubital       Culture result: NO GROWTH AFTER 13 HOURS       CULTURE, URINE [[id number]] Collected: 01/22/22 1841    Order Status: Completed Specimen: Cath Urine Updated: 01/23/22 0653     Special Requests: NO SPECIAL REQUESTS        Culture result:       NO GROWTH AFTER SHORT PERIOD OF INCUBATION.  FURTHER RESULTS TO FOLLOW AFTER OVERNIGHT INCUBATION. BLOOD CULTURE [[id number]] Collected: 01/23/22 0026    Order Status: Completed Specimen: Blood Updated: 01/23/22 0114          Other Studies:  XR CHEST PORT    Result Date: 1/22/2022  EXAMINATION: XR CHEST PORT 1/22/2022 6:48 PM ACCESSION NUMBER: [id number] COMPARISON: 11/21/2019 INDICATION: Covid positive, fever TECHNIQUE: A single view of the chest was obtained. FINDINGS: Support Devices: *  Right IJ approach port catheter tip overlies the cavoatrial junction. Left subclavian approach pacer leads overlie the right heart. Cardiac Silhouette: Cardiac silhouette is stable in size. Loop recorder overlies the cardiac silhouette. Mediastinum: Normal mediastinal contours. Lungs: No airspace consolidation. No pneumothorax or sizable pleural effusion. Upper Abdomen: Normal Miscellaneous: No fracture or suspicious osseous lesion. No acute cardiopulmonary abnormality.        Current Meds:  Current Facility-Administered Medications   Medication Dose Route Frequency    sodium chloride (NS) flush 5-10 mL  5-10 mL IntraVENous Q8H    sodium chloride (NS) flush 5-10 mL  5-10 mL IntraVENous PRN    apixaban (ELIQUIS) tablet 2.5 mg  2.5 mg Oral Q12H    clonazePAM (KlonoPIN) tablet 0.5 mg  0.5 mg Oral BID    donepeziL (ARICEPT) tablet 10 mg  10 mg Oral DAILY    levETIRAcetam (KEPPRA) tablet 750 mg  750 mg Oral BID    memantine (NAMENDA) tablet 5 mg  5 mg Oral DAILY    QUEtiapine (SEROquel) tablet 25 mg  25 mg Oral QHS PRN    lenalidomide cap 10 mg (Revlimid) - patient supplied chemo (Patient Supplied)  10 mg Oral DAILY    sertraline (ZOLOFT) tablet 50 mg  50 mg Oral DAILY    albuterol-ipratropium (DUO-NEB) 2.5 MG-0.5 MG/3 ML  3 mL Nebulization Q4H PRN    cloNIDine HCL (CATAPRES) tablet 0.2 mg  0.2 mg Oral BID PRN    zinc sulfate (ZINCATE) 50 mg zinc (220 mg) capsule 50 mg  50 mg Oral DAILY    cholecalciferol (VITAMIN D3) (1000 Units /25 mcg) tablet 2,000 Units  2,000 Units Oral DAILY    ascorbic acid (vitamin C) (VITAMIN C) tablet 2,000 mg  2,000 mg Oral DAILY    senna-docusate (PERICOLACE) 8.6-50 mg per tablet 2 Tablet  2 Tablet Oral BID PRN    loperamide (IMODIUM) capsule 4 mg  4 mg Oral BID PRN    dexamethasone (DECADRON) 10 mg/mL injection 6 mg  6 mg IntraVENous Q24H    sodium chloride (NS) flush 5-40 mL  5-40 mL IntraVENous Q8H    sodium chloride (NS) flush 5-40 mL  5-40 mL IntraVENous PRN    acetaminophen (TYLENOL) tablet 650 mg  650 mg Oral Q6H PRN    Or    acetaminophen (TYLENOL) suppository 650 mg  650 mg Rectal Q6H PRN    senna (SENOKOT) tablet 17.2 mg  2 Tablet Oral BID PRN    ondansetron (ZOFRAN) injection 4 mg  4 mg IntraVENous Q6H PRN     Current Outpatient Medications   Medication Sig    Revlimid 10 mg cap TAKE 1 CAPSULE BY MOUTH  DAILY FOR 21 DAYS, THEN 7  DAYS OFF    ergocalciferol (ERGOCALCIFEROL) 1,250 mcg (50,000 unit) capsule Take 1 Cap by mouth every seven (7) days.  clonazePAM (KlonoPIN) 0.5 mg tablet Take 1 Tab by mouth two (2) times a day. Max Daily Amount: 1 mg.  apixaban (Eliquis) 2.5 mg tablet Take 1 Tab by mouth two (2) times a day.  levETIRAcetam (KEPPRA) 750 mg tablet Take 1 Tab by mouth two (2) times a day.  sertraline (ZOLOFT) 50 mg tablet Take 1 Tab by mouth daily.  acetaminophen (TYLENOL) 325 mg tablet Take 2 Tabs by mouth every six (6) hours as needed for Pain or Fever.  loperamide (IMMODIUM) 2 mg tablet Take 4 mg by mouth after first loose stool- then take 2 mg after each additional loose BM  Maximum 8 tablets in 24 hours    QUEtiapine (SEROquel) 25 mg tablet Take 1 Tab by mouth nightly as needed for Other (confusion/ sleep aid).  memantine (NAMENDA) 10 mg tablet     albuterol (PROVENTIL VENTOLIN) 2.5 mg /3 mL (0.083 %) nebu 3 mL by Nebulization route every four to six (4-6) hours as needed for Other (Cough, SOB, wheezing).  lovastatin (MEVACOR) 40 mg tablet Take 1 Tab by mouth nightly.  (Patient taking differently: Take 20 mg by mouth nightly.)    donepezil (ARICEPT) 10 mg tablet Take 1 Tab by mouth daily. Signed:  Wendy Cruz MD    Part of this note may have been written by using a voice dictation software. The note has been proof read but may still contain some grammatical/other typographical errors.

## 2022-01-23 NOTE — PROGRESS NOTES
TRANSFER - IN REPORT:    Verbal report received from ARSH RN on Shoaib Arguello  being received from ED for routine progression of care      Report consisted of patients Situation, Background, Assessment and   Recommendations(SBAR). Information from the following report(s) SBAR was reviewed with the receiving nurse. Opportunity for questions and clarification was provided. Assessment completed upon patients arrival to unit and care assumed.

## 2022-01-23 NOTE — ED NOTES
TRANSFER - OUT REPORT:    Verbal report given to Carole(name) on Brigid Medrano  being transferred to [facility](unit) for routine progression of care       Report consisted of patients Situation, Background, Assessment and   Recommendations(SBAR). Information from the following report(s) SBAR was reviewed with the receiving nurse. Lines:   Peripheral IV 01/23/22 Left Hand (Active)   Site Assessment Clean, dry, & intact 01/23/22 1833   Phlebitis Assessment 0 01/23/22 1833   Infiltration Assessment 0 01/23/22 1833   Dressing Status Clean, dry, & intact 01/23/22 1833   Hub Color/Line Status Blue 01/23/22 1833        Opportunity for questions and clarification was provided.       Patient transported with:   O2 @ 4 liters   tech

## 2022-01-23 NOTE — ED NOTES
Note to hospitalist    Patient was seen on an 1/19 and found to be COVID-positive. He represents to our department with hypoxia requiring oxygen supplementation.   Oxygen saturations have been stable with supplementation so far

## 2022-01-23 NOTE — PROGRESS NOTES
Patient is being admitted to floor from ER    Per notes:      [address] locally    Daughter -  Kirsten López    Full code    No directives

## 2022-01-23 NOTE — ED NOTES
Pt placed on bedpan. Able to have large soft bowel movement. Placed in clean brief and sheets changed. Repositioned for comfort. Denies complaint.

## 2022-01-24 NOTE — ED PROVIDER NOTES
With high degree of baseline debility had been a recent patient in the hospital.  Was found with soiled closed stool and urine. He is in assisted living which certainly is not within his present skills. Some skin breakdown was noted by nursing at the area of the presacral region. This was not visualized by me personally. Discharge medications were present in the patient's room and packets beginning on 17th January have not been opened. He continues to have some cough. He is supposed to be on Eliquis which most likely has not been on in this interval as well. Denies any chest pain. The history is provided by the patient and the EMS personnel. Cough  This is a recurrent problem. The cough is non-productive. Associated symptoms include confusion. Pertinent negatives include no chills and no wheezing. Past Medical History:   Diagnosis Date    Acute encephalopathy 3/22/2015    Altered mental status 9/16/2014    Alzheimer disease ([address]     Anemia 9/16/2014    MILD      Anxiety     Asymmetrical sensorineural hearing loss     Bipolar disorder ([address]     NOT TREATED, DIAGNOSED MANY YEARS AGO    Cancer ([address]     multiple myeloma    Cataract 9/8/2016    Cortical hemorrhage ([address] 9/17/2014    Elevated AST (SGOT) 9/16/2014    GERD (gastroesophageal reflux disease)     not Tx    H/O seasonal allergies     History of TIA (transient ischemic attack) 9/16/2014    Hypomagnesemia 9/16/2014    MILD      Panic attack     Senile dementia ([address] 5/19/2017    Stroke ([address]     ?  TIA, PUT ON PLAVIX, TOOK SELF OFF    Syncope and collapse 3/22/2015    Tinea corporis 9/16/2014    Tinnitus        Past Surgical History:   Procedure Laterality Date    HX APPENDECTOMY      HX COLONOSCOPY  MULTIPLE OVER THE YEARS    NO CANCER    HX OTHER SURGICAL  AGE 26    COLONIC POLYP REMOVAL    HX VASCULAR ACCESS           Family History:   Problem Relation Age of Onset    Diabetes Mother         COMPLICATIONS OF DM    Cancer Mother     Heart Disease Mother     Lung Disease Father         BLACK LUNG    Dementia Sister     Heart Disease Brother         \"OLD AGE\"    Other Son         E[place], [place]    Other Daughter         1 ESTRANGED, [address] [place] [address], 1 IN [place]       Social History     Socioeconomic History    Marital status:      Spouse name: Not on file    Number of children: Not on file    Years of education: Not on file    Highest education level: Not on file   Occupational History    Not on file   Tobacco Use    Smoking status: Former Smoker     Packs/day: 2.00     Years: 16.00     Pack years: 32.00     Types: Cigarettes    Smokeless tobacco: Never Used    Tobacco comment: QUIT AGE 35   Substance and Sexual Activity    Alcohol use: No    Drug use: No    Sexual activity: Not Currently     Partners: Female     Birth control/protection: None   Other Topics Concern    Not on file   Social History Narrative    9/16/14:  PATIENT IS , LIVES WITH CATARINA MAYA. 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 HE MOVED TO [facility] FOR 5 YEARS, HAS BEEN HERE (?) FOR ABOUT 10 YEARS. HAS A DAUGHTER IN [place] THAT IS NOT SPEAKING TO HIM, A SON IN [place] WHO IS NOT SPEAKING TO HIM, AND A DAUGHTER IN [facility]. BROTHER AND SISTER ARE DEAD. ONLY FRIEND IS SERA COELLOASIA, ([facility]: [phone]), A  WHO LOOKS AFTER THE PATIENT. CALL HIM ON DISCHARGE AS HE HAS PATIENT'S DOG AND HOUSE KEYS. Social Determinants of Health     Financial Resource Strain:     Difficulty of Paying Living Expenses: Not on file   Food Insecurity:     Worried About Running Out of Food in the Last Year: Not on file    Deann of Food in the Last Year: Not on file   Transportation Needs:     Lack of Transportation (Medical): Not on file    Lack of Transportation (Non-Medical):  Not on file   Physical Activity:     Days of Exercise per Week: Not on file    Minutes of Exercise per Session: Not on file   Stress:     Feeling of Stress : Not on file   Social Connections:     Frequency of Communication with Friends and Family: Not on file    Frequency of Social Gatherings with Friends and Family: Not on file    Attends Mandaen Services: Not on file    Active Member of Clubs or Organizations: Not on file    Attends Club or Organization Meetings: Not on file    Marital Status: Not on file   Intimate Partner Violence:     Fear of Current or Ex-Partner: Not on file    Emotionally Abused: Not on file    Physically Abused: Not on file    Sexually Abused: Not on file   Housing Stability:     Unable to Pay for Housing in the Last Year: Not on file    Number of [place] in the Last Year: Not on file    Unstable Housing in the Last Year: Not on file         ALLERGIES: Patient has no known allergies. Review of Systems   Constitutional: Positive for activity change and fatigue. Negative for chills and diaphoresis. Respiratory: Positive for cough. Negative for wheezing. Gastrointestinal: Negative for abdominal pain. Genitourinary: Negative. Psychiatric/Behavioral: Positive for confusion. All other systems reviewed and are negative. Vitals:    01/24/22 0717 01/24/22 0848 01/24/22 0849 01/24/22 1137   BP: (!) 114/58   (!) 97/54   Pulse: 60   62   Resp: 20   20   Temp: 97.7 °F (36.5 °C)   98.2 °F (36.8 °C)   SpO2: 93% 92% 92% 96%   Weight:       Height:                Physical Exam  Constitutional:       Appearance: He is obese. He is ill-appearing. He is not toxic-appearing. Comments: Not overtly toxic or septic though looks as though he has not had adequate care for quite some time with stool and urine and evidence of pressure early changes to the lower back   HENT:      Head: Atraumatic. Nose: Nose normal.      Mouth/Throat:      Mouth: Mucous membranes are dry. Eyes:      General: No scleral icterus. Cardiovascular:      Rate and Rhythm: Normal rate. Pulses: Normal pulses.    Pulmonary:      Effort: No respiratory distress. Comments: Cough but no overt wheezing no sputum seen  Abdominal:      Palpations: Abdomen is soft. Comments: Obese soft abdomen   Musculoskeletal:      Cervical back: Normal range of motion. Comments: Evidence of presacral pressure related injuries per nursing   Skin:     General: Skin is warm. Comments: Has had stool and urine in contact to the skin for some period of time by appearance   Neurological:      Mental Status: Mental status is at baseline. Psychiatric:         Mood and Affect: Mood normal.          MDM  Number of Diagnoses or Management Options  Diagnosis management comments: Other wheezing Covid related admission. Certainly is in a living situation which at his present health is no longer suitable.   Will need admission for oxygen confirmation of medication use appropriately and reevaluation and possible consideration of transition to a higher level of care       Amount and/or Complexity of Data Reviewed  Clinical lab tests: ordered and reviewed  Tests in the radiology section of CPT®: reviewed and ordered  Tests in the medicine section of CPT®: (EKG  Atrial fib  Rate 114  No acute injury pattern identified)  Discuss the patient with other providers: yes  Independent visualization of images, tracings, or specimens: yes    Risk of Complications, Morbidity, and/or Mortality  Presenting problems: moderate  Diagnostic procedures: moderate  Management options: moderate           Procedures

## 2022-01-24 NOTE — ACP (ADVANCE CARE PLANNING)
CM unable to complete ACP with patient this day, as patient is confused at this time. Advance Care Planning (ACP)       Attempted to discuss ACP with Mr. David Mosquera authorized healthcare decision maker today, Decision maker declines to discuss at this time. Will readdress at future visit.       Primary Decision MakerTtim Montalvoesther - Daughter - [phone]

## 2022-01-24 NOTE — MANAGEMENT PLAN
[facility] Hematology & Oncology        Inpatient Hematology / Oncology Plan of Care    Reason for Consult:  COVID-19 [U07.1]  Referring Physician:  Sarahi Lagunas MD    History of Present Illness:  Mr. Kyleigh Luther is a 82 y.o. male admitted on 1/22/2022 with a primary diagnosis of COVID-19. His PMH includes Alzheimer dementia, TIA, bradycardia s/p pacemaker, bipolar disorder, and Afib on Eliquis. He is a patient of Dr. Beti Salvador. In 8/2017, he was diagnosed with Multiple Myeloma, IgG Kappa, ISS Stage II,  he received 4 cycles of CyBorD and achieved a ME. In 3/2018 he was started on Revlimid. He achieved CR-1 in 6/2019. He was recently diagnosed with Covid on 1/19. He presented to ED from [facility] with c/o confusion. Staff reported he has not been feeding or caring for himself. Pt was found in bed only in depends brief with both fecal and urinary incontinence. It appears pt has not taken any of his medications since 1/17. CXR neg. On Dex/Baricitinib. Cultures NGTD. On O2 @4 L. We were consulted for recommendations to continue or hold his Revlimid in the setting of COVID infection. No Known Allergies  Past Medical History:   Diagnosis Date    Acute encephalopathy 3/22/2015    Altered mental status 9/16/2014    Alzheimer disease ([address]     Anemia 9/16/2014    MILD      Anxiety     Asymmetrical sensorineural hearing loss     Bipolar disorder ([address]     NOT TREATED, DIAGNOSED MANY YEARS AGO    Cancer ([address]     multiple myeloma    Cataract 9/8/2016    Cortical hemorrhage ([address] 9/17/2014    Elevated AST (SGOT) 9/16/2014    GERD (gastroesophageal reflux disease)     not Tx    H/O seasonal allergies     History of TIA (transient ischemic attack) 9/16/2014    Hypomagnesemia 9/16/2014    MILD      Panic attack     Senile dementia ([address] 5/19/2017    Stroke ([address]     ?  TIA, PUT ON PLAVIX, TOOK SELF OFF    Syncope and collapse 3/22/2015    Tinea corporis 9/16/2014    Tinnitus Past Surgical History:   Procedure Laterality Date    HX APPENDECTOMY      HX COLONOSCOPY  MULTIPLE OVER THE YEARS    NO CANCER    HX OTHER SURGICAL  AGE 23    COLONIC POLYP REMOVAL    HX VASCULAR ACCESS       Family History   Problem Relation Age of Onset    Diabetes Mother         COMPLICATIONS OF DM    Cancer Mother     Heart Disease Mother     Lung Disease Father         BLACK LUNG    Dementia Sister     Heart Disease Brother         \"OLD AGE\"    Other Son         ESTRANGED, [place]    Other Daughter         1 ESTRANGED, [place], 1 IN [place]     Social History     Socioeconomic History    Marital status:      Spouse name: Not on file    Number of children: Not on file    Years of education: Not on file    Highest education level: Not on file   Occupational History    Not on file   Tobacco Use    Smoking status: Former Smoker     Packs/day: 2.00     Years: 16.00     Pack years: 32.00     Types: Cigarettes    Smokeless tobacco: Never Used    Tobacco comment: QUIT AGE 35   Substance and Sexual Activity    Alcohol use: No    Drug use: No    Sexual activity: Not Currently     Partners: Female     Birth control/protection: None   Other Topics Concern    Not on file   Social History Narrative    9/16/14:  PATIENT IS , LIVES WITH CATARINA MYAA. LIVED MOST OF LIVE IN [place], OWNED A BUSINESS [facility]. HE MOVED TO [facility] FOR 5 YEARS, HAS BEEN HERE (?) FOR ABOUT 10 YEARS. HAS A DAUGHTER IN [place] THAT IS NOT SPEAKING TO HIM, A SON IN [place] WHO IS NOT SPEAKING TO HIM, AND A DAUGHTER IN [facility]. BROTHER AND SISTER ARE DEAD. ONLY FRIEND IS SERA TORRES, ([facility]: [phone]), A  WHO LOOKS AFTER THE PATIENT. CALL HIM ON DISCHARGE AS HE HAS PATIENT'S DOG AND HOUSE KEYS.        Social Determinants of Health     Financial Resource Strain:     Difficulty of Paying Living Expenses: Not on file   Food Insecurity:     Worried About Running Out of Food in the Last Year: Not on file    Deann lund Food in the Last Year: Not on file   Transportation Needs:     Lack of Transportation (Medical): Not on file    Lack of Transportation (Non-Medical):  Not on file   Physical Activity:     Days of Exercise per Week: Not on file    Minutes of Exercise per Session: Not on file   Stress:     Feeling of Stress : Not on file   Social Connections:     Frequency of Communication with Friends and Family: Not on file    Frequency of Social Gatherings with Friends and Family: Not on file    Attends Denominational Services: Not on file    Active Member of Clubs or Organizations: Not on file    Attends Club or Organization Meetings: Not on file    Marital Status: Not on file   Intimate Partner Violence:     Fear of Current or Ex-Partner: Not on file    Emotionally Abused: Not on file    Physically Abused: Not on file    Sexually Abused: Not on file   Housing Stability:     Unable to Pay for Housing in the Last Year: Not on file    Number of [place] in the Last Year: Not on file    Unstable Housing in the Last Year: Not on file     Current Facility-Administered Medications   Medication Dose Route Frequency Provider Last Rate Last Admin    tuberculin injection 5 Units  5 Units IntraDERMal ONCE Mario Alberto Sánchez MD   5 Units at 01/23/22 1823    sodium chloride (NS) flush 5-10 mL  5-10 mL IntraVENous Q8H Alexsandra Bryan MD   10 mL at 01/24/22 0444    sodium chloride (NS) flush 5-10 mL  5-10 mL IntraVENous PRN Alexsandra Byran MD        apixaban (ELIQUIS) tablet 2.5 mg  2.5 mg Oral Q12H Sabiha Moran MD   2.5 mg at 01/24/22 0857    clonazePAM (KlonoPIN) tablet 0.5 mg  0.5 mg Oral BID Sabiha Moran MD   0.5 mg at 01/24/22 0856    donepeziL (ARICEPT) tablet 10 mg  10 mg Oral DAILY Sabiha Moran MD   10 mg at 01/24/22 0857    levETIRAcetam (KEPPRA) tablet 750 mg  750 mg Oral BID Sabiha Moran MD   750 mg at 01/24/22 0857    memantine (NAMENDA) tablet 5 mg  5 mg Oral DAILY Sabiha Moran MD   5 mg at 01/24/22 0857    QUEtiapine (SEROquel) tablet 25 mg  25 mg Oral QHS PRN Ayla Dave MD        [Held by provider] lenalidomide cap 10 mg (Revlimid) - patient supplied chemo (Patient Supplied)  10 mg Oral DAILY Ayla Dave MD        sertraline (ZOLOFT) tablet 50 mg  50 mg Oral DAILY Ayla Dave MD   50 mg at 01/24/22 0856    albuterol-ipratropium (DUO-NEB) 2.5 MG-0.5 MG/3 ML  3 mL Nebulization Q4H PRN Ayla Dave MD        cloNIDine HCL (CATAPRES) tablet 0.2 mg  0.2 mg Oral BID PRN Ayla Dave MD        zinc sulfate (ZINCATE) 50 mg zinc (220 mg) capsule 50 mg  50 mg Oral DAILY Ayla Dave MD   50 mg at 01/24/22 3896    cholecalciferol (VITAMIN D3) (1000 Units /25 mcg) tablet 2,000 Units  2,000 Units Oral DAILY Ayla Dave MD   2,000 Units at 01/24/22 4558    ascorbic acid (vitamin C) (VITAMIN C) tablet 2,000 mg  2,000 mg Oral DAILY Ayla Dave MD   2,000 mg at 01/24/22 0856    senna-docusate (PERICOLACE) 8.6-50 mg per tablet 2 Tablet  2 Tablet Oral BID PRN Ayla Dave MD        loperamide (IMODIUM) capsule 4 mg  4 mg Oral BID PRN Ayla Dave MD        dexamethasone (DECADRON) 10 mg/mL injection 6 mg  6 mg IntraVENous Q24H Ayla Dave MD   6 mg at 01/23/22 2123    sodium chloride (NS) flush 5-40 mL  5-40 mL IntraVENous Q8H Ayla Dave MD        sodium chloride (NS) flush 5-40 mL  5-40 mL IntraVENous PRN Ayla Dave MD        acetaminophen (TYLENOL) tablet 650 mg  650 mg Oral Q6H PRN Ayla Dave MD   650 mg at 01/23/22 8231    Or    acetaminophen (TYLENOL) suppository 650 mg  650 mg Rectal Q6H PRN Ayla Dave MD        senna (SENOKOT) tablet 17.2 mg  2 Tablet Oral BID PRN Ayla Dave MD        ondansetron [facility]) injection 4 mg  4 mg IntraVENous Q6H PRN Ayla Dave MD   4 mg at 01/23/22 0603       OBJECTIVE:  Patient Vitals for the past 8 hrs:   BP Temp Pulse Resp SpO2   01/24/22 0717 (!) 114/58 97.7 °F (36.5 °C) 60 20 93 % 22 0345 (!) 117/59 98 °F (36.7 °C) (!) 59 20 91 %     Temp (24hrs), Av °F (36.7 °C), Min:97.4 °F (36.3 °C), Max:99.2 °F (37.3 °C)    No intake/output data recorded. Physical Exam:  Exam per Dr. Seema Macdonald:    Recent Results (from the past 24 hour(s))   CBC WITH AUTOMATED DIFF    Collection Time: 22  5:01 AM   Result Value Ref Range    WBC 5.3 4.3 - 11.1 K/uL    RBC 4.38 4.23 - 5.6 M/uL    HGB 13.3 (L) 13.6 - 17.2 g/dL    HCT 40.9 (L) 41.1 - 50.3 %    MCV 93.4 79.6 - 97.8 FL    MCH 30.4 26.1 - 32.9 PG    MCHC 32.5 31.4 - 35.0 g/dL    RDW 14.1 11.9 - 14.6 %    PLATELET 617 (L) 686 - 450 K/uL    MPV 11.5 9.4 - 12.3 FL    ABSOLUTE NRBC 0.00 0.0 - 0.2 K/uL    DF AUTOMATED      NEUTROPHILS 84 (H) 43 - 78 %    LYMPHOCYTES 10 (L) 13 - 44 %    MONOCYTES 6 4.0 - 12.0 %    EOSINOPHILS 0 (L) 0.5 - 7.8 %    BASOPHILS 0 0.0 - 2.0 %    IMMATURE GRANULOCYTES 1 0.0 - 5.0 %    ABS. NEUTROPHILS 4.5 1.7 - 8.2 K/UL    ABS. LYMPHOCYTES 0.5 0.5 - 4.6 K/UL    ABS. MONOCYTES 0.3 0.1 - 1.3 K/UL    ABS. EOSINOPHILS 0.0 0.0 - 0.8 K/UL    ABS. BASOPHILS 0.0 0.0 - 0.2 K/UL    ABS. IMM. GRANS. 0.1 0.0 - 0.5 K/UL   METABOLIC PANEL, COMPREHENSIVE    Collection Time: 22  5:01 AM   Result Value Ref Range    Sodium 137 136 - 145 mmol/L    Potassium 3.6 3.5 - 5.1 mmol/L    Chloride 104 98 - 107 mmol/L    CO2 29 21 - 32 mmol/L    Anion gap 4 (L) 7 - 16 mmol/L    Glucose 127 (H) 65 - 100 mg/dL    BUN 23 8 - 23 MG/DL    Creatinine 0.92 0.8 - 1.5 MG/DL    GFR est AA >60 >60 ml/min/1.73m2    GFR est non-AA >60 >60 ml/min/1.73m2    Calcium 8.3 8.3 - 10.4 MG/DL    Bilirubin, total 0.5 0.2 - 1.1 MG/DL    ALT (SGPT) 55 12 - 65 U/L    AST (SGOT) 61 (H) 15 - 37 U/L    Alk.  phosphatase 108 50 - 136 U/L    Protein, total 6.7 6.3 - 8.2 g/dL    Albumin 2.4 (L) 3.2 - 4.6 g/dL    Globulin 4.3 (H) 2.3 - 3.5 g/dL    A-G Ratio 0.6 (L) 1.2 - 3.5         Imaging:  XR CHEST PORT [[id number]] Collected: 22 0601   Order Status: Completed Updated: 01/22/22 1854   Narrative:     EXAMINATION: XR CHEST PORT 1/22/2022 6:48 PM     ACCESSION NUMBER: [id number]     COMPARISON: 11/21/2019     INDICATION: Covid positive, fever     TECHNIQUE: A single view of the chest was obtained. FINDINGS:   Support Devices:   *  Right IJ approach port catheter tip overlies the cavoatrial junction. Left   subclavian approach pacer leads overlie the right heart. Cardiac Silhouette: Cardiac silhouette is stable in size. Loop recorder overlies   the cardiac silhouette. Mediastinum: Normal mediastinal contours. Lungs: No airspace consolidation.  No pneumothorax or sizable pleural effusion. Upper Abdomen: Normal     Miscellaneous: No fracture or suspicious osseous lesion. Impression:     No acute cardiopulmonary abnormality. ASSESSMENT:  Problem List  Date Reviewed: 8/8/2018          Codes Class Noted    * (Principal) Acute respiratory failure with hypoxia [facility]) ICD-10-CM: J96.01  ICD-9-CM: 518.81  1/23/2022        Multiple myeloma ([facility] ICD-10-CM: C90.00  ICD-9-CM: 203.00  1/23/2022        Seizures (HCC) (Chronic) ICD-10-CM: R56.9  ICD-9-CM: 780.39  1/23/2022        COVID-19 ICD-10-CM: U07.1  ICD-9-CM: 079.89  1/22/2022        Seizure ([facility] (Chronic) ICD-10-CM: R56.9  ICD-9-CM: 780.39  4/19/2020        Thrombocytopenia ([facility] ICD-10-CM: D69.6  ICD-9-CM: 287.5  4/19/2020        Falls frequently ICD-10-CM: R29.6  ICD-9-CM: V15.88  4/18/2020        A-fib (HCC) (Chronic) ICD-10-CM: I48.91  ICD-9-CM: 427.31  4/18/2020        Bradycardia (Chronic) ICD-10-CM: R00.1  ICD-9-CM: 427.89  4/18/2020        CKD (chronic kidney disease) ICD-10-CM: N18.9  ICD-9-CM: 585.9  11/19/2019        Hypokalemia ICD-10-CM: E87.6  ICD-9-CM: 276.8  11/19/2019        Urinary frequency ICD-10-CM: R35.0  ICD-9-CM: 788.41  8/8/2018        S/P placement of cardiac pacemaker ICD-10-CM: Z95.0  ICD-9-CM: V45.01  8/2/2018        Severe obesity (BMI 35.0-39. 9) with comorbidity ([address] ICD-10-CM: E66.01  ICD-9-CM: 278.01  4/12/2018        Multiple myeloma not having achieved remission ([facility] ICD-10-CM: C90.00  ICD-9-CM: 203.00  8/2/2017        Gastroesophageal reflux disease ICD-10-CM: K21.9  ICD-9-CM: 530.81  8/2/2017        Bilateral ankle pain ICD-10-CM: M25.571, M25.572  ICD-9-CM: 719.47  7/31/2017        Pollen allergies ICD-10-CM: Z91.09  ICD-9-CM: V15.09  7/31/2017    Overview Signed 7/31/2017  2:27 PM by Cheikh Moran MD     Pt would like to consider allergy shots. Will refer to Allergy             Vitamin B12 deficiency ICD-10-CM: E53.8  ICD-9-CM: 266.2  7/18/2017    Overview Signed 7/18/2017  2:06 PM by Cheikh Moran MD     Pt just started taking Vitamin B 12 supplement. Abnormal laboratory test ICD-10-CM: R89.9  ICD-9-CM: 796.4  7/18/2017    Overview Addendum 7/31/2017  2:26 PM by Cheikh Moran MD     UPEP results reviewed with pt. Pt has referral to Oncology to further evaluate. Mixed hyperlipidemia ICD-10-CM: E78.2  ICD-9-CM: 272.2  6/26/2017        OA (osteoarthritis) ICD-10-CM: M19.90  ICD-9-CM: 715.90  6/26/2017        Senile dementia ([facility] ICD-10-CM: F03.90  ICD-9-CM: 290.0  5/19/2017        Cataract ICD-10-CM: H26.9  ICD-9-CM: 366.9  9/8/2016        Asymmetrical sensorineural hearing loss ICD-10-CM: H90.3  ICD-9-CM: 389.16  Unknown        Bipolar disorder ([facility] ICD-10-CM: F31.9  ICD-9-CM: 296.80  3/22/2015    Overview Addendum 7/6/2017  5:01 PM by Cheikh Moran MD     Currently treated with Lexapro and Xanax. Given pt's age, I would recommend continuing current regimen; refill rx. Follow up 3 months or prn. Pt is working on weaning to a lower dose of Xanax.              Syncope and collapse ICD-10-CM: R55  ICD-9-CM: 780.2  3/22/2015        Acute encephalopathy ICD-10-CM: G93.40  ICD-9-CM: 348.30  3/22/2015        Cortical hemorrhage ([facility] ICD-10-CM: I61.1  ICD-9-CM: 431  9/17/2014        Syncope ICD-10-CM: R55  ICD-9-CM: 780.2  9/16/2014 Overview Signed 7/6/2017  5:00 PM by Hitesh Aguila MD     Refer to Cardiology to evaluated. Concern for symptomatic bradycardia. History of TIA (transient ischemic attack) ICD-10-CM: Z86.73  ICD-9-CM: V12.54  9/16/2014        Altered mental status ICD-10-CM: R41.82  ICD-9-CM: 780.97  9/16/2014        Anemia ICD-10-CM: D64.9  ICD-9-CM: 285.9  9/16/2014    Overview Addendum 7/6/2017  5:01 PM by Hitesh Aguila MD     Recheck CBC. Tinea corporis ICD-10-CM: B35.4  ICD-9-CM: 110.5  9/16/2014        Hypomagnesemia ICD-10-CM: E83.42  ICD-9-CM: 275.2  9/16/2014    Overview Signed 9/16/2014 10:14 PM by Orlando Rey NP     MILD               Elevated AST (SGOT) ICD-10-CM: R74.01  ICD-9-CM: 790.4  9/16/2014                RECOMMENDATIONS:  Multiple myeloma, in remission  - On Revlimid  - Decision to hold or con't Revlimid TBD by Dr. Tomás Peres, note to follow    COVID-19 Infection  - CXR neg  - Cultures NGTD  - on Dex/Baricitinib    Lab studies and imaging studies were personally reviewed. Thank you for allowing us to participate in the care of Mr. Ines Kenyon. We will sign off; please call with questions. Mr. Ines Kenyon will need to f/u with Dr. Hubert Arechiga upon discharge. Formal consult note by Dr. Tomás Peres to follow.          Namita Weiss NP   [facility] Hematology & Oncology  [address] [address]  Office : [phone]  Fax : [phone]

## 2022-01-24 NOTE — PROGRESS NOTES
Hospitalist Progress Note   Admit Date:  2022  5:56 PM   Name:  Ivette Gibson   Age:  82 y.o. Sex:  male  :  1935   MRN:  [id number]   Room:  [facility]    Presenting Complaint: No chief complaint on file. Reason(s) for Admission: COVID-19 [U07.1]     Hospital Course & Interval History:     Mr. 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 Hearing loss limits communication. On 4 L NC. CXR negative. CRP elevated 12.0. On decadron. Not on baricitinib as on revlimid for MM that is on hold during acute illness pending hematology consult. Discharge plans pending. Subjective (22):       Limited per mentation, frustrated, did eat, wants water, has cough, feels be isnt any better         Assessment & Plan:     Principal Problem:    COVID-19 (2022)    Acute hypoxic respiratory failure:  · On 4 L NC  · CRP elevated though takes revlimid for multiple myeloma   · D2 decadron   · Add tessalon      thrombocytopenia  Active Problems:    Anemia   · Trend CBC      Multiple myeloma:  · Consulted oncology and holding revlimid until evaluated       dementia    Acute encephalopathy   ·   followup mentation   · Continued aricept , namenda , zoloft         A-fib (HCC)   · Currently on eliquis       Hypokalemia:  · followup lab      Seizures:  · Continued keppra      Dispo/Discharge Planning:      Pending     Diet:  ADULT DIET Regular  DVT PPx: eliquis   Code status: Full Code    Hospital Problems as of 2022 Date Reviewed: 2018          Codes Class Noted - Resolved POA    * (Principal) Acute respiratory failure with hypoxia ([address] ICD-10-CM: J96.01  ICD-9-CM: 518.81  2022 - Present Yes        Multiple myeloma ([facility] ICD-10-CM: C90.00  ICD-9-CM: 203.00  2022 - Present Yes        Seizures ([address] (Chronic) ICD-10-CM: R56.9  ICD-9-CM: 780.39  2022 - Present Yes        COVID-19 ICD-10-CM: U07.1  ICD-9-CM: 079.89  1/22/2022 - Present Unknown        A-fib ([address] (Chronic) ICD-10-CM: I48.91  ICD-9-CM: 427.31  4/18/2020 - Present Yes        Acute encephalopathy ICD-10-CM: G93.40  ICD-9-CM: 348.30  3/22/2015 - Present Yes        Anemia ICD-10-CM: D64.9  ICD-9-CM: 285.9  9/16/2014 - Present Yes    Overview Addendum 7/6/2017  5:01 PM by Roby Zelaya MD     Recheck CBC. Objective:     Patient Vitals for the past 24 hrs:   Temp Pulse Resp BP SpO2   01/24/22 1622 100 °F (37.8 °C) 63 24 (!) 109/46 93 %   01/24/22 1137 98.2 °F (36.8 °C) 62 20 (!) 97/54 96 %   01/24/22 0849     92 %   01/24/22 0848     92 %   01/24/22 0717 97.7 °F (36.5 °C) 60 20 (!) 114/58 93 %   01/24/22 0345 98 °F (36.7 °C) (!) 59 20 (!) 117/59 91 %   01/24/22 0005 97.5 °F (36.4 °C) 60 20 122/65 96 %   01/23/22 1951 97.4 °F (36.3 °C) 65 20 (!) 110/58 95 %     Oxygen Therapy  O2 Sat (%): 93 % (01/24/22 1622)  O2 Device: Hi flow nasal cannula (01/24/22 0849)  Skin Assessment: Clean, dry, & intact (01/24/22 7448)  Skin Protection for O2 Device: N/A (01/24/22 0723)  O2 Flow Rate (L/min): 4 l/min (01/24/22 0849)    Estimated body mass index is 35.26 kg/m² as calculated from the following:    Height as of this encounter: 6' (1.829 m). Weight as of this encounter: 117.9 kg (260 lb). No intake or output data in the 24 hours ending 01/24/22 1793      Physical Exam:     Blood pressure (!) 109/46, pulse 63, temperature 100 °F (37.8 °C), resp. rate 24, height 6' (1.829 m), weight 117.9 kg (260 lb), SpO2 93 %. General:    Well nourished. No overt distress, elderly, hard of hearing , agitated   CV:   RRR. No m/r/g. No jugular venous distension. No edema   Lungs:   CTAB. No wheezing, rhonchi, or rales. Respirations even, unlabored, anterior   Abdomen: Bowel sounds present. Soft, nontender, nondistended. Extremities: No cyanosis or clubbing.   No edema  Skin:     No rashes and normal coloration. Neuro:  grossly intact. Psych:  Confused      I have reviewed ordered lab tests and independently visualized imaging below:    Recent Labs:  Recent Results (from the past 48 hour(s))   EKG, 12 LEAD, INITIAL    Collection Time: 01/22/22  6:25 PM   Result Value Ref Range    Ventricular Rate 114 BPM    Atrial Rate 111 BPM    QRS Duration 94 ms    Q-T Interval 366 ms    QTC Calculation (Bezet) 504 ms    Calculated R Axis -40 degrees    Calculated T Axis 10 degrees    Diagnosis       Atrial fibrillation  cannot rule out Inferior infarct, old  Prolonged QT intervals  Non-specific ST-t wave changes    Confirmed by Valentino Flattery MD ()BENNIE ([id number]) on 1/23/2022 4:31:05 PM     LACTIC ACID    Collection Time: 01/22/22  6:41 PM   Result Value Ref Range    Lactic acid 1.1 0.4 - 2.0 MMOL/L   CBC WITH AUTOMATED DIFF    Collection Time: 01/22/22  6:41 PM   Result Value Ref Range    WBC 3.6 (L) 4.3 - 11.1 K/uL    RBC 4.38 4.23 - 5.6 M/uL    HGB 13.2 (L) 13.6 - 17.2 g/dL    HCT 40.0 (L) 41.1 - 50.3 %    MCV 91.3 79.6 - 97.8 FL    MCH 30.1 26.1 - 32.9 PG    MCHC 33.0 31.4 - 35.0 g/dL    RDW 14.0 11.9 - 14.6 %    PLATELET 792 (L) 194 - 450 K/uL    MPV 11.8 9.4 - 12.3 FL    ABSOLUTE NRBC 0.00 0.0 - 0.2 K/uL    NEUTROPHILS 75 43 - 78 %    LYMPHOCYTES 15 13 - 44 %    MONOCYTES 9 4.0 - 12.0 %    EOSINOPHILS 0 (L) 0.5 - 7.8 %    BASOPHILS 0 0.0 - 2.0 %    IMMATURE GRANULOCYTES 1 0.0 - 5.0 %    ABS. NEUTROPHILS 2.8 1.7 - 8.2 K/UL    ABS. LYMPHOCYTES 0.5 0.5 - 4.6 K/UL    ABS. MONOCYTES 0.3 0.1 - 1.3 K/UL    ABS. EOSINOPHILS 0.0 0.0 - 0.8 K/UL    ABS. BASOPHILS 0.0 0.0 - 0.2 K/UL    ABS. IMM.  GRANS. 0.0 0.0 - 0.5 K/UL    RBC COMMENTS SLIGHT  ANISOCYTOSIS + POIKILOCYTOSIS        RBC COMMENTS SLIGHT  POLYCHROMASIA        WBC COMMENTS Result Confirmed By Smear      PLATELET COMMENTS SLIGHT      DF AUTOMATED     METABOLIC PANEL, COMPREHENSIVE    Collection Time: 01/22/22  6:41 PM   Result Value Ref Range    Sodium 137 136 - 145 mmol/L    Potassium 3.3 (L) 3.5 - 5.1 mmol/L    Chloride 103 98 - 107 mmol/L    CO2 26 21 - 32 mmol/L    Anion gap 8 7 - 16 mmol/L    Glucose 119 (H) 65 - 100 mg/dL    BUN 13 8 - 23 MG/DL    Creatinine 0.86 0.8 - 1.5 MG/DL    GFR est AA >60 >60 ml/min/1.73m2    GFR est non-AA >60 >60 ml/min/1.73m2    Calcium 7.6 (L) 8.3 - 10.4 MG/DL    Bilirubin, total 0.9 0.2 - 1.1 MG/DL    ALT (SGPT) 45 12 - 65 U/L    AST (SGOT) 44 (H) 15 - 37 U/L    Alk.  phosphatase 113 50 - 136 U/L    Protein, total 6.5 6.3 - 8.2 g/dL    Albumin 2.5 (L) 3.2 - 4.6 g/dL    Globulin 4.0 (H) 2.3 - 3.5 g/dL    A-G Ratio 0.6 (L) 1.2 - 3.5     URINE MICROSCOPIC    Collection Time: 01/22/22  6:41 PM   Result Value Ref Range    WBC 0-3 0 /hpf    RBC 10-20 0 /hpf    Epithelial cells 0-3 0 /hpf    Bacteria 0 0 /hpf    Casts 0-3 0 /lpf   CULTURE, URINE    Collection Time: 01/22/22  6:41 PM    Specimen: Cath Urine    URINE   Result Value Ref Range    Special Requests: NO SPECIAL REQUESTS      Culture result: NO GROWTH 1 DAY     PROCALCITONIN    Collection Time: 01/22/22  6:41 PM   Result Value Ref Range    Procalcitonin 0.05 0.00 - 0.49 ng/mL   C REACTIVE PROTEIN, QT    Collection Time: 01/22/22  6:41 PM   Result Value Ref Range    C-Reactive protein 12.0 (H) 0.0 - 0.9 mg/dL   FERRITIN    Collection Time: 01/22/22  6:41 PM   Result Value Ref Range    Ferritin 184 8 - 388 NG/ML   MAGNESIUM    Collection Time: 01/22/22  6:41 PM   Result Value Ref Range    Magnesium 2.2 1.8 - 2.4 mg/dL   CULTURE, BLOOD    Collection Time: 01/22/22  7:19 PM    Specimen: Blood   Result Value Ref Range    Special Requests: NO SPECIAL REQUESTS  RIGHT  Antecubital        Culture result: NO GROWTH 2 DAYS     CULTURE, BLOOD    Collection Time: 01/23/22 12:26 AM    Specimen: Blood   Result Value Ref Range    Special Requests: RIGHT  Antecubital        Culture result: NO GROWTH 1 DAY     LACTIC ACID    Collection Time: 01/23/22 12:26 AM   Result Value Ref Range    Lactic acid 2.2 (H) 0.4 - 2.0 MMOL/L   CBC WITH AUTOMATED DIFF    Collection Time: 01/24/22  5:01 AM   Result Value Ref Range    WBC 5.3 4.3 - 11.1 K/uL    RBC 4.38 4.23 - 5.6 M/uL    HGB 13.3 (L) 13.6 - 17.2 g/dL    HCT 40.9 (L) 41.1 - 50.3 %    MCV 93.4 79.6 - 97.8 FL    MCH 30.4 26.1 - 32.9 PG    MCHC 32.5 31.4 - 35.0 g/dL    RDW 14.1 11.9 - 14.6 %    PLATELET 983 (L) 742 - 450 K/uL    MPV 11.5 9.4 - 12.3 FL    ABSOLUTE NRBC 0.00 0.0 - 0.2 K/uL    DF AUTOMATED      NEUTROPHILS 84 (H) 43 - 78 %    LYMPHOCYTES 10 (L) 13 - 44 %    MONOCYTES 6 4.0 - 12.0 %    EOSINOPHILS 0 (L) 0.5 - 7.8 %    BASOPHILS 0 0.0 - 2.0 %    IMMATURE GRANULOCYTES 1 0.0 - 5.0 %    ABS. NEUTROPHILS 4.5 1.7 - 8.2 K/UL    ABS. LYMPHOCYTES 0.5 0.5 - 4.6 K/UL    ABS. MONOCYTES 0.3 0.1 - 1.3 K/UL    ABS. EOSINOPHILS 0.0 0.0 - 0.8 K/UL    ABS. BASOPHILS 0.0 0.0 - 0.2 K/UL    ABS. IMM. GRANS. 0.1 0.0 - 0.5 K/UL   METABOLIC PANEL, COMPREHENSIVE    Collection Time: 01/24/22  5:01 AM   Result Value Ref Range    Sodium 137 136 - 145 mmol/L    Potassium 3.6 3.5 - 5.1 mmol/L    Chloride 104 98 - 107 mmol/L    CO2 29 21 - 32 mmol/L    Anion gap 4 (L) 7 - 16 mmol/L    Glucose 127 (H) 65 - 100 mg/dL    BUN 23 8 - 23 MG/DL    Creatinine 0.92 0.8 - 1.5 MG/DL    GFR est AA >60 >60 ml/min/1.73m2    GFR est non-AA >60 >60 ml/min/1.73m2    Calcium 8.3 8.3 - 10.4 MG/DL    Bilirubin, total 0.5 0.2 - 1.1 MG/DL    ALT (SGPT) 55 12 - 65 U/L    AST (SGOT) 61 (H) 15 - 37 U/L    Alk.  phosphatase 108 50 - 136 U/L    Protein, total 6.7 6.3 - 8.2 g/dL    Albumin 2.4 (L) 3.2 - 4.6 g/dL    Globulin 4.3 (H) 2.3 - 3.5 g/dL    A-G Ratio 0.6 (L) 1.2 - 3.5         All Micro Results     Procedure Component Value Units Date/Time    BLOOD CULTURE [[id number]] Collected: 01/23/22 0026    Order Status: Completed Specimen: Blood Updated: 01/24/22 1414     Special Requests: --        RIGHT  Antecubital       Culture result: NO GROWTH 1 DAY       BLOOD CULTURE [[id number]] Collected: 01/22/22 1919    Order Status: Completed Specimen: Blood Updated: 01/24/22 1414     Special Requests: --        NO SPECIAL REQUESTS  RIGHT  Antecubital       Culture result: NO GROWTH 2 DAYS       CULTURE, URINE [[id number]] Collected: 01/22/22 1841    Order Status: Completed Specimen: Cath Urine Updated: 01/24/22 0939     Special Requests: NO SPECIAL REQUESTS        Culture result: NO GROWTH 1 DAY             Other Studies:  No results found.     Current Meds:  Current Facility-Administered Medications   Medication Dose Route Frequency    tuberculin injection 5 Units  5 Units IntraDERMal ONCE    apixaban (ELIQUIS) tablet 2.5 mg  2.5 mg Oral Q12H    clonazePAM (KlonoPIN) tablet 0.5 mg  0.5 mg Oral BID    donepeziL (ARICEPT) tablet 10 mg  10 mg Oral DAILY    levETIRAcetam (KEPPRA) tablet 750 mg  750 mg Oral BID    memantine (NAMENDA) tablet 5 mg  5 mg Oral DAILY    QUEtiapine (SEROquel) tablet 25 mg  25 mg Oral QHS PRN    [Held by provider] lenalidomide cap 10 mg (Revlimid) - patient supplied chemo (Patient Supplied)  10 mg Oral DAILY    sertraline (ZOLOFT) tablet 50 mg  50 mg Oral DAILY    albuterol-ipratropium (DUO-NEB) 2.5 MG-0.5 MG/3 ML  3 mL Nebulization Q4H PRN    cloNIDine HCL (CATAPRES) tablet 0.2 mg  0.2 mg Oral BID PRN    zinc sulfate (ZINCATE) 50 mg zinc (220 mg) capsule 50 mg  50 mg Oral DAILY    cholecalciferol (VITAMIN D3) (1000 Units /25 mcg) tablet 2,000 Units  2,000 Units Oral DAILY    ascorbic acid (vitamin C) (VITAMIN C) tablet 2,000 mg  2,000 mg Oral DAILY    senna-docusate (PERICOLACE) 8.6-50 mg per tablet 2 Tablet  2 Tablet Oral BID PRN    loperamide (IMODIUM) capsule 4 mg  4 mg Oral BID PRN    dexamethasone (DECADRON) 10 mg/mL injection 6 mg  6 mg IntraVENous Q24H    sodium chloride (NS) flush 5-40 mL  5-40 mL IntraVENous Q8H    sodium chloride (NS) flush 5-40 mL  5-40 mL IntraVENous PRN    acetaminophen (TYLENOL) tablet 650 mg  650 mg Oral Q6H PRN    Or    acetaminophen (TYLENOL) suppository 650 mg  650 mg Rectal Q6H PRN    senna (SENOKOT) tablet 17.2 mg  2 Tablet Oral BID PRN    ondansetron (ZOFRAN) injection 4 mg  4 mg IntraVENous Q6H PRN       Signed:  Harpal Grimes MD    Part of this note may have been written by using a voice dictation software. The note has been proof read but may still contain some grammatical/other typographical errors.

## 2022-01-24 NOTE — PROGRESS NOTES
Problem: Airway Clearance - Ineffective  Goal: Achieve or maintain patent airway  Outcome: Progressing Towards Goal     Problem: Gas Exchange - Impaired  Goal: Absence of hypoxia  Outcome: Progressing Towards Goal  Goal: Promote optimal lung function  Outcome: Progressing Towards Goal     Problem: Breathing Pattern - Ineffective  Goal: Ability to achieve and maintain a regular respiratory rate  Outcome: Progressing Towards Goal     Problem: Body Temperature -  Risk of, Imbalanced  Goal: Ability to maintain a body temperature within defined limits  Outcome: Progressing Towards Goal  Goal: Will regain or maintain usual level of consciousness  Outcome: Progressing Towards Goal  Goal: Complications related to the disease process, condition or treatment will be avoided or minimized  Outcome: Progressing Towards Goal     Problem: Isolation Precautions - Risk of Spread of Infection  Goal: Prevent transmission of infectious organism to others  Outcome: Progressing Towards Goal     Problem: Nutrition Deficits  Goal: Optimize nutrtional status  Outcome: Progressing Towards Goal     Problem: Risk for Fluid Volume Deficit  Goal: Maintain normal heart rhythm  Outcome: Progressing Towards Goal  Goal: Maintain absence of muscle cramping  Outcome: Progressing Towards Goal  Goal: Maintain normal serum potassium, sodium, calcium, phosphorus, and pH  Outcome: Progressing Towards Goal     Problem: Loneliness or Risk for Loneliness  Goal: Demonstrate positive use of time alone when socialization is not possible  Outcome: Progressing Towards Goal     Problem: Fatigue  Goal: Verbalize increase energy and improved vitality  Outcome: Progressing Towards Goal     Problem: Patient Education: Go to Patient Education Activity  Goal: Patient/Family Education  Outcome: Progressing Towards Goal     Problem: Falls - Risk of  Goal: *Absence of Falls  Description: Document Shea Fall Risk and appropriate interventions in the flowsheet.   Outcome: Progressing Towards Goal  Note: Fall Risk Interventions:  Mobility Interventions: Bed/chair exit alarm    Mentation Interventions: Bed/chair exit alarm    Medication Interventions: Bed/chair exit alarm,Patient to call before getting OOB,Teach patient to arise slowly    Elimination Interventions: Call light in reach,Patient to call for help with toileting needs,Toileting schedule/hourly rounds              Problem: Patient Education: Go to Patient Education Activity  Goal: Patient/Family Education  Outcome: Progressing Towards Goal     Problem: Pressure Injury - Risk of  Goal: *Prevention of pressure injury  Description: Document Blayne Scale and appropriate interventions in the flowsheet.   Outcome: Progressing Towards Goal  Note: Pressure Injury Interventions:  Sensory Interventions: Assess changes in LOC    Moisture Interventions: Apply protective barrier, creams and emollients,Absorbent underpads,Check for incontinence Q2 hours and as needed    Activity Interventions: Pressure redistribution bed/mattress(bed type)    Mobility Interventions: Pressure redistribution bed/mattress (bed type)    Nutrition Interventions: Document food/fluid/supplement intake    Friction and Shear Interventions: Lift sheet                Problem: Patient Education: Go to Patient Education Activity  Goal: Patient/Family Education  Outcome: Progressing Towards Goal     Problem: Patient Education: Go to Patient Education Activity  Goal: Patient/Family Education  Outcome: Progressing Towards Goal

## 2022-01-24 NOTE — PROGRESS NOTES
ACUTE PHYSICAL THERAPY GOALS:  (Developed with and agreed upon by patient and/or caregiver.)  1. Pt will perform bed mobility c Mod (A) in 7 therapy sessions. 2. Pt will perform sit-to-stand/ stand-to-sit transfers c Min (A) and use of LRAD in 7 therapy sessions. 3. Pt will ambulate 15 ft CG (A) with use of LRAD and breaks as needed in 7 therapy sessions. 4. Pt will perform standing balance activities with minimal postural sway in 7 therapy sessions. 5. Pt will tolerate multiple sets and reps of BLE exercises in 7 therapy sessions. PHYSICAL THERAPY ASSESSMENT: Initial Assessment and AM PT Treatment Day # 1      Jose A Hawk is a 82 y.o. male   PRIMARY DIAGNOSIS: Acute respiratory failure with hypoxia ([facility]  COVID-19 [U07.1]       Reason for Referral:    ICD-10: Treatment Diagnosis: Generalized Muscle Weakness (M62.81)  Difficulty in walking, Not elsewhere classified (R26.2)  Other abnormalities of gait and mobility (R26.89)  INPATIENT: Payor: [employer] MEDICARE / Plan: [facility] [employer] MEDICARE HMO / Product Type: Managed Care Medicare /     ASSESSMENT:     REHAB RECOMMENDATIONS:   Recommendation to date pending progress:  Setting:   Return to SNF; Unknown baseline at time of Eval  Equipment:    To Be Determined     PRIOR LEVEL OF FUNCTION:  (Prior to Hospitalization) INITIAL/CURRENT LEVEL OF FUNCTION:  (Most Recently Demonstrated)   Bed Mobility:   Unknown  Sit to Stand:   Unknown  Transfers:   Unknown  Gait/Mobility:   Unknown Bed Mobility:   Maximal Assistance x 2  Sit to Stand:   Not tested  Transfers:   Not tested  Gait/Mobility:   Not tested     ASSESSMENT:  Mr. Byron Isaacs Is a 82 y.o COVID (+) M presenting to PT after coming to ED on 1/22 d/t AMS observed by SNF staff.  PTA pt lives in [facility] and reports use of rollator for ambulation; all other PLOF is unknown at this time as pt was only oriented x1, extremely [place] and unable to consistently respond appropriately to therapy staff inquiries. At time of initial evaluation, pt presents with deficits in hearing, cognition, bed mobility, strength, balance and activity tolerance limiting his overall functional mobility. Today, pt performed bed mobility with Max (A)x2 and inc time/ repeated instruction. Pt's command following was fair (-) at best d/t hearing difficulties and confusion. Of note, pt was initially found to be at 83% on RA; improved to 89% on 3L and ultimately was left on 4.5L HFNC at 93%. Pt required mod (A) to correct post trunk lean at EOB ehichnotably got worse c inc time sitting. Pt was (A) by OT for taking pills before being (A) back to bed with max (A)x2. No attempt to stand d/t communication difficulties, poor command following and general poor performance/ safety awareness. At this time, pt is an appropriate candidate for skilled PT and will benefit from POC designed to address the aforementioned deficits. Upon completion of treatment, pt was positioned to comfort in bed with needs in reach. RN was made aware of pt performance. DC Recommendation: Return to SNF but potentially STR based off baseline level of function     SUBJECTIVE:   Mr. Byron Isaacs states, \"I cant hear you\"    SOCIAL HISTORY/LIVING ENVIRONMENT: lives in [facility] [place]; Rollator for ambulation; all other PLOF is unknown. Oriented x1, extremely [place] and unable to consistently respond appropriately to therapy staff inquiries.      OBJECTIVE:     PAIN: VITAL SIGNS: LINES/DRAINS:   Pre Treatment: Pain Screen  Pain Scale 1: Numeric (0 - 10)  Pain Intensity 1: 0  Post Treatment: 0 Vital Signs  O2 Sat (%): 92 %  O2 Device: Hi flow nasal cannula  O2 Flow Rate (L/min): 4 l/min NA  O2 Device: Hi flow nasal cannula     GROSS EVALUATION:  BLE Within Functional Limits Abnormal/ Functional Abnormal/ Non-Functional (see comments) Not Tested Comments:   AROM [] [x] [] [] Globally dec at EOB   PROM [] [] [] [x]    Strength [] [x] [] [] Weakness CORY Balance [] [x] [] [] Post trunk lean sitting EOB   Posture [] [x] [] [] Difficulty maintaining midline EOB   Sensation [] [] [] [x]    Coordination [x] [] [] []    Tone [] [] [] [x]    Edema [x] [] [] []    Activity Tolerance [] [x] [] [] Very limited     [] [] [] []      COGNITION/  PERCEPTION: Intact Impaired   (see comments) Comments:   Orientation [] [x] A&Ox1   Vision [] [x] glasses   Hearing [] [x] EXTREMELY [place]   Command Following [] [x] Inconsistent    Safety Awareness [] [x] Repeated instruction    [] []      MOBILITY: I Mod I S SBA CGA Min Mod Max Total  NT x2 Comments:   Bed Mobility    Rolling [] [] [] [] [] [] [] [] [] [x] []    Supine to Sit [] [] [] [] [] [] [] [x] [] [] [x]    Scooting [] [] [] [] [] [] [] [x] [] [] [x]    Sit to Supine [] [] [] [] [] [] [] [x] [] [] [x]    Transfers    Sit to Stand [] [] [] [] [] [] [] [] [] [x] []    Bed to Chair [] [] [] [] [] [] [] [] [] [x] []    Stand to Sit [] [] [] [] [] [] [] [] [] [x] []    I=Independent, Mod I=Modified Independent, S=Supervision, SBA=Standby Assistance, CGA=Contact Guard Assistance,   Min=Minimal Assistance, Mod=Moderate Assistance, Max=Maximal Assistance, Total=Total Assistance, NT=Not Tested  GAIT: I Mod I S SBA CGA Min Mod Max Total  NT x2 Comments:   Level of Assistance [] [] [] [] [] [] [] [] [] [x] []    Distance NA    DME N/A    Gait Quality NA    Weightbearing Status N/A     I=Independent, Mod I=Modified Independent, S=Supervision, SBA=Standby Assistance, CGA=Contact Guard Assistance,   Min=Minimal Assistance, Mod=Moderate Assistance, Max=Maximal Assistance, Total=Total Assistance, NT=Not Tested    [employer] AM-PAC 6 Clicks   Basic Mobility Inpatient Short Form       How much difficulty does the patient currently have. .. Unable A Lot A Little None   1. Turning over in bed (including adjusting bedclothes, sheets and blankets)? [] 1   [x] 2   [] 3   [] 4   2.   Sitting down on and standing up from a chair with arms ( e.g., wheelchair, bedside commode, etc.)   [] 1   [x] 2   [] 3   [] 4   3. Moving from lying on back to sitting on the side of the bed? [] 1   [x] 2   [] 3   [] 4   How much help from another person does the patient currently need. .. Total A Lot A Little None   4. Moving to and from a bed to a chair (including a wheelchair)? [] 1   [x] 2   [] 3   [] 4   5. Need to walk in hospital room? [] 1   [x] 2   [] 3   [] 4   6. Climbing 3-5 steps with a railing? [x] 1   [] 2   [] 3   [] 4   © 2007, Trustees of [address] [place] [address] [address] [id number], under license to [employer]. All rights reserved     Score:  Initial: 11   Most Recent: X (Date: -- )    Interpretation of Tool:  Represents activities that are increasingly more difficult (i.e. Bed mobility, Transfers, Gait). PLAN:   FREQUENCY/DURATION: PT Plan of Care: 3 times/week for duration of hospital stay or until stated goals are met, whichever comes first.    PROBLEM LIST:   (Skilled intervention is medically necessary to address:)  1. Decreased ADL/Functional Activities  2. Decreased Activity Tolerance  3. Decreased Balance  4. Decreased Cognition  5. Decreased Gait Ability  6. Decreased Strength  7. Decreased Transfer Abilities   INTERVENTIONS PLANNED:   (Benefits and precautions of physical therapy have been discussed with the patient.)  1. Therapeutic Activity  2. Therapeutic Exercise/HEP  3. Neuromuscular Re-education  4. Gait Training  5. Manual Therapy  6. Education     TREATMENT:     EVALUATION: Low Complexity : (Untimed Charge)    TREATMENT:   ($$ Therapeutic Activity: 8-22 mins    )  Co-Treatment PT/OT necessary due to patient's decreased overall endurance/tolerance levels, as well as need for high level skilled assistance to complete functional transfers/mobility and functional tasks  Therapeutic Activity (12 Minutes):  Therapeutic activity included Supine to Sit, Sit to Supine, Scooting, Lateral Scooting and Sitting balance  to improve functional Mobility, Strength and Activity tolerance.     TREATMENT GRID:  N/A    AFTER TREATMENT POSITION/PRECAUTIONS:  Bed, Needs within reach and RN notified    INTERDISCIPLINARY COLLABORATION:  RN/PCT, PT/PTA and OT/SERVIN    TOTAL TREATMENT DURATION:  PT Patient Time In/Time Out  Time In: 5170  Time Out: [address], PT

## 2022-01-24 NOTE — PROGRESS NOTES
Chart reviewed by JUAN GRIGGS for discharge planning. Patient currently on COVID Isolation. CM unable to obtain information from patient, as patient is confused at this time. Assessment information obtained from patient's daughter Mary Pennington [phone], who resides in [place]. Patient is a resident of [facility] [phone]. According to daughter, patient has difficulty with completing ADL's and is incontinent. Patient receives 3 meals a day. It is unclear if patient's medications are managed by staff. CM to clarify this information with staff at [facility]. Discharge planning: PT/OT has been consulted. Patient has a history of REHAB. It is unclear if the patient will need STR at discharge. Point of contact at [facility] is Steve Bettencourt- [phone] provided by patient's daughter. CM will follow up with [facility] staff, regarding discharge planning, once therapy needs have been identified. CM continues to follow plan of care. Care Management Interventions  PCP Verified by CM: Yes (VINCE Madden unable to provide PCP practice. )  Mode of Transport at Discharge:  Other (see comment) (TBD: based upon need.)  Transition of Care Consult (CM Consult): Discharge Planning  Physical Therapy Consult: Yes  Occupational Therapy Consult: Yes  Speech Therapy Consult: No  Support Systems: Assisted Living (Patient is a resident of [facility] [phone].)  Confirm Follow Up Transport: Other (see comment) (TBD)  Discharge Location  Patient Expects to be Discharged to[de-identified] Unable to determine at this time

## 2022-01-24 NOTE — PROGRESS NOTES
ACUTE PHYSICAL THERAPY GOALS:  (Developed with and agreed upon by patient and/or caregiver.)  1. Pt will perform bed mobility c Mod (A) in 7 therapy sessions. 2. Pt will perform sit-to-stand/ stand-to-sit transfers c Min (A) and use of LRAD in 7 therapy sessions. 3. Pt will ambulate 15 ft CG (A) with use of LRAD and breaks as needed in 7 therapy sessions. 4. Pt will perform standing balance activities with minimal postural sway in 7 therapy sessions. 5. Pt will tolerate multiple sets and reps of BLE exercises in 7 therapy sessions. PHYSICAL THERAPY ASSESSMENT: Initial Assessment and AM PT Treatment Day # 1      Shweta Navarro is a 82 y.o. male   PRIMARY DIAGNOSIS: Acute respiratory failure with hypoxia ([facility] [address]  COVID-19 [U07.1]       Reason for Referral:    ICD-10: Treatment Diagnosis: Generalized Muscle Weakness (M62.81)  Difficulty in walking, Not elsewhere classified (R26.2)  Other abnormalities of gait and mobility (R26.89)  INPATIENT: Payor: HUMANA MEDICARE / Plan: [facility] HUMANA MEDICARE HMO / Product Type: Managed Care Medicare /     ASSESSMENT:     REHAB RECOMMENDATIONS:   Recommendation to date pending progress:  Setting:   Return to SNF; Unknown baseline at time of Eval  Equipment:    To Be Determined     PRIOR LEVEL OF FUNCTION:  (Prior to Hospitalization) INITIAL/CURRENT LEVEL OF FUNCTION:  (Most Recently Demonstrated)   Bed Mobility:   Unknown  Sit to Stand:   Unknown  Transfers:   Unknown  Gait/Mobility:   Unknown Bed Mobility:   Maximal Assistance x 2  Sit to Stand:   Not tested  Transfers:   Not tested  Gait/Mobility:   Not tested     ASSESSMENT:  Mr. Rosalinda Lazcano Is a 82 y.o COVID (+) M presenting to PT after coming to ED on 1/22 d/t AMS observed by SNF staff.  PTA pt lives in [facility] and reports use of rollator for ambulation; all other PLOF is unknown at this time as pt was only oriented x1, extremely [place] and unable to consistently respond appropriately to therapy staff inquiries. At time of initial evaluation, pt presents with deficits in hearing, cognition, bed mobility, strength, balance and activity tolerance limiting his overall functional mobility. Today, pt performed bed mobility with Max (A)x2 and inc time/ repeated instruction. Pt's command following was fair (-) at best d/t hearing difficulties and confusion. Of note, pt was initially found to be at 83% on RA; improved to 89% on 3L and ultimately was left on 4.5L HFNC at 93%. Pt required mod (A) to correct post trunk lean at EOB ehichnotably got worse c inc time sitting. Pt was (A) by OT for taking pills before being (A) back to bed with max (A)x2. No attempt to stand d/t communication difficulties, poor command following and general poor performance/ safety awareness. At this time, pt is an appropriate candidate for skilled PT and will benefit from POC designed to address the aforementioned deficits. Upon completion of treatment, pt was positioned to comfort in bed with needs in reach. RN was made aware of pt performance. DC Recommendation: Return to SNF but potentially STR based off baseline level of function     SUBJECTIVE:   Mr. Laquita Mock states, \"I cant hear you\"    SOCIAL HISTORY/LIVING ENVIRONMENT: lives in [place]; Rollator for ambulation; all other PLOF is unknown. Oriented x1, extremely [place] and unable to consistently respond appropriately to therapy staff inquiries.      OBJECTIVE:     PAIN: VITAL SIGNS: LINES/DRAINS:   Pre Treatment: Pain Screen  Pain Scale 1: Numeric (0 - 10)  Pain Intensity 1: 0  Post Treatment: 0 Vital Signs  O2 Sat (%): 92 %  O2 Device: Hi flow nasal cannula  O2 Flow Rate (L/min): 4 l/min NA  O2 Device: Hi flow nasal cannula     GROSS EVALUATION:  BLE Within Functional Limits Abnormal/ Functional Abnormal/ Non-Functional (see comments) Not Tested Comments:   AROM [] [x] [] [] Globally dec at EOB   PROM [] [] [] [x]    Strength [] [x] [] [] Weakness CORY Balance [] [x] [] [] Post trunk lean sitting EOB   Posture [] [x] [] [] Difficulty maintaining midline EOB   Sensation [] [] [] [x]    Coordination [x] [] [] []    Tone [] [] [] [x]    Edema [x] [] [] []    Activity Tolerance [] [x] [] [] Very limited     [] [] [] []      COGNITION/  PERCEPTION: Intact Impaired   (see comments) Comments:   Orientation [] [x] A&Ox1   Vision [] [x] glasses   Hearing [] [x] EXTREMELY [place]   Command Following [] [x] Inconsistent    Safety Awareness [] [x] Repeated instruction    [] []      MOBILITY: I Mod I S SBA CGA Min Mod Max Total  NT x2 Comments:   Bed Mobility    Rolling [] [] [] [] [] [] [] [] [] [x] []    Supine to Sit [] [] [] [] [] [] [] [x] [] [] [x]    Scooting [] [] [] [] [] [] [] [x] [] [] [x]    Sit to Supine [] [] [] [] [] [] [] [x] [] [] [x]    Transfers    Sit to Stand [] [] [] [] [] [] [] [] [] [x] []    Bed to Chair [] [] [] [] [] [] [] [] [] [x] []    Stand to Sit [] [] [] [] [] [] [] [] [] [x] []    I=Independent, Mod I=Modified Independent, S=Supervision, SBA=Standby Assistance, CGA=Contact Guard Assistance,   Min=Minimal Assistance, Mod=Moderate Assistance, Max=Maximal Assistance, Total=Total Assistance, NT=Not Tested  GAIT: I Mod I S SBA CGA Min Mod Max Total  NT x2 Comments:   Level of Assistance [] [] [] [] [] [] [] [] [] [x] []    Distance NA    DME N/A    Gait Quality NA    Weightbearing Status N/A     I=Independent, Mod I=Modified Independent, S=Supervision, SBA=Standby Assistance, CGA=Contact Guard Assistance,   Min=Minimal Assistance, Mod=Moderate Assistance, Max=Maximal Assistance, Total=Total Assistance, NT=Not Tested    [employer] AM-PAC 6 Clicks   Basic Mobility Inpatient Short Form       How much difficulty does the patient currently have. .. Unable A Lot A Little None   1. Turning over in bed (including adjusting bedclothes, sheets and blankets)? [] 1   [x] 2   [] 3   [] 4   2.   Sitting down on and standing up from a chair with arms ( e.g., wheelchair, bedside commode, etc.)   [] 1   [x] 2   [] 3   [] 4   3. Moving from lying on back to sitting on the side of the bed? [] 1   [x] 2   [] 3   [] 4   How much help from another person does the patient currently need. .. Total A Lot A Little None   4. Moving to and from a bed to a chair (including a wheelchair)? [] 1   [x] 2   [] 3   [] 4   5. Need to walk in hospital room? [] 1   [x] 2   [] 3   [] 4   6. Climbing 3-5 steps with a railing? [x] 1   [] 2   [] 3   [] 4   © 2007, Trustees of [address] [place] [address] [address] [address], under license to [employer]. All rights reserved     Score:  Initial: 11   Most Recent: X (Date: -- )    Interpretation of Tool:  Represents activities that are increasingly more difficult (i.e. Bed mobility, Transfers, Gait). PLAN:   FREQUENCY/DURATION: PT Plan of Care: 3 times/week for duration of hospital stay or until stated goals are met, whichever comes first.    PROBLEM LIST:   (Skilled intervention is medically necessary to address:)  1. {PT/OT PROBLEM QILO:78565}   INTERVENTIONS PLANNED:   (Benefits and precautions of physical therapy have been discussed with the patient.)  1.  {INTERVENTIONS :18083::\"Therapeutic Activity\",\"Therapeutic Exercise/HEP\",\"Neuromuscular Re-education\",\"Gait Training\",\"Manual Therapy\",\"Education\"}     TREATMENT:     EVALUATION: {Evaluation:54776} : (Untimed Charge)    TREATMENT:   ($$ Therapeutic Activity: 8-22 mins    )  {TREATMENTS:47181}    TREATMENT GRID:  {CSTHEREXGRID:64484}    AFTER TREATMENT POSITION/PRECAUTIONS:  {After Treatment Precautions:63098}    INTERDISCIPLINARY COLLABORATION:  {Interdisciplinary Collaboration:99453}    TOTAL TREATMENT DURATION:  PT Patient Time In/Time Out  Time In: 0849  Time Out: 0913    [facility], PT

## 2022-01-24 NOTE — PROGRESS NOTES
01/23/22 1951   Dual Skin Pressure Injury Assessment   Dual Skin Pressure Injury Assessment WDL   Second Care Provider (Based on [address] [place] [address]) Noemi Heath RN   Skin Integumentary   Skin Integumentary (WDL) X    Pressure  Injury Documentation No Pressure Injury Noted-Pressure Ulcer Prevention Initiated   Skin Color Ecchymosis (comment)   Skin Condition/Temp Warm;Dry;Fragile   Skin Integrity Abrasion; Other (comment)  (rash left hip/thigh; abrasion left flank)   Wound Prevention and Protection Methods   Orientation of Wound Prevention Posterior   Location of Wound Prevention Sacrum/Coccyx   Dressing Present  No   Wound Offloading (Prevention Methods) Bed, pressure reduction mattress

## 2022-01-24 NOTE — PROGRESS NOTES
ACUTE OT GOALS:  (Developed with and agreed upon by patient and/or caregiver.)  1) Patient will complete upper body bathing and dressing with SET UP and adaptive equipment as needed. Patient will complete lower body bathing and dressing with MIN A and adaptive equipment as needed. 2) Patient will complete toileting with MIN A.   3) Patient will complete functional transfers with MIN A and adaptive equipment as needed. 4) Patient will tolerate at least 25 minutes of OT activity with AS NEEDED rest breaks while maintaining O2 sats >90%. 5) Patient will verbalize at least 3 energy conservation technique to utilize during ADL/IADL. 6) Patient will complete grooming ADL EOB with SET UP.    Timeframe: 7 visits       OCCUPATIONAL THERAPY ASSESSMENT: Initial Assessment and Daily Note OT Treatment Day # 1    Missy Medrano is a 82 y.o. male   PRIMARY DIAGNOSIS: Acute respiratory failure with hypoxia ([facility] [address]  COVID-19 [U07.1]       Reason for Referral:    ICD-10: Treatment Diagnosis: Generalized Muscle Weakness (M62.81)  Difficulty in walking, Not elsewhere classified (R26.2)  Other abnormalities of gait and mobility (R26.89)  INPATIENT: Payor: HUMANA MEDICARE / Plan: [facility] HUMANA MEDICARE HMO / Product Type: Managed Care Medicare /   ASSESSMENT:     REHAB RECOMMENDATIONS:   Recommendation to date pending progress:  Setting:   Short-term Rehab  Equipment:    To Be Determined     PRIOR LEVEL OF FUNCTION:  (Prior to Hospitalization)  INITIAL/CURRENT LEVEL OF FUNCTION:  (Based on today's evaluation)   Bathing:   Unknown  Dressing:   Unknown  Feeding/Grooming:   Unknown  Toileting:   Unknown  Functional Mobility:   Unknown Bathing:   Maximal Assistance  Dressing:   Maximal Assistance  Feeding/Grooming:   Minimal Assistance  Toileting:   Maximal Assistance  Functional Mobility:   Maximal Assistance x 2     ASSESSMENT:  Mr. Damien Villarreal is an 81 y/o male presents from [facility] with acute respiratory failure due to COVID 23. Pt is very [place] and currently confused; difficult to obtain PLOF at this time. Pt currently on 4L HF NC and reports does not wear home O2. Pt reports he uses a rollator at baseline and was able to recall his MICHELE name and room number, however difficult to become/remain oriented today. Pt overall max A x2 for bed mobility and mod A for static/dynamic sitting balance EOB. Pt with posterior lean EOB. Pt tolerated sitting EOB for ~10 minutes before fatigue. Pt O2 sats ranged 85-92% throughout. Pt is currently functioning below baseline and would benefit from skilled OT services to address OT goals and plan of care.      SUBJECTIVE:   Mr. Marquez Stevens states, \"I would like to check my answering machine\"    SOCIAL HISTORY/LIVING ENVIRONMENT: admit from halfway, PLOF unkown as pt is confused and [place]       OBJECTIVE:     PAIN: VITAL SIGNS: LINES/DRAINS:   Pre Treatment: Pain Screen  Pain Scale 1: Numeric (0 - 10)  Pain Intensity 1: 0  Post Treatment: did not appear in pain, resting comfortably in supine Vital Signs  O2 Sat (%): 92 %  O2 Device: Hi flow nasal cannula  O2 Flow Rate (L/min): 4 l/min None  O2 Device: Hi flow nasal cannula     GROSS EVALUATION:  BUE Within Functional Limits Abnormal/ Functional Abnormal/ Non-Functional (see comments) Not Tested Comments:   AROM [] [x] [] []    PROM [x] [] [] []    Strength [] [x] [] [] Generally decreased   Balance [] [x] [] [] Sitting: fair-  Standing: not tested   Posture [] [x] [] [] Posterior lean   Sensation [] [] [] [x]    Coordination [] [] [] [x]    Tone [] [] [] [x]    Edema [] [] [] [x]    Activity Tolerance [] [x] [] [] 4L HF NC, sats 85-92% throughout    [] [] [] []      COGNITION/  PERCEPTION: Intact Impaired   (see comments) Comments:   Orientation [] [x] AAO x1 to self, difficult to be oriented and remain oriented   Vision [] [x] Wears glasses   Hearing [] [x] Very [place]   Judgment/ Insight [] [x] Decreased insight into deficits   Attention [] [x] fleeting   Memory [] [x] Hx of dementia   Command Following [] [x] Multimodal cues--could be due to [facility]   Emotional Regulation [] [x] Agitated easily    [] []      ACTIVITIES OF DAILY LIVING: I Mod I S SBA CGA Min Mod Max Total NT Comments   BASIC ADLs:              Bathing/ Showering [] [] [] [] [] [] [] [] [] [x]    Toileting [] [] [] [] [] [] [] [] [] [x]    Dressing [] [] [] [] [] [] [] [x] [] [] Donning socks EOB   Feeding [] [] [] [] [] [x] [] [] [] [] Assisting drinking from cup   Grooming [] [] [] [] [] [] [] [] [] [x]    Personal Device Care [] [] [] [] [] [] [] [] [] [x]    Functional Mobility [] [] [] [] [] [] [] [x] [] [] x2   I=Independent, Mod I=Modified Independent, S=Supervision, SBA=Standby Assistance, CGA=Contact Guard Assistance,   Min=Minimal Assistance, Mod=Moderate Assistance, Max=Maximal Assistance, Total=Total Assistance, NT=Not Tested    MOBILITY: I Mod I S SBA CGA Min Mod Max Total  NT x2 Comments:   Supine to sit [] [] [] [] [] [] [] [x] [] [] [x]    Sit to supine [] [] [] [] [] [] [] [x] [] [] [x]    Sit to stand [] [] [] [] [] [] [] [] [] [x] []    Bed to chair [] [] [] [] [] [] [] [] [] [x] []    I=Independent, Mod I=Modified Independent, S=Supervision, SBA=Standby Assistance, CGA=Contact Guard Assistance,   Min=Minimal Assistance, Mod=Moderate Assistance, Max=Maximal Assistance, Total=Total Assistance, NT=Not Tested    [facility] AM-PAC 6 Clicks   Daily Activity Inpatient Short Form        How much help from another person does the patient currently need. .. Total A Lot A Little None   1. Putting on and taking off regular lower body clothing? [] 1   [x] 2   [] 3   [] 4   2. Bathing (including washing, rinsing, drying)? [] 1   [x] 2   [] 3   [] 4   3. Toileting, which includes using toilet, bedpan or urinal?   [] 1   [x] 2   [] 3   [] 4   4. Putting on and taking off regular upper body clothing? [] 1   [x] 2   [] 3   [] 4   5. Taking care of personal grooming such as brushing teeth?    [] 1 [x] 2   [] 3   [] 4   6. Eating meals? [] 1   [] 2   [x] 3   [] 4   © 2007, Trustees of [address] [place] [address] [address] [address], under license to [employer]. All rights reserved     Score:  Initial: 13 Most Recent: X (Date: -- )   Interpretation of Tool:  Represents activities that are increasingly more difficult (i.e. Bed mobility, Transfers, Gait). PLAN:   FREQUENCY/DURATION: OT Plan of Care: 3 times/week for duration of hospital stay or until stated goals are met, whichever comes first.    PROBLEM LIST:   (Skilled intervention is medically necessary to address:)  1. Decreased ADL/Functional Activities  2. Decreased Activity Tolerance  3. Decreased AROM/PROM  4. Decreased Balance  5. Decreased Cognition  6. Decreased Coordination  7. Decreased Gait Ability  8. Decreased Strength  9. Decreased Transfer Abilities   INTERVENTIONS PLANNED:   (Benefits and precautions of occupational therapy have been discussed with the patient.)  1. Self Care Training  2. Therapeutic Activity  3. Therapeutic Exercise/HEP  4. Neuromuscular Re-education  5. Education     TREATMENT:     EVALUATION: Moderate Complexity : (Untimed Charge)    TREATMENT:   ( $$ Neuromuscular Re-Education: 8-22 mins   )  Co-Treatment PT/OT necessary due to patient's decreased overall endurance/tolerance levels, as well as need for high level skilled assistance to complete functional transfers/mobility and functional tasks  Neuromuscular Re-education (20 Minutes): Neuromuscular Re-education included Balance Training, Coordination training, Functional mobility with facilitation, Postural training and Sitting balance training to improve Balance, Coordination, Functional Mobility, Postural Control and Proprioception.     TREATMENT GRID:  N/A    AFTER TREATMENT POSITION/PRECAUTIONS:  Bed, Needs within reach and RN notified    INTERDISCIPLINARY COLLABORATION:  RN/PCT, PT/PTA and OT/SERVIN    TOTAL TREATMENT DURATION:  OT Patient Time In/Time Out  Time In: 0848  Time Out: [address], OT

## 2022-01-25 NOTE — PROGRESS NOTES
ACUTE PHYSICAL THERAPY GOALS:  (Developed with and agreed upon by patient and/or caregiver.)  1. Pt will perform bed mobility c Mod (A) in 7 therapy sessions. 2. Pt will perform sit-to-stand/ stand-to-sit transfers c Min (A) and use of LRAD in 7 therapy sessions. 3. Pt will ambulate 15 ft CG (A) with use of LRAD and breaks as needed in 7 therapy sessions. 4. Pt will perform standing balance activities with minimal postural sway in 7 therapy sessions. 5. Pt will tolerate multiple sets and reps of BLE exercises in 7 therapy sessions.       PHYSICAL THERAPY: Daily Note and PM Treatment Day # 2    John Felder is a 82 y.o. male   PRIMARY DIAGNOSIS: Acute respiratory failure with hypoxia ([facility] [address]  COVID-19 [U07.1]         ASSESSMENT:     REHAB RECOMMENDATIONS: CURRENT LEVEL OF FUNCTION:  (Most Recently Demonstrated)   Recommendation to date pending progress:  Setting:   Return to SNF  Equipment:    To Be Determined Bed Mobility:   Moderate Assistance x 2 supine>sit and Max X2 for rolling  Sit to Stand:   Moderate Assistance  Transfers:   Not tested  Gait/Mobility:   Not tested     ASSESSMENT:  Mr. Narinder Del Angel was supine in bed on 7L O2 and resting at 88% but increased to 90% with pursed lip breathing edu. Pt very [place] and angry today because he cannot find his walker. Pt Max/Mod A supine>sit today and fair static seated balance, Fair- seated balance with scooting. Pt desatted to 82/84% seated EOB after STS with Mod A. Unable to recover with seated rest and PT attempted to instruct patient through STS and side steps to [facility], but patient refused because the RW was not his rollator. Pt perseverated on his rollator not being in the room and states it was an hour ago, however there has never been one in there per RN. Pt transferred supine from sitting with Max Ax2 and scooted up in bed in trendelenburg with Max Ax2.  Upon transferring supine, pt's oxygen was increased to 8L oxygen and patient able to recover to 90% in sidelying during Max Ax2 for rolling for brief and sheet change. Pt left supine in bed with needs in reach and sats resting at 90%. RN notified. Will continue to follow. SUBJECTIVE:   Mr. Estrellita Meade states, \"I cant use this walker, I need my walker! \"    SOCIAL HISTORY/ LIVING ENVIRONMENT: See al  Support Systems: Assisted Living (Patient is a resident of [facility] [phone].)  OBJECTIVE:     PAIN: VITAL SIGNS: LINES/DRAINS:   Pre Treatment:    Post Treatment: 0   IV  O2 Device: Hi flow nasal cannula     MOBILITY: I Mod I S SBA CGA Min Mod Max Total  NT x2 Comments:   Bed Mobility    Rolling [] [] [] [] [] [] [] [x] [] [] [x]    Supine to Sit [] [] [] [] [] [] [x] [] [] [] [x]    Scooting [] [] [] [] [] [] [] [x] [] [] [x]    Sit to Supine [] [] [] [] [] [] [] [x] [] [] [x]    Transfers    Sit to Stand [] [] [] [] [] [] [x] [] [] [] []    Bed to Chair [] [] [] [] [] [] [] [] [] [x] []    Stand to Sit [] [] [] [] [] [] [x] [] [] [] []    I=Independent, Mod I=Modified Independent, S=Supervision, SBA=Standby Assistance, CGA=Contact Guard Assistance,   Min=Minimal Assistance, Mod=Moderate Assistance, Max=Maximal Assistance, Total=Total Assistance, NT=Not Tested    BALANCE: Good Fair+ Fair Fair- Poor NT Comments   Sitting Static [] [] [x] [] [] []    Sitting Dynamic [] [] [] [x] [x] []              Standing Static [] [] [] [] [x] []    Standing Dynamic [] [] [] [] [x] []      GAIT: I Mod I S SBA CGA Min Mod Max Total  NT x2 Comments:   Level of Assistance [] [] [] [] [] [] [] [] [] [x] []    Distance n/a    DME N/A    Gait Quality     Weightbearing  Status N/A     I=Independent, Mod I=Modified Independent, S=Supervision, SBA=Standby Assistance, CGA=Contact Guard Assistance,   Min=Minimal Assistance, Mod=Moderate Assistance, Max=Maximal Assistance, Total=Total Assistance, NT=Not Tested    PLAN:   FREQUENCY/DURATION: PT Plan of Care: 3 times/week for duration of hospital stay or until stated goals are met, whichever comes first.  TREATMENT:     TREATMENT:   ($$ Therapeutic Activity: 23-37 mins    )  Therapeutic Activity (26 Minutes): Therapeutic activity included Rolling, Supine to Sit, Sit to Supine, Scooting, Lateral Scooting, Sitting balance  and Standing balance to improve functional Mobility, Strength and Activity tolerance.     TREATMENT GRID:  N/A    AFTER TREATMENT POSITION/PRECAUTIONS:  Bed, Needs within reach and RN notified    INTERDISCIPLINARY COLLABORATION:  RN/PCT and PT/PTA    TOTAL TREATMENT DURATION:  PT Patient Time In/Time Out  Time In: 2735  Time Out: [facility]

## 2022-01-25 NOTE — PROGRESS NOTES
Hospitalist Progress Note   Admit Date:  2022  5:56 PM   Name:  Mirlande Rankin   Age:  82 y.o. Sex:  male  :  1935   MRN:  [id number]   Room:  [facility]/    Presenting Complaint: No chief complaint on file. Reason(s) for Admission: COVID-19 [U07.1]     Hospital Course & Interval History:   Copied from prior notes hpi:  Mr. 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 Hearing loss limits communication. On 4 L NC. CXR negative. CRP elevated 12.0. On decadron. Not on baricitinib as on revlimid for MM that is on hold during acute illness pending hematology consult.         Discharge plans pending.      Subjective (22): Now requiring 6 L nasal cannulaalert to personcannot name the hospital or locationhistory of dementia. Resides at assisted living facility. Is estranged from daughter and she is listed as emergency contact but decision maker for the patient is Sabina Nj (sp?) [phone] from his residential facility. I called and received voicemail. Attempting to clarify CODE STATUS. Please refer to case management note from 2022 documenting daughter is not decision-maker and refused to assist with decisions for patient. Despite increased FiO2 patient appears stableadmitted with Covid with acute respiratory failure/pneumonia. Receiving Decadrondid not receive baricitinib due to Revlimid being administered for multiple myelomaoncology has seen patient and recommended okay to hold Revlimid for nowif clinical deterioration consider addition of baricitinib.                 Assessment & Plan:      Principal Problem:    COVID-19 (2022)    Acute hypoxic respiratory failure:    · On 6 liters nasal cannula increased from 4  · Continue Decadronconsider baricitinib addition if continued worsening hypoxemiaRevlimid on [facility] oncology consult appreciated        thrombocytopenia  Active Problems:  · 1/25resolved platelet count 327,381AMMV likely secondary to virus        Multiple myeloma:  · Consulted oncology and holding revlimid until evaluated   · 1/25continue to hold Revlimidsee note        dementia    Acute encephalopathy   ·   followup mentation   · Continued aricept , namenda , zoloft   · 1/25 very likely secondary to COVID-19Covid encephalopathy on baseline dementia. · Will need to touch base with decision-maker at residential facility [phone]apparently full [address] with available information at time of admission.          A-fib ([facility]   · 1/25--Currently on eliquisrate controlledhas permanent pacemaker        Hypokalemia:  · Resolvedmonitor intermittently        Seizures:  · Continued keppra       Dispo/Discharge Planning:      Pending      Diet:  ADULT DIET Regular  DVT PPx: eliquis   Code status: Full Code        Hospital Problems as of 1/25/2022 Date Reviewed: 8/8/2018          Codes Class Noted - Resolved POA    * (Principal) Acute respiratory failure with hypoxia ([facility] ICD-10-CM: J96.01  ICD-9-CM: 518.81  1/23/2022 - Present Yes        Multiple myeloma ([facility] ICD-10-CM: C90.00  ICD-9-CM: 203.00  1/23/2022 - Present Yes        Seizures (HCC) (Chronic) ICD-10-CM: R56.9  ICD-9-CM: 780.39  1/23/2022 - Present Yes        COVID-19 ICD-10-CM: U07.1  ICD-9-CM: 079.89  1/22/2022 - Present Unknown        A-fib (HCC) (Chronic) ICD-10-CM: I48.91  ICD-9-CM: 427.31  4/18/2020 - Present Yes        Acute encephalopathy ICD-10-CM: G93.40  ICD-9-CM: 348.30  3/22/2015 - Present Yes        Anemia ICD-10-CM: D64.9  ICD-9-CM: 285.9  9/16/2014 - Present Yes    Overview Addendum 7/6/2017  5:01 PM by Seema Victor MD     Recheck CBC.                    Objective:     Patient Vitals for the past 24 hrs:   Temp Pulse Resp BP SpO2   01/25/22 1056 97.5 °F (36.4 °C) (!) 59 18 124/61 (!) 87 %   01/25/22 0823 98 °F (36.7 °C) 63 18 108/74 98 %   01/25/22 0333 97.6 °F (36.4 °C) 60 18 (!) 114/56 92 %   01/25/22 0031     92 %   01/24/22 2342 98.3 °F (36.8 °C) 66 20 (!) 110/50 (!) 86 %   01/24/22 2126     95 %   01/24/22 1926 99.8 °F (37.7 °C) 62 18 (!) 104/53 94 %   01/24/22 1622 100 °F (37.8 °C) 63 24 (!) 109/46 93 %     Oxygen Therapy  O2 Sat (%): (!) 87 % (01/25/22 1056)  O2 Device: Nasal cannula (01/25/22 0823)  Skin Assessment: Clean, dry, & intact (01/25/22 9017)  Skin Protection for O2 Device: N/A (01/25/22 0722)  O2 Flow Rate (L/min): 6 l/min (01/25/22 0823)    Estimated body mass index is 35.26 kg/m² as calculated from the following:    Height as of this encounter: 6' (1.829 m). Weight as of this encounter: 117.9 kg (260 lb). No intake or output data in the 24 hours ending 01/25/22 1605      Physical Exam:     Blood pressure 124/61, pulse (!) 59, temperature 97.5 °F (36.4 °C), resp. rate 18, height 6' (1.829 m), weight 117.9 kg (260 lb), SpO2 (!) 87 %. General-alert and oriented x3, cooperative and calm  Eyes-conjunctive are clear pupils equal round react to light extraocular muscles intact  Ears-no deformity-no drainage  Nares-no nasal deformity-no discharge-turbinates not significantly enlarged  Throat--- obvious thrush. Heart-regularrate controlledno gross JVD or HJR  Lungs-mid zone crackles faint no wheezing. Symmetric excursion. Abdomen-soft, nontender, no gross percussible organomegaly. No gross distention. Bowel sounds present all 4 quadrants  Extremities-no significant edema, moves all extremities symmetrically. Skin-no obvious breakdown or significant rash  Neurologic-no obvious focal motor deficits.   No significant tremor  Psychiatric-significant dementiairritablescreamed at me \"you are going to get me out of here now\"    I have reviewed ordered lab tests and independently visualized imaging below:    Recent Labs:  Recent Results (from the past 48 hour(s))   CBC WITH AUTOMATED DIFF    Collection Time: 01/24/22 5:01 AM   Result Value Ref Range    WBC 5.3 4.3 - 11.1 K/uL    RBC 4.38 4.23 - 5.6 M/uL    HGB 13.3 (L) 13.6 - 17.2 g/dL    HCT 40.9 (L) 41.1 - 50.3 %    MCV 93.4 79.6 - 97.8 FL    MCH 30.4 26.1 - 32.9 PG    MCHC 32.5 31.4 - 35.0 g/dL    RDW 14.1 11.9 - 14.6 %    PLATELET 609 (L) 021 - 450 K/uL    MPV 11.5 9.4 - 12.3 FL    ABSOLUTE NRBC 0.00 0.0 - 0.2 K/uL    DF AUTOMATED      NEUTROPHILS 84 (H) 43 - 78 %    LYMPHOCYTES 10 (L) 13 - 44 %    MONOCYTES 6 4.0 - 12.0 %    EOSINOPHILS 0 (L) 0.5 - 7.8 %    BASOPHILS 0 0.0 - 2.0 %    IMMATURE GRANULOCYTES 1 0.0 - 5.0 %    ABS. NEUTROPHILS 4.5 1.7 - 8.2 K/UL    ABS. LYMPHOCYTES 0.5 0.5 - 4.6 K/UL    ABS. MONOCYTES 0.3 0.1 - 1.3 K/UL    ABS. EOSINOPHILS 0.0 0.0 - 0.8 K/UL    ABS. BASOPHILS 0.0 0.0 - 0.2 K/UL    ABS. IMM. GRANS. 0.1 0.0 - 0.5 K/UL   METABOLIC PANEL, COMPREHENSIVE    Collection Time: 01/24/22  5:01 AM   Result Value Ref Range    Sodium 137 136 - 145 mmol/L    Potassium 3.6 3.5 - 5.1 mmol/L    Chloride 104 98 - 107 mmol/L    CO2 29 21 - 32 mmol/L    Anion gap 4 (L) 7 - 16 mmol/L    Glucose 127 (H) 65 - 100 mg/dL    BUN 23 8 - 23 MG/DL    Creatinine 0.92 0.8 - 1.5 MG/DL    GFR est AA >60 >60 ml/min/1.73m2    GFR est non-AA >60 >60 ml/min/1.73m2    Calcium 8.3 8.3 - 10.4 MG/DL    Bilirubin, total 0.5 0.2 - 1.1 MG/DL    ALT (SGPT) 55 12 - 65 U/L    AST (SGOT) 61 (H) 15 - 37 U/L    Alk.  phosphatase 108 50 - 136 U/L    Protein, total 6.7 6.3 - 8.2 g/dL    Albumin 2.4 (L) 3.2 - 4.6 g/dL    Globulin 4.3 (H) 2.3 - 3.5 g/dL    A-G Ratio 0.6 (L) 1.2 - 3.5     PLEASE READ & DOCUMENT PPD TEST IN 24 HRS    Collection Time: 01/24/22  6:23 PM   Result Value Ref Range    PPD Negative Negative    mm Induration 0 0 - 5 mm   CBC WITH AUTOMATED DIFF    Collection Time: 01/25/22  6:27 AM   Result Value Ref Range    WBC 5.3 4.3 - 11.1 K/uL    RBC 4.13 (L) 4.23 - 5.6 M/uL    HGB 12.5 (L) 13.6 - 17.2 g/dL    HCT 37.4 (L) 41.1 - 50.3 %    MCV 90.6 79.6 - 97.8 FL    MCH 30.3 26.1 - 32.9 PG    MCHC 33.4 31.4 - 35.0 g/dL    RDW 14.4 11.9 - 14.6 %    PLATELET 398 214 - 217 K/uL    MPV 11.2 9.4 - 12.3 FL    ABSOLUTE NRBC 0.00 0.0 - 0.2 K/uL    DF AUTOMATED      NEUTROPHILS 82 (H) 43 - 78 %    LYMPHOCYTES 11 (L) 13 - 44 %    MONOCYTES 6 4.0 - 12.0 %    EOSINOPHILS 0 (L) 0.5 - 7.8 %    BASOPHILS 0 0.0 - 2.0 %    IMMATURE GRANULOCYTES 2 0.0 - 5.0 %    ABS. NEUTROPHILS 4.4 1.7 - 8.2 K/UL    ABS. LYMPHOCYTES 0.6 0.5 - 4.6 K/UL    ABS. MONOCYTES 0.3 0.1 - 1.3 K/UL    ABS. EOSINOPHILS 0.0 0.0 - 0.8 K/UL    ABS. BASOPHILS 0.0 0.0 - 0.2 K/UL    ABS. IMM. GRANS. 0.1 0.0 - 0.5 K/UL   METABOLIC PANEL, COMPREHENSIVE    Collection Time: 01/25/22  6:27 AM   Result Value Ref Range    Sodium 137 (L) 138 - 145 mmol/L    Potassium 3.6 3.5 - 5.1 mmol/L    Chloride 103 98 - 107 mmol/L    CO2 29 21 - 32 mmol/L    Anion gap 5 (L) 7 - 16 mmol/L    Glucose 127 (H) 65 - 100 mg/dL    BUN 25 (H) 8 - 23 MG/DL    Creatinine 0.87 0.8 - 1.5 MG/DL    GFR est AA >60 >60 ml/min/1.73m2    GFR est non-AA >60 >60 ml/min/1.73m2    Calcium 8.5 8.3 - 10.4 MG/DL    Bilirubin, total 0.4 0.2 - 1.1 MG/DL    ALT (SGPT) 61 12 - 65 U/L    AST (SGOT) 69 (H) 15 - 37 U/L    Alk.  phosphatase 107 50 - 136 U/L    Protein, total 6.4 6.3 - 8.2 g/dL    Albumin 2.1 (L) 3.2 - 4.6 g/dL    Globulin 4.3 (H) 2.3 - 3.5 g/dL    A-G Ratio 0.5 (L) 1.2 - 3.5         All Micro Results     Procedure Component Value Units Date/Time    CULTURE, URINE [[id number]] Collected: 01/22/22 1841    Order Status: Completed Specimen: Cath Urine Updated: 01/25/22 0752     Special Requests: NO SPECIAL REQUESTS        Culture result: NO GROWTH 2 DAYS       BLOOD CULTURE [[id number]] Collected: 01/23/22 0026    Order Status: Completed Specimen: Blood Updated: 01/25/22 0736     Special Requests: --        RIGHT  Antecubital       Culture result: NO GROWTH 2 DAYS       BLOOD CULTURE [[id number]] Collected: 01/22/22 1919    Order Status: Completed Specimen: Blood Updated: 01/25/22 8893 Special Requests: --        NO SPECIAL REQUESTS  RIGHT  Antecubital       Culture result: NO GROWTH 3 DAYS             Other Studies:  No results found. Current Meds:  Current Facility-Administered Medications   Medication Dose Route Frequency    dexAMETHasone (DECADRON) tablet 6 mg  6 mg Oral Q24H    benzonatate (TESSALON) capsule 100 mg  100 mg Oral TID PRN    apixaban (ELIQUIS) tablet 2.5 mg  2.5 mg Oral Q12H    clonazePAM (KlonoPIN) tablet 0.5 mg  0.5 mg Oral BID    donepeziL (ARICEPT) tablet 10 mg  10 mg Oral DAILY    levETIRAcetam (KEPPRA) tablet 750 mg  750 mg Oral BID    memantine (NAMENDA) tablet 5 mg  5 mg Oral DAILY    QUEtiapine (SEROquel) tablet 25 mg  25 mg Oral QHS PRN    [Held by provider] lenalidomide cap 10 mg (Revlimid) - patient supplied chemo (Patient Supplied)  10 mg Oral DAILY    sertraline (ZOLOFT) tablet 50 mg  50 mg Oral DAILY    albuterol-ipratropium (DUO-NEB) 2.5 MG-0.5 MG/3 ML  3 mL Nebulization Q4H PRN    cloNIDine HCL (CATAPRES) tablet 0.2 mg  0.2 mg Oral BID PRN    zinc sulfate (ZINCATE) 50 mg zinc (220 mg) capsule 50 mg  50 mg Oral DAILY    cholecalciferol (VITAMIN D3) (1000 Units /25 mcg) tablet 2,000 Units  2,000 Units Oral DAILY    ascorbic acid (vitamin C) (VITAMIN C) tablet 2,000 mg  2,000 mg Oral DAILY    senna-docusate (PERICOLACE) 8.6-50 mg per tablet 2 Tablet  2 Tablet Oral BID PRN    loperamide (IMODIUM) capsule 4 mg  4 mg Oral BID PRN    sodium chloride (NS) flush 5-40 mL  5-40 mL IntraVENous Q8H    sodium chloride (NS) flush 5-40 mL  5-40 mL IntraVENous PRN    acetaminophen (TYLENOL) tablet 650 mg  650 mg Oral Q6H PRN    Or    acetaminophen (TYLENOL) suppository 650 mg  650 mg Rectal Q6H PRN    senna (SENOKOT) tablet 17.2 mg  2 Tablet Oral BID PRN    ondansetron (ZOFRAN) injection 4 mg  4 mg IntraVENous Q6H PRN       Signed:  Marval Hack, DO    Part of this note may have been written by using a voice dictation software.   The note has been proof read but may still contain some grammatical/other typographical errors.

## 2022-01-25 NOTE — PROGRESS NOTES
CM spoke with [employer] [phone] with Yan Elizabethteresa, who reports patient resides in a [address] [place] [address]. According to Ulysses Rodriguez, patient has had a recent decline in physical mobility. Ulysses Rodriguez also stated patient declines assistance with care needs at facility. Ulysses Rodriguez suggested patient return with non skilled home care services provided by a third party housed in living community. Ulysses Rodriguez confirmed non skilled home care services are private pay and can assist patient with ADL's, weekly visits, and other supportive care needs. CM was receptive to this information. Patient will likely return to [place], if no further skilled therapy is indicated. CM continues to follow plan of care.

## 2022-01-25 NOTE — CONSULTS
[facility] Hematology and Oncology: Inpatient Hematology / Oncology Consult Note    Reason for Consult:    Referring Physician:  Arin Pennington MD    History of Present Illness:  Mr. Jennifer Montelongo is a 82 y.o. male admitted on 1/22/2022 with a primary diagnosis of   Encounter Diagnosis   Name Primary?  Acute encephalopathy      Mr Jennifer Montelongo is a pt of Dr Rozina Urias with MM on maintenance Revlimid. He was admitted on 1/22/22 w. COVID. Pmxh: Alzheimer dementia, TIA, s/p pacemaker for SSS, bipolar d/o, afib on Eliquis. He was originally diagnosed in 8/2017 with MM, IgG Kappa, ISS stage II. S/p CyBorD x4 cycles and achieved [place]. In 3/2018 he started Revlimid. He achieved CR-1 in 6/2019. He was brought in from [facility] with AMS and diagnosed with COVID. Off medications since 1/17/22. CXR neg. S/p Dex/Barictinib. Cultures NGTD. He is on 4L of NC for supplemental O2. We were consulted re recommendations of his Revlimid while sick with COVID. Pt not seen in person due to active COVID status and risk posted to other immunocompromised pts currently on chemotherapy. ROS - unable to obtain. No Known Allergies  Past Medical History:   Diagnosis Date    Acute encephalopathy 3/22/2015    Altered mental status 9/16/2014    Alzheimer disease ([address]     Anemia 9/16/2014    MILD      Anxiety     Asymmetrical sensorineural hearing loss     Bipolar disorder ([address]     NOT TREATED, DIAGNOSED MANY YEARS AGO    Cancer ([address]     multiple myeloma    Cataract 9/8/2016    Cortical hemorrhage ([address] 9/17/2014    Elevated AST (SGOT) 9/16/2014    GERD (gastroesophageal reflux disease)     not Tx    H/O seasonal allergies     History of TIA (transient ischemic attack) 9/16/2014    Hypomagnesemia 9/16/2014    MILD      Panic attack     Senile dementia ([address] 5/19/2017    Stroke ([address]     ?  TIA, PUT ON PLAVIX, TOOK SELF OFF    Syncope and collapse 3/22/2015    Tinea corporis 9/16/2014    Tinnitus Past Surgical History:   Procedure Laterality Date    HX APPENDECTOMY      HX COLONOSCOPY  MULTIPLE OVER THE YEARS    NO CANCER    HX OTHER SURGICAL  AGE 23    COLONIC POLYP REMOVAL    HX VASCULAR ACCESS       Family History   Problem Relation Age of Onset    Diabetes Mother         COMPLICATIONS OF DM    Cancer Mother     Heart Disease Mother     Lung Disease Father         BLACK LUNG    Dementia Sister     Heart Disease Brother         \"OLD AGE\"    Other Son         ESTRANGED, [place]    Other Daughter         1 ESTRANGED, [place], 1 IN [place]     Social History     Socioeconomic History    Marital status:      Spouse name: Not on file    Number of children: Not on file    Years of education: Not on file    Highest education level: Not on file   Occupational History    Not on file   Tobacco Use    Smoking status: Former Smoker     Packs/day: 2.00     Years: 16.00     Pack years: 32.00     Types: Cigarettes    Smokeless tobacco: Never Used    Tobacco comment: QUIT AGE 35   Substance and Sexual Activity    Alcohol use: No    Drug use: No    Sexual activity: Not Currently     Partners: Female     Birth control/protection: None   Other Topics Concern    Not on file   Social History Narrative    9/16/14:  PATIENT IS , LIVES WITH CATARINA MAYA. 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 HE MOVED TO [facility] FOR 5 YEARS, HAS BEEN HERE (?) FOR ABOUT 10 YEARS. HAS A DAUGHTER IN [place] THAT IS NOT SPEAKING TO HIM, A SON IN [place] WHO IS NOT SPEAKING TO HIM, AND A DAUGHTER IN [facility]. BROTHER AND SISTER ARE DEAD. ONLY FRIEND IS SERA TORRES, ([facility]: [phone]), A  WHO LOOKS AFTER THE PATIENT. CALL HIM ON DISCHARGE AS HE HAS PATIENT'S DOG AND HOUSE KEYS.        Social Determinants of Health     Financial Resource Strain:     Difficulty of Paying Living Expenses: Not on file   Food Insecurity:     Worried About Running Out of Food in the Last Year: Not on file    Deann lund Food in the Last Year: Not on file   Transportation Needs:     Lack of Transportation (Medical): Not on file    Lack of Transportation (Non-Medical):  Not on file   Physical Activity:     Days of Exercise per Week: Not on file    Minutes of Exercise per Session: Not on file   Stress:     Feeling of Stress : Not on file   Social Connections:     Frequency of Communication with Friends and Family: Not on file    Frequency of Social Gatherings with Friends and Family: Not on file    Attends Evangelical Services: Not on file    Active Member of [address] [place] [address] [employer] or Organizations: Not on file    Attends Club or Organization Meetings: Not on file    Marital Status: Not on file   Intimate Partner Violence:     Fear of Current or Ex-Partner: Not on file    Emotionally Abused: Not on file    Physically Abused: Not on file    Sexually Abused: Not on file   Housing Stability:     Unable to Pay for Housing in the Last Year: Not on file    Number of [place] in the Last Year: Not on file    Unstable Housing in the Last Year: Not on file     Current Facility-Administered Medications   Medication Dose Route Frequency Provider Last Rate Last Admin    benzonatate (TESSALON) capsule 100 mg  100 mg Oral TID PRN Parisa Sánchez MD        apixaban (ELIQUIS) tablet 2.5 mg  2.5 mg Oral Q12H Leona Iverson MD   2.5 mg at 01/24/22 0857    clonazePAM (KlonoPIN) tablet 0.5 mg  0.5 mg Oral BID Leona Iverson MD   0.5 mg at 01/24/22 1720    donepeziL (ARICEPT) tablet 10 mg  10 mg Oral DAILY Leona Iverson MD   10 mg at 01/24/22 0857    levETIRAcetam (KEPPRA) tablet 750 mg  750 mg Oral BID Leona Iverson MD   750 mg at 01/24/22 1720    memantine (NAMENDA) tablet 5 mg  5 mg Oral DAILY Leona Iverson MD   5 mg at 01/24/22 0857    QUEtiapine (SEROquel) tablet 25 mg  25 mg Oral QHS PRN Leona Iverson MD        [Held by provider] lenalidomide cap 10 mg (Revlimid) - patient supplied chemo (Patient Supplied)  10 mg Oral DAILY David Cruz MD        sertraline (ZOLOFT) tablet 50 mg  50 mg Oral DAILY David Cruz MD   50 mg at 22 0856    albuterol-ipratropium (DUO-NEB) 2.5 MG-0.5 MG/3 ML  3 mL Nebulization Q4H PRN David Cruz MD        cloNIDine HCL (CATAPRES) tablet 0.2 mg  0.2 mg Oral BID PRN David Cruz MD        zinc sulfate (ZINCATE) 50 mg zinc (220 mg) capsule 50 mg  50 mg Oral DAILY David Cruz MD   50 mg at 22 5791    cholecalciferol (VITAMIN D3) (1000 Units /25 mcg) tablet 2,000 Units  2,000 Units Oral DAILY David Cruz MD   2,000 Units at 22 2934    ascorbic acid (vitamin C) (VITAMIN C) tablet 2,000 mg  2,000 mg Oral DAILY David Cruz MD   2,000 mg at 22 0856    senna-docusate (PERICOLACE) 8.6-50 mg per tablet 2 Tablet  2 Tablet Oral BID PRN David Cruz MD        loperamide (IMODIUM) capsule 4 mg  4 mg Oral BID PRN David Cruz MD        dexamethasone (DECADRON) 10 mg/mL injection 6 mg  6 mg IntraVENous Q24H David Cruz MD   6 mg at 22 2123    sodium chloride (NS) flush 5-40 mL  5-40 mL IntraVENous Q8H David Cruz MD   10 mL at 22 1302    sodium chloride (NS) flush 5-40 mL  5-40 mL IntraVENous PRN David Cruz MD        acetaminophen (TYLENOL) tablet 650 mg  650 mg Oral Q6H PRN David Cruz MD   650 mg at 22 5344    Or    acetaminophen (TYLENOL) suppository 650 mg  650 mg Rectal Q6H PRN David Cruz MD        senna (SENOKOT) tablet 17.2 mg  2 Tablet Oral BID PRN David Cruz MD        ondansetron [facility]) injection 4 mg  4 mg IntraVENous Q6H PRN David Cruz MD   4 mg at 22 0603       OBJECTIVE:  Patient Vitals for the past 8 hrs:   BP Temp Pulse Resp SpO2   22 1926 (!) 104/53 99.8 °F (37.7 °C) 62 18 94 %   22 1622 (!) 109/46 100 °F (37.8 °C) 63 24 93 %   22 1137 (!) 97/54 98.2 °F (36.8 °C) 62 20 96 %     Temp (24hrs), Av.4 °F (36.9 °C), Min:97.4 °F (36.3 °C), Max:100 °F (37.8 °C)    No intake/output data recorded. Labs:    Recent Results (from the past 24 hour(s))   CBC WITH AUTOMATED DIFF    Collection Time: 01/24/22  5:01 AM   Result Value Ref Range    WBC 5.3 4.3 - 11.1 K/uL    RBC 4.38 4.23 - 5.6 M/uL    HGB 13.3 (L) 13.6 - 17.2 g/dL    HCT 40.9 (L) 41.1 - 50.3 %    MCV 93.4 79.6 - 97.8 FL    MCH 30.4 26.1 - 32.9 PG    MCHC 32.5 31.4 - 35.0 g/dL    RDW 14.1 11.9 - 14.6 %    PLATELET 849 (L) 980 - 450 K/uL    MPV 11.5 9.4 - 12.3 FL    ABSOLUTE NRBC 0.00 0.0 - 0.2 K/uL    DF AUTOMATED      NEUTROPHILS 84 (H) 43 - 78 %    LYMPHOCYTES 10 (L) 13 - 44 %    MONOCYTES 6 4.0 - 12.0 %    EOSINOPHILS 0 (L) 0.5 - 7.8 %    BASOPHILS 0 0.0 - 2.0 %    IMMATURE GRANULOCYTES 1 0.0 - 5.0 %    ABS. NEUTROPHILS 4.5 1.7 - 8.2 K/UL    ABS. LYMPHOCYTES 0.5 0.5 - 4.6 K/UL    ABS. MONOCYTES 0.3 0.1 - 1.3 K/UL    ABS. EOSINOPHILS 0.0 0.0 - 0.8 K/UL    ABS. BASOPHILS 0.0 0.0 - 0.2 K/UL    ABS. IMM. GRANS. 0.1 0.0 - 0.5 K/UL   METABOLIC PANEL, COMPREHENSIVE    Collection Time: 01/24/22  5:01 AM   Result Value Ref Range    Sodium 137 136 - 145 mmol/L    Potassium 3.6 3.5 - 5.1 mmol/L    Chloride 104 98 - 107 mmol/L    CO2 29 21 - 32 mmol/L    Anion gap 4 (L) 7 - 16 mmol/L    Glucose 127 (H) 65 - 100 mg/dL    BUN 23 8 - 23 MG/DL    Creatinine 0.92 0.8 - 1.5 MG/DL    GFR est AA >60 >60 ml/min/1.73m2    GFR est non-AA >60 >60 ml/min/1.73m2    Calcium 8.3 8.3 - 10.4 MG/DL    Bilirubin, total 0.5 0.2 - 1.1 MG/DL    ALT (SGPT) 55 12 - 65 U/L    AST (SGOT) 61 (H) 15 - 37 U/L    Alk.  phosphatase 108 50 - 136 U/L    Protein, total 6.7 6.3 - 8.2 g/dL    Albumin 2.4 (L) 3.2 - 4.6 g/dL    Globulin 4.3 (H) 2.3 - 3.5 g/dL    A-G Ratio 0.6 (L) 1.2 - 3.5     PLEASE READ & DOCUMENT PPD TEST IN 24 HRS    Collection Time: 01/24/22  6:23 PM   Result Value Ref Range    PPD Negative Negative    mm Induration 0 0 - 5 mm       Imaging: reviewed     ASSESSMENT:    Principal Problem:    Acute respiratory failure with hypoxia (HCC) (1/23/2022)    Active Problems:    Anemia (9/16/2014)      Overview: Recheck CBC. Acute encephalopathy (3/22/2015)      A-fib ([facility] (4/18/2020)      COVID-19 (1/22/2022)      Multiple myeloma ([facility] (1/23/2022)      Seizures ([facility] (1/23/2022)        PLAN / RECOMMENDATIONS:  Lab studies and imaging studies were personally reviewed. Pertinent old records were reviewed . 1. MM in remission   - tx hx per above. Currently on Revlimid - which can be held due to active admission with COVID infection. - pt will FU with Dr Debby Gonzalez upon d/c and to determine restart of Revlimid. We will s/o at this time but are available for any other questions/concerns. Thank you for allowing me to participate in the care of Mr. Estrellita Meade.         Viridiana Dickson MD  [facility] Hematology and Oncology  [address] [place] [address]  Office : [phone]  Fax : [phone]

## 2022-01-26 NOTE — ACP (ADVANCE CARE PLANNING)
[facility] (formerly [facility]) Hospitalist Service  At the heart of better care     Advance Care Planning   Admit Date:  2022  5:56 PM   Name:  Celsa Goodman   Age:  82 y.o. Sex:  male  :  1935   MRN:  [id number]   Room:  [facility]    Celsa Goodman is able to make his own decisions: Yes    If patient unable to make decisions, POA/surrogate decision maker:  Daughter, Carrillo Watson -- [phone]    Other members present in the meeting:   N/A    Patient / surrogate decision-maker directed:  Code Status: FULL CODE -full aggressive medical and surgical interventions, including intubations, resuscitations, pressors, artificial tube feeding    Other ACP topics discussed, if applicable:   N/A    Patient or surrogate consented to discussion of the current conditions, workup, management plans, prognosis, and understand the risk for further deterioration. Time spent: 16 minutes in direct discussion (face to face and/or over phone).     Signed:  Traci Avery MD

## 2022-01-26 NOTE — PROGRESS NOTES
Patient has took [place] [employer] off x 2. Educated patient the importance of leaving it on. [place] [employer] currently in place. O2 Sat 95%.

## 2022-01-26 NOTE — PROGRESS NOTES
ACUTE OT GOALS:  (Developed with and agreed upon by patient and/or caregiver.)  1) Patient will complete upper body bathing and dressing with SET UP and adaptive equipment as needed. Patient will complete lower body bathing and dressing with MIN A and adaptive equipment as needed. 2) Patient will complete toileting with MIN A.   3) Patient will complete functional transfers with MIN A and adaptive equipment as needed. 4) Patient will tolerate at least 25 minutes of OT activity with AS NEEDED rest breaks while maintaining O2 sats >90%. 5) Patient will verbalize at least 3 energy conservation technique to utilize during ADL/IADL. 6) Patient will complete grooming ADL EOB with SET UP. Timeframe: 7 visits          OCCUPATIONAL THERAPY: Daily Note OT Treatment Day # 2    Ivette Gibson is a 82 y.o. male   PRIMARY DIAGNOSIS: Acute respiratory failure with hypoxia ([facility] [address]  COVID-19 [U07.1]       Payor: [employer] MEDICARE / Plan: [facility] [employer] MEDICARE HMO / Product Type: Managed Care Medicare /   ASSESSMENT:     REHAB RECOMMENDATIONS: CURRENT LEVEL OF FUNCTION:  (Most Recently Demonstrated)   Recommendation to date pending progress:  Setting:   Short-term Rehab  Equipment:    To Be Determined Bathing:   Not tested  Dressing:   Total Assistance for brief  Feeding/Grooming:   Not tested  Toileting:   Total Assistance for bladder hygiene (pericare)  Functional Mobility:   Maximal Assistance for rolling     ASSESSMENT:  Mr. Chel Culver is having a rough day today. Pt is now on Airvo and uses NRB to maintain ~90 %. Pt noted to be saturated. Required max A for rolling for linen, cleaning and brief change. O2 sats did drop to lower 80's. RN notified. Minimal to no progress made today due to patient oxygen requirements and continue to desat. Left comfortable with all needs in reach. Will continue to benefit from skilled OT during stay.       SUBJECTIVE:   Mr. Chel Culver states, \"Come on now\"    SOCIAL HISTORY/LIVING ENVIRONMENT:   Support Systems: Assisted Living (Patient is a resident of [facility] [phone].)    OBJECTIVE:     PAIN: VITAL SIGNS: LINES/DRAINS:   Pre Treatment: Pain Screen  Pain Scale 1: Numeric (0 - 10)  Pain Intensity 1: 0  Post Treatment: 0   Condom Cath  O2 Device: Heated,Hi flow nasal cannula (+nrb)     ACTIVITIES OF DAILY LIVING: I Mod I S SBA CGA Min Mod Max Total NT Comments   BASIC ADLs:              Bathing/ Showering [] [] [] [] [] [] [] [] [] [x]    Toileting [] [] [] [] [] [] [] [] [x] []    Dressing [] [] [] [] [] [] [] [x] [] []    Feeding [] [] [] [] [] [] [] [] [] [x]    Grooming [] [] [] [] [] [] [] [] [] [x]    Personal Device Care [] [] [] [] [] [] [] [] [] [x]    Functional Mobility [] [] [] [] [] [] [] [x] [] [] Bed mobility   I=Independent, Mod I=Modified Independent, S=Supervision, SBA=Standby Assistance, CGA=Contact Guard Assistance,   Min=Minimal Assistance, Mod=Moderate Assistance, Max=Maximal Assistance, Total=Total Assistance, NT=Not Tested    MOBILITY: I Mod I S SBA CGA Min Mod Max Total  NT x2 Comments:   Supine to sit [] [] [] [] [] [] [] [] [] [x] []    Sit to supine [] [] [] [] [] [] [] [] [] [x] []    Sit to stand [] [] [] [] [] [] [] [] [] [x] []    Bed to chair [] [] [] [] [] [] [] [] [] [x] []    I=Independent, Mod I=Modified Independent, S=Supervision, SBA=Standby Assistance, CGA=Contact Guard Assistance,   Min=Minimal Assistance, Mod=Moderate Assistance, Max=Maximal Assistance, Total=Total Assistance, NT=Not Tested    BALANCE: Good Fair+ Fair Fair- Poor NT Comments   Sitting Static [] [] [] [] [] [x]    Sitting Dynamic [] [] [] [] [] [x]              Standing Static [] [] [] [] [] [x]    Standing Dynamic [] [] [] [] [] [x]      PLAN:   FREQUENCY/DURATION: OT Plan of Care: 3 times/week for duration of hospital stay or until stated goals are met, whichever comes first.    TREATMENT:   TREATMENT:   ($$ Self Care/Home Management: 8-22 mins$$ Neuromuscular Re-Education: 8-22 mins   )  Co-Treatment PT/OT necessary due to patient's decreased overall endurance/tolerance levels, as well as need for high level skilled assistance to complete functional transfers/mobility and functional tasks  Self Care (10 Minutes): Self care including Toileting, Upper Body Dressing and Lower Body Dressing to increase independence and decrease level of assistance required. Neuromuscular Re-education (15 Minutes): Neuromuscular Re-education included Coordination training to improve Balance and activity tolerance.     TREATMENT GRID:  N/A    AFTER TREATMENT POSITION/PRECAUTIONS:  Bed, Needs within reach and RN notified    INTERDISCIPLINARY COLLABORATION:  RN/PCT, PT/PTA and OT/SERVIN    TOTAL TREATMENT DURATION:  OT Patient Time In/Time Out  Time In: 9084  Time Out: 46    Kimmie Chaidez

## 2022-01-26 NOTE — PROGRESS NOTES
Hospitalist Progress Note   Admit Date:  2022  5:56 PM   Name:  Shoaib Arguello   Age:  82 y.o. Sex:  male  :  1935   MRN:  [id number]   Room[facility]  [facility] hospital    Presenting Complaint: No chief complaint on file. Reason(s) for Admission: COVID-19 [U07.1]     Hospital Course & Interval History:   Mr. Na Bro is an 81 y/o WM with a h/o dementia, TIA, pacemaker, AFib on Eliquis, Bipolar, MM on Revlimid who was admitted to our service on  with acute hypoxemic respiratory failure. Started on dexamethasone. Revlimid held, Oncology consulted and agreed. Developed thrombocytopenia which has since resolved. Subjective (22): Went to evaluate patient and staff in the room with pulse ox low 80s at rest. O2 increased to 10L with no improvement, I increased him to 15L with marginal change. Put on NRB and did better but still hovered high 80s, Airvo requested. Stat CXR with progressive infiltrates, asymmetric appearance. Called his apparent Ladoris Anirudh, and left  to return call. Assessment & Plan:   # Acute hypoxemic respiratory failure 2/2 COVID-19   - Progressive hypoxemia today, increased to 10L then 15L then NRB with improvement to high 80s at rest, RT getting Airvo, stat repeat CXR pending. Called apparent decision maker, Inna Tan, and left  to call back. CRP 7.6. Consult pharmacy for Actemra. Add on PCT, if high add abx, Lasix x1 and follow up Is/Os. Already on Eliquis for AFib so doubt VTE. # HypoK   - Replace PO. Mg high. # Thrombocytopenia    - 2/2 above. Resolved. # Multiple myeloma   - Revlimid on hold with acute infection, Onc consulted. # Acute encephalopathy superimposed on dementia   - Likely secondary to COVID. Con't current meds. Unclear baseline.     # AFib   - Eliquis, has pacer    # Seizure d/o   - Keppra    There is a high probability of acute organ impairment or life-threatening deterioration in the patient's condition from progressive acute hypoxemic respiratory failure 2/2 COVID-19. Critical care interventions: STAT CXR, IV Lasix, pharmacy consult for Actemra, increase in O2 needs now requiring Airvo heated high flow. Total critical care time spent: 35 minutes. Time is indicative of direct patient attendance at bedside and on the patient's floor nearby. Includes time spent at bedside performing history and exam, performing chart review, discussing findings and treatment plan with patient and/or family, discussing patient with consultants and colleagues, ordering and reviewing pertinent laboratory and radiographic evaluations, and discussing patient with nursing staff. Time excludes procedures. Addendum: Spoke to SARAH, she is not his POA. I gave her an update since she is very familiar with him. I called and updated his daughter, Fermin Boles, who is his actual POA. Patient is full code. She is appreciative of the care he is receiving and understands given his age and myeloma he is high risk for further decline. Dispo/Discharge Planning: Pending. High risk of further decline. Called his listed Nico Clemons, no answer. I left  to return my call regarding change in patient's condition.    Diet:  ADULT DIET Regular  DVT PPx: Eliquis  Code status: Full Code    Hospital Problems as of 1/26/2022 Date Reviewed: 8/8/2018          Codes Class Noted - Resolved POA    * (Principal) Acute respiratory failure with hypoxia ([facility] [address] ICD-10-CM: J96.01  ICD-9-CM: 518.81  1/23/2022 - Present Yes        Multiple myeloma ([facility] [address] ICD-10-CM: C90.00  ICD-9-CM: 203.00  1/23/2022 - Present Yes        Seizures (HCC) (Chronic) ICD-10-CM: R56.9  ICD-9-CM: 780.39  1/23/2022 - Present Yes        COVID-19 ICD-10-CM: U07.1  ICD-9-CM: 079.89  1/22/2022 - Present Unknown        A-fib (HCC) (Chronic) ICD-10-CM: I48.91  ICD-9-CM: 427.31  4/18/2020 - Present Yes        Acute encephalopathy ICD-10-CM: G93.40  ICD-9-CM: 348.30  3/22/2015 - Present Yes        Anemia ICD-10-CM: D64.9  ICD-9-CM: 285.9  9/16/2014 - Present Yes    Overview Addendum 7/6/2017  5:01 PM by Anthony Cardozo MD     Recheck CBC. Objective:     Patient Vitals for the past 24 hrs:   Temp Pulse Resp BP SpO2   01/26/22 1128     93 %   01/26/22 1104  60 18 (!) 99/44 90 %   01/26/22 0750 97.2 °F (36.2 °C) 60 18 119/62 91 %   01/26/22 0401 97.4 °F (36.3 °C) 60 20 118/65 90 %   01/26/22 0017 97.4 °F (36.3 °C) 60 20 (!) 119/57 90 %   01/25/22 2100     95 %   01/25/22 1924 98 °F (36.7 °C) 63 20 (!) 113/53 90 %   01/25/22 1704 97.7 °F (36.5 °C) 95 18 104/63 93 %     Oxygen Therapy  O2 Sat (%): 93 % (01/26/22 1128)  Pulse via Oximetry: 70 beats per minute (01/25/22 2100)  O2 Device: Nasal cannula (01/26/22 0357)  Skin Assessment: Clean, dry, & intact (01/25/22 2090)  Skin Protection for O2 Device: N/A (01/25/22 0722)  O2 Flow Rate (L/min): 9 l/min (01/26/22 0504)    Estimated body mass index is 35.26 kg/m² as calculated from the following:    Height as of this encounter: 6' (1.829 m). Weight as of this encounter: 117.9 kg (260 lb). Intake/Output Summary (Last 24 hours) at 1/26/2022 1146  Last data filed at 1/26/2022 0505  Gross per 24 hour   Intake    Output 400 ml   Net -400 ml         Physical Exam:   Blood pressure (!) 99/44, pulse 60, temperature 97.2 °F (36.2 °C), resp. rate 18, height 6' (1.829 m), weight 117.9 kg (260 lb), SpO2 93 %. General:    Well nourished. Disoriented, mild agitation. Head:  Normocephalic, atraumatic  Eyes:  Sclerae appear normal.  Pupils equally round. ENT:  Nares appear normal, no drainage. Moist oral mucosa  Neck:  No restricted ROM. Trachea midline =.  CV:   RRR. No m/r/g. No jugular venous distension. Lungs:   BB rales, hypoxic at low 80s on 15L NC. Abdomen: Bowel sounds present. Soft, nontender, nondistended. Extremities: No cyanosis or clubbing. No edema  Skin:     No rashes and normal coloration. Warm and dry.     Neuro:  Disoriented, unable to fully assess due to progressive hypoxemia and clinical status. Psych:  As above. I have reviewed ordered lab tests and independently visualized imaging below:    Recent Labs:  Recent Results (from the past 48 hour(s))   PLEASE READ & DOCUMENT PPD TEST IN 24 HRS    Collection Time: 01/24/22  6:23 PM   Result Value Ref Range    PPD Negative Negative    mm Induration 0 0 - 5 mm   CBC WITH AUTOMATED DIFF    Collection Time: 01/25/22  6:27 AM   Result Value Ref Range    WBC 5.3 4.3 - 11.1 K/uL    RBC 4.13 (L) 4.23 - 5.6 M/uL    HGB 12.5 (L) 13.6 - 17.2 g/dL    HCT 37.4 (L) 41.1 - 50.3 %    MCV 90.6 79.6 - 97.8 FL    MCH 30.3 26.1 - 32.9 PG    MCHC 33.4 31.4 - 35.0 g/dL    RDW 14.4 11.9 - 14.6 %    PLATELET 822 374 - 478 K/uL    MPV 11.2 9.4 - 12.3 FL    ABSOLUTE NRBC 0.00 0.0 - 0.2 K/uL    DF AUTOMATED      NEUTROPHILS 82 (H) 43 - 78 %    LYMPHOCYTES 11 (L) 13 - 44 %    MONOCYTES 6 4.0 - 12.0 %    EOSINOPHILS 0 (L) 0.5 - 7.8 %    BASOPHILS 0 0.0 - 2.0 %    IMMATURE GRANULOCYTES 2 0.0 - 5.0 %    ABS. NEUTROPHILS 4.4 1.7 - 8.2 K/UL    ABS. LYMPHOCYTES 0.6 0.5 - 4.6 K/UL    ABS. MONOCYTES 0.3 0.1 - 1.3 K/UL    ABS. EOSINOPHILS 0.0 0.0 - 0.8 K/UL    ABS. BASOPHILS 0.0 0.0 - 0.2 K/UL    ABS. IMM. GRANS. 0.1 0.0 - 0.5 K/UL   METABOLIC PANEL, COMPREHENSIVE    Collection Time: 01/25/22  6:27 AM   Result Value Ref Range    Sodium 137 (L) 138 - 145 mmol/L    Potassium 3.6 3.5 - 5.1 mmol/L    Chloride 103 98 - 107 mmol/L    CO2 29 21 - 32 mmol/L    Anion gap 5 (L) 7 - 16 mmol/L    Glucose 127 (H) 65 - 100 mg/dL    BUN 25 (H) 8 - 23 MG/DL    Creatinine 0.87 0.8 - 1.5 MG/DL    GFR est AA >60 >60 ml/min/1.73m2    GFR est non-AA >60 >60 ml/min/1.73m2    Calcium 8.5 8.3 - 10.4 MG/DL    Bilirubin, total 0.4 0.2 - 1.1 MG/DL    ALT (SGPT) 61 12 - 65 U/L    AST (SGOT) 69 (H) 15 - 37 U/L    Alk.  phosphatase 107 50 - 136 U/L    Protein, total 6.4 6.3 - 8.2 g/dL    Albumin 2.1 (L) 3.2 - 4.6 g/dL    Globulin 4.3 (H) 2.3 - 3.5 g/dL    A-G Ratio 0.5 (L) 1.2 - 3.5     PLEASE READ & DOCUMENT PPD TEST IN 48 HRS    Collection Time: 01/25/22  7:15 PM   Result Value Ref Range    PPD Negative Negative    mm Induration 0 0 - 5 mm   CBC WITH AUTOMATED DIFF    Collection Time: 01/26/22  6:13 AM   Result Value Ref Range    WBC 5.0 4.3 - 11.1 K/uL    RBC 4.20 (L) 4.23 - 5.6 M/uL    HGB 12.4 (L) 13.6 - 17.2 g/dL    HCT 37.8 (L) 41.1 - 50.3 %    MCV 90.0 79.6 - 97.8 FL    MCH 29.5 26.1 - 32.9 PG    MCHC 32.8 31.4 - 35.0 g/dL    RDW 14.3 11.9 - 14.6 %    PLATELET 698 507 - 517 K/uL    MPV 10.4 9.4 - 12.3 FL    ABSOLUTE NRBC 0.00 0.0 - 0.2 K/uL    DF AUTOMATED      NEUTROPHILS 80 (H) 43 - 78 %    LYMPHOCYTES 11 (L) 13 - 44 %    MONOCYTES 8 4.0 - 12.0 %    EOSINOPHILS 0 (L) 0.5 - 7.8 %    BASOPHILS 0 0.0 - 2.0 %    IMMATURE GRANULOCYTES 1 0.0 - 5.0 %    ABS. NEUTROPHILS 4.0 1.7 - 8.2 K/UL    ABS. LYMPHOCYTES 0.6 0.5 - 4.6 K/UL    ABS. MONOCYTES 0.4 0.1 - 1.3 K/UL    ABS. EOSINOPHILS 0.0 0.0 - 0.8 K/UL    ABS. BASOPHILS 0.0 0.0 - 0.2 K/UL    ABS. IMM. GRANS. 0.0 0.0 - 0.5 K/UL   METABOLIC PANEL, COMPREHENSIVE    Collection Time: 01/26/22  6:13 AM   Result Value Ref Range    Sodium 139 136 - 145 mmol/L    Potassium 3.4 (L) 3.5 - 5.1 mmol/L    Chloride 106 98 - 107 mmol/L    CO2 31 21 - 32 mmol/L    Anion gap 2 (L) 7 - 16 mmol/L    Glucose 128 (H) 65 - 100 mg/dL    BUN 15 8 - 23 MG/DL    Creatinine 0.80 0.8 - 1.5 MG/DL    GFR est AA >60 >60 ml/min/1.73m2    GFR est non-AA >60 >60 ml/min/1.73m2    Calcium 8.2 (L) 8.3 - 10.4 MG/DL    Bilirubin, total 0.3 0.2 - 1.1 MG/DL    ALT (SGPT) 83 (H) 12 - 65 U/L    AST (SGOT) 80 (H) 15 - 37 U/L    Alk.  phosphatase 114 50 - 136 U/L    Protein, total 6.4 6.3 - 8.2 g/dL    Albumin 2.1 (L) 3.2 - 4.6 g/dL    Globulin 4.3 (H) 2.3 - 3.5 g/dL    A-G Ratio 0.5 (L) 1.2 - 3.5     LACTIC ACID    Collection Time: 01/26/22  6:13 AM   Result Value Ref Range    Lactic acid 1.2 0.4 - 2.0 MMOL/L   C REACTIVE PROTEIN, QT    Collection Time: 01/26/22 6:13 AM   Result Value Ref Range    C-Reactive protein 7.6 (H) 0.0 - 0.9 mg/dL   MAGNESIUM    Collection Time: 01/26/22  6:13 AM   Result Value Ref Range    Magnesium 2.8 (H) 1.8 - 2.4 mg/dL       All Micro Results     Procedure Component Value Units Date/Time    BLOOD CULTURE [[id number]] Collected: 01/23/22 0026    Order Status: Completed Specimen: Blood Updated: 01/26/22 0717     Special Requests: --        RIGHT  Antecubital       Culture result: NO GROWTH 3 DAYS       BLOOD CULTURE [[id number]] Collected: 01/22/22 1919    Order Status: Completed Specimen: Blood Updated: 01/26/22 0717     Special Requests: --        NO SPECIAL REQUESTS  RIGHT  Antecubital       Culture result: NO GROWTH 4 DAYS       CULTURE, URINE [[id number]] Collected: 01/22/22 1841    Order Status: Completed Specimen: Cath Urine Updated: 01/25/22 0752     Special Requests: NO SPECIAL REQUESTS        Culture result: NO GROWTH 2 DAYS             Other Studies:  XR CHEST SNGL V    Result Date: 1/26/2022  EXAMINATION: CHEST RADIOGRAPH 1/26/2022 11:39 AM ACCESSION NUMBER: [id number] INDICATION: Worsening hypoxia, COVID + COMPARISON: Chest x-ray 1/22/2022, 1/19/2022 TECHNIQUE: A single view of the chest was obtained. FINDINGS: Support Lines and Tubes: Unchanged positioning of support lines and tubes. Cardiac Silhouette: Unchanged enlargement of the cardiac silhouette. Lungs: Worsening left asymmetric basilar predominant opacities. Worsening low lung volumes. Pleura: No pleural effusion. No pneumothorax. Osseous Structures: Thoracic spine spondylosis. Upper Abdomen: Unremarkable. 1. Worsening left asymmetric basilar predominant pneumonia. 2. Worsening low lung volumes.        Current Meds:  Current Facility-Administered Medications   Medication Dose Route Frequency    dexAMETHasone (DECADRON) tablet 6 mg  6 mg Oral Q24H    benzonatate (TESSALON) capsule 100 mg  100 mg Oral TID PRN    apixaban (ELIQUIS) tablet 2.5 mg  2.5 mg Oral Q12H    clonazePAM (KlonoPIN) tablet 0.5 mg  0.5 mg Oral BID    donepeziL (ARICEPT) tablet 10 mg  10 mg Oral DAILY    levETIRAcetam (KEPPRA) tablet 750 mg  750 mg Oral BID    memantine (NAMENDA) tablet 5 mg  5 mg Oral DAILY    QUEtiapine (SEROquel) tablet 25 mg  25 mg Oral QHS PRN    [Held by provider] lenalidomide cap 10 mg (Revlimid) - patient supplied chemo (Patient Supplied)  10 mg Oral DAILY    sertraline (ZOLOFT) tablet 50 mg  50 mg Oral DAILY    albuterol-ipratropium (DUO-NEB) 2.5 MG-0.5 MG/3 ML  3 mL Nebulization Q4H PRN    cloNIDine HCL (CATAPRES) tablet 0.2 mg  0.2 mg Oral BID PRN    zinc sulfate (ZINCATE) 50 mg zinc (220 mg) capsule 50 mg  50 mg Oral DAILY    cholecalciferol (VITAMIN D3) (1000 Units /25 mcg) tablet 2,000 Units  2,000 Units Oral DAILY    ascorbic acid (vitamin C) (VITAMIN C) tablet 2,000 mg  2,000 mg Oral DAILY    senna-docusate (PERICOLACE) 8.6-50 mg per tablet 2 Tablet  2 Tablet Oral BID PRN    loperamide (IMODIUM) capsule 4 mg  4 mg Oral BID PRN    sodium chloride (NS) flush 5-40 mL  5-40 mL IntraVENous Q8H    sodium chloride (NS) flush 5-40 mL  5-40 mL IntraVENous PRN    acetaminophen (TYLENOL) tablet 650 mg  650 mg Oral Q6H PRN    Or    acetaminophen (TYLENOL) suppository 650 mg  650 mg Rectal Q6H PRN    senna (SENOKOT) tablet 17.2 mg  2 Tablet Oral BID PRN    ondansetron (ZOFRAN) injection 4 mg  4 mg IntraVENous Q6H PRN       Signed:  Quynh Murcia MD    Part of this note may have been written by using a voice dictation software. The note has been proof read but may still contain some grammatical/other typographical errors.

## 2022-01-26 NOTE — PROGRESS NOTES
ACUTE PHYSICAL THERAPY GOALS:  (Developed with and agreed upon by patient and/or caregiver.)  1. Pt will perform bed mobility c Mod (A) in 7 therapy sessions. 2. Pt will perform sit-to-stand/ stand-to-sit transfers c Min (A) and use of LRAD in 7 therapy sessions. 3. Pt will ambulate 15 ft CG (A) with use of LRAD and breaks as needed in 7 therapy sessions. 4. Pt will perform standing balance activities with minimal postural sway in 7 therapy sessions. 5. Pt will tolerate multiple sets and reps of BLE exercises in 7 therapy sessions.       PHYSICAL THERAPY: Daily Note and PM Treatment Day # 3    Meryle Senate is a 82 y.o. male   PRIMARY DIAGNOSIS: Acute respiratory failure with hypoxia ([facility] [address]  COVID-19 [U07.1]         ASSESSMENT:     REHAB RECOMMENDATIONS: CURRENT LEVEL OF FUNCTION:  (Most Recently Demonstrated)   Recommendation to date pending progress:  Setting:   Return to SNF  Equipment:    To Be Determined Bed Mobility:   Maximal Assistance x 2  Sit to Stand:   Not tested  Transfers:   Not tested  Gait/Mobility:   Not tested     ASSESSMENT:  Mr. Elie Reid was supine in bed and increased O2 so that he is now on Airvo. Pt clothing and bed were soiled upon entering room and he required rolling several times to change out everything. Pt with minimal participation today and is max A x2 for rolling and scooting up in bed. RN notified at the end of the session and PT/OT assisted with placing of catheter. Will continue to follow.      SUBJECTIVE:   Mr. Elie Reid states, \"Cover me back up\"    SOCIAL HISTORY/ LIVING ENVIRONMENT: See eval  Support Systems: Assisted Living (Patient is a resident of [facility] [phone].)  OBJECTIVE:     PAIN: VITAL SIGNS: LINES/DRAINS:   Pre Treatment: Pain Screen  Pain Scale 1: Numeric (0 - 10)  Pain Intensity 1: 0  Post Treatment: 0   IV  O2 Device: Heated,Hi flow nasal cannula (+nrb)     MOBILITY: I Mod I S SBA CGA Min Mod Max Total  NT x2 Comments: Bed Mobility    Rolling [] [] [] [] [] [] [] [x] [] [] [x]    Supine to Sit [] [] [] [] [] [] [] [] [] [x] []    Scooting [] [] [] [] [] [] [] [x] [] [] [x]    Sit to Supine [] [] [] [] [] [] [] [] [] [x] []    Transfers    Sit to Stand [] [] [] [] [] [] [] [] [] [x] []    Bed to Chair [] [] [] [] [] [] [] [] [] [x] []    Stand to Sit [] [] [] [] [] [] [] [] [] [x] []    I=Independent, Mod I=Modified Independent, S=Supervision, SBA=Standby Assistance, CGA=Contact Guard Assistance,   Min=Minimal Assistance, Mod=Moderate Assistance, Max=Maximal Assistance, Total=Total Assistance, NT=Not Tested    BALANCE: Good Fair+ Fair Fair- Poor NT Comments   Sitting Static [] [] [] [] [] [x]    Sitting Dynamic [] [] [] [] [] [x]              Standing Static [] [] [] [] [] [x]    Standing Dynamic [] [] [] [] [] [x]      GAIT: I Mod I S SBA CGA Min Mod Max Total  NT x2 Comments:   Level of Assistance [] [] [] [] [] [] [] [] [] [x] []    Distance n/a    DME N/A    Gait Quality     Weightbearing  Status N/A     I=Independent, Mod I=Modified Independent, S=Supervision, SBA=Standby Assistance, CGA=Contact Guard Assistance,   Min=Minimal Assistance, Mod=Moderate Assistance, Max=Maximal Assistance, Total=Total Assistance, NT=Not Tested    PLAN:   FREQUENCY/DURATION: PT Plan of Care: 3 times/week for duration of hospital stay or until stated goals are met, whichever comes first.  TREATMENT:     TREATMENT:   ($$ Therapeutic Activity: 23-37 mins    )  Therapeutic Activity (30 Minutes): Therapeutic activity included Rolling and Scooting to improve functional Mobility and Activity tolerance.     TREATMENT GRID:  N/A    AFTER TREATMENT POSITION/PRECAUTIONS:  Bed, Needs within reach and RN notified    INTERDISCIPLINARY COLLABORATION:  RN/PCT, PT/PTA and OT/SERVIN    TOTAL TREATMENT DURATION:  PT Patient Time In/Time Out  Time In: 0449  Time Out: [address], [place]

## 2022-01-26 NOTE — PROGRESS NOTES
Pt resting, denies any needs at this time. Bed in low and locked position, call light and personal items within reach. Will continue to monitor and give bedside report to oncoming nurse.

## 2022-01-26 NOTE — PROGRESS NOTES
Mittens placed on patient due to patient removing Airvo. Patient resting quietly in bed. Cont pulse ox in place.

## 2022-01-26 NOTE — PROGRESS NOTES
Physician Progress Note      Yon Kauffman  [facility] #:                  [id number]  :                       1935  ADMIT DATE:       2022 5:56 PM  [address] [place] DATE:  RESPONDING  PROVIDER #:        Hina MARLOW MD        QUERY TEXT:    Type of Encephalopathy: Please provide further specificity, if known. Clinical indicators include: cute, encephalopathy, acute, fever, ams, altered mental status, alcohol use, infection  Options provided:  -- Anoxic/hypoxic encephalopathy  -- Metabolic encephalopathy  -- Toxic encephalopathy  -- Hepatic encephalopathy  -- Hypertensive encephalopathy  -- Other - I will add my own diagnosis  -- Disagree - Not applicable / Not valid  -- Disagree - Clinically Unable to determine / Unknown        PROVIDER RESPONSE TEXT:    The patient has metabolic encephalopathy. QUERY TEXT:    Pt admitted with COVID 19 PNA and acute resp failure. Pt noted to have leukopenia of 3.6, new confusion superimposed on his dementia. . If possible, please document in the progress notes and discharge summary if you are evaluating and /or treating any of the following: The medical record reflects the following:  Risk Factors: COVID 19 PNA, multiple myeloma, Acute hypoxic resp failure  Clinical Indicators: wbc- 3.6, la-2.2, crp 12 but getting tx for MM. cxray--covid pna. confusion away from baseline dementia per notes. Treatment: 4lhiflo nc, decadron, essential home meds for his dementia, baricitnib, labs, fluids. Options provided:  -- Sepsis, present on admission  -- Sepsis, present on admission now resolved  -- Sepsis, not present on admission  -- COVID PNA without Sepsis  -- Other - I will add my own diagnosis  -- Disagree - Not applicable / Not valid  -- Disagree - Clinically unable to determine / Unknown  -- Refer to Clinical Documentation Reviewer    PROVIDER RESPONSE TEXT:    This patient has sepsis that was not present on admission.     Query created by: Geneva Martinez on 1/24/2022 11:09 AM      Electronically signed by:  Celeste MARLOW MD 1/26/2022 1:13 PM

## 2022-01-27 PROBLEM — R74.01 TRANSAMINITIS: Status: ACTIVE | Noted: 2022-01-01

## 2022-01-27 NOTE — PROGRESS NOTES
Hospitalist Progress Note   Admit Date:  2022  5:56 PM   Name:  Vishnu Machado   Age:  82 y.o. Sex:  male  :  1935   MRN:  [id number]   Room:  [facility]/    Presenting Complaint: No chief complaint on file. Reason(s) for Admission: COVID-19 [U07.1]     Hospital Course & Interval History:   Mr. Laurel Rahman is an 81 y/o WM with a h/o dementia, TIA, pacemaker, AFib on Eliquis, Bipolar, MM on Revlimid who was admitted to our service on  with acute hypoxemic respiratory failure. Started on dexamethasone. Revlimid held, Oncology consulted and agreed. Developed thrombocytopenia which has since resolved. O2 needs incraesed from 6L to Airvo max settings on . Subjective (22): Hypoxic in the 70s this morning, still on Airvo max settings. NRB put on and O2 only increased to 80-82%. Put on Bipap 100% and sats up to 90s. He was agitated overnight. He was yelling about how he's going to eat with the mask on. Unable to get ROS due to agitation. Daughter updated by phone. Assessment & Plan:   # Acute hypoxemic respiratory failure 2/2 COVID-19              - Hypoxia worsened  and went form 6L to max Airvo settings. CXR with asymmetric infiltrates. Given Lasix x1 and Actemra. Hypoxic this morning at low 80s at rest with max Airvo and NRB on top, changed to Bipap, intensivist notified of need for transfer, Precedex if needed for agitation. Has been on Eliquis so doubt VTE. PCT only 0.05 and afebrile but given clinical decompensation and myeloma hx (and now recurrent leukopenia) will add broad spectrum abx. # Transaminitis   - COVID related? Trend.     # HypoK              - Resolved.     # Thrombocytopenia               - 2/2 above. Resolved.     # Multiple myeloma              - Revlimid on hold with acute infection, Onc consulted and appreciated.     # Acute encephalopathy superimposed on dementia              - Likely secondary to [place].  Con't current meds.     # AFib              - Eliquis, has pacer     # Seizure d/o              - Keppra    There is a high probability of acute organ impairment or life-threatening deterioration in the patient's condition from ongoing and progressive acute hypoxemic respiratory failure 2/2 COVID-19. Critical care interventions: Bipap support, need for ICU transfer  Total critical care time spent: 35 minutes. Time is indicative of direct patient attendance at bedside and on the patient's floor nearby. Includes time spent at bedside performing history and exam, performing chart review, discussing findings and treatment plan with patient and/or family, discussing patient with consultants and colleagues, ordering and reviewing pertinent laboratory and radiographic evaluations, and discussing patient with nursing staff. Time excludes procedures. Dispo/Discharge Planning: Pending. High risk of further decline. I called and updated his daughter on the change in status.    Diet:  ADULT DIET Regular  DVT PPx: Eliquis  Code status: Full Code    Hospital Problems as of 1/27/2022 Date Reviewed: 8/8/2018          Codes Class Noted - Resolved POA    Transaminitis ICD-10-CM: R74.01  ICD-9-CM: 790.4  1/27/2022 - Present Unknown        * (Principal) Acute respiratory failure with hypoxia ([facility] ICD-10-CM: J96.01  ICD-9-CM: 518.81  1/23/2022 - Present Yes        Multiple myeloma ([facility] ICD-10-CM: C90.00  ICD-9-CM: 203.00  1/23/2022 - Present Yes        Seizures (HCC) (Chronic) ICD-10-CM: R56.9  ICD-9-CM: 780.39  1/23/2022 - Present Yes        COVID-19 ICD-10-CM: U07.1  ICD-9-CM: 079.89  1/22/2022 - Present Unknown        A-fib (HCC) (Chronic) ICD-10-CM: I48.91  ICD-9-CM: 427.31  4/18/2020 - Present Yes        Leukopenia ICD-10-CM: P16.586  ICD-9-CM: 288.50  8/2/2017 - Present Unknown        Acute encephalopathy ICD-10-CM: G93.40  ICD-9-CM: 348.30  3/22/2015 - Present Yes        Anemia ICD-10-CM: D64.9  ICD-9-CM: 285.9  9/16/2014 - Present Yes    Overview Addendum 7/6/2017 5:01 PM by Tasia Carney MD     Recheck CBC. Objective:     Patient Vitals for the past 24 hrs:   Temp Pulse Resp BP SpO2   01/27/22 0826     92 %   01/27/22 0746 97.1 °F (36.2 °C) (!) 59 28 136/60 91 %   01/27/22 0444 97.5 °F (36.4 °C) 60 18 117/68 91 %   01/27/22 0015 97.6 °F (36.4 °C) 60 20 105/62 90 %   01/26/22 2228     92 %   01/26/22 1936 97.5 °F (36.4 °C) 60 20 95/65 97 %   01/26/22 1725     95 %   01/26/22 1509 97.5 °F (36.4 °C) 67 18 (!) 107/54 (!) 88 %   01/26/22 1233     90 %   01/26/22 1216  62  121/79    01/26/22 1128     93 %   01/26/22 1104  60 18 (!) 99/44 90 %     Oxygen Therapy  O2 Sat (%): 92 % (01/27/22 0826)  Pulse via Oximetry: 66 beats per minute (01/27/22 0826)  O2 Device: BIPAP (01/27/22 0826)  Skin Assessment: Clean, dry, & intact (01/26/22 2228)  Skin Protection for O2 Device: Yes (01/26/22 1216)  Orientation: Bilateral (01/26/22 1216)  Location: Cheek (01/26/22 1216)  Interventions: Skin Barrier (01/26/22 1216)  O2 Flow Rate (L/min): 50 l/min (01/26/22 2228)  O2 Temperature: 87.8 °F (31 °C) (01/26/22 1233)  FIO2 (%): 100 % (01/27/22 0826)    Estimated body mass index is 34.25 kg/m² as calculated from the following:    Height as of this encounter: 6' (1.829 m). Weight as of this encounter: 114.5 kg (252 lb 8 oz). Intake/Output Summary (Last 24 hours) at 1/27/2022 0937  Last data filed at 1/27/2022 0152  Gross per 24 hour   Intake 240 ml   Output 1300 ml   Net -1060 ml         Physical Exam:   Blood pressure 136/60, pulse (!) 59, temperature 97.1 °F (36.2 °C), resp. rate 28, height 6' (1.829 m), weight 114.5 kg (252 lb 8 oz), SpO2 92 %. General:    Well nourished. Obese, agitated. Head:  Normocephalic, atraumatic  Eyes:  Sclerae appear normal.  Pupils equally round. ENT:  Nares appear normal, no drainage. Moist oral mucosa  Neck:  No restricted ROM. Trachea midline   CV:   RRR. No m/r/g. No jugular venous distension.   Lungs: Bibasilar rales, no wheezes or rhonchi. Bipap at 100%, bedside sats 90% at rest.  Abdomen: Bowel sounds present. Soft, nontender, nondistended. Extremities: No cyanosis or clubbing. No edema  Skin:     No rashes and normal coloration. Warm and dry. Neuro:  CN II-XII grossly intact. Sensation intact. Psych:  Normal mood and affect. I have reviewed ordered lab tests and independently visualized imaging below:    Recent Labs:  Recent Results (from the past 48 hour(s))   PLEASE READ & DOCUMENT PPD TEST IN 48 HRS    Collection Time: 01/25/22  7:15 PM   Result Value Ref Range    PPD Negative Negative    mm Induration 0 0 - 5 mm   CBC WITH AUTOMATED DIFF    Collection Time: 01/26/22  6:13 AM   Result Value Ref Range    WBC 5.0 4.3 - 11.1 K/uL    RBC 4.20 (L) 4.23 - 5.6 M/uL    HGB 12.4 (L) 13.6 - 17.2 g/dL    HCT 37.8 (L) 41.1 - 50.3 %    MCV 90.0 79.6 - 97.8 FL    MCH 29.5 26.1 - 32.9 PG    MCHC 32.8 31.4 - 35.0 g/dL    RDW 14.3 11.9 - 14.6 %    PLATELET 257 150 - 388 K/uL    MPV 10.4 9.4 - 12.3 FL    ABSOLUTE NRBC 0.00 0.0 - 0.2 K/uL    DF AUTOMATED      NEUTROPHILS 80 (H) 43 - 78 %    LYMPHOCYTES 11 (L) 13 - 44 %    MONOCYTES 8 4.0 - 12.0 %    EOSINOPHILS 0 (L) 0.5 - 7.8 %    BASOPHILS 0 0.0 - 2.0 %    IMMATURE GRANULOCYTES 1 0.0 - 5.0 %    ABS. NEUTROPHILS 4.0 1.7 - 8.2 K/UL    ABS. LYMPHOCYTES 0.6 0.5 - 4.6 K/UL    ABS. MONOCYTES 0.4 0.1 - 1.3 K/UL    ABS. EOSINOPHILS 0.0 0.0 - 0.8 K/UL    ABS. BASOPHILS 0.0 0.0 - 0.2 K/UL    ABS. IMM.  GRANS. 0.0 0.0 - 0.5 K/UL   METABOLIC PANEL, COMPREHENSIVE    Collection Time: 01/26/22  6:13 AM   Result Value Ref Range    Sodium 139 136 - 145 mmol/L    Potassium 3.4 (L) 3.5 - 5.1 mmol/L    Chloride 106 98 - 107 mmol/L    CO2 31 21 - 32 mmol/L    Anion gap 2 (L) 7 - 16 mmol/L    Glucose 128 (H) 65 - 100 mg/dL    BUN 15 8 - 23 MG/DL    Creatinine 0.80 0.8 - 1.5 MG/DL    GFR est AA >60 >60 ml/min/1.73m2    GFR est non-AA >60 >60 ml/min/1.73m2    Calcium 8.2 (L) 8.3 - 10.4 MG/DL    Bilirubin, total 0.3 0.2 - 1.1 MG/DL    ALT (SGPT) 83 (H) 12 - 65 U/L    AST (SGOT) 80 (H) 15 - 37 U/L    Alk. phosphatase 114 50 - 136 U/L    Protein, total 6.4 6.3 - 8.2 g/dL    Albumin 2.1 (L) 3.2 - 4.6 g/dL    Globulin 4.3 (H) 2.3 - 3.5 g/dL    A-G Ratio 0.5 (L) 1.2 - 3.5     LACTIC ACID    Collection Time: 01/26/22  6:13 AM   Result Value Ref Range    Lactic acid 1.2 0.4 - 2.0 MMOL/L   C REACTIVE PROTEIN, QT    Collection Time: 01/26/22  6:13 AM   Result Value Ref Range    C-Reactive protein 7.6 (H) 0.0 - 0.9 mg/dL   MAGNESIUM    Collection Time: 01/26/22  6:13 AM   Result Value Ref Range    Magnesium 2.8 (H) 1.8 - 2.4 mg/dL   PROCALCITONIN    Collection Time: 01/26/22 12:00 PM   Result Value Ref Range    Procalcitonin 0.05 0.00 - 0.49 ng/mL   CBC WITH AUTOMATED DIFF    Collection Time: 01/27/22  6:44 AM   Result Value Ref Range    WBC 2.6 (L) 4.3 - 11.1 K/uL    RBC 4.66 4.23 - 5.6 M/uL    HGB 13.8 13.6 - 17.2 g/dL    HCT 41.6 41.1 - 50.3 %    MCV 89.3 79.6 - 97.8 FL    MCH 29.6 26.1 - 32.9 PG    MCHC 33.2 31.4 - 35.0 g/dL    RDW 14.3 11.9 - 14.6 %    PLATELET 835 377 - 898 K/uL    MPV 10.6 9.4 - 12.3 FL    ABSOLUTE NRBC 0.00 0.0 - 0.2 K/uL    DF AUTOMATED      NEUTROPHILS 66 43 - 78 %    LYMPHOCYTES 20 13 - 44 %    MONOCYTES 13 (H) 4.0 - 12.0 %    EOSINOPHILS 0 (L) 0.5 - 7.8 %    BASOPHILS 0 0.0 - 2.0 %    IMMATURE GRANULOCYTES 1 0.0 - 5.0 %    ABS. NEUTROPHILS 1.7 1.7 - 8.2 K/UL    ABS. LYMPHOCYTES 0.5 0.5 - 4.6 K/UL    ABS. MONOCYTES 0.3 0.1 - 1.3 K/UL    ABS. EOSINOPHILS 0.0 0.0 - 0.8 K/UL    ABS. BASOPHILS 0.0 0.0 - 0.2 K/UL    ABS. IMM.  GRANS. 0.0 0.0 - 0.5 K/UL   METABOLIC PANEL, COMPREHENSIVE    Collection Time: 01/27/22  6:44 AM   Result Value Ref Range    Sodium 136 (L) 138 - 145 mmol/L    Potassium 4.8 3.5 - 5.1 mmol/L    Chloride 103 98 - 107 mmol/L    CO2 28 21 - 32 mmol/L    Anion gap 5 (L) 7 - 16 mmol/L    Glucose 117 (H) 65 - 100 mg/dL    BUN 28 (H) 8 - 23 MG/DL    Creatinine 0.95 0.8 - 1.5 MG/DL    GFR est AA >60 >60 ml/min/1.73m2    GFR est non-AA >60 >60 ml/min/1.73m2    Calcium 8.7 8.3 - 10.4 MG/DL    Bilirubin, total 0.7 0.2 - 1.1 MG/DL    ALT (SGPT) 112 (H) 12 - 65 U/L    AST (SGOT) 124 (H) 15 - 37 U/L    Alk. phosphatase 139 (H) 50 - 136 U/L    Protein, total 7.2 6.3 - 8.2 g/dL    Albumin 2.1 (L) 3.2 - 4.6 g/dL    Globulin 5.1 (H) 2.3 - 3.5 g/dL    A-G Ratio 0.4 (L) 1.2 - 3.5         All Micro Results     Procedure Component Value Units Date/Time    BLOOD CULTURE [[id number]] Collected: 01/23/22 0026    Order Status: Completed Specimen: Blood Updated: 01/27/22 0723     Special Requests: --        RIGHT  Antecubital       Culture result: NO GROWTH 4 DAYS       BLOOD CULTURE [[id number]] Collected: 01/22/22 1919    Order Status: Completed Specimen: Blood Updated: 01/27/22 0722     Special Requests: --        NO SPECIAL REQUESTS  RIGHT  Antecubital       Culture result: NO GROWTH 5 DAYS       CULTURE, URINE [[id number]] Collected: 01/22/22 1841    Order Status: Completed Specimen: Cath Urine Updated: 01/25/22 0752     Special Requests: NO SPECIAL REQUESTS        Culture result: NO GROWTH 2 DAYS             Other Studies:  XR CHEST SNGL V    Result Date: 1/26/2022  EXAMINATION: CHEST RADIOGRAPH 1/26/2022 11:39 AM ACCESSION NUMBER: [id number] INDICATION: Worsening hypoxia, COVID + COMPARISON: Chest x-ray 1/22/2022, 1/19/2022 TECHNIQUE: A single view of the chest was obtained. FINDINGS: Support Lines and Tubes: Unchanged positioning of support lines and tubes. Cardiac Silhouette: Unchanged enlargement of the cardiac silhouette. Lungs: Worsening left asymmetric basilar predominant opacities. Worsening low lung volumes. Pleura: No pleural effusion. No pneumothorax. Osseous Structures: Thoracic spine spondylosis. Upper Abdomen: Unremarkable. 1. Worsening left asymmetric basilar predominant pneumonia. 2. Worsening low lung volumes.        Current Meds:  Current Facility-Administered Medications Medication Dose Route Frequency    dexmedeTOMidine in 0.9 % NaCl (PRECEDEX) 400 mcg/100 mL (4 mcg/mL) infusion soln  0.1-1.5 mcg/kg/hr IntraVENous TITRATE    piperacillin-tazobactam (ZOSYN) 3.375 g in 0.9% sodium chloride (MBP/ADV) 100 mL MBP  3.375 g IntraVENous Q8H    dexAMETHasone (DECADRON) tablet 6 mg  6 mg Oral Q24H    benzonatate (TESSALON) capsule 100 mg  100 mg Oral TID PRN    apixaban (ELIQUIS) tablet 2.5 mg  2.5 mg Oral Q12H    clonazePAM (KlonoPIN) tablet 0.5 mg  0.5 mg Oral BID    donepeziL (ARICEPT) tablet 10 mg  10 mg Oral DAILY    levETIRAcetam (KEPPRA) tablet 750 mg  750 mg Oral BID    memantine (NAMENDA) tablet 5 mg  5 mg Oral DAILY    QUEtiapine (SEROquel) tablet 25 mg  25 mg Oral QHS PRN    [Held by provider] lenalidomide cap 10 mg (Revlimid) - patient supplied chemo (Patient Supplied)  10 mg Oral DAILY    sertraline (ZOLOFT) tablet 50 mg  50 mg Oral DAILY    albuterol-ipratropium (DUO-NEB) 2.5 MG-0.5 MG/3 ML  3 mL Nebulization Q4H PRN    cloNIDine HCL (CATAPRES) tablet 0.2 mg  0.2 mg Oral BID PRN    senna-docusate (PERICOLACE) 8.6-50 mg per tablet 2 Tablet  2 Tablet Oral BID PRN    loperamide (IMODIUM) capsule 4 mg  4 mg Oral BID PRN    sodium chloride (NS) flush 5-40 mL  5-40 mL IntraVENous Q8H    sodium chloride (NS) flush 5-40 mL  5-40 mL IntraVENous PRN    acetaminophen (TYLENOL) tablet 650 mg  650 mg Oral Q6H PRN    Or    acetaminophen (TYLENOL) suppository 650 mg  650 mg Rectal Q6H PRN    senna (SENOKOT) tablet 17.2 mg  2 Tablet Oral BID PRN    ondansetron (ZOFRAN) injection 4 mg  4 mg IntraVENous Q6H PRN       Signed:  Jazmin Lux MD    Part of this note may have been written by using a voice dictation software. The note has been proof read but may still contain some grammatical/other typographical errors.

## 2022-01-27 NOTE — PROGRESS NOTES
Date of Outreach Update:  Rafi Swann was seen and assessed. MEWS Score: 1 (01/27/22 0444)  Vitals:    01/27/22 0015 01/27/22 0444 01/27/22 0746 01/27/22 0826   BP: 105/62 117/68 136/60    Pulse: 60 60 (!) 59    Resp: 20 18 28    Temp: 97.6 °F (36.4 °C) 97.5 °F (36.4 °C) 97.1 °F (36.2 °C)    SpO2: 90% 91% 91% 92%   Weight:  114.5 kg (252 lb 8 oz)     Height:             Pain Assessment  Pain Intensity 1: 0 (01/27/22 0742)        Patient Stated Pain Goal: 0      Previous Outreach assessment has been reviewed. There have been no significant clinical changes since the completion of the last dated Outreach assessment. Patient remains on bipap with O2sat 98% on 80%. Weaned to 70% and tolerating. MD and RT aware. Pt would liek to facetime with his daughter, primary RN and charge notified. Will continue to follow up per outreach protocol.     Signed By:   Linda Salter RN    January 27, 2022 10:20 AM

## 2022-01-27 NOTE — PROGRESS NOTES
Pt verbally abusive and combative with RN. Pt keeps pulling off airvo and NRB. Attempted to calm pt but was ineffective. MD notified. Orders received to place pt in soft wrist restraints.      Daughter, Venessa Moss was notified

## 2022-01-27 NOTE — PROGRESS NOTES
VANCO DAILY FOLLOW UP NOTE  [address] [facility] Pharmacokinetic Monitoring Service - Vancomycin    Consulting Provider: Elisabeth Bamberger  Indication: HAP  Target Concentration: Goal AUC/ALTON 400-600 mg*hr/L  Day of Therapy: 1  Additional Antimicrobials: zosyn    Pertinent Laboratory Values: Wt Readings from Last 1 Encounters:   01/27/22 114.5 kg (252 lb 8 oz)     Temp Readings from Last 1 Encounters:   01/27/22 97.1 °F (36.2 °C)     No components found for: PROCAL  Recent Labs     01/27/22  0644 01/26/22  1200 01/26/22  0613 01/25/22  0627   BUN 28*  --  15 25*   CREA 0.95  --  0.80 0.87   WBC 2.6*  --  5.0 5.3   PCT  --  0.05  --   --    LAC  --   --  1.2  --      Estimated Creatinine Clearance: 75.6 mL/min (based on SCr of 0.95 mg/dL). No results found for: Ashish Cain    MRSA Nasal Swab: not ordered. Order placed by pharmacy. .      Assessment:  Date/Time Dose Concentration AUC          Note: Serum concentrations collected for AUC dosing may appear elevated if collected in close proximity to the dose administered, this is not necessarily an indication of toxicity      Plan:  Dosing recommendations based on Bayesian software  Start vancomycin 1500mg q 24 hours  Anticipated AUC of 516 and trough concentration of 12.6 at steady state  Renal labs as indicated   Vancomycin concentration ordered as clinically necessary  Pharmacy will continue to monitor patient and adjust therapy as indicated    Thank you for the consult,  CONCHITA Dennis

## 2022-01-27 NOTE — PROGRESS NOTES
OT Note:    OT attempted to see patient today for therapy. Pt is now on Bipap. OT will HOLD treatment today and re-attempt to see patient at a later date/time when appropriate.     Thanks,  Kimmie Plata

## 2022-01-27 NOTE — PROGRESS NOTES
[facility] CRITICAL CARE OUTREACH NURSE PROGRESS REPORT      SUBJECTIVE: Called to assess patient secondary to MD concern. MEWS Score: 1 (01/27/22 0444)  Vitals:    01/27/22 0015 01/27/22 0444 01/27/22 0746 01/27/22 0826   BP: 105/62 117/68 136/60    Pulse: 60 60 (!) 59    Resp: 20 18 28    Temp: 97.6 °F (36.4 °C) 97.5 °F (36.4 °C) 97.1 °F (36.2 °C)    SpO2: 90% 91% 91% 92%   Weight:  114.5 kg (252 lb 8 oz)     Height:              LAB DATA:    Recent Labs     01/27/22 0644 01/26/22 0613 01/25/22 0627   * 139 137*   K 4.8 3.4* 3.6    106 103   CO2 28 31 29   AGAP 5* 2* 5*   * 128* 127*   BUN 28* 15 25*   CREA 0.95 0.80 0.87   GFRAA >60 >60 >60   GFRNA >60 >60 >60   CA 8.7 8.2* 8.5   MG  --  2.8*  --    ALB 2.1* 2.1* 2.1*   TP 7.2 6.4 6.4   GLOB 5.1* 4.3* 4.3*   AGRAT 0.4* 0.5* 0.5*   * 83* 61        Recent Labs     01/27/22 0644 01/26/22 0613 01/25/22 0627   WBC 2.6* 5.0 5.3   HGB 13.8 12.4* 12.5*   HCT 41.6 37.8* 37.4*    170 183          OBJECTIVE: On arrival to room, I found patient to be resting in bed on right side, sitter at bedside, no distress. Pain Assessment  Pain Intensity 1: 0 (01/27/22 0742)        Patient Stated Pain Goal: 0                                 ASSESSMENT:  Patient is on BiPAP 100% with O2sat 98%. Alert and oriented x4 requesting to eat. Explained to pt why he could not have anything at this time and that if we could wean his O2 down a little more and he remain calm then we could then get him something to eat. Patient verbalized understanding. Lungs sounds are diminished but clear bilaterally. Sitter remains at bedside. O2 weaned to 80%, pt remains with an O2sat of 96%. RT and MD aware. PLAN:  Will continue to follow per outreach program protocol.      Margi Dumont RN

## 2022-01-27 NOTE — PROGRESS NOTES
Chart reviewed by JUAN GRIGGS for discharge planning. Patient remains on COVID Isolation and is requiring Bipap. Disposition: TBD, pending clinical course. Please consult or notify JUAN GRIGGS if any needs shall arise. CM continues to follow plan of care.

## 2022-01-28 NOTE — PROGRESS NOTES
Patient awake and removed his BiPAP. Placed on Optiflow. SpO2=84%. Increased Optiflow to 60L/100%. SpO2=88%. Returned patient to BiPAP at documented settings. SpO2 increased to 90%. RN aware, and at bedside. Will continue to monitor. 01/28/22 1219   Oxygen Therapy   O2 Sat (%) 90 %   Pulse via Oximetry 87 beats per minute   O2 Device BIPAP   FIO2 (%) 100 %   Respiratory   Respiratory (WDL) X   Respiratory Pattern Tachypneic   Breath Sounds Bilateral Diminished   CPAP/BIPAP   Mask Type and Size Full face   PIP Observed 21 cm H20   IPAP (cm H2O) 16 cm H2O   EPAP (cm H2O) 12 cm H2O   Inspiratory Time (sec) 0.9 seconds   Vt Spont (ml) 795 ml   Ve Observed (l/min) 26.9 l/min   Backup Rate 16   Total RR (Spontaneous) 34 breaths per minute   Insp Rise Time (sec) 4   Pt's Home Machine No   Biomedical Check Performed Yes   Settings Verified Yes   Alarm Settings   High Pressure 30   Low Pressure 5   Apnea 20   Low Ve 2   High Rate 50   Low Rate 5   Pulmonary Toilet   Pulmonary Toilet H. O.B elevated

## 2022-01-28 NOTE — PROGRESS NOTES
Patient remains on BiPAP due to SpO2-88-91% on BiPAP on 100%. RN notified. Will continue to monitor.       01/28/22 0802   Oxygen Therapy   O2 Sat (%) 91 %   O2 Device BIPAP   Respiratory   Respiratory (WDL) X   CPAP/BIPAP   Mask Type and Size Full face   PIP Observed 17 cm H20   IPAP (cm H2O) 16 cm H2O   EPAP (cm H2O) 12 cm H2O   Inspiratory Time (sec) 0.9 seconds   Vt Spont (ml) 328 ml   Ve Observed (l/min) 10.7 l/min   Backup Rate 4   Total RR (Spontaneous) 33 breaths per minute   Insp Rise Time (sec) 4   Leak (Estimated) 45 L/min   Pt's Home Machine No   Biomedical Check Performed Yes   Settings Verified Yes   Alarm Settings   High Pressure 30   Low Pressure 5   Apnea 20   Low Ve 2   High Rate 50   Low Rate 5

## 2022-01-28 NOTE — PROGRESS NOTES
Pt agitated throughout entire shift. Pt switched between bipap and airvo during the day per RT. PT pulling off bipap mask or airvo constantly. Pt desats to 70s when off of O2. Ativan given 1x this am. No other prn orders for agitation, MD aware. Difficult to get pt to put mask back on, verbally abusive and combative with staff and throwing equipment. Unable to put pt in restraints d/t being on bipap. Pt needs a sitter/closer monitoring d/t agitation and removing O2 however no staff available for this. Concerns expressed to MD and nurse manager. Monitored on continuous pulse ox and frequent rounding.

## 2022-01-28 NOTE — PROGRESS NOTES
Hospitalist Progress Note   Admit Date:  2022  5:56 PM   Name:  Ernie Shane   Age:  82 y.o. Sex:  male  :  1935   MRN:  [id number]   Room:  [facility]/    Presenting Complaint: No chief complaint on file. Reason(s) for Admission: COVID-19 [U07.1]     Hospital Course & Interval History:   Mr. Jessica Almaguer is an 81 y/o WM with a h/o dementia, TIA, pacemaker, AFib on Eliquis, Bipolar, MM on Revlimid who was admitted to our service on  with acute hypoxemic respiratory failure. Started on dexamethasone. Revlimid held, Oncology consulted and agreed. Developed thrombocytopenia which has since resolved. O2 needs incraesed from 6L to [address] max settings on  and then required Bipap on  AM.     Subjective (22): On Bipap this AM, put back on overnight. He was put on it yesterday AM but was able to come off and tolerated Airvo/NRB. Sats low 90s at rest. He is still intermittently agitated and desaturates to the 70s if he pulls his mask off. He just rec'd Ativan for agitation so unable to get ROS. I did call and speak with his daughter and gave her an update this morning, patient still critical.    Assessment & Plan:   # Acute hypoxemic respiratory failure 2/2 COVID-19              - Hypoxia worsened  and went from 6L to max Airvo settings. CXR with asymmetric infiltrates. Given Lasix x1 and Actemra. baricitinib d/c. Needed Bipap  AM but was able to wean off in the afternoon so did not go to ICU as planned. Broad spectrum abx started on  given neutropenia and overall clinical changes. On Bipap overnight, will attempt to wean back to Airvo today. Repeat CXR today.     # Transaminitis              - COVID related? Trend.     # HypoK              - Replace again today.     # Thrombocytopenia               - 2/2 above.  Resolved.     # Multiple myeloma              - No Revlimid with acute infection, Onc saw and agreed.     # Acute encephalopathy superimposed on dementia              - Likely secondary to COVID. Con't current meds.     # AFib              - Eliquis held since using Bipap and can't get PO meds, has pacer. Lovenox for DVT ppx.     # Seizure d/o              - Keppra    Dispo/Discharge Planning: Pending. High risk of further decline, discussed with daughter, Kirsten López, by phone this AM.  Diet:  ADULT DIET Regular  DVT PPx: Lovenox  Code status: Full Code    Hospital Problems as of 1/28/2022 Date Reviewed: 8/8/2018          Codes Class Noted - Resolved POA    Transaminitis ICD-10-CM: R74.01  ICD-9-CM: 790.4  1/27/2022 - Present Unknown        * (Principal) Acute respiratory failure with hypoxia ([facility] ICD-10-CM: J96.01  ICD-9-CM: 518.81  1/23/2022 - Present Yes        Multiple myeloma ([facility] ICD-10-CM: C90.00  ICD-9-CM: 203.00  1/23/2022 - Present Yes        Seizures ([facility] (Chronic) ICD-10-CM: R56.9  ICD-9-CM: 780.39  1/23/2022 - Present Yes        COVID-19 ICD-10-CM: U07.1  ICD-9-CM: 079.89  1/22/2022 - Present Unknown        A-fib ([facility] (Chronic) ICD-10-CM: I48.91  ICD-9-CM: 427.31  4/18/2020 - Present Yes        Leukopenia ICD-10-CM: E14.928  ICD-9-CM: 288.50  8/2/2017 - Present Unknown        Acute encephalopathy ICD-10-CM: G93.40  ICD-9-CM: 348.30  3/22/2015 - Present Yes        Anemia ICD-10-CM: D64.9  ICD-9-CM: 285.9  9/16/2014 - Present Yes    Overview Addendum 7/6/2017  5:01 PM by Kevin Chung MD     Recheck CBC.                    Objective:     Patient Vitals for the past 24 hrs:   Temp Pulse Resp BP SpO2   01/28/22 0718 97.2 °F (36.2 °C) 60 20 102/62 94 %   01/28/22 0606 97.3 °F (36.3 °C) 68 18 (!) 118/57 97 %   01/27/22 2356     90 %   01/27/22 2348 97.5 °F (36.4 °C) 60 18 127/75 91 %   01/27/22 2117     93 %   01/27/22 1935 97.4 °F (36.3 °C) 66 18 108/61    01/27/22 1600 97.3 °F (36.3 °C) 63 19 (!) 102/54 92 %   01/27/22 1138 97.7 °F (36.5 °C) 61 25 108/62 96 %     Oxygen Therapy  O2 Sat (%): 94 % (01/28/22 0718)  Pulse via Oximetry: 82 beats per minute (01/27/22 2117)  O2 Device: BIPAP (01/27/22 2117)  Skin Assessment: Clean, dry, & intact (01/26/22 2228)  Skin Protection for O2 Device: Yes (01/26/22 1216)  Orientation: Bilateral (01/26/22 1216)  Location: Cheek (01/26/22 1216)  Interventions: Skin Barrier (01/26/22 1216)  O2 Flow Rate (L/min): 15 l/min (01/27/22 2117)  O2 Temperature: 87.8 °F (31 °C) (01/26/22 1233)  FIO2 (%): 100 % (01/27/22 2117)    Estimated body mass index is 34.25 kg/m² as calculated from the following:    Height as of this encounter: 6' (1.829 m). Weight as of this encounter: 114.5 kg (252 lb 8 oz). Intake/Output Summary (Last 24 hours) at 1/28/2022 0857  Last data filed at 1/27/2022 1844  Gross per 24 hour   Intake    Output 1200 ml   Net -1200 ml         Physical Exam:   Blood pressure 102/62, pulse 60, temperature 97.2 °F (36.2 °C), resp. rate 20, height 6' (1.829 m), weight 114.5 kg (252 lb 8 oz), SpO2 94 %. General:    Well nourished. Obese. Bipap. Head:  Normocephalic, atraumatic  Eyes:  Sclerae appear normal.  Pupils equally round. ENT:  Nares appear normal, no drainage. Moist oral mucosa  Neck:  No restricted ROM. Trachea midline   CV:   RRR. No m/r/g. No jugular venous distension. Lungs:   BB rales, Bipap at 100% FiO2, bedside sats low 90s at rest.  Abdomen: Bowel sounds present. Soft, nontender, nondistended. Extremities: No cyanosis or clubbing. No edema  Skin:     No rashes and normal coloration. Warm and dry. Neuro:  Somnolent, unable to fully assess but moves all extremities. Psych:  As above.     I have reviewed ordered lab tests and independently visualized imaging below:    Recent Labs:  Recent Results (from the past 48 hour(s))   PROCALCITONIN    Collection Time: 01/26/22 12:00 PM   Result Value Ref Range    Procalcitonin 0.05 0.00 - 0.49 ng/mL   CBC WITH AUTOMATED DIFF    Collection Time: 01/27/22  6:44 AM   Result Value Ref Range    WBC 2.6 (L) 4.3 - 11.1 K/uL    RBC 4.66 4.23 - 5.6 M/uL    HGB 13.8 13.6 - 17.2 g/dL    HCT 41.6 41.1 - 50.3 %    MCV 89.3 79.6 - 97.8 FL    MCH 29.6 26.1 - 32.9 PG    MCHC 33.2 31.4 - 35.0 g/dL    RDW 14.3 11.9 - 14.6 %    PLATELET 181 563 - 685 K/uL    MPV 10.6 9.4 - 12.3 FL    ABSOLUTE NRBC 0.00 0.0 - 0.2 K/uL    DF AUTOMATED      NEUTROPHILS 66 43 - 78 %    LYMPHOCYTES 20 13 - 44 %    MONOCYTES 13 (H) 4.0 - 12.0 %    EOSINOPHILS 0 (L) 0.5 - 7.8 %    BASOPHILS 0 0.0 - 2.0 %    IMMATURE GRANULOCYTES 1 0.0 - 5.0 %    ABS. NEUTROPHILS 1.7 1.7 - 8.2 K/UL    ABS. LYMPHOCYTES 0.5 0.5 - 4.6 K/UL    ABS. MONOCYTES 0.3 0.1 - 1.3 K/UL    ABS. EOSINOPHILS 0.0 0.0 - 0.8 K/UL    ABS. BASOPHILS 0.0 0.0 - 0.2 K/UL    ABS. IMM. GRANS. 0.0 0.0 - 0.5 K/UL   METABOLIC PANEL, COMPREHENSIVE    Collection Time: 01/27/22  6:44 AM   Result Value Ref Range    Sodium 136 (L) 138 - 145 mmol/L    Potassium 4.8 3.5 - 5.1 mmol/L    Chloride 103 98 - 107 mmol/L    CO2 28 21 - 32 mmol/L    Anion gap 5 (L) 7 - 16 mmol/L    Glucose 117 (H) 65 - 100 mg/dL    BUN 28 (H) 8 - 23 MG/DL    Creatinine 0.95 0.8 - 1.5 MG/DL    GFR est AA >60 >60 ml/min/1.73m2    GFR est non-AA >60 >60 ml/min/1.73m2    Calcium 8.7 8.3 - 10.4 MG/DL    Bilirubin, total 0.7 0.2 - 1.1 MG/DL    ALT (SGPT) 112 (H) 12 - 65 U/L    AST (SGOT) 124 (H) 15 - 37 U/L    Alk.  phosphatase 139 (H) 50 - 136 U/L    Protein, total 7.2 6.3 - 8.2 g/dL    Albumin 2.1 (L) 3.2 - 4.6 g/dL    Globulin 5.1 (H) 2.3 - 3.5 g/dL    A-G Ratio 0.4 (L) 1.2 - 3.5     METABOLIC PANEL, COMPREHENSIVE    Collection Time: 01/28/22  5:58 AM   Result Value Ref Range    Sodium 140 136 - 145 mmol/L    Potassium 3.3 (L) 3.5 - 5.1 mmol/L    Chloride 107 98 - 107 mmol/L    CO2 27 21 - 32 mmol/L    Anion gap 6 (L) 7 - 16 mmol/L    Glucose 99 65 - 100 mg/dL    BUN 29 (H) 8 - 23 MG/DL    Creatinine 1.03 0.8 - 1.5 MG/DL    GFR est AA >60 >60 ml/min/1.73m2    GFR est non-AA >60 >60 ml/min/1.73m2    Calcium 8.5 8.3 - 10.4 MG/DL    Bilirubin, total 0.6 0.2 - 1.1 MG/DL    ALT (SGPT) 104 (H) 12 - 65 U/L    AST (SGOT) 73 (H) 15 - 37 U/L    Alk. phosphatase 136 50 - 136 U/L    Protein, total 6.8 6.3 - 8.2 g/dL    Albumin 2.4 (L) 3.2 - 4.6 g/dL    Globulin 4.4 (H) 2.3 - 3.5 g/dL    A-G Ratio 0.5 (L) 1.2 - 3.5     CBC WITH AUTOMATED DIFF    Collection Time: 01/28/22  5:58 AM   Result Value Ref Range    WBC 2.6 (L) 4.3 - 11.1 K/uL    RBC 4.78 4.23 - 5.6 M/uL    HGB 14.0 13.6 - 17.2 g/dL    HCT 44.1 41.1 - 50.3 %    MCV 92.3 79.6 - 97.8 FL    MCH 29.3 26.1 - 32.9 PG    MCHC 31.7 31.4 - 35.0 g/dL    RDW 14.2 11.9 - 14.6 %    PLATELET 658 137 - 885 K/uL    MPV 10.3 9.4 - 12.3 FL    ABSOLUTE NRBC 0.00 0.0 - 0.2 K/uL    DF AUTOMATED      NEUTROPHILS 57 43 - 78 %    LYMPHOCYTES 31 13 - 44 %    MONOCYTES 11 4.0 - 12.0 %    EOSINOPHILS 0 (L) 0.5 - 7.8 %    BASOPHILS 0 0.0 - 2.0 %    IMMATURE GRANULOCYTES 1 0.0 - 5.0 %    ABS. NEUTROPHILS 1.5 (L) 1.7 - 8.2 K/UL    ABS. LYMPHOCYTES 0.8 0.5 - 4.6 K/UL    ABS. MONOCYTES 0.3 0.1 - 1.3 K/UL    ABS. EOSINOPHILS 0.0 0.0 - 0.8 K/UL    ABS. BASOPHILS 0.0 0.0 - 0.2 K/UL    ABS. IMM. GRANS. 0.0 0.0 - 0.5 K/UL       All Micro Results     Procedure Component Value Units Date/Time    BLOOD CULTURE [[id number]] Collected: 01/23/22 0026    Order Status: Completed Specimen: Blood Updated: 01/28/22 0711     Special Requests: --        RIGHT  Antecubital       Culture result: NO GROWTH 5 DAYS       BLOOD CULTURE [[id number]] Collected: 01/22/22 1919    Order Status: Completed Specimen: Blood Updated: 01/27/22 0722     Special Requests: --        NO SPECIAL REQUESTS  RIGHT  Antecubital       Culture result: NO GROWTH 5 DAYS       CULTURE, URINE [[id number]] Collected: 01/22/22 1841    Order Status: Completed Specimen: Cath Urine Updated: 01/25/22 0752     Special Requests: NO SPECIAL REQUESTS        Culture result: NO GROWTH 2 DAYS             Other Studies:  No results found.     Current Meds:  Current Facility-Administered Medications   Medication Dose Route Frequency    piperacillin-tazobactam (ZOSYN) 4.5 g in 0.9% sodium chloride (MBP/ADV) 100 mL MBP  4.5 g IntraVENous Q8H    vancomycin (VANCOCIN) 1500 mg in  ml infusion  1,500 mg IntraVENous Q24H    levETIRAcetam (KEPPRA) 750 mg in 0.9% sodium chloride 100 mL IVPB  750 mg IntraVENous Q12H    dexamethasone (DECADRON) 10 mg/mL injection 6 mg  6 mg IntraVENous Q24H    enoxaparin (LOVENOX) injection 40 mg  40 mg SubCUTAneous Q12H    benzonatate (TESSALON) capsule 100 mg  100 mg Oral TID PRN    [Held by provider] apixaban (ELIQUIS) tablet 2.5 mg  2.5 mg Oral Q12H    [Held by provider] clonazePAM (KlonoPIN) tablet 0.5 mg  0.5 mg Oral BID    [Held by provider] donepeziL (ARICEPT) tablet 10 mg  10 mg Oral DAILY    [Held by provider] memantine (NAMENDA) tablet 5 mg  5 mg Oral DAILY    QUEtiapine (SEROquel) tablet 25 mg  25 mg Oral QHS PRN    [Held by provider] lenalidomide cap 10 mg (Revlimid) - patient supplied chemo (Patient Supplied)  10 mg Oral DAILY    [Held by provider] sertraline (ZOLOFT) tablet 50 mg  50 mg Oral DAILY    albuterol-ipratropium (DUO-NEB) 2.5 MG-0.5 MG/3 ML  3 mL Nebulization Q4H PRN    cloNIDine HCL (CATAPRES) tablet 0.2 mg  0.2 mg Oral BID PRN    senna-docusate (PERICOLACE) 8.6-50 mg per tablet 2 Tablet  2 Tablet Oral BID PRN    loperamide (IMODIUM) capsule 4 mg  4 mg Oral BID PRN    sodium chloride (NS) flush 5-40 mL  5-40 mL IntraVENous Q8H    sodium chloride (NS) flush 5-40 mL  5-40 mL IntraVENous PRN    acetaminophen (TYLENOL) tablet 650 mg  650 mg Oral Q6H PRN    Or    acetaminophen (TYLENOL) suppository 650 mg  650 mg Rectal Q6H PRN    senna (SENOKOT) tablet 17.2 mg  2 Tablet Oral BID PRN    ondansetron (ZOFRAN) injection 4 mg  4 mg IntraVENous Q6H PRN       Signed:  Mark Briseno MD    Part of this note may have been written by using a voice dictation software. The note has been proof read but may still contain some grammatical/other typographical errors.

## 2022-01-28 NOTE — PROGRESS NOTES
Pt in restraints this am. airvo and NRB in place. Pt desat to 62s when taking NRB off to eat breakfast. MD assessed at bedside. Placed on bipap, restraints d/cd due to safety. Pt tolerated bipap well throughout shift. Pt taken off bipap and placed back on airvo/ NRB to eat dinner. Pt agitated at times. O2 monitored at beside. No other needs at time time.  Bed in lowest position call bell within reach

## 2022-01-28 NOTE — PROGRESS NOTES
Patient placed on BiPAP due to SpO2=85% on 60L/100% Optiflow and NRB. Patient is awake and alert. RN at bedside. Will continue to monitor.       01/28/22 1628   Oxygen Therapy   O2 Sat (%) 91 %   Pulse via Oximetry 61 beats per minute   O2 Device BIPAP   FIO2 (%) 100 %   Respiratory   Respiratory (WDL) X   Respiratory Pattern Tachypneic   Breath Sounds Bilateral Diminished   CPAP/BIPAP   Mask Type and Size Full face   Skin Condition intact   PIP Observed 19 cm H20   IPAP (cm H2O) 18 cm H2O   EPAP (cm H2O) 12 cm H2O   Inspiratory Time (sec) 0.9 seconds   Vt Spont (ml) 550 ml   Ve Observed (l/min) 16 l/min   Backup Rate 16   Total RR (Spontaneous) 33 breaths per minute   Insp Rise Time (sec) 4   Pt's Home Machine No   Biomedical Check Performed Yes   Settings Verified Yes   Alarm Settings   High Pressure 35   Low Pressure 5   Apnea 20   Low Ve 2   High Rate 50   Low Rate 5

## 2022-01-29 NOTE — PROGRESS NOTES
Pt constantly yelling out, being demanding, restless, and constantly taking bipap mask off (stats drop in the low 70s). Pt requiring 24-7 (1on1) monitoring. Dr notified, orders for haldol and geodon.

## 2022-01-29 NOTE — PROGRESS NOTES
Redness and excoration to groin, scrotum, and gluteal fold area. Zinc paste applied throughout the day. Patient has an open area on scrotum. MD made aware.

## 2022-01-29 NOTE — PROGRESS NOTES
Attempted to call daughter Gera Ortez to notify family in regards to patient's behavvior and risks as he will not keep oxygen on. Unable to reach however left call back number at this time.

## 2022-01-29 NOTE — PROGRESS NOTES
Patient put on AIRVO 60/100 this morning. Patient's oxygen saturation between 90-95%. Occasionally patient pulling off oxygen however RN sitting right outside patient's room and when redirected patient remains calm. Usually in the event of a soiled brief. Patient turned throughout shift. Non-rebreather at bedside in the event that patient desats below 88%. Called for a new bed for this patient as he continues to slide down and it will not allow him to sit up properly however no available beds to switch at this time. Patient coughing with sips of water this shift and notified MD. SLP consult put in. SLP cam by to see patient and unable to assess as patient was agitated and c/o feeling \"sick\" stating he was going to vomit. No emesis present. Patient then verbalized he is not nauseous at this time.

## 2022-01-29 NOTE — PROGRESS NOTES
Hospitalist Progress Note   Admit Date:  2022  5:56 PM   Name:  Lizette Yates   Age:  82 y.o. Sex:  male  :  1935   MRN:  [id number]   Room:  [facility]/    Presenting Complaint: No chief complaint on file. Reason(s) for Admission: COVID-19 [U07.1]     Hospital Course & Interval History:   Mr. Steph Snyder is an 83 y/o WM with a h/o dementia, TIA, pacemaker, AFib on Eliquis, Bipolar, MM on Revlimid who was admitted to our service on  with acute hypoxemic respiratory failure. Started on dexamethasone. Revlimid held, Oncology consulted and agreed. Developed thrombocytopenia which has since resolved. O2 needs incraesed from 6L to [address] max settings on  and then required Bipap on  AM. Transitioned back to Airvo . Subjective (22): Agitated overnight, pulling at Bipap mask. Transitioned to Airvo this AM, max settings and sats low 90s. He is calm but is saying that he is going home today. Given water by RN and reportedly choked. No chest pain, N/V/D. Assessment & Plan:   # Acute hypoxemic respiratory failure 2/2 COVID-19              - O2 increased to Airvo on , CXR with asymmetric infiltrates. Given Lasix x1 and Actemra, baricitinib d/c. Needed Bipap  AM but weaned by afternoon. Broad spectrum abx started on , stop vancomycin today, con't Zosyn. Off Bipap and now on Airvo, tolerating thus far. If he requires to go back on Bipap and becomes agitated again then would need ICU for precedex and Bipap. Repeat CXR showed stable infiltrates, otherwise no changes. # Leukopenia   - Stable. # Transaminitis              - COVID related? Stable.     # HypoK              - Resolved.     # Thrombocytopenia               - 2/2 above. Resolved.     # Multiple myeloma              - No Revlimid with acute infection, Onc saw and agreed.     # Acute encephalopathy superimposed on dementia              - Likely secondary to [place].  Con't current meds.     # AFib              - Eliquis held since using Bipap and can't get PO meds, has pacer. Lovenox for DVT ppx.     # Seizure d/o              - Keppra    Dispo/Discharge Planning: Pending. Remains high risk of decline, daughter updated yesterday and understands his condition. Diet:  ADULT DIET Regular  DVT PPx: Lovenox  Code status: Full Code    Hospital Problems as of 1/29/2022 Date Reviewed: 8/8/2018          Codes Class Noted - Resolved POA    Transaminitis ICD-10-CM: R74.01  ICD-9-CM: 790.4  1/27/2022 - Present Unknown        * (Principal) Acute respiratory failure with hypoxia ([facility] ICD-10-CM: J96.01  ICD-9-CM: 518.81  1/23/2022 - Present Yes        Multiple myeloma ([facility] ICD-10-CM: C90.00  ICD-9-CM: 203.00  1/23/2022 - Present Yes        Seizures (HCC) (Chronic) ICD-10-CM: R56.9  ICD-9-CM: 780.39  1/23/2022 - Present Yes        COVID-19 ICD-10-CM: U07.1  ICD-9-CM: 079.89  1/22/2022 - Present Unknown        A-fib (HCC) (Chronic) ICD-10-CM: I48.91  ICD-9-CM: 427.31  4/18/2020 - Present Yes        Leukopenia ICD-10-CM: K91.650  ICD-9-CM: 288.50  8/2/2017 - Present Unknown        Acute encephalopathy ICD-10-CM: G93.40  ICD-9-CM: 348.30  3/22/2015 - Present Yes        Anemia ICD-10-CM: D64.9  ICD-9-CM: 285.9  9/16/2014 - Present Yes    Overview Addendum 7/6/2017  5:01 PM by John Molina MD     Recheck CBC.                    Objective:     Patient Vitals for the past 24 hrs:   Temp Pulse Resp BP SpO2   01/29/22 0751 97.9 °F (36.6 °C) 76 22 (!) 105/58 90 %   01/29/22 0717  65 20  98 %   01/29/22 0345     94 %   01/29/22 0311 97.9 °F (36.6 °C) 65 20 (!) 126/59 95 %   01/29/22 0118     95 %   01/29/22 0031 97.7 °F (36.5 °C) 61 20 (!) 131/57 90 %   01/28/22 2007     92 %   01/28/22 1941 98.2 °F (36.8 °C) 71 20 (!) 120/55 91 %   01/28/22 1628     91 %   01/28/22 1546     95 %   01/28/22 1444 97.5 °F (36.4 °C) 62 20 (!) 144/78 92 %   01/28/22 1219     90 %   01/28/22 1058 98.1 °F (36.7 °C) 61 20 (!) 110/58 97 % Oxygen Therapy  O2 Sat (%): 90 % (01/29/22 0751)  Pulse via Oximetry: 69 beats per minute (01/29/22 0118)  O2 Device: Hi flow nasal cannula; Heated;Humidifier (Pt off BIPAP for trial) (01/29/22 0717)  Skin Assessment: Clean, dry, & intact (01/26/22 2228)  Skin Protection for O2 Device: Yes (01/26/22 1216)  Orientation: Bilateral (01/26/22 1216)  Location: Cheek (01/26/22 1216)  Interventions: Skin Barrier (01/26/22 1216)  O2 Flow Rate (L/min): 60 l/min (01/29/22 0717)  O2 Temperature: 87.8 °F (31 °C) (01/26/22 1233)  FIO2 (%): 100 % (01/29/22 0717)    Estimated body mass index is 32.36 kg/m² as calculated from the following:    Height as of this encounter: 6' (1.829 m). Weight as of this encounter: 108.2 kg (238 lb 9.6 oz). Intake/Output Summary (Last 24 hours) at 1/29/2022 0926  Last data filed at 1/28/2022 1157  Gross per 24 hour   Intake 120 ml   Output    Net 120 ml         Physical Exam:   Blood pressure (!) 105/58, pulse 76, temperature 97.9 °F (36.6 °C), resp. rate 22, height 6' (1.829 m), weight 108.2 kg (238 lb 9.6 oz), SpO2 90 %. General:    Well nourished. Obese. Head:  Normocephalic, atraumatic  Eyes:  Sclerae appear normal.  Pupils equally round. ENT:  Nares appear normal, no drainage. Dry oral mucosa  Neck:  No restricted ROM. Trachea midline   CV:   RRR. No m/r/g. No jugular venous distension. Lungs:   BB rales, no wheezes or rhonchi noted. Airvo 60L/100% with bedside sats low 90s at rest.  Abdomen: Bowel sounds present. Soft, nontender, nondistended. Extremities: No cyanosis or clubbing. No edema  Skin:     No rashes and normal coloration. Warm and dry. Neuro:  CN II-XII grossly intact. Sensation intact. Psych:  Normal mood and affect.       I have reviewed ordered lab tests and independently visualized imaging below:    Recent Labs:  Recent Results (from the past 48 hour(s))   METABOLIC PANEL, COMPREHENSIVE    Collection Time: 01/28/22  5:58 AM   Result Value Ref Range Sodium 140 136 - 145 mmol/L    Potassium 3.3 (L) 3.5 - 5.1 mmol/L    Chloride 107 98 - 107 mmol/L    CO2 27 21 - 32 mmol/L    Anion gap 6 (L) 7 - 16 mmol/L    Glucose 99 65 - 100 mg/dL    BUN 29 (H) 8 - 23 MG/DL    Creatinine 1.03 0.8 - 1.5 MG/DL    GFR est AA >60 >60 ml/min/1.73m2    GFR est non-AA >60 >60 ml/min/1.73m2    Calcium 8.5 8.3 - 10.4 MG/DL    Bilirubin, total 0.6 0.2 - 1.1 MG/DL    ALT (SGPT) 104 (H) 12 - 65 U/L    AST (SGOT) 73 (H) 15 - 37 U/L    Alk. phosphatase 136 50 - 136 U/L    Protein, total 6.8 6.3 - 8.2 g/dL    Albumin 2.4 (L) 3.2 - 4.6 g/dL    Globulin 4.4 (H) 2.3 - 3.5 g/dL    A-G Ratio 0.5 (L) 1.2 - 3.5     CBC WITH AUTOMATED DIFF    Collection Time: 01/28/22  5:58 AM   Result Value Ref Range    WBC 2.6 (L) 4.3 - 11.1 K/uL    RBC 4.78 4.23 - 5.6 M/uL    HGB 14.0 13.6 - 17.2 g/dL    HCT 44.1 41.1 - 50.3 %    MCV 92.3 79.6 - 97.8 FL    MCH 29.3 26.1 - 32.9 PG    MCHC 31.7 31.4 - 35.0 g/dL    RDW 14.2 11.9 - 14.6 %    PLATELET 268 087 - 465 K/uL    MPV 10.3 9.4 - 12.3 FL    ABSOLUTE NRBC 0.00 0.0 - 0.2 K/uL    DF AUTOMATED      NEUTROPHILS 57 43 - 78 %    LYMPHOCYTES 31 13 - 44 %    MONOCYTES 11 4.0 - 12.0 %    EOSINOPHILS 0 (L) 0.5 - 7.8 %    BASOPHILS 0 0.0 - 2.0 %    IMMATURE GRANULOCYTES 1 0.0 - 5.0 %    ABS. NEUTROPHILS 1.5 (L) 1.7 - 8.2 K/UL    ABS. LYMPHOCYTES 0.8 0.5 - 4.6 K/UL    ABS. MONOCYTES 0.3 0.1 - 1.3 K/UL    ABS. EOSINOPHILS 0.0 0.0 - 0.8 K/UL    ABS. BASOPHILS 0.0 0.0 - 0.2 K/UL    ABS. IMM.  GRANS. 0.0 0.0 - 0.5 K/UL   METABOLIC PANEL, COMPREHENSIVE    Collection Time: 01/29/22  6:46 AM   Result Value Ref Range    Sodium 141 136 - 145 mmol/L    Potassium 4.6 3.5 - 5.1 mmol/L    Chloride 109 (H) 98 - 107 mmol/L    CO2 24 21 - 32 mmol/L    Anion gap 8 7 - 16 mmol/L    Glucose 84 65 - 100 mg/dL    BUN 25 (H) 8 - 23 MG/DL    Creatinine 1.04 0.8 - 1.5 MG/DL    GFR est AA >60 >60 ml/min/1.73m2    GFR est non-AA >60 >60 ml/min/1.73m2    Calcium 8.5 8.3 - 10.4 MG/DL    Bilirubin, total 0.9 0.2 - 1.1 MG/DL    ALT (SGPT) 100 (H) 12 - 65 U/L    AST (SGOT) 82 (H) 15 - 37 U/L    Alk. phosphatase 137 (H) 50 - 136 U/L    Protein, total 6.2 (L) 6.3 - 8.2 g/dL    Albumin 2.4 (L) 3.2 - 4.6 g/dL    Globulin 3.8 (H) 2.3 - 3.5 g/dL    A-G Ratio 0.6 (L) 1.2 - 3.5     VANCOMYCIN, RANDOM    Collection Time: 01/29/22  6:46 AM   Result Value Ref Range    Vancomycin, random 13.1 UG/ML   CBC WITH AUTOMATED DIFF    Collection Time: 01/29/22  6:46 AM   Result Value Ref Range    WBC 2.3 (L) 4.3 - 11.1 K/uL    RBC 4.55 4.23 - 5.6 M/uL    HGB 13.4 (L) 13.6 - 17.2 g/dL    HCT 39.7 (L) 41.1 - 50.3 %    MCV 87.3 79.6 - 97.8 FL    MCH 29.5 26.1 - 32.9 PG    MCHC 33.8 31.4 - 35.0 g/dL    RDW 14.1 11.9 - 14.6 %    PLATELET 393 274 - 297 K/uL    MPV 10.4 9.4 - 12.3 FL    ABSOLUTE NRBC 0.00 0.0 - 0.2 K/uL    DF PENDING        All Micro Results     Procedure Component Value Units Date/Time    BLOOD CULTURE [[id number]] Collected: 01/23/22 0026    Order Status: Completed Specimen: Blood Updated: 01/28/22 0711     Special Requests: --        RIGHT  Antecubital       Culture result: NO GROWTH 5 DAYS       BLOOD CULTURE [[id number]] Collected: 01/22/22 1919    Order Status: Completed Specimen: Blood Updated: 01/27/22 0722     Special Requests: --        NO SPECIAL REQUESTS  RIGHT  Antecubital       Culture result: NO GROWTH 5 DAYS       CULTURE, URINE [[id number]] Collected: 01/22/22 1841    Order Status: Completed Specimen: Cath Urine Updated: 01/25/22 0752     Special Requests: NO SPECIAL REQUESTS        Culture result: NO GROWTH 2 DAYS             Other Studies:  XR CHEST SNGL V    Result Date: 1/28/2022  EXAMINATION: CHEST RADIOGRAPH 1/28/2022 11:00 AM ACCESSION NUMBER: [id number] INDICATION: Hypoxia, COVID COMPARISON: Chest x-ray 1/26/2022, 1/22/2022 TECHNIQUE: A single view of the chest was obtained. FINDINGS: Support Lines and Tubes: Unchanged positioning of support lines and tubes. Cardiac Silhouette: Stable in caliber. Lungs: Improving low lung volumes. Unchanged basilar predominant bilateral pulmonary opacities. Pleura: No pleural effusion. No pneumothorax. Osseous Structures: Thoracic spine spondylosis. Upper Abdomen: Unremarkable. 1. Improving low lung volumes. 2. Otherwise stable chest as above.        Current Meds:  Current Facility-Administered Medications   Medication Dose Route Frequency    LORazepam (ATIVAN) injection 1 mg  1 mg IntraVENous ONCE    piperacillin-tazobactam (ZOSYN) 4.5 g in 0.9% sodium chloride (MBP/ADV) 100 mL MBP  4.5 g IntraVENous Q8H    vancomycin (VANCOCIN) 1500 mg in  ml infusion  1,500 mg IntraVENous Q24H    levETIRAcetam (KEPPRA) 750 mg in 0.9% sodium chloride 100 mL IVPB  750 mg IntraVENous Q12H    dexamethasone (DECADRON) 10 mg/mL injection 6 mg  6 mg IntraVENous Q24H    enoxaparin (LOVENOX) injection 40 mg  40 mg SubCUTAneous Q12H    benzonatate (TESSALON) capsule 100 mg  100 mg Oral TID PRN    [Held by provider] apixaban (ELIQUIS) tablet 2.5 mg  2.5 mg Oral Q12H    [Held by provider] clonazePAM (KlonoPIN) tablet 0.5 mg  0.5 mg Oral BID    [Held by provider] donepeziL (ARICEPT) tablet 10 mg  10 mg Oral DAILY    [Held by provider] memantine (NAMENDA) tablet 5 mg  5 mg Oral DAILY    QUEtiapine (SEROquel) tablet 25 mg  25 mg Oral QHS PRN    [Held by provider] sertraline (ZOLOFT) tablet 50 mg  50 mg Oral DAILY    albuterol-ipratropium (DUO-NEB) 2.5 MG-0.5 MG/3 ML  3 mL Nebulization Q4H PRN    cloNIDine HCL (CATAPRES) tablet 0.2 mg  0.2 mg Oral BID PRN    senna-docusate (PERICOLACE) 8.6-50 mg per tablet 2 Tablet  2 Tablet Oral BID PRN    loperamide (IMODIUM) capsule 4 mg  4 mg Oral BID PRN    sodium chloride (NS) flush 5-40 mL  5-40 mL IntraVENous Q8H    sodium chloride (NS) flush 5-40 mL  5-40 mL IntraVENous PRN    acetaminophen (TYLENOL) tablet 650 mg  650 mg Oral Q6H PRN    Or    acetaminophen (TYLENOL) suppository 650 mg  650 mg Rectal Q6H PRN    senna (SENOKOT) tablet 17.2 mg  2 Tablet Oral BID PRN    ondansetron (ZOFRAN) injection 4 mg  4 mg IntraVENous Q6H PRN       Signed:  Hillary Bolden MD    Part of this note may have been written by using a voice dictation software. The note has been proof read but may still contain some grammatical/other typographical errors.

## 2022-01-29 NOTE — REHAB NOTE
Attempted to see patient for bedside swallow assessment but RN in room and patient stating he was going to be sick. Assisted RN with getting patient more upright in bed - Patient on Airvo but required additional oxygen as patient kept pulling oxygen off. Speech will attempt to see patient tomorrow.

## 2022-01-29 NOTE — ROUTINE PROCESS
Patient verbally and physically aggressive to staff. Mitts were attempted however patient managed to get mitts off and rip airvo off and broke tubing. Notified MD and verbal orders to place patient in non violent restraints at this time.

## 2022-01-30 NOTE — PROGRESS NOTES
MD notified Patient very agitated and swatting at this nurse each time range of motion performed. Patient verbally abusive and restless. Will continue to monitor.

## 2022-01-30 NOTE — PROGRESS NOTES
Completed range of motion with this patient. Patient trying to hit this RN while restraints removed. Patient yelling, \"Get these gloves off! I'm gonna punch you straight in the mouth! Take these gloves off\"! This RN managed to fasten restraints back in place. Will continue to monitor.

## 2022-01-30 NOTE — PROGRESS NOTES
TRANSFER - IN REPORT:    Verbal report received from Johana Alexis [facility] (name) on Estephania Cora  being received from 6th floor (unit) for change in patient condition(hypoxia)      Report consisted of patients Situation, Background, Assessment and   Recommendations(SBAR). Information from the following report(s) SBAR, Kardex, Intake/Output, MAR, Recent Results, Med Rec Status and Cardiac Rhythm NSR was reviewed with the receiving nurse. Opportunity for questions and clarification was provided. Assessment completed upon patients arrival to unit and care assumed.

## 2022-01-30 NOTE — PROGRESS NOTES
Patient scooted down in bed allowing his wrist restraints to loosen. With the slack he reached up and pulled his oxygen off. He broke the tubing for his optiflow. Patient was hitting at staff and verbally abusive while Khushi Cedeño and Andrea Cho helped hold patients hands down and this nurse placed Non rebreather on patient. Respiratory tech JAG came and replaced optiflow tubing. Patient was adjusted in bed and restraints were tightened. Will continue to monitor.

## 2022-01-30 NOTE — PROGRESS NOTES
Patient resting in room. No signs or symptoms of distress. No changes in status. Bed in low position and call light/ personal items within reach. Will continue to monitor and give bedside shift report to oncoming day shift nurse.

## 2022-01-30 NOTE — PROGRESS NOTES
Patient transferred to unit with primary RN, Respiratory, and another RN. Patient transferred with upper dentures in mouth. Bottom dentures in pink dentures cup. Bracelet and green glasses.

## 2022-01-30 NOTE — PROGRESS NOTES
Patient agitated at start of shift. Managed to get out of mitts and pull of airvo. RN sitting outside of patient's room with baby monitor to closely monitor for de saturation. Airvo placed back on face and Ativan ordered and will be given at this time.

## 2022-01-30 NOTE — PROGRESS NOTES
Hospitalist Progress Note   Admit Date:  2022  5:56 PM   Name:  Omar Pina   Age:  82 y.o. Sex:  male  :  1935   MRN:  [id number]   Room:  [facility]    Presenting Complaint: No chief complaint on file. Reason(s) for Admission: COVID-19 [U07.1]     Hospital Course & Interval History:   Mr. Edin Augustin is an 81 y/o WM with a h/o dementia, TIA, pacemaker, AFib on Eliquis, Bipolar, MM on Revlimid who was admitted to our service on  with acute hypoxemic respiratory failure. Started on dexamethasone. Revlimid held, Oncology consulted and agreed. Developed thrombocytopenia which has since resolved. O2 needs incraesed from 6L to [address] max settings on  and then required Bipap on  AM. Transitioned back to Airvo  which he has tolerated so far. Still intermittently agitated, worse at night. Subjective (22): Airvo max settings w NRB, sats high 90s at rest. Eyes closed, mumbling that he needs a doctor. Disoriented but will open eyes and communicate. Agitated overnight. Unable to get reliable ROS. Assessment & Plan:   # Acute hypoxemic respiratory failure 2/2 COVID-19              - O2 increased to Airvo on , CXR with asymmetric infiltrates. Given Lasix x1 and Actemra, baricitinib d/c. Needed Bipap  AM but weaned by afternoon. Broad spectrum abx started on , vancomycin d/c , remains on Zosyn. Back to Airvo . Morphine and Ativan PRN are helpful for symptom management.      # Leukopenia              - Stable.      # Transaminitis              - COVID related? Increased today. Bili normal. Con't to trend.     # HypoK              - [id number].     # Thrombocytopenia               - 2/2 above. Resolved.     # Acute encephalopathy superimposed on dementia              - Likely secondary to [place]. Con't current meds.     # AFib              - Eliquis held since using Bipap and can't get PO meds, has pacer.  Lovenox for DVT ppx.     # Seizure d/o              - Keppra     # Multiple myeloma              - No Revlimid with acute infection, Onc saw and agreed. Dispo/Discharge Planning: Unclear. Diet:  ADULT DIET Regular  DVT PPx: Lovenox  Code status: Full Code    Hospital Problems as of 1/30/2022 Date Reviewed: 8/8/2018          Codes Class Noted - Resolved POA    Transaminitis ICD-10-CM: R74.01  ICD-9-CM: 790.4  1/27/2022 - Present Unknown        * (Principal) Acute respiratory failure with hypoxia ([facility] [address] ICD-10-CM: J96.01  ICD-9-CM: 518.81  1/23/2022 - Present Yes        Multiple myeloma ([facility] [address] ICD-10-CM: C90.00  ICD-9-CM: 203.00  1/23/2022 - Present Yes        Seizures (HCC) (Chronic) ICD-10-CM: R56.9  ICD-9-CM: 780.39  1/23/2022 - Present Yes        COVID-19 ICD-10-CM: U07.1  ICD-9-CM: 079.89  1/22/2022 - Present Unknown        A-fib (HCC) (Chronic) ICD-10-CM: I48.91  ICD-9-CM: 427.31  4/18/2020 - Present Yes        Leukopenia ICD-10-CM: B17.392  ICD-9-CM: 288.50  8/2/2017 - Present Unknown        Acute encephalopathy ICD-10-CM: G93.40  ICD-9-CM: 348.30  3/22/2015 - Present Yes        Anemia ICD-10-CM: D64.9  ICD-9-CM: 285.9  9/16/2014 - Present Yes    Overview Addendum 7/6/2017  5:01 PM by Enmanuel Kim MD     Recheck CBC. Objective:     Patient Vitals for the past 24 hrs:   Temp Pulse Resp BP SpO2   01/30/22 0716 97.9 °F (36.6 °C) 61 20 (!) 114/96 94 %   01/30/22 0658  68 20 (!) 113/99 92 %   01/30/22 0440 98.4 °F (36.9 °C) 71 22 121/74 99 %   01/30/22 0036 97.9 °F (36.6 °C) 69 22 129/73 99 %   01/29/22 2053     99 %   01/29/22 1957 97.9 °F (36.6 °C) 70 22 134/77 91 %   01/29/22 1542 97.7 °F (36.5 °C) 68 22 121/62 97 %   01/29/22 1205  70  105/60 90 %     Oxygen Therapy  O2 Sat (%): 94 % (01/30/22 0716)  Pulse via Oximetry: 69 beats per minute (01/29/22 0118)  O2 Device: Heated; Hi flow nasal cannula (01/30/22 0610)  Skin Assessment: Clean, dry, & intact (01/26/22 2220)  Skin Protection for O2 Device: Yes (01/26/22 1216)  Orientation: Bilateral (01/26/22 1216)  Location: Cheek (01/26/22 1216)  Interventions: Skin Barrier (01/26/22 1216)  O2 Flow Rate (L/min):  (60 l/min) (01/30/22 0610)  O2 Temperature: 87.8 °F (31 °C) (01/26/22 1233)  FIO2 (%):  (100%) (01/30/22 0610)    Estimated body mass index is 32.22 kg/m² as calculated from the following:    Height as of this encounter: 6' (1.829 m). Weight as of this encounter: 107.8 kg (237 lb 9.6 oz). Intake/Output Summary (Last 24 hours) at 1/30/2022 0858  Last data filed at 1/29/2022 0916  Gross per 24 hour   Intake    Output 650 ml   Net -650 ml         Physical Exam:   Blood pressure (!) 114/96, pulse 61, temperature 97.9 °F (36.6 °C), resp. rate 20, height 6' (1.829 m), weight 107.8 kg (237 lb 9.6 oz), SpO2 94 %. General:    Well nourished. Obese. Airvo/NRB. Head:  Normocephalic, atraumatic  Eyes:  Sclerae appear normal.  Pupils equally round. ENT:  Nares appear normal, no drainage. Moist oral mucosa  Neck:  No restricted ROM. Trachea midline   CV:   RRR. No m/r/g. No jugular venous distension. Lungs:   BB rales, otherwise clear anteriorly. Airvo max settings with NRB, sats high 90s at rest.   Abdomen: Bowel sounds present. Soft, nontender, nondistended. Extremities: No cyanosis or clubbing. No edema  Skin:     No rashes and normal coloration. Warm and dry. Neuro:  CN II-XII grossly intact. Sensation intact. Disoriented. Psych:  Normal mood and affect.       I have reviewed ordered lab tests and independently visualized imaging below:    Recent Labs:  Recent Results (from the past 48 hour(s))   METABOLIC PANEL, COMPREHENSIVE    Collection Time: 01/29/22  6:46 AM   Result Value Ref Range    Sodium 141 136 - 145 mmol/L    Potassium 4.6 3.5 - 5.1 mmol/L    Chloride 109 (H) 98 - 107 mmol/L    CO2 24 21 - 32 mmol/L    Anion gap 8 7 - 16 mmol/L    Glucose 84 65 - 100 mg/dL    BUN 25 (H) 8 - 23 MG/DL    Creatinine 1.04 0.8 - 1.5 MG/DL    GFR est AA >60 >60 ml/min/1.73m2    GFR est non-AA >60 >60 ml/min/1.73m2    Calcium 8.5 8.3 - 10.4 MG/DL    Bilirubin, total 0.9 0.2 - 1.1 MG/DL    ALT (SGPT) 100 (H) 12 - 65 U/L    AST (SGOT) 82 (H) 15 - 37 U/L    Alk. phosphatase 137 (H) 50 - 136 U/L    Protein, total 6.2 (L) 6.3 - 8.2 g/dL    Albumin 2.4 (L) 3.2 - 4.6 g/dL    Globulin 3.8 (H) 2.3 - 3.5 g/dL    A-G Ratio 0.6 (L) 1.2 - 3.5     VANCOMYCIN, RANDOM    Collection Time: 01/29/22  6:46 AM   Result Value Ref Range    Vancomycin, random 13.1 UG/ML   CBC WITH AUTOMATED DIFF    Collection Time: 01/29/22  6:46 AM   Result Value Ref Range    WBC 2.3 (L) 4.3 - 11.1 K/uL    RBC 4.55 4.23 - 5.6 M/uL    HGB 13.4 (L) 13.6 - 17.2 g/dL    HCT 39.7 (L) 41.1 - 50.3 %    MCV 87.3 79.6 - 97.8 FL    MCH 29.5 26.1 - 32.9 PG    MCHC 33.8 31.4 - 35.0 g/dL    RDW 14.1 11.9 - 14.6 %    PLATELET 560 116 - 717 K/uL    MPV 10.4 9.4 - 12.3 FL    ABSOLUTE NRBC 0.00 0.0 - 0.2 K/uL    NEUTROPHILS 53 43 - 78 %    LYMPHOCYTES 32 13 - 44 %    MONOCYTES 8 4.0 - 12.0 %    EOSINOPHILS 3 0.5 - 7.8 %    BASOPHILS 1 0.0 - 2.0 %    IMMATURE GRANULOCYTES 3 0.0 - 5.0 %    ABS. NEUTROPHILS 1.2 (L) 1.7 - 8.2 K/UL    ABS. LYMPHOCYTES 0.7 0.5 - 4.6 K/UL    ABS. MONOCYTES 0.2 0.1 - 1.3 K/UL    ABS. EOSINOPHILS 0.1 0.0 - 0.8 K/UL    ABS. BASOPHILS 0.0 0.0 - 0.2 K/UL    ABS. IMM.  GRANS. 0.1 0.0 - 0.5 K/UL    RBC COMMENTS SLIGHT  ANISOCYTOSIS + POIKILOCYTOSIS        RBC COMMENTS OCCASIONAL  BLADIMIR CELLS        WBC COMMENTS Result Confirmed By Smear      PLATELET COMMENTS ADEQUATE      DF AUTOMATED     METABOLIC PANEL, COMPREHENSIVE    Collection Time: 01/30/22  6:11 AM   Result Value Ref Range    Sodium 144 136 - 145 mmol/L    Potassium 3.8 3.5 - 5.1 mmol/L    Chloride 111 (H) 98 - 107 mmol/L    CO2 24 21 - 32 mmol/L    Anion gap 9 7 - 16 mmol/L    Glucose 92 65 - 100 mg/dL    BUN 26 (H) 8 - 23 MG/DL    Creatinine 1.07 0.8 - 1.5 MG/DL    GFR est AA >60 >60 ml/min/1.73m2    GFR est non-AA >60 >60 ml/min/1.73m2    Calcium 8.7 8.3 - 10.4 MG/DL Bilirubin, total 0.9 0.2 - 1.1 MG/DL    ALT (SGPT) 160 (H) 12 - 65 U/L    AST (SGOT) 119 (H) 15 - 37 U/L    Alk. phosphatase 150 (H) 50 - 136 U/L    Protein, total 6.0 (L) 6.3 - 8.2 g/dL    Albumin 2.3 (L) 3.2 - 4.6 g/dL    Globulin 3.7 (H) 2.3 - 3.5 g/dL    A-G Ratio 0.6 (L) 1.2 - 3.5         All Micro Results     Procedure Component Value Units Date/Time    BLOOD CULTURE [[id number]] Collected: 01/23/22 0026    Order Status: Completed Specimen: Blood Updated: 01/28/22 0711     Special Requests: --        RIGHT  Antecubital       Culture result: NO GROWTH 5 DAYS       BLOOD CULTURE [[id number]] Collected: 01/22/22 1919    Order Status: Completed Specimen: Blood Updated: 01/27/22 0722     Special Requests: --        NO SPECIAL REQUESTS  RIGHT  Antecubital       Culture result: NO GROWTH 5 DAYS       CULTURE, URINE [[id number]] Collected: 01/22/22 1841    Order Status: Completed Specimen: Cath Urine Updated: 01/25/22 0752     Special Requests: NO SPECIAL REQUESTS        Culture result: NO GROWTH 2 DAYS             Other Studies:  No results found.     Current Meds:  Current Facility-Administered Medications   Medication Dose Route Frequency    morphine injection 2 mg  2 mg IntraVENous Q3H PRN    piperacillin-tazobactam (ZOSYN) 4.5 g in 0.9% sodium chloride (MBP/ADV) 100 mL MBP  4.5 g IntraVENous Q8H    levETIRAcetam (KEPPRA) 750 mg in 0.9% sodium chloride 100 mL IVPB  750 mg IntraVENous Q12H    dexamethasone (DECADRON) 10 mg/mL injection 6 mg  6 mg IntraVENous Q24H    enoxaparin (LOVENOX) injection 40 mg  40 mg SubCUTAneous Q12H    benzonatate (TESSALON) capsule 100 mg  100 mg Oral TID PRN    [Held by provider] apixaban (ELIQUIS) tablet 2.5 mg  2.5 mg Oral Q12H    [Held by provider] clonazePAM (KlonoPIN) tablet 0.5 mg  0.5 mg Oral BID    [Held by provider] donepeziL (ARICEPT) tablet 10 mg  10 mg Oral DAILY    [Held by provider] memantine (NAMENDA) tablet 5 mg  5 mg Oral DAILY    QUEtiapine (SEROquel) tablet 25 mg  25 mg Oral QHS PRN    [Held by provider] sertraline (ZOLOFT) tablet 50 mg  50 mg Oral DAILY    albuterol-ipratropium (DUO-NEB) 2.5 MG-0.5 MG/3 ML  3 mL Nebulization Q4H PRN    cloNIDine HCL (CATAPRES) tablet 0.2 mg  0.2 mg Oral BID PRN    senna-docusate (PERICOLACE) 8.6-50 mg per tablet 2 Tablet  2 Tablet Oral BID PRN    loperamide (IMODIUM) capsule 4 mg  4 mg Oral BID PRN    sodium chloride (NS) flush 5-40 mL  5-40 mL IntraVENous Q8H    sodium chloride (NS) flush 5-40 mL  5-40 mL IntraVENous PRN    acetaminophen (TYLENOL) tablet 650 mg  650 mg Oral Q6H PRN    Or    acetaminophen (TYLENOL) suppository 650 mg  650 mg Rectal Q6H PRN    senna (SENOKOT) tablet 17.2 mg  2 Tablet Oral BID PRN    ondansetron (ZOFRAN) injection 4 mg  4 mg IntraVENous Q6H PRN       Signed:  Shiraz Vale MD    Part of this note may have been written by using a voice dictation software. The note has been proof read but may still contain some grammatical/other typographical errors.

## 2022-01-30 NOTE — PROGRESS NOTES
RR called after patient became agitated, pulled a soft mitt off and then ripped off his Airvo. He then slid out of bed onto the ground with staff present. He was off O2 and was briefly cyanotic. RT placed him on Bipap at 100% and he was initially in the low 80s. He was helped back to bed and sats have improved to high 80s to low 90s. He is awake but still on Bipap and visibly SOB. Transfer to ICU for ongoing Bipap and Precedex. I called and updated his daughter, Kirsten López. There is a high probability of acute organ impairment or life-threatening deterioration in the patient's condition from agitation and progressive acute hypoxemic respiratory failure and probable ARDS 2/2 COVID-19. Critical care interventions: Bipap at 100%, ABG, ICU transfer, Precedex  Total critical care time spent: 35 minutes. Time is indicative of direct patient attendance at bedside and on the patient's floor nearby. Includes time spent at bedside performing history and exam, performing chart review, discussing findings and treatment plan with patient and/or family, discussing patient with consultants and colleagues, ordering and reviewing pertinent laboratory and radiographic evaluations, and discussing patient with nursing staff. Time excludes procedures.     Merly Pettit MD

## 2022-01-30 NOTE — PROGRESS NOTES
Patient's IV beeping and RN when in to assess. Restarted and patient was resting with eyes closed. Within 20 seconds baby monitoring beeping and RN went back into assess patient and patient managed to get out of right mitt and restraint. Both legs hanging off the bed and sliding off the side. RN assisted patient to the floor. AIRVO was wripped off by patient and in half. Rapid response called. Patient placed on BIpap and assisted back to bed. Currently sating 90-92% with bipap mask on. MD at bedside with patient and patient to be transferred to the ICU. Patient has been restless and managing to get oxygen mask off (airvo/NRB/Bipap) for a few days now. Medication given as ordered to calm patient and works for a period of time and then he becomes aggressive with staff. ROM preferred with shift. Patient been NPO due to aspiration risks. Report given to ICU nurse.

## 2022-01-31 PROBLEM — C90.00 MULTIPLE MYELOMA (HCC): Chronic | Status: ACTIVE | Noted: 2022-01-01

## 2022-01-31 NOTE — PROGRESS NOTES
Patients daughter called for update, stated that she and her  will be driving up from [place] today and will arrive tomorrow afternoon to see the patient. She stated she would like to proceed with hospice/comfort care once they arrive.  Patient is allowed to have family visit per Dr. Danilo Watkins and Jose Ngo NP.

## 2022-01-31 NOTE — PROGRESS NOTES
SPEECH PATHOLOGY NOTE:    Speech therapy attempt this morning. Patient currently on Bipap and not able to participate in dysphagia evaluation. Will follow up at later time as medical status improves.      CINDY Pate, CCC-SLP  Speech Language Pathologist  Acute Rehabilitation Services  Contact: [place]

## 2022-01-31 NOTE — PROGRESS NOTES
PT Note:  Therapist is discontinuing physical therapy at this time due to transfer to unit. Mr. Joselyn Tucker physical therapy goals were not met. Please reorder PT when our services are again appropriate. Thank you.   Mouna Perry, PT, DPT  1/31/2022

## 2022-01-31 NOTE — PROGRESS NOTES
Occupational Therapy Note:  Therapist is discharging patient from OT at this time due to decline in medical status and transfer to ICU. Please reconsult OT when MD deems patient appropriate for continued services. Thank you.   Aime Neville, OTR/L

## 2022-01-31 NOTE — PROGRESS NOTES
01/31/22 0723   Oxygen Therapy   O2 Sat (%) 92 %   Pulse via Oximetry 58 beats per minute   O2 Device BIPAP   FIO2 (%) 100 %   Respiratory   Respiratory (WDL) X   Breath Sounds Bilateral Diminished   Cough Non-productive   CPAP/BIPAP   Device Mode S/T   Mask Type and Size Other (comment)   Skin Condition intact   PIP Observed 15 cm H20   IPAP (cm H2O) 15 cm H2O   EPAP (cm H2O) 10 cm H2O   Inspiratory Time (sec) 0.9 seconds   Vt Spont (ml) 630 ml   Ve Observed (l/min) 14 l/min   Backup Rate 20   Total RR (Spontaneous) 20 breaths per minute   Insp Rise Time (sec) 4   Leak (Estimated) 55 L/min   Pt's Home Machine No   Biomedical Check Performed Yes   Settings Verified Yes   Alarm Settings   High Pressure 30   Low Pressure 5   Low Ve 2   High Rate 50   Low Rate 5

## 2022-01-31 NOTE — PROGRESS NOTES
Bedside and verbal shift change report received from  [address] [place] [address] (offgoing nurse). Report included the following information SBAR, Kardex, ED Summary, Procedure Summary, Intake/Output, MAR, Accordion, Recent Results, Med Rec Status, Cardiac Rhythm Normal Sinus, Alarm Parameters  and Quality Measures.      Dual skin assessment completed at bedside: none (list pertinent skin assessment findings)    Dual verification of gtts completed (name of gtts verified): none

## 2022-01-31 NOTE — PROGRESS NOTES
Hospitalist Progress Note   Admit Date:  2022  5:56 PM   Name:  Omar Pina   Age:  82 y.o. Sex:  male  :  1935   MRN:  [id number]   Room:  [facility]/    Presenting Complaint: No chief complaint on file. Reason(s) for Admission: COVID-19 [U07.1]     Hospital Course & Interval History:   Mr. Edin Augustin is an 81 y/o WM with a h/o dementia, TIA, pacemaker, AFib on Eliquis, Bipolar, MM on Revlimid who was admitted to our service on  with acute hypoxemic respiratory failure. Started on dexamethasone. Revlimid held, Oncology consulted and agreed. Developed thrombocytopenia which has since resolved. O2 needs incraesed from 6L to [address] max settings on  and then required Bipap on  AM. Transitioned back to Airvo  which he has tolerated so far. Rapid response was called on  as patient was found lying on the floor without oxygen. Oxygen saturations dropped to 60s. He was placed on BiPAP and transferred to the ICU. Intensivist consulted. He is currently on Precedex drip as he is agitated. Subjective (22): Unable to obtain review of systems as patient is sedated due to agitation (currently on Precedex drip. )    Discussed goals of care with the patient's daughter, Ms. Megan Oakes over the phone today. Daughter lives in [place]. Confirmed CODE STATUS is DNR. Discussed options of hospice/comfort care. Daughter stated that she would like to discuss with other family members and get back to us. Plan to continue all active interventions until then. Assessment & Plan: This is a 82y Male with:    # Acute hypoxemic respiratory failure POA 2/2 COVID-19 pneumonia POA currently needing noninvasive positive pressure ventilation with BiPAP. Patient is currently on dexamethasone. Completed Tocilizumab. Broad spectrum abx started on , vancomycin d/c , remains on Zosyn. As patient is currently needing BIPAP, intensivist consulted. Appreciate recommendations.   Chest x-ray repeated this morning shows interval worsening of diffuse bilateral lung opacities. Goals of care were discussed with the patient's daughter over the phone. CODE STATUS confirmed as DNR. Hospice/comfort care discussed with the patient's daughter. She would like to discuss with other family members and get back to us. Plan to continue all active interventions until then.     # Leukopenia              - Stable.      # Transaminitis              - COVID related? Increased today. Bili normal. Con't to trend.     # HypoK              - EFERNYFP.     # Thrombocytopenia               - 2/2 above. Resolved.     # Acute encephalopathy superimposed on dementia              - Likely secondary to [place]. Currently on Precedex drip in the ICU. Wean as tolerated.     # AFib              - Eliquis held since using Bipap and can't get PO meds, has pacer. Lovenox for DVT ppx.     # Seizure d/o              - Keppra     # Multiple myeloma              - No Revlimid with acute infection, Oncology agrees with plan. Dispo/Discharge Planning: Goals of care were discussed with the patient's daughter over the phone. CODE STATUS confirmed as DNR. Hospice/comfort care discussed with the patient's daughter. She would like to discuss with other family members and get back to us. Plan to continue all active interventions until then. Hopefully, a decision will be made over the next 24 hours.     Diet:  ADULT DIET Regular  DVT PPx: Lovenox  Code status: DNR    Hospital Problems as of 1/31/2022 Date Reviewed: 8/8/2018          Codes Class Noted - Resolved POA    Transaminitis ICD-10-CM: R74.01  ICD-9-CM: 790.4  1/27/2022 - Present Yes        * (Principal) Acute respiratory failure with hypoxia ([facility] [address] ICD-10-CM: J96.01  ICD-9-CM: 518.81  1/23/2022 - Present Yes        Multiple myeloma (HCC) (Chronic) ICD-10-CM: C90.00  ICD-9-CM: 203.00  1/23/2022 - Present Yes        Seizures (HCC) (Chronic) ICD-10-CM: R56.9  ICD-9-CM: 780.39 1/23/2022 - Present Yes        COVID-19 ICD-10-CM: U07.1  ICD-9-CM: 079.89  1/22/2022 - Present Yes        A-fib (HCC) (Chronic) ICD-10-CM: I48.91  ICD-9-CM: 427.31  4/18/2020 - Present Yes        Leukopenia ICD-10-CM: D62.173  ICD-9-CM: 288.50  8/2/2017 - Present Yes        Acute encephalopathy ICD-10-CM: G93.40  ICD-9-CM: 348.30  3/22/2015 - Present Yes        Anemia ICD-10-CM: D64.9  ICD-9-CM: 285.9  9/16/2014 - Present Yes    Overview Addendum 7/6/2017  5:01 PM by Kevin Waters MD     Recheck CBC.                    Objective:     Patient Vitals for the past 24 hrs:   Temp Pulse Resp BP SpO2   01/31/22 1230  60 20 (!) 147/70 92 %   01/31/22 1200  60 21 (!) 154/76 92 %   01/31/22 1130  60 21 138/68 91 %   01/31/22 1100 (!) 96.5 °F (35.8 °C) 60 20 (!) 147/84 91 %   01/31/22 1030  60 20 (!) 150/79 92 %   01/31/22 1000  60 20 (!) 158/74 92 %   01/31/22 0930  60 21 (!) 150/83 92 %   01/31/22 0900  67 (!) 31 (!) 144/78 (!) 87 %   01/31/22 0830  64 21 (!) 159/77 92 %   01/31/22 0800  66 20 (!) 159/80 91 %   01/31/22 0730  62 20 (!) 155/78 92 %   01/31/22 0723     92 %   01/31/22 0700 96.9 °F (36.1 °C) 60 21 (!) 155/90 94 %   01/31/22 0601  60 23 (!) 166/83 93 %   01/31/22 0546  60 22 (!) 161/73 91 %   01/31/22 0531  60 28 (!) 167/78 91 %   01/31/22 0516  61 (!) 34 (!) 166/69 91 %   01/31/22 0501  66 25 (!) 161/79 93 %   01/31/22 0446  60 22 (!) 152/79 91 %   01/31/22 0434     95 %   01/31/22 0431  60 23 (!) 149/73 90 %   01/31/22 0416  60 21 (!) 146/82 93 %   01/31/22 0401  60 30 (!) 156/64 93 %   01/31/22 0346  60 21 (!) 159/78 91 %   01/31/22 0331  60 21 (!) 156/84 96 %   01/31/22 0316 96.8 °F (36 °C) 60 22 (!) 155/85 94 %   01/31/22 0301  60 22 (!) 156/84 95 %   01/31/22 0246  60 24 (!) 156/77 91 %   01/31/22 0231  60 23 (!) 170/81 94 %   01/31/22 0216  60 23 (!) 162/85 94 %   01/31/22 0201  60 17 (!) 170/81 94 %   01/31/22 0146  60 23 (!) 171/77 (!) 89 %   01/31/22 0131  60 22 (!) 173/78 (!) 89 %   01/31/22 0116  60 21 (!) 178/78 93 %   01/31/22 0101  60 24 (!) 189/77 93 %   01/31/22 0046  60 21 (!) 174/79 90 %   01/31/22 0031  60 23 (!) 179/81 95 %   01/31/22 0016  60 21 (!) 181/84 95 %   01/31/22 0001  60 22 (!) 179/83 93 %   01/30/22 2350  60      01/30/22 2346 (!) 96.1 °F (35.6 °C) 60 21 (!) 192/91 93 %   01/30/22 2335     (!) 88 %   01/30/22 2331  (!) 120 27 (!) 179/89 (!) 88 %   01/30/22 2316  60 21 (!) 181/85 90 %   01/30/22 2301  60 23 (!) 181/84 90 %   01/30/22 2246  60 23 (!) 190/83 (!) 89 %   01/30/22 2231  60 22 (!) 179/85 92 %   01/30/22 2216  66 23 (!) 180/80 (!) 88 %   01/30/22 2201  92 (!) 31 138/65 91 %   01/30/22 2146  71 19 (!) 155/63 90 %   01/30/22 2131  60 21 (!) 152/69 96 %   01/30/22 2116  60 17 (!) 157/72 97 %   01/30/22 2101  60 18 (!) 143/66 95 %   01/30/22 2046  60 17 134/63 92 %   01/30/22 2040     95 %   01/30/22 2031  60 20 129/62 92 %   01/30/22 2016  67 24 (!) 106/57 96 %   01/30/22 2001  60 22 (!) 107/55 92 %   01/30/22 1946 97.5 °F (36.4 °C) 60 22 (!) 97/56 93 %   01/30/22 1931  66 22 (!) 97/52 94 %   01/30/22 1901  60 27 (!) 104/54 91 %   01/30/22 1846  71 (!) 31 (!) 162/57 (!) 89 %   01/30/22 1831  71 30 (!) 158/69 91 %   01/30/22 1816  67 (!) 32 (!) 174/82 94 %   01/30/22 1801  75 30 (!) 183/77 96 %   01/30/22 1746  80 (!) 35 (!) 209/105 92 %   01/30/22 1731  67 (!) 35 (!) 172/71 95 %   01/30/22 1716  74 27 (!) 172/80 100 %   01/30/22 1701  60 21  96 %   01/30/22 1646  60 23  96 %   01/30/22 1631  60 25  96 %   01/30/22 1616  62 27  94 %   01/30/22 1601  60 27  95 %   01/30/22 1546  60 24  94 %   01/30/22 1531  66 23  96 %   01/30/22 1516  66 30 (!) 141/63 91 %   01/30/22 1501 98.5 °F (36.9 °C) 65 (!) 32 (!) 142/64 95 %   01/30/22 1455     97 %   01/30/22 1446  65 30 (!) 142/65 94 %   01/30/22 1431  70 (!) 40 (!) 163/77 92 %   01/30/22 1430 (!) 95.5 °F (35.3 °C)       01/30/22 1344  66 (!) 48 (!) 186/123      Oxygen Therapy  O2 Sat (%): 92 % (01/31/22 1230)  Pulse via Oximetry: 60 beats per minute (01/31/22 1230)  O2 Device: BIPAP (01/31/22 1100)  Skin Assessment: Clean, dry, & intact (01/31/22 1100)  Skin Protection for O2 Device: Yes (01/31/22 0700)  Orientation: Bilateral (01/30/22 1501)  Location: Cheek (01/30/22 1501)  Interventions: Mouth Care (01/31/22 1100)  O2 Flow Rate (L/min):  (60 l/min) (01/30/22 0610)  O2 Temperature: 87.8 °F (31 °C) (01/26/22 1233)  FIO2 (%): 100 % (01/31/22 1100)  ETCO2 (mmHg): 100 mmHg (01/30/22 1344)    Estimated body mass index is 33.02 kg/m² as calculated from the following:    Height as of this encounter: 6' (1.829 m). Weight as of this encounter: 110.5 kg (243 lb 8 oz). Intake/Output Summary (Last 24 hours) at 1/31/2022 1259  Last data filed at 1/31/2022 1100  Gross per 24 hour   Intake 2333.21 ml   Output 850 ml   Net 1483.21 ml         Physical Exam:   Blood pressure (!) 147/70, pulse 60, temperature (!) 96.5 °F (35.8 °C), resp. rate 20, height 6' (1.829 m), weight 110.5 kg (243 lb 8 oz), SpO2 92 %. General:    Elderly male, demented, needing BiPAP, intermittently agitated currently on Precedex drip,  Head:  Normocephalic, atraumatic  Eyes:  Sclerae appear normal.  Pupils equally round. ENT:  Nares appear normal, no drainage. Moist oral mucosa  Neck:  No restricted ROM. Trachea midline   CV:   RRR. No m/r/g. No jugular venous distension. Lungs:   Bibasilar crackles, no wheezing, diminished breath sounds,  Abdomen: Bowel sounds present. Soft, nontender, nondistended. Extremities: No cyanosis or clubbing. No edema  Skin:     No rashes and normal coloration. Warm and dry.     Neuro:   Patient sedated on Precedex drip, limiting neurological exam,  Psych:  Unable to assess,    I have reviewed ordered lab tests and independently visualized imaging below:    Recent Labs:  Recent Results (from the past 48 hour(s))   METABOLIC PANEL, COMPREHENSIVE    Collection Time: 01/30/22  6:11 AM   Result Value Ref Range    Sodium 144 136 - 145 mmol/L    Potassium 3.8 3.5 - 5.1 mmol/L    Chloride 111 (H) 98 - 107 mmol/L    CO2 24 21 - 32 mmol/L    Anion gap 9 7 - 16 mmol/L    Glucose 92 65 - 100 mg/dL    BUN 26 (H) 8 - 23 MG/DL    Creatinine 1.07 0.8 - 1.5 MG/DL    GFR est AA >60 >60 ml/min/1.73m2    GFR est non-AA >60 >60 ml/min/1.73m2    Calcium 8.7 8.3 - 10.4 MG/DL    Bilirubin, total 0.9 0.2 - 1.1 MG/DL    ALT (SGPT) 160 (H) 12 - 65 U/L    AST (SGOT) 119 (H) 15 - 37 U/L    Alk. phosphatase 150 (H) 50 - 136 U/L    Protein, total 6.0 (L) 6.3 - 8.2 g/dL    Albumin 2.3 (L) 3.2 - 4.6 g/dL    Globulin 3.7 (H) 2.3 - 3.5 g/dL    A-G Ratio 0.6 (L) 1.2 - 3.5     BLOOD GAS, ARTERIAL POC    Collection Time: 01/30/22  2:38 PM   Result Value Ref Range    Device: BIPAP MASK      FIO2 (POC) 100 %    pH (POC) 7.44 7.35 - 7.45      pCO2 (POC) 34.4 (L) 35 - 45 MMHG    pO2 (POC) 72 (L) 75 - 100 MMHG    HCO3 (POC) 23.1 22 - 26 MMOL/L    sO2 (POC) 95.0 95 - 98 %    Base deficit (POC) 0.5 mmol/L    Allens test (POC) Positive      Site RIGHT RADIAL      Specimen type (POC) ARTERIAL      Performed by Stan     Respiratory comment: 13.3    PROCALCITONIN    Collection Time: 01/30/22  3:18 PM   Result Value Ref Range    Procalcitonin 0.07 0.00 - 3.88 ng/mL   METABOLIC PANEL, COMPREHENSIVE    Collection Time: 01/31/22  3:35 AM   Result Value Ref Range    Sodium 143 138 - 145 mmol/L    Potassium 5.0 3.5 - 5.1 mmol/L    Chloride 113 (H) 98 - 107 mmol/L    CO2 25 21 - 32 mmol/L    Anion gap 5 (L) 7 - 16 mmol/L    Glucose 154 (H) 65 - 100 mg/dL    BUN 31 (H) 8 - 23 MG/DL    Creatinine 1.01 0.8 - 1.5 MG/DL    GFR est AA >60 >60 ml/min/1.73m2    GFR est non-AA >60 >60 ml/min/1.73m2    Calcium 8.6 8.3 - 10.4 MG/DL    Bilirubin, total 1.3 (H) 0.2 - 1.1 MG/DL    ALT (SGPT) 275 (H) 12 - 65 U/L    AST (SGOT) 192 (H) 15 - 37 U/L    Alk.  phosphatase 164 (H) 50 - 136 U/L    Protein, total 6.5 6.3 - 8.2 g/dL    Albumin 2.2 (L) 3.2 - 4.6 g/dL    Globulin 4.3 (H) 2.3 - 3.5 g/dL    A-G Ratio 0.5 (L) 1.2 - 3.5     CBC WITH AUTOMATED DIFF    Collection Time: 01/31/22  3:35 AM   Result Value Ref Range    WBC 4.1 (L) 4.3 - 11.1 K/uL    RBC 4.88 4.23 - 5.6 M/uL    HGB 14.5 13.6 - 17.2 g/dL    HCT 44.6 41.1 - 50.3 %    MCV 91.4 79.6 - 97.8 FL    MCH 29.7 26.1 - 32.9 PG    MCHC 32.5 31.4 - 35.0 g/dL    RDW 14.1 11.9 - 14.6 %    PLATELET 625 709 - 431 K/uL    MPV 10.5 9.4 - 12.3 FL    ABSOLUTE NRBC 0.00 0.0 - 0.2 K/uL    DF AUTOMATED      NEUTROPHILS 78 43 - 78 %    LYMPHOCYTES 12 (L) 13 - 44 %    MONOCYTES 4 4.0 - 12.0 %    EOSINOPHILS 3 0.5 - 7.8 %    BASOPHILS 1 0.0 - 2.0 %    IMMATURE GRANULOCYTES 2 0.0 - 5.0 %    ABS. NEUTROPHILS 3.2 1.7 - 8.2 K/UL    ABS. LYMPHOCYTES 0.5 0.5 - 4.6 K/UL    ABS. MONOCYTES 0.2 0.1 - 1.3 K/UL    ABS. EOSINOPHILS 0.1 0.0 - 0.8 K/UL    ABS. BASOPHILS 0.0 0.0 - 0.2 K/UL    ABS. IMM.  GRANS. 0.1 0.0 - 0.5 K/UL   C REACTIVE PROTEIN, QT    Collection Time: 01/31/22  3:35 AM   Result Value Ref Range    C-Reactive protein 1.1 (H) 0.0 - 0.9 mg/dL   MAGNESIUM    Collection Time: 01/31/22  3:35 AM   Result Value Ref Range    Magnesium 2.3 1.8 - 2.4 mg/dL   D DIMER    Collection Time: 01/31/22  9:02 AM   Result Value Ref Range    D DIMER 2.91 (H) <0.56 ug/ml(FEU)       All Micro Results     Procedure Component Value Units Date/Time    BLOOD CULTURE [[id number]] Collected: 01/23/22 0026    Order Status: Completed Specimen: Blood Updated: 01/28/22 0711     Special Requests: --        RIGHT  Antecubital       Culture result: NO GROWTH 5 DAYS       BLOOD CULTURE [[id number]] Collected: 01/22/22 1919    Order Status: Completed Specimen: Blood Updated: 01/27/22 0722     Special Requests: --        NO SPECIAL REQUESTS  RIGHT  Antecubital       Culture result: NO GROWTH 5 DAYS       CULTURE, URINE [[id number]] Collected: 01/22/22 1841    Order Status: Completed Specimen: Cath Urine Updated: 01/25/22 4781     Special Requests: NO SPECIAL REQUESTS        Culture result: NO GROWTH 2 DAYS             Other Studies:  XR CHEST SNGL V    Result Date: 1/31/2022  EXAM: CHEST X-RAY, 1 VIEW INDICATION: shortness of breath. COMPARISON: Chest x-ray 1/28/2022 TECHNIQUE: Single AP view of the chest was obtained. FINDINGS: Diffuse bilateral lung opacities, which appear worse compared to the prior study. Right port catheter terminates in the SVC. Dual chamber cardiac pacemaker. No pneumothorax or sizable effusion. The bones are unchanged. Interval worsening of diffuse bilateral lung opacities.        Current Meds:  Current Facility-Administered Medications   Medication Dose Route Frequency    morphine injection 2 mg  2 mg IntraVENous Q3H PRN    dexmedeTOMidine in 0.9 % NaCl (PRECEDEX) 400 mcg/100 mL (4 mcg/mL) infusion soln  0.1-1.5 mcg/kg/hr IntraVENous TITRATE    hydrALAZINE (APRESOLINE) 20 mg/mL injection 10 mg  10 mg IntraVENous Q6H PRN    piperacillin-tazobactam (ZOSYN) 4.5 g in 0.9% sodium chloride (MBP/ADV) 100 mL MBP  4.5 g IntraVENous Q8H    levETIRAcetam (KEPPRA) 750 mg in 0.9% sodium chloride 100 mL IVPB  750 mg IntraVENous Q12H    dexamethasone (DECADRON) 10 mg/mL injection 6 mg  6 mg IntraVENous Q24H    enoxaparin (LOVENOX) injection 40 mg  40 mg SubCUTAneous Q12H    benzonatate (TESSALON) capsule 100 mg  100 mg Oral TID PRN    [Held by provider] apixaban (ELIQUIS) tablet 2.5 mg  2.5 mg Oral Q12H    [Held by provider] clonazePAM (KlonoPIN) tablet 0.5 mg  0.5 mg Oral BID    [Held by provider] donepeziL (ARICEPT) tablet 10 mg  10 mg Oral DAILY    [Held by provider] memantine (NAMENDA) tablet 5 mg  5 mg Oral DAILY    QUEtiapine (SEROquel) tablet 25 mg  25 mg Oral QHS PRN    [Held by provider] sertraline (ZOLOFT) tablet 50 mg  50 mg Oral DAILY    albuterol-ipratropium (DUO-NEB) 2.5 MG-0.5 MG/3 ML  3 mL Nebulization Q4H PRN    cloNIDine HCL (CATAPRES) tablet 0.2 mg  0.2 mg Oral BID PRN    senna-docusate (PERICOLACE) 8.6-50 mg per tablet 2 Tablet  2 Tablet Oral BID PRN    loperamide (IMODIUM) capsule 4 mg  4 mg Oral BID PRN    sodium chloride (NS) flush 5-40 mL  5-40 mL IntraVENous Q8H    sodium chloride (NS) flush 5-40 mL  5-40 mL IntraVENous PRN    acetaminophen (TYLENOL) tablet 650 mg  650 mg Oral Q6H PRN    Or    acetaminophen (TYLENOL) suppository 650 mg  650 mg Rectal Q6H PRN    senna (SENOKOT) tablet 17.2 mg  2 Tablet Oral BID PRN    ondansetron (ZOFRAN) injection 4 mg  4 mg IntraVENous Q6H PRN       Signed:  Jarred Randall MD    Part of this note may have been written by using a voice dictation software. The note has been proof read but may still contain some grammatical/other typographical errors.

## 2022-01-31 NOTE — PROGRESS NOTES
Bedside and verbal shift change report received from  [address] (offgoing nurse). Report included the following information SBAR, Kardex, Intake/Output, MAR, Recent Results, Cardiac Rhythm NSR and Alarm Parameters .      Dual skin assessment completed at bedside: WDL (list pertinent skin assessment findings)    Dual verification of gtts completed (name of gtts verified): None

## 2022-01-31 NOTE — PROGRESS NOTES
Chart reviewed and pt discussed in am IDR s/p tx to ICU covid positive isolation. BIPAP 100%. Staff attempting to reach family and assisted to get daughter. Pt now DNR per his and family wishes. CM will follow for any assist. LOS 9 days.

## 2022-01-31 NOTE — CONSULTS
[facility]/ Henrique Adkins Umair ENCOUNTER :  1/31/2022    Date of Admission:  1/22/2022  Length of Stay: 9 days     The patient's chart has been reviewed and the chart has been discussed with nursing staff. Subjective: This patient has been seen and evaluated at the request of Dr. Suzan Peguero for worsening hypoxemia. Patient is a 82 y.o.  male presents with confusion. Initially covid 19 positive on 1/19. Admitted by the hospitalist and initially on 4 lpm but has continued to decompensate requiring more oxygen and worsening confusion. He has underlying Multiple Myeloma on revlimid, Alzheimer dementia on aricept , namenda , zoloft, Hx TIA, Atrial fibrillation on eliquis, pacemaker placement/bradycardia, seizure d/o on keppra, hearing loss, and debility. CRP 12. Given Decadron. Hem/onc consulted and revlimid held. BiPAP was initiated and he was transferred to the ICU. We were consulted for covid PNA and worsening acute respiratory failure.      POA/surrogate decision maker:  DaughterAixa Si -- [phone]    Past Surgical History:   Procedure Laterality Date    HX APPENDECTOMY      HX COLONOSCOPY  MULTIPLE OVER THE YEARS    NO CANCER    HX OTHER SURGICAL  AGE 23    COLONIC POLYP REMOVAL    HX VASCULAR ACCESS        Social History     Tobacco Use    Smoking status: Former Smoker     Packs/day: 2.00     Years: 16.00     Pack years: 32.00     Types: Cigarettes    Smokeless tobacco: Never Used    Tobacco comment: QUIT AGE 37   Substance Use Topics    Alcohol use: No      Family History   Problem Relation Age of Onset    Diabetes Mother         COMPLICATIONS OF DM    Cancer Mother     Heart Disease Mother     Lung Disease Father         BLACK LUNG    Dementia Sister     Heart Disease Brother         \"OLD AGE\"    Other Son         [place], [place]    Other Daughter         1 ESTRANGED, [place], 1 IN [place]      No Known Allergies   Prior to Admission Medications   Prescriptions Last Dose Informant Patient Reported? Taking? QUEtiapine (SEROquel) 25 mg tablet   No No   Sig: Take 1 Tab by mouth nightly as needed for Other (confusion/ sleep aid). acetaminophen (TYLENOL) 325 mg tablet   No No   Sig: Take 2 Tabs by mouth every six (6) hours as needed for Pain or Fever. albuterol (PROVENTIL VENTOLIN) 2.5 mg /3 mL (0.083 %) nebu   No No   Sig: 3 mL by Nebulization route every four to six (4-6) hours as needed for Other (Cough, SOB, wheezing). apixaban (Eliquis) 2.5 mg tablet   No No   Sig: Take 1 Tab by mouth two (2) times a day. clonazePAM (KlonoPIN) 0.5 mg tablet   No No   Sig: Take 1 Tab by mouth two (2) times a day. Max Daily Amount: 1 mg.   donepezil (ARICEPT) 10 mg tablet   No No   Sig: Take 1 Tab by mouth daily. ergocalciferol (ERGOCALCIFEROL) 1,250 mcg (50,000 unit) capsule   No No   Sig: Take 1 Cap by mouth every seven (7) days. lenalidomide (Revlimid) 10 mg cap   No No   Sig: TAKE 1 CAPSULE BY MOUTH  DAILY FOR 21 DAYS ON, THEN  7 DAYS OFF   levETIRAcetam (KEPPRA) 750 mg tablet   No No   Sig: Take 1 Tab by mouth two (2) times a day. loperamide (IMMODIUM) 2 mg tablet   No No   Sig: Take 4 mg by mouth after first loose stool- then take 2 mg after each additional loose BM  Maximum 8 tablets in 24 hours   lovastatin (MEVACOR) 40 mg tablet   No No   Sig: Take 1 Tab by mouth nightly. Patient taking differently: Take 20 mg by mouth nightly. memantine (NAMENDA) 10 mg tablet   Yes No   sertraline (ZOLOFT) 50 mg tablet   No No   Sig: Take 1 Tab by mouth daily.       Facility-Administered Medications: None       MEDS SCHEDULED:    Current Facility-Administered Medications   Medication Dose Route Frequency    morphine injection 2 mg  2 mg IntraVENous Q3H PRN    dexmedeTOMidine in 0.9 % NaCl (PRECEDEX) 400 mcg/100 mL (4 mcg/mL) infusion soln  0.1-1.5 mcg/kg/hr IntraVENous TITRATE    hydrALAZINE (APRESOLINE) 20 mg/mL injection 10 mg  10 mg IntraVENous Q6H PRN    piperacillin-tazobactam (ZOSYN) 4.5 g in 0.9% sodium chloride (MBP/ADV) 100 mL MBP  4.5 g IntraVENous Q8H    levETIRAcetam (KEPPRA) 750 mg in 0.9% sodium chloride 100 mL IVPB  750 mg IntraVENous Q12H    dexamethasone (DECADRON) 10 mg/mL injection 6 mg  6 mg IntraVENous Q24H    enoxaparin (LOVENOX) injection 40 mg  40 mg SubCUTAneous Q12H    benzonatate (TESSALON) capsule 100 mg  100 mg Oral TID PRN    [Held by provider] apixaban (ELIQUIS) tablet 2.5 mg  2.5 mg Oral Q12H    [Held by provider] clonazePAM (KlonoPIN) tablet 0.5 mg  0.5 mg Oral BID    [Held by provider] donepeziL (ARICEPT) tablet 10 mg  10 mg Oral DAILY    [Held by provider] memantine (NAMENDA) tablet 5 mg  5 mg Oral DAILY    QUEtiapine (SEROquel) tablet 25 mg  25 mg Oral QHS PRN    [Held by provider] sertraline (ZOLOFT) tablet 50 mg  50 mg Oral DAILY    albuterol-ipratropium (DUO-NEB) 2.5 MG-0.5 MG/3 ML  3 mL Nebulization Q4H PRN    cloNIDine HCL (CATAPRES) tablet 0.2 mg  0.2 mg Oral BID PRN    senna-docusate (PERICOLACE) 8.6-50 mg per tablet 2 Tablet  2 Tablet Oral BID PRN    loperamide (IMODIUM) capsule 4 mg  4 mg Oral BID PRN    sodium chloride (NS) flush 5-40 mL  5-40 mL IntraVENous Q8H    sodium chloride (NS) flush 5-40 mL  5-40 mL IntraVENous PRN    acetaminophen (TYLENOL) tablet 650 mg  650 mg Oral Q6H PRN    Or    acetaminophen (TYLENOL) suppository 650 mg  650 mg Rectal Q6H PRN    senna (SENOKOT) tablet 17.2 mg  2 Tablet Oral BID PRN    ondansetron (ZOFRAN) injection 4 mg  4 mg IntraVENous Q6H PRN         Review of Systems  Review of systems not obtained due to patient factors. Objective:     Vitals:    01/31/22 0730 01/31/22 0800 01/31/22 0830 01/31/22 0900   BP: (!) 155/78 (!) 159/80 (!) 159/77 (!) 144/78   Pulse: 62 66 64 67   Resp: 20 20 21 (!) 31   Temp:       SpO2: 92% 91% 92% (!) 87%   Weight:       Height:         No intake/output data recorded.   01/29 1901 - 01/31 0700  In: 2333.2 [I.V.:2333.2]  Out: 650 [Urine:650]  PHYSICAL EXAM Physical Exam:   General:  Elderly, BiPAP. Eyes:  Conjunctivae/corneas clear. Nose: Nares patent and moist.    Mouth/Throat: Lips, mucosa, and tongue pink and intact. Neck: Supple, symmetrical.   Respiratory:   Diminished bases to auscultation bilaterally on BiPAP   Cardiovascular:  Regular rate and rhythm, S1, S2, no murmur, click, rub or gallop. Telemetry monitor: NSR   GI:   Abdomen soft, non-tender. Bowel sounds active X 4 Q. No masses,     Musculoskeletal: Extremities symmetrical, atraumatic, no cyanosis, no edema. Skin: Skin color, texture, turgor normal. No rashes or lesions       Neurologic:  opens his eyes, combative. ACTIVITY: limited  NUTRITION:npo    On precedex     IMAGES: Results from Hospital Encounter encounter on 07/25/20    CT HEAD WO CONT    Impression  IMPRESSION:  Negative for acute intracranial abnormality. Chronic changes. Results from [place] encounter on 09/16/14    MRI BRAIN WO CONT    Impression  :    1. No acute infarction. 2. Punctate signal abnormality in the region of the left precentral gyrus  measuring approximately 5 mm in diameter. This may be a very small cortical  hemorrhage. There is no associated mass effect on the brain. Consider a short  followup head CT in several weeks to confirm resolution of this finding, unless  earlier evaluation is indicated clinically. [facility]  The critical results contained in this report were communicated to the  hospitalist, Dr. Cris Muller by me on 9/17/2014 at 9:35 AM.       No results found for this or any previous visit.        LAB  Recent Labs     01/31/22  0335 01/29/22  0646   WBC 4.1* 2.3*   HGB 14.5 13.4*   HCT 44.6 39.7*    227     Recent Labs     01/31/22  0335 01/30/22  0611 01/29/22  0646    144 141   K 5.0 3.8 4.6   * 111* 109*   CO2 25 24 24   * 92 84   BUN 31* 26* 25*   CREA 1.01 1.07 1.04   MG 2.3  --   --      No results for input(s): LCAD, LAC in the last 72 hours.  ABG:     Recent Labs     01/30/22  1438   PHI 7.44   PCO2I 34.4*   PO2I 72*   HCO3I 23.1   FIO2I 100     Cultures:   No results for input(s): SDES, CULT in the last 72 hours. Assessment/Plan:       Acute respiratory failure with hypoxia ([facility] [address] (1/23/2022)  On BiPAP 100% FiO2  Family confirms DNR. Advised to come and visit when able. On decadron  Ongoing encephalopathy, now on precedex       Anemia (9/16/2014)  stable      Acute encephalopathy (3/22/2015)  On precedex      A-fib ([facility] [address] (4/18/2020)  Controlled, eliquis on hold      COVID-19 (1/22/2022)  With worsening hypoxemia      Multiple myeloma ([facility] [address] (1/23/2022)  Chronic, hem/onc       Seizures ([facility] [address] (1/23/2022)  On keppra      Prophylaxis: PPI, DVT  Full Code. His daughter states that his living will states that he would not want aggressive measures. She is in agreement with changing him to DNR. Will continue support but will not intubate if he decompensates. POA/surrogate decision maker: DaughterMarita Call -- [phone]. Pinky Short NP-C    More than 50% of time documented was spent in face-to-face contact with the patient and in the care of the patient on the floor/unit where the patient is located. I have spoken with and examined the patient. I agree with the above assessment and plan as documented.     Gen: on BIPAP, agitated   Lungs:  Decreased   Heart:  RRR with no Murmur/Rubs/Gallops  Abd: soft, NT     *HHE is now DNR, continue BIPAP, wean as tolerated ,if not improvement soon ,comfort care would be appropriate   Theta MD Ezequiel

## 2022-01-31 NOTE — PROGRESS NOTES
This was a visit to a covid patient. I had prayer for the patient outside of the patient's room and I did not enter the room. I received updated information from the patient's nurse concerning this patient's needs. I will continue to follow-up with this patient and offer assistance and prayer.           Pedro Campos, [address] [facility]

## 2022-02-01 NOTE — PROGRESS NOTES
SPEECH PATHOLOGY NOTE:    Patient remains on Bipap this morning. Per report, family en route and plans for transition to comfort care. Speech to sign off. Please consider re-consult if additional services are indicated at a later time/date. Thank you for the opportunity to participate in his care.      CINDY Walker, CCC-SLP  Speech Language Pathologist  [facility]ute [facility]  Contact: Rabia

## 2022-02-01 NOTE — PROGRESS NOTES
TRANSFER - IN REPORT:    Verbal report received from ICU RN on Jose Lush  being received from ICU for routine progression of care      Report consisted of patients Situation, Background, Assessment and   Recommendations(SBAR). Information from the following report(s) SBAR, Kardex and MAR was reviewed with the receiving nurse. Opportunity for questions and clarification was provided. Assessment to be completed upon patients arrival to unit and care assumed. Patient not on floor at this time.

## 2022-02-01 NOTE — PROGRESS NOTES
Hospitalist Progress Note   Admit Date:  2022  5:56 PM   Name:  Estephania Multani   Age:  82 y.o. Sex:  male  :  1935   MRN:  [id number]   Room:  [facility]/    Presenting Complaint: No chief complaint on file. Reason(s) for Admission: COVID-19 [U07.1]     Hospital Course & Interval History:   Mr. Hitesh Valenzuela is an 83 y/o WM with a h/o dementia, TIA, pacemaker, AFib on Eliquis, Bipolar, MM on Revlimid who was admitted to our service on  with acute hypoxemic respiratory failure. Started on dexamethasone. Revlimid held, Oncology consulted and agreed. Developed thrombocytopenia which has since resolved. O2 needs incraesed from 6L to [address] max settings on  and then required Bipap on  AM. Transitioned back to Airvo  which he has tolerated so far. Rapid response was called on  as patient was found lying on the floor without oxygen. Oxygen saturations dropped to 60s. He was placed on BiPAP and transferred to the ICU. Intensivist consulted. He is currently on Precedex drip as he is agitated. Goals of care were discussed with the patient's daughter at bedside today. Agree with hospice/comfort care. All active interventions discontinued and hospice care initiated. Subjective (22): Unable to obtain review of systems as patient is sedated due to agitation (currently on Precedex drip. )    Patient is still needing BiPAP to maintain saturations greater than 88%. Started on low-dose Levophed as he had hypotension. Patient's daughter is here from [place]. Discussed goals of care. Recommended hospice given overall poor quality of life. Daughter agrees with hospice/comfort care. All active interventions discontinued and hospice/comfort care initiated. Assessment & Plan: This is a 82y Male with:    Hospice/comfort care  All active interventions discontinued. Comfort care initiated.     The following problems were taken care of prior to making the patient hospice/comfort care:     # Acute hypoxemic respiratory failure POA 2/2 COVID-19 pneumonia POA currently needing noninvasive positive pressure ventilation with BiPAP. BiPAP weaned to nasal cannula for comfort.      # Leukopenia                 # Transaminitis              - COVID related?     # HypoK                # Thrombocytopenia                  # Acute encephalopathy superimposed on dementia              - Likely secondary to [place]. Currently on Precedex drip in the ICU. # AFib                 # Seizure d/o             # Multiple myeloma        Patient is at risk of imminent death. Diet:  ADULT DIET Regular  DVT PPx: Lovenox  Code status: DNR    Hospital Problems as of 2/1/2022 Date Reviewed: 8/8/2018          Codes Class Noted - Resolved POA    Transaminitis ICD-10-CM: R74.01  ICD-9-CM: 790.4  1/27/2022 - Present Yes        * (Principal) Acute respiratory failure with hypoxia ([facility] [address] ICD-10-CM: J96.01  ICD-9-CM: 518.81  1/23/2022 - Present Yes        Multiple myeloma (HCC) (Chronic) ICD-10-CM: C90.00  ICD-9-CM: 203.00  1/23/2022 - Present Yes        Seizures (HCC) (Chronic) ICD-10-CM: R56.9  ICD-9-CM: 780.39  1/23/2022 - Present Yes        COVID-19 ICD-10-CM: U07.1  ICD-9-CM: 079.89  1/22/2022 - Present Yes        A-fib (HCC) (Chronic) ICD-10-CM: I48.91  ICD-9-CM: 427.31  4/18/2020 - Present Yes        Leukopenia ICD-10-CM: W63.278  ICD-9-CM: 288.50  8/2/2017 - Present Yes        Acute encephalopathy ICD-10-CM: G93.40  ICD-9-CM: 348.30  3/22/2015 - Present Yes        Anemia ICD-10-CM: D64.9  ICD-9-CM: 285.9  9/16/2014 - Present Yes    Overview Addendum 7/6/2017  5:01 PM by Anthony Cardozo MD     Recheck CBC.                    Objective:     Patient Vitals for the past 24 hrs:   Temp Pulse Resp BP SpO2   02/01/22 1145  (!) 144 (!) 33 (!) 98/52 90 %   02/01/22 1130  63 (!) 32 (!) 103/55 91 %   02/01/22 1115  66 (!) 35 (!) 104/50 90 %   02/01/22 1100 98.2 °F (36.8 °C) 62 (!) 34 (!) 104/54 (!) 88 %   02/01/22 1045  60 28 (!) 111/57 (!) 89 %   02/01/22 1030  (!) 117 (!) 35 (!) 124/59 (!) 88 %   02/01/22 1015  93 (!) 39 (!) 96/53 90 %   02/01/22 1000  73 (!) 32 (!) 114/56 91 %   02/01/22 0945  72 (!) 32 (!) 98/54 92 %   02/01/22 0930  72 30 (!) 112/56 94 %   02/01/22 0915  78 (!) 31 (!) 149/71 96 %   02/01/22 0900  77 30 (!) 83/51 91 %   02/01/22 0845  69 27 (!) 92/50 91 %   02/01/22 0830  63 26 (!) 83/46 91 %   02/01/22 0815  70 25 (!) 76/47 91 %   02/01/22 0800  65 24 (!) 78/53 91 %   02/01/22 0745  68 25 (!) 77/42 91 %   02/01/22 0734  61 (!) 31 (!) 83/45 92 %   02/01/22 0730  70 25 (!) 65/31 90 %   02/01/22 0725  68 25 (!) 80/43 (!) 89 %   02/01/22 0724     (!) 88 %   02/01/22 0715  69 23 (!) 72/49 90 %   02/01/22 0700 97.2 °F (36.2 °C) 63 24 (!) 86/47 91 %   02/01/22 0320     90 %   02/01/22 0200  77 (!) 36  90 %   02/01/22 0145  69 25 (!) 103/56 (!) 88 %   02/01/22 0130  69 26 (!) 107/55 (!) 87 %   02/01/22 0115  70 28 (!) 89/50 90 %   02/01/22 0100 (!) 96 °F (35.6 °C) 66 21 (!) 90/51 91 %   02/01/22 0045  68 22  91 %   02/01/22 0030  65 21 (!) 99/54 94 %   02/01/22 0015  64 21  94 %   02/01/22 0000  62 22 (!) 96/53 93 %   01/31/22 2351     92 %   01/31/22 2345  65 21  91 %   01/31/22 2330  67 23 (!) 81/52 91 %   01/31/22 2315  71 28  90 %   01/31/22 2300  96 10 (!) 95/53 92 %   01/31/22 2245  65   92 %   01/31/22 2230  62  (!) 85/53 93 %   01/31/22 2215  65 19 (!) 69/48 91 %   01/31/22 2200  66 24  91 %   01/31/22 2145  62 24  92 %   01/31/22 2130  60 21 (!) 70/48 92 %   01/31/22 2115  64 23  92 %   01/31/22 2100  63 24 (!) 94/56 91 %   01/31/22 2045  60 21  95 %   01/31/22 2030  60 24 102/64 94 %   01/31/22 2015  67 21  95 %   01/31/22 2000  71 29 (!) 98/58 94 %   01/31/22 1945  72 23  (!) 88 %   01/31/22 1930  65 24 (!) 89/56 92 %   01/31/22 1915  100 (!) 35  (!) 88 %   01/31/22 1900 (!) 94.2 °F (34.6 °C) 60 18 100/64 90 %   01/31/22 1845  62 20  (!) 89 %   01/31/22 1830  64 20 99/64 90 %   01/31/22 1815  62 20  (!) 89 %   01/31/22 1800  68 20 96/63 (!) 88 %   01/31/22 1745  60 20  (!) 89 %   01/31/22 1730  62 19 (!) 89/56 90 %   01/31/22 1715  76 21  90 %   01/31/22 1700  60 20 (!) 97/57 91 %   01/31/22 1645  60 20  91 %   01/31/22 1630  60 19 (!) 103/55 91 %   01/31/22 1615  66 19  91 %   01/31/22 1600  61 20 (!) 104/56 90 %   01/31/22 1545  61 22  90 %   01/31/22 1530  66 20 104/62 90 %   01/31/22 1500 96.8 °F (36 °C) 62 20 103/66 92 %   01/31/22 1448  62 23 111/72 (!) 89 %   01/31/22 1433     (!) 87 %   01/31/22 1430  60 23  91 %   01/31/22 1400  60 19 (!) 148/72 94 %   01/31/22 1330  60 21 (!) 155/72 94 %     Oxygen Therapy  O2 Sat (%): 90 % (02/01/22 1145)  Pulse via Oximetry: 67 beats per minute (02/01/22 1145)  O2 Device: BIPAP (02/01/22 1100)  Skin Assessment: Clean, dry, & intact (02/01/22 1100)  Skin Protection for O2 Device: Yes (02/01/22 1100)  Orientation: Bilateral (01/30/22 1501)  Location: Cheek (01/30/22 1501)  Interventions: Mouth Care (02/01/22 0700)  O2 Flow Rate (L/min):  (60 l/min) (01/30/22 0610)  O2 Temperature: 87.8 °F (31 °C) (02/01/22 0724)  FIO2 (%): 100 % (02/01/22 0724)  ETCO2 (mmHg): 100 mmHg (01/30/22 1344)    Estimated body mass index is 33.63 kg/m² as calculated from the following:    Height as of this encounter: 6' (1.829 m). Weight as of this encounter: 112.5 kg (248 lb). Intake/Output Summary (Last 24 hours) at 2/1/2022 1302  Last data filed at 2/1/2022 0416  Gross per 24 hour   Intake 737.26 ml   Output 600 ml   Net 137.26 ml         Physical Exam:   Blood pressure (!) 98/52, pulse (!) 144, temperature 98.2 °F (36.8 °C), resp. rate (!) 33, height 6' (1.829 m), weight 112.5 kg (248 lb), SpO2 90 %.   General:    Elderly male, demented, needing BiPAP, intermittently agitated currently on Precedex drip,  Head:  Normocephalic, atraumatic  Eyes:  Sclerae appear normal.  Pupils equally round.  ENT:  Nares appear normal, no drainage. Moist oral mucosa  Neck:  No restricted ROM. Trachea midline   CV:   RRR. No m/r/g. No jugular venous distension. Lungs:   Bibasilar crackles, no wheezing, diminished breath sounds,  Abdomen: Bowel sounds present. Soft, nontender, nondistended. Extremities: No cyanosis or clubbing. No edema  Skin:     No rashes and normal coloration. Warm and dry. Neuro:   Patient sedated on Precedex drip, limiting neurological exam,  Psych:  Unable to assess,    I have reviewed ordered lab tests and independently visualized imaging below:    Recent Labs:  Recent Results (from the past 48 hour(s))   BLOOD GAS, ARTERIAL POC    Collection Time: 01/30/22  2:38 PM   Result Value Ref Range    Device: BIPAP MASK      FIO2 (POC) 100 %    pH (POC) 7.44 7.35 - 7.45      pCO2 (POC) 34.4 (L) 35 - 45 MMHG    pO2 (POC) 72 (L) 75 - 100 MMHG    HCO3 (POC) 23.1 22 - 26 MMOL/L    sO2 (POC) 95.0 95 - 98 %    Base deficit (POC) 0.5 mmol/L    Allens test (POC) Positive      Site RIGHT RADIAL      Specimen type (POC) ARTERIAL      Performed by Stan     Respiratory comment: 13.3    PROCALCITONIN    Collection Time: 01/30/22  3:18 PM   Result Value Ref Range    Procalcitonin 0.07 0.00 - 1.92 ng/mL   METABOLIC PANEL, COMPREHENSIVE    Collection Time: 01/31/22  3:35 AM   Result Value Ref Range    Sodium 143 138 - 145 mmol/L    Potassium 5.0 3.5 - 5.1 mmol/L    Chloride 113 (H) 98 - 107 mmol/L    CO2 25 21 - 32 mmol/L    Anion gap 5 (L) 7 - 16 mmol/L    Glucose 154 (H) 65 - 100 mg/dL    BUN 31 (H) 8 - 23 MG/DL    Creatinine 1.01 0.8 - 1.5 MG/DL    GFR est AA >60 >60 ml/min/1.73m2    GFR est non-AA >60 >60 ml/min/1.73m2    Calcium 8.6 8.3 - 10.4 MG/DL    Bilirubin, total 1.3 (H) 0.2 - 1.1 MG/DL    ALT (SGPT) 275 (H) 12 - 65 U/L    AST (SGOT) 192 (H) 15 - 37 U/L    Alk.  phosphatase 164 (H) 50 - 136 U/L    Protein, total 6.5 6.3 - 8.2 g/dL    Albumin 2.2 (L) 3.2 - 4.6 g/dL    Globulin 4.3 (H) 2.3 - 3.5 g/dL    A-G Ratio 0.5 (L) 1.2 - 3.5     CBC WITH AUTOMATED DIFF    Collection Time: 01/31/22  3:35 AM   Result Value Ref Range    WBC 4.1 (L) 4.3 - 11.1 K/uL    RBC 4.88 4.23 - 5.6 M/uL    HGB 14.5 13.6 - 17.2 g/dL    HCT 44.6 41.1 - 50.3 %    MCV 91.4 79.6 - 97.8 FL    MCH 29.7 26.1 - 32.9 PG    MCHC 32.5 31.4 - 35.0 g/dL    RDW 14.1 11.9 - 14.6 %    PLATELET 449 070 - 292 K/uL    MPV 10.5 9.4 - 12.3 FL    ABSOLUTE NRBC 0.00 0.0 - 0.2 K/uL    DF AUTOMATED      NEUTROPHILS 78 43 - 78 %    LYMPHOCYTES 12 (L) 13 - 44 %    MONOCYTES 4 4.0 - 12.0 %    EOSINOPHILS 3 0.5 - 7.8 %    BASOPHILS 1 0.0 - 2.0 %    IMMATURE GRANULOCYTES 2 0.0 - 5.0 %    ABS. NEUTROPHILS 3.2 1.7 - 8.2 K/UL    ABS. LYMPHOCYTES 0.5 0.5 - 4.6 K/UL    ABS. MONOCYTES 0.2 0.1 - 1.3 K/UL    ABS. EOSINOPHILS 0.1 0.0 - 0.8 K/UL    ABS. BASOPHILS 0.0 0.0 - 0.2 K/UL    ABS. IMM.  GRANS. 0.1 0.0 - 0.5 K/UL   C REACTIVE PROTEIN, QT    Collection Time: 01/31/22  3:35 AM   Result Value Ref Range    C-Reactive protein 1.1 (H) 0.0 - 0.9 mg/dL   MAGNESIUM    Collection Time: 01/31/22  3:35 AM   Result Value Ref Range    Magnesium 2.3 1.8 - 2.4 mg/dL   D DIMER    Collection Time: 01/31/22  9:02 AM   Result Value Ref Range    D DIMER 2.91 (H) <0.56 ug/ml(FEU)       All Micro Results     Procedure Component Value Units Date/Time    BLOOD CULTURE [[id number]] Collected: 01/23/22 0026    Order Status: Completed Specimen: Blood Updated: 01/28/22 0711     Special Requests: --        RIGHT  Antecubital       Culture result: NO GROWTH 5 DAYS       BLOOD CULTURE [[id number]] Collected: 01/22/22 1919    Order Status: Completed Specimen: Blood Updated: 01/27/22 0722     Special Requests: --        NO SPECIAL REQUESTS  RIGHT  Antecubital       Culture result: NO GROWTH 5 DAYS       CULTURE, URINE [[id number]] Collected: 01/22/22 1841    Order Status: Completed Specimen: Cath Urine Updated: 01/25/22 0752     Special Requests: NO SPECIAL REQUESTS        Culture result: NO GROWTH 2 DAYS             Other Studies:  DUPLEX LOWER EXT VENOUS BILAT    Result Date: 1/31/2022  BILATERAL LOWER EXTREMITY DEEP VENOUS SONOGRAPHY: CLINICAL HISTORY: Leg pain and shortness of breath with Covid . FINDINGS: Multiple images from real time ultrasound evaluation of the deep venous system of both legs demonstrate normal venous flow in the posterior tibial, popliteal, and superficial and common femoral veins. Normal compressibility was demonstrated. Flow was also documented in the proximal saphenous and deep femoral veins. No intraluminal echogenic material was seen to suggest the presence of nonobstructive thrombus. NO ULTRASOUND EVIDENCE OF DEEP VENOUS THROMBOSIS IN EITHER LEG.        Current Meds:  Current Facility-Administered Medications   Medication Dose Route Frequency    famotidine (PF) (PEPCID) 20 mg in 0.9% sodium chloride 10 mL injection  20 mg IntraVENous DAILY    NOREPINephrine (LEVOPHED) 4 mg in 5% dextrose 250 mL infusion  0.5-30 mcg/min IntraVENous TITRATE    metoprolol (LOPRESSOR) injection 5 mg  5 mg IntraVENous Q6H PRN    morphine injection 2 mg  2 mg IntraVENous Q3H PRN    dexmedeTOMidine in 0.9 % NaCl (PRECEDEX) 400 mcg/100 mL (4 mcg/mL) infusion soln  0.1-1.5 mcg/kg/hr IntraVENous TITRATE    hydrALAZINE (APRESOLINE) 20 mg/mL injection 10 mg  10 mg IntraVENous Q6H PRN    piperacillin-tazobactam (ZOSYN) 4.5 g in 0.9% sodium chloride (MBP/ADV) 100 mL MBP  4.5 g IntraVENous Q8H    levETIRAcetam (KEPPRA) 750 mg in 0.9% sodium chloride 100 mL IVPB  750 mg IntraVENous Q12H    dexamethasone (DECADRON) 10 mg/mL injection 6 mg  6 mg IntraVENous Q24H    enoxaparin (LOVENOX) injection 40 mg  40 mg SubCUTAneous Q12H    benzonatate (TESSALON) capsule 100 mg  100 mg Oral TID PRN    [Held by provider] apixaban (ELIQUIS) tablet 2.5 mg  2.5 mg Oral Q12H    [Held by provider] clonazePAM (KlonoPIN) tablet 0.5 mg  0.5 mg Oral BID    [Held by provider] donepeziL (ARICEPT) tablet 10 mg 10 mg Oral DAILY    [Held by provider] memantine (NAMENDA) tablet 5 mg  5 mg Oral DAILY    QUEtiapine (SEROquel) tablet 25 mg  25 mg Oral QHS PRN    [Held by provider] sertraline (ZOLOFT) tablet 50 mg  50 mg Oral DAILY    albuterol-ipratropium (DUO-NEB) 2.5 MG-0.5 MG/3 ML  3 mL Nebulization Q4H PRN    cloNIDine HCL (CATAPRES) tablet 0.2 mg  0.2 mg Oral BID PRN    senna-docusate (PERICOLACE) 8.6-50 mg per tablet 2 Tablet  2 Tablet Oral BID PRN    loperamide (IMODIUM) capsule 4 mg  4 mg Oral BID PRN    sodium chloride (NS) flush 5-40 mL  5-40 mL IntraVENous Q8H    sodium chloride (NS) flush 5-40 mL  5-40 mL IntraVENous PRN    acetaminophen (TYLENOL) tablet 650 mg  650 mg Oral Q6H PRN    Or    acetaminophen (TYLENOL) suppository 650 mg  650 mg Rectal Q6H PRN    senna (SENOKOT) tablet 17.2 mg  2 Tablet Oral BID PRN    ondansetron (ZOFRAN) injection 4 mg  4 mg IntraVENous Q6H PRN       Signed:  Brijesh Perez MD    Part of this note may have been written by using a voice dictation software. The note has been proof read but may still contain some grammatical/other typographical errors.

## 2022-02-01 NOTE — PROGRESS NOTES
02/01/22 0724   Oxygen Therapy   O2 Sat (%) (!) 88 %   Pulse via Oximetry 66 beats per minute   O2 Device BIPAP   O2 Temperature 87.8 °F (31 °C)   FIO2 (%) 100 %   Respiratory   Respiratory (WDL) X   Respiratory Pattern Tachypneic   Breath Sounds Bilateral Diminished   Cough Non-productive   CPAP/BIPAP   Device Mode S/T   Mask Type and Size Other (comment)   Skin Condition intact   PIP Observed 15 cm H20   IPAP (cm H2O) 15 cm H2O   EPAP (cm H2O) 10 cm H2O   Inspiratory Time (sec) 1.1 seconds   Vt Spont (ml) 687 ml   Ve Observed (l/min) 14.8 l/min   Backup Rate 20   Total RR (Spontaneous) 23 breaths per minute   Leak (Estimated) 32 L/min   Pt's Home Machine No   Biomedical Check Performed Yes   Settings Verified Yes   Alarm Settings   High Pressure 30   Low Pressure 5   Low Ve 2   High Rate 50   Low Rate 5

## 2022-02-01 NOTE — PROGRESS NOTES
Nutrition    Noted goals of care with pt transitioning to comfort measures/hospice care. Regular diet in place. Per d/w RN, pt unable to take in PO 2/2 BIPAP. Nutrition to respectfully sign off.      Viola Jacobson [address], [address] [place] [address], [address] [place] [address], [address] [place] [address] Ave.

## 2022-02-02 NOTE — PROGRESS NOTES
Pt remains on Comfort care on 2L via NC. Ray draining minimal amount of urine this shift. No distress. Resp even and unlabored at rest. Bed alarm intact, SR up x 3, bed low locked. Will update oncoming nurse.

## 2022-02-02 NOTE — PROGRESS NOTES
CH received call PT had . [address] [place] [address] checked with RN. RN said family had visited earlier today. [address] [place] [address] left information about how Family could contact her if needed. [address] [place] [address] prayed for PT and his Family. . LE[facility]ARDO [facility].

## 2022-02-02 NOTE — PROGRESS NOTES
Hospitalist Progress Note   Admit Date:  2022  5:56 PM   Name:  Ryan Sutton   Age:  82 y.o. Sex:  male  :  1935   MRN:  [id number]   Room:  821/01    Presenting Complaint: No chief complaint on file. Reason(s) for Admission: COVID-19 [U07.1]     Hospital Course & Interval History:   Mr. Babatunde Trinh is an 81 y/o WM with a h/o dementia, TIA, pacemaker, AFib on Eliquis, Bipolar, MM on Revlimid who was admitted to our service on  with acute hypoxemic respiratory failure. Started on dexamethasone. Revlimid held, Oncology consulted and agreed. Developed thrombocytopenia which has since resolved. O2 needs incraesed from 6L to [address] max settings on  and then required Bipap on  AM. Transitioned back to Airvo  which he has tolerated so far. Rapid response was called on  as patient was found lying on the floor without oxygen. Oxygen saturations dropped to 60s. He was placed on BiPAP and transferred to the ICU. Intensivist consulted. He is currently on Precedex drip as he is agitated. Goals of care were discussed with the patient's daughter at bedside today. Agree with hospice/comfort care. All active interventions discontinued and hospice care initiated. Subjective (22): Patient seen at bedside, resting in bed appears comfortable, is nonverbal and minimally responsive. ROS: 10 point review system could not be obtained due to patient's level condition    Assessment & Plan:       Hospice/comfort care  All active interventions discontinued. Continue comfort care. Morphine 1 mg 4 times daily  Continue as needed IV morphine and Ativan. The following problems were taken care of prior to making the patient hospice/comfort care:     # Acute hypoxemic respiratory failure POA 2/2 COVID-19 pneumonia POA currently needing noninvasive positive pressure ventilation with BiPAP.   BiPAP weaned to nasal cannula for comfort.      # Leukopenia                 # Transaminitis              - COVID related?     # HypoK                # Thrombocytopenia                  # Acute encephalopathy superimposed on dementia              - Likely secondary to [place]. Currently on Precedex drip in the ICU. # AFib                 # Seizure d/o             # Multiple myeloma        Patient is at risk of imminent death. Diet:  ADULT DIET Regular  DVT PPx: Lovenox  Code status: DNR    Hospital Problems as of 2/2/2022 Date Reviewed: 8/8/2018          Codes Class Noted - Resolved POA    Transaminitis ICD-10-CM: R74.01  ICD-9-CM: 790.4  1/27/2022 - Present Yes        * (Principal) Acute respiratory failure with hypoxia ([facility] [address] ICD-10-CM: J96.01  ICD-9-CM: 518.81  1/23/2022 - Present Yes        Multiple myeloma (HCC) (Chronic) ICD-10-CM: C90.00  ICD-9-CM: 203.00  1/23/2022 - Present Yes        Seizures (HCC) (Chronic) ICD-10-CM: R56.9  ICD-9-CM: 780.39  1/23/2022 - Present Yes        COVID-19 ICD-10-CM: U07.1  ICD-9-CM: 079.89  1/22/2022 - Present Yes        A-fib (HCC) (Chronic) ICD-10-CM: I48.91  ICD-9-CM: 427.31  4/18/2020 - Present Yes        Leukopenia ICD-10-CM: Q86.684  ICD-9-CM: 288.50  8/2/2017 - Present Yes        Acute encephalopathy ICD-10-CM: G93.40  ICD-9-CM: 348.30  3/22/2015 - Present Yes        Anemia ICD-10-CM: D64.9  ICD-9-CM: 285.9  9/16/2014 - Present Yes    Overview Addendum 7/6/2017  5:01 PM by Kevin Waters MD     Recheck CBC.                    Objective:     Patient Vitals for the past 24 hrs:   Temp Pulse Resp BP SpO2   02/01/22 2010 97.6 °F (36.4 °C) 68 29 (!) 72/42 (!) 49 %   02/01/22 1945  62 22  (!) 64 %   02/01/22 1930  65 26  (!) 66 %   02/01/22 1915  60 25  (!) 67 %   02/01/22 1900  64 (!) 31  (!) 65 %   02/01/22 1845  63 26  (!) 66 %   02/01/22 1830  72 (!) 36  (!) 62 %   02/01/22 1815  63 26  (!) 68 %   02/01/22 1800  62 25  (!) 68 %   02/01/22 1745  60 26  (!) 68 %   02/01/22 1730  61 24  (!) 70 %   02/01/22 1715  61 24  (!) 70 %   02/01/22 1700  69 30  (!) 68 %   02/01/22 1645  62 24  (!) 72 %   02/01/22 1630  64 22  (!) 71 %   02/01/22 1615  65 22  (!) 71 %   02/01/22 1600  63 23  (!) 71 %   02/01/22 1545  64 26  (!) 71 %   02/01/22 1530  67 22  (!) 73 %   02/01/22 1515  66 26  (!) 71 %   02/01/22 1430  60 26  (!) 69 %   02/01/22 1415  62 26 (!) 67/38 (!) 72 %   02/01/22 1400  64 (!) 32 (!) 69/42 (!) 70 %   02/01/22 1345  66 27 (!) 105/54 91 %   02/01/22 1330  60 27 131/67 91 %   02/01/22 1315  75 15 128/67 93 %   02/01/22 1300  64 30 129/64 93 %   02/01/22 1245  60 28 124/61 92 %   02/01/22 1230  60 27 (!) 116/57 91 %   02/01/22 1215  66 14 125/66 92 %   02/01/22 1200  60 30 (!) 115/57 90 %   02/01/22 1145  (!) 144 (!) 33 (!) 98/52 90 %   02/01/22 1130  63 (!) 32 (!) 103/55 91 %   02/01/22 1115  66 (!) 35 (!) 104/50 90 %   02/01/22 1100 98.2 °F (36.8 °C) 62 (!) 34 (!) 104/54 (!) 88 %   02/01/22 1045  60 28 (!) 111/57 (!) 89 %   02/01/22 1030  (!) 117 (!) 35 (!) 124/59 (!) 88 %   02/01/22 1015  93 (!) 39 (!) 96/53 90 %   02/01/22 1000  73 (!) 32 (!) 114/56 91 %   02/01/22 0945  72 (!) 32 (!) 98/54 92 %   02/01/22 0930  72 30 (!) 112/56 94 %   02/01/22 0915  78 (!) 31 (!) 149/71 96 %   02/01/22 0900  77 30 (!) 83/51 91 %   02/01/22 0845  69 27 (!) 92/50 91 %   02/01/22 0830  63 26 (!) 83/46 91 %   02/01/22 0815  70 25 (!) 76/47 91 %   02/01/22 0800  65 24 (!) 78/53 91 %     Oxygen Therapy  O2 Sat (%): (!) 49 % (02/01/22 2010)  Pulse via Oximetry: 63 beats per minute (02/01/22 1945)  O2 Device: Nasal cannula (02/01/22 1345)  Skin Assessment: Clean, dry, & intact (02/01/22 1100)  Skin Protection for O2 Device: Yes (02/01/22 1100)  Orientation: Bilateral (01/30/22 1501)  Location: Cheek (01/30/22 1501)  Interventions: Mouth Care (02/01/22 0700)  O2 Flow Rate (L/min): 2 l/min (02/01/22 1345)  O2 Temperature: 87.8 °F (31 °C) (02/01/22 0724)  FIO2 (%): 100 % (02/01/22 0724)  ETCO2 (mmHg): 100 mmHg (01/30/22 1344)    Estimated body mass index is 33.63 kg/m² as calculated from the following:    Height as of this encounter: 6' (1.829 m). Weight as of this encounter: 112.5 kg (248 lb). Intake/Output Summary (Last 24 hours) at 2/2/2022 0769  Last data filed at 2/2/2022 0546  Gross per 24 hour   Intake 902.19 ml   Output 450 ml   Net 452.19 ml         Physical Exam:   Blood pressure (!) 72/42, pulse 68, temperature 97.6 °F (36.4 °C), resp. rate 29, height 6' (1.829 m), weight 112.5 kg (248 lb), SpO2 (!) 49 %. General:    Elderly male, resting in bed, nonverbal, NAD, somnolent. HEENT:           Head NCAT, PERRLA positive  Neck:  No restricted ROM. Trachea midline   CV:   RRR. No m/r/g. No jugular venous distension. Lungs:   Clear breath sounds anteriorly  Abdomen: Bowel sounds present. Soft, nontender, nondistended. Extremities: No cyanosis or clubbing. No edema  Skin:     No rashes and normal coloration. Warm and dry.     Neuro:  Patient is minimally responsive and nonverbal  Psych:  Unable to assess,    I have reviewed ordered lab tests and independently visualized imaging below:    Recent Labs:  Recent Results (from the past 48 hour(s))   D DIMER    Collection Time: 01/31/22  9:02 AM   Result Value Ref Range    D DIMER 2.91 (H) <0.56 ug/ml(FEU)       All Micro Results     Procedure Component Value Units Date/Time    BLOOD CULTURE [[id number]] Collected: 01/23/22 0026    Order Status: Completed Specimen: Blood Updated: 01/28/22 0711     Special Requests: --        RIGHT  Antecubital       Culture result: NO GROWTH 5 DAYS       BLOOD CULTURE [[id number]] Collected: 01/22/22 1919    Order Status: Completed Specimen: Blood Updated: 01/27/22 0722     Special Requests: --        NO SPECIAL REQUESTS  RIGHT  Antecubital       Culture result: NO GROWTH 5 DAYS       CULTURE, URINE [[id number]] Collected: 01/22/22 1841    Order Status: Completed Specimen: Cath Urine Updated: 01/25/22 0752     Special Requests: NO SPECIAL REQUESTS        Culture result: NO GROWTH 2 DAYS             Other Studies:  No results found. Current Meds:  Current Facility-Administered Medications   Medication Dose Route Frequency    morphine injection 1 mg  1 mg IntraVENous QID    morphine injection 2 mg  2 mg IntraVENous Q15MIN PRN    LORazepam (ATIVAN) injection 1 mg  1 mg IntraVENous Q15MIN PRN    glycopyrrolate (ROBINUL) injection 0.1 mg  0.1 mg IntraVENous QID PRN    dexmedeTOMidine in 0.9 % NaCl (PRECEDEX) 400 mcg/100 mL (4 mcg/mL) infusion soln  0.1-1.5 mcg/kg/hr IntraVENous TITRATE    ondansetron (ZOFRAN) injection 4 mg  4 mg IntraVENous Q6H PRN       Signed:  Saurabh Ramsey MD    Part of this note may have been written by using a voice dictation software. The note has been proof read but may still contain some grammatical/other typographical errors.

## 2022-02-02 NOTE — PROGRESS NOTES
TRANSFER - IN REPORT:    Verbal report received from Cindy Pereira RN on Dalton Higginbotham  being received from ICU for routine progression of care      Report consisted of patients Situation, Background, Assessment and   Recommendations(SBAR). Information from the following report(s) Kardex was reviewed with the receiving nurse. Opportunity for questions and clarification was provided. Assessment completed upon patients arrival to unit and care assumed.

## 2022-02-02 NOTE — PROGRESS NOTES
Ruben Anna received call to provide support for Daughter of PT moving to [address] [place] [address]. Ruben Anna prayed outside of Pt's room. RN then introduced Ruben Anna to Daughter and JOHN in waiting room. Daughter had recently arrived from [facility]. Daughter engaged in life review and talked about recent losses . [address] Anna provided empathetic spiritual presence, active listening, and therapeutic communication. Daughter was exhibiting appropriate but complicated grief. PT is from [place]. PT has three children. Daughter is oldest. She has a sister in [place]. Daughter stated there had been much loss to cancer in the past few years including her Mother and Younger Brother. Daughter expressed importance of jennifer and talked about PTs total healing. CH provided prayer with Daughter and JOHN outside of room. Ruben Anna offered additional spiritual support if needed. Ruben Anna continued to check on PT throughout remainder of her shift.  is grateful for support of RN. Rev. Sloan Ocampo M.Div.

## 2022-02-02 NOTE — PROGRESS NOTES
Resp even and unlabored at present with eyes closed on 2L via NC. Ray intact draining duyen urine. Bed alarm in place, SR up x 3, bed low locked.

## 2022-02-02 NOTE — PROGRESS NOTES
CM spoke with the patient's daughter about patient discharging to [facility]. Daughter has a lot of planning and arranging since she lives in [place] and needs to get back by next week. She would like some time to consider options. Daughter asked to speak with CM in person and will come to the hospital later today or tomorrow.

## 2022-08-11 NOTE — DISCHARGE SUMMARY
[address] [place] [address] Hematology & Oncology: Inpatient Hematology / Oncology Discharge Summary Note    Patient ID:  Brooke [place]  [id number]  89 y.o.  1935    Admit Date: 8/2/2017    Discharge Date: 8/10/2017    Admission Diagnoses: multimyeloma  Leukopenia  Leukopenia    Discharge Diagnoses:  Principal Diagnosis: <principal problem not specified>  Active Problems:    Leukopenia (8/2/2017)        Hospital Course:  Mr. Audrey Contreras is a [de-identified] y.o. male admitted on 8/2/2017 with a primary diagnosis of Multiple Myeloma, IgG Kappa, ISS Stage II (FISH still pending). He expressed suicidal ideation after being told the diagnosis and treatment plan and therefore was admitted to hospital. Psych was consulted and Dr. Wendie Miranda (tele-psych) made recommendations for medication adjustments. He has been weaned off of Lexapro and started on Seroquel. Dr. Gayla Schwartz did not find Mr. Audrey Contreras appropriate for involuntary commitment to facility but did recommend SNF rehab then to long term facility. Patient refuses to discuss long term facility. I have reached out to his daughter Silvia Locwkood at [phone] several times and have left message but have not received response. I did speak with his niece who is listed as emergency contact in patient's chart and she was made aware of patient's disposition. Mr. Audrey Contreras appears to be of no threat to self. When discussing his suicidal ideations, he denied ever having those thoughts. He has an appointment scheduled with Dr. Chioma Anguiano psychiatrist on 8/18 at 1 pm. He will follow up in clinic with Dr. Scott Powell one week.      Consults:  IP CONSULT TO PSYCHIATRY  IP CONSULT TO INTERVENTIONAL RADIOLOGY  IP CONSULT TO INTERVENTIONAL RADIOLOGY  IP CONSULT TO PSYCHIATRY  IP CONSULT TO PSYCHOLOGY  IP CONSULT TO PSYCHIATRY    Pertinent Diagnostic Studies:   Labs:    Recent Labs      08/10/17   0453  08/08/17   0412   WBC  3.4*  2.8*   HGB  11.5*  10.9*   PLT  127*  118*   ANEU  1.1*  1.1*    Recent Labs      08/10/17   0453 Previous Labs: No 17   0412   NA  145  144   K  3.8  3.8   CL  113*  113*   CO2  26  24   GLU  81  85   BUN  15  13   CREA  0.92  0.83   CA  8.4  8.4         OBJECTIVE:  Patient Vitals for the past 8 hrs:   BP Temp Pulse Resp SpO2 Weight   08/10/17 1109 146/72 97.9 °F (36.6 °C) 83 17 97 % -   08/10/17 0714 147/67 98.1 °F (36.7 °C) (!) 55 17 100 % -   08/10/17 0447 - - - - - 265 lb (120.2 kg)   08/10/17 0405 108/41 97.7 °F (36.5 °C) (!) 46 16 100 % -     Temp (24hrs), Av.9 °F (36.6 °C), Min:97.5 °F (36.4 °C), Max:98.2 °F (36.8 °C)    08/10 07 - 08/10 1900  In: 240 [P.O.:240]  Out: -     Physical Exam:  Constitutional: Well developed, well nourished male in no acute distress lying comfortably in bed. HEENT: Normocephalic and atraumatic. Oropharynx is clear, mucous membranes are moist.. Extraocular muscles are intact. Sclerae anicteric. Skin Warm and dry. No bruising and no rash noted. No erythema. No pallor. Respiratory Lungs are clear to auscultation bilaterally without wheezes, rales or rhonchi, normal air exchange without accessory muscle use. CVS Normal rate, regular rhythm and normal S1 and S2. No murmurs, gallops, or rubs. Abdomen Soft, nontender and nondistended, normoactive bowel sounds. Neuro Grossly nonfocal with no obvious sensory or motor deficits. MSK Normal range of motion in general.  No edema and no tenderness. Psych Appropriate mood and affect. Current Discharge Medication List      START taking these medications    Details   !! naproxen (NAPROSYN) 500 mg tablet Take 1 Tab by mouth two (2) times daily as needed. Qty: 60 Tab, Refills: 0      potassium chloride (K-DUR, KLOR-CON) 20 mEq tablet Take 1 Tab by mouth two (2) times a day. Qty: 60 Tab, Refills: 0      QUEtiapine (SEROQUEL) 25 mg tablet Take 1 Tab by mouth nightly. Qty: 30 Tab, Refills: 0       !! - Potential duplicate medications found. Please discuss with provider.       CONTINUE these medications which have How Did The Hair Loss Occur?: sudden in onset CHANGED    Details   !! ALPRAZolam (XANAX) 1 mg tablet Take 1 Tab by mouth three (3) times daily as needed for Anxiety. Max Daily Amount: 3 mg. Qty: 90 Tab, Refills: 0       !! - Potential duplicate medications found. Please discuss with provider. CONTINUE these medications which have NOT CHANGED    Details   multivitamin (ONE A DAY) tablet Take 1 Tab by mouth daily. Leg Brace (ANKLE BRACE) misc by Does Not Apply route. Ankle brace (bilatera). Qty: 2 Each, Refills: 1      !! ALPRAZolam (XANAX) 1 mg tablet Take 1 Tab by mouth three (3) times daily as needed for Anxiety. Max Daily Amount: 3 mg. Qty: 90 Tab, Refills: 5      !! naproxen (NAPROSYN) 500 mg tablet Take 1 Tab by mouth two (2) times daily as needed. Qty: 60 Tab, Refills: 5      lovastatin (MEVACOR) 40 mg tablet Take 1 Tab by mouth nightly. Qty: 90 Tab, Refills: 3      donepezil (ARICEPT) 10 mg tablet Take 1 Tab by mouth daily. Qty: 90 Tab, Refills: 3      aspirin delayed-release 81 mg tablet Take 81 mg by mouth daily. !! - Potential duplicate medications found. Please discuss with provider. STOP taking these medications       escitalopram oxalate (LEXAPRO) 20 mg tablet Comments:   Reason for Stopping:         meclizine (ANTIVERT) 25 mg tablet Comments:   Reason for Stopping:             DISPOSITION:  Follow-up Appointments   Procedures    FOLLOW UP VISIT Appointment in: One Week Please make follow up appointment with Dr. Kofi Rodas one week. Please make follow up appointment with Dr. Kofi Rodas one week. Standing Status:   Standing     Number of Occurrences:   1     Order Specific Question:   Appointment in     Answer: One Week   He has an appointment scheduled with Dr. Renay Salgado psychiatrist on 8/18 at 1 pm.      Over 30 minutes was spent in discharge planning and coordination of care.             Jelena Mcfadden NP  [facility] Hematology & Oncology  [address] [address]  Office : [phone]  Fax : [phone] How Severe Is Your Hair Loss?: mild Attending Addendum:  I personally evaluated the patient with Ayaka Joshi N.P.,  and agree with the assessment, findings and plan as documented. Appears well, heart regular without murmur, lungs clear, abdomen benign. Clinically stable for discharge to rehab. F/u w psychiatry as well as hematology as above. I spent 32 minutes on evaluation, management, counseling and discharge planning on patient.               Yaya Silva MD  [address]  Office : [phone]  Fax : [phone] What Hair Products Do You Use?: CBD oil

## 2023-09-07 NOTE — PROGRESS NOTES
Patient received sitting up in bedside recliner, offers no complants. Shift assessment completed. Will monitor. no

## 2024-08-30 NOTE — PROGRESS NOTES
All EP Catheters removed.  TRANSFER - OUT REPORT:    Verbal report given to [facility] RN(name) on Robert Small  being transferred to IR recovery(unit) for routine progression of care       Report consisted of patients Situation, Background, Assessment and   Recommendations(SBAR). Information from the following report(s) Procedure Summary and MAR was reviewed with the receiving nurse. Lines:   Venous Access Device power port 08/04/17 Upper chest (subclavicular area, right (Active)       Peripheral IV 03/23/15 Left Hand (Active)       Peripheral IV 08/02/17 Left Hand (Active)   Site Assessment Clean, dry, & intact 8/4/2017  7:45 AM   Phlebitis Assessment 0 8/4/2017  7:45 AM   Infiltration Assessment 0 8/4/2017  7:45 AM   Dressing Status Clean, dry, & intact 8/4/2017  7:45 AM   Dressing Type Tape;Transparent 8/4/2017  7:45 AM   Hub Color/Line Status Infusing;Pink 8/4/2017  7:45 AM   Alcohol Cap Used No 8/4/2017  7:45 AM        Opportunity for questions and clarification was provided.       Patient transported with:   Registered Nurse

## 2024-10-05 NOTE — DISCHARGE SUMMARY
Hospitalist Discharge Summary     Patient ID:  Celsa Goodman  [id number]  83 y.o.  1935  Admit date: 1/22/2022  5:56 PM  Discharge date and time: 2/2/2022  Attending: Farzana Cardenas MD  PCP:  None  Treatment Team: Attending Provider: Farzana Cardenas MD; Utilization Review: Meagan Cates, RN; Primary Nurse: Marilou Lee RN; Care Manager: Solomon Nugent Principal Diagnosis Acute respiratory failure with hypoxia ([address]   Principal Problem:    Acute respiratory failure with hypoxia ([address] (1/23/2022)    Active Problems:    Anemia (9/16/2014)      Overview: Recheck CBC. Acute encephalopathy (3/22/2015)      Leukopenia (8/2/2017)      A-fib ([address] (4/18/2020)      COVID-19 (1/22/2022)      Multiple myeloma ([address] (1/23/2022)      Seizures ([address] (1/23/2022)      Transaminitis (1/27/2022)       Mr. Tari Jasmine is an 83 y/o WM with a h/o dementia, TIA, pacemaker, AFib on Eliquis, Bipolar, MM on Revlimid who was admitted to our service on 1/22 with acute hypoxemic respiratory failure. Started on dexamethasone. Revlimid held, Oncology consulted and agreed. Developed thrombocytopenia which has since resolved. O2 needs incraesed from 6L to [address] max settings on 1/26 and then required Bipap on 1/27 AM. Transitioned back to Airvo 1/29 which he has tolerated so far. Rapid response was called on 1/13 as patient was found lying on the floor without oxygen. Oxygen saturations dropped to 60s. He was placed on BiPAP and transferred to the ICU. Intensivist consulted. He is currently on Precedex drip as he is agitated.     Goals of care were discussed with the patient's daughter at bedside 2/1/22. Agree with hospice/comfort care. All active interventions discontinued and hospice care initiated. Hospital Course:  Hospice/comfort care  All active interventions discontinued. Continue comfort care.   Morphine 1 mg 4 times daily  Continue as needed IV morphine and Ativan.     The following problems Urology consulted: no surgical/invasive intervention recommended  S/P left inguinal canal exploration with Penrose drain placed in the inguinal canal 9/25 per general surgeon.  Continue antibiotics per Infectious Disease thru 10/9   were taken care of prior to making the patient hospice/comfort care:      # Acute hypoxemic respiratory failure POA / COVID-19 pneumonia POA currently needing noninvasive positive pressure ventilation with BiPAP. BiPAP weaned to nasal cannula for comfort.      # Leukopenia                 # Transaminitis              - COVID related?     # HypoK                # Thrombocytopenia                  # Acute encephalopathy superimposed on dementia              - Likely secondary to [place]. Currently on Precedex drip in the ICU.       # AFib                 # Seizure d/o             # Multiple myeloma    Patient  at 1523 hrs.     Time spent to discharge patient 35 minutes  Signed:  Taz Nam MD  2022  4:04 PM